# Patient Record
Sex: FEMALE | Race: ASIAN | NOT HISPANIC OR LATINO
[De-identification: names, ages, dates, MRNs, and addresses within clinical notes are randomized per-mention and may not be internally consistent; named-entity substitution may affect disease eponyms.]

---

## 2017-01-05 ENCOUNTER — APPOINTMENT (OUTPATIENT)
Dept: OBGYN | Facility: HOSPITAL | Age: 37
End: 2017-01-05

## 2017-01-05 ENCOUNTER — OUTPATIENT (OUTPATIENT)
Dept: OUTPATIENT SERVICES | Facility: HOSPITAL | Age: 37
LOS: 1 days | End: 2017-01-05

## 2017-01-05 VITALS
SYSTOLIC BLOOD PRESSURE: 125 MMHG | HEART RATE: 79 BPM | WEIGHT: 139 LBS | BODY MASS INDEX: 24.62 KG/M2 | DIASTOLIC BLOOD PRESSURE: 87 MMHG

## 2017-01-05 DIAGNOSIS — Z30.42 ENCOUNTER FOR SURVEILLANCE OF INJECTABLE CONTRACEPTIVE: ICD-10-CM

## 2017-01-05 RX ORDER — MEDROXYPROGESTERONE ACETATE 150 MG/ML
150 INJECTION, SUSPENSION INTRAMUSCULAR
Qty: 0 | Refills: 0 | Status: COMPLETED | OUTPATIENT
Start: 2017-01-05

## 2017-01-05 RX ADMIN — MEDROXYPROGESTERONE ACETATE 0 MG/ML: 150 INJECTION, SUSPENSION INTRAMUSCULAR at 00:00

## 2017-03-23 ENCOUNTER — APPOINTMENT (OUTPATIENT)
Dept: OBGYN | Facility: HOSPITAL | Age: 37
End: 2017-03-23

## 2017-03-23 ENCOUNTER — OUTPATIENT (OUTPATIENT)
Dept: OUTPATIENT SERVICES | Facility: HOSPITAL | Age: 37
LOS: 1 days | End: 2017-03-23

## 2017-03-23 VITALS
WEIGHT: 138 LBS | SYSTOLIC BLOOD PRESSURE: 121 MMHG | HEART RATE: 72 BPM | BODY MASS INDEX: 24.45 KG/M2 | DIASTOLIC BLOOD PRESSURE: 73 MMHG

## 2017-03-23 DIAGNOSIS — Z30.42 ENCOUNTER FOR SURVEILLANCE OF INJECTABLE CONTRACEPTIVE: ICD-10-CM

## 2017-03-23 RX ORDER — MEDROXYPROGESTERONE ACETATE 150 MG/ML
150 INJECTION, SUSPENSION INTRAMUSCULAR
Qty: 0 | Refills: 0 | Status: COMPLETED | OUTPATIENT
Start: 2017-03-23

## 2017-03-23 RX ADMIN — MEDROXYPROGESTERONE ACETATE 0 MG/ML: 150 INJECTION, SUSPENSION INTRAMUSCULAR at 00:00

## 2017-04-03 ENCOUNTER — APPOINTMENT (OUTPATIENT)
Dept: OPHTHALMOLOGY | Facility: CLINIC | Age: 37
End: 2017-04-03

## 2017-05-30 ENCOUNTER — RESULT REVIEW (OUTPATIENT)
Age: 37
End: 2017-05-30

## 2017-05-30 ENCOUNTER — LABORATORY RESULT (OUTPATIENT)
Age: 37
End: 2017-05-30

## 2017-05-30 ENCOUNTER — APPOINTMENT (OUTPATIENT)
Dept: OBGYN | Facility: HOSPITAL | Age: 37
End: 2017-05-30

## 2017-05-30 ENCOUNTER — OUTPATIENT (OUTPATIENT)
Dept: OUTPATIENT SERVICES | Facility: HOSPITAL | Age: 37
LOS: 1 days | End: 2017-05-30

## 2017-05-30 VITALS
BODY MASS INDEX: 24.45 KG/M2 | DIASTOLIC BLOOD PRESSURE: 68 MMHG | HEART RATE: 74 BPM | WEIGHT: 138 LBS | SYSTOLIC BLOOD PRESSURE: 113 MMHG

## 2017-05-30 DIAGNOSIS — Z78.9 OTHER SPECIFIED HEALTH STATUS: ICD-10-CM

## 2017-05-30 LAB
ALBUMIN SERPL ELPH-MCNC: 4.6 G/DL — SIGNIFICANT CHANGE UP (ref 3.3–5)
ALP SERPL-CCNC: 113 U/L — SIGNIFICANT CHANGE UP (ref 40–120)
ALT FLD-CCNC: 35 U/L — HIGH (ref 4–33)
AST SERPL-CCNC: 25 U/L — SIGNIFICANT CHANGE UP (ref 4–32)
BASOPHILS # BLD AUTO: 0.05 K/UL — SIGNIFICANT CHANGE UP (ref 0–0.2)
BASOPHILS NFR BLD AUTO: 1.4 % — SIGNIFICANT CHANGE UP (ref 0–2)
BILIRUB SERPL-MCNC: 0.5 MG/DL — SIGNIFICANT CHANGE UP (ref 0.2–1.2)
BUN SERPL-MCNC: 17 MG/DL — SIGNIFICANT CHANGE UP (ref 7–23)
CALCIUM SERPL-MCNC: 9.5 MG/DL — SIGNIFICANT CHANGE UP (ref 8.4–10.5)
CHLORIDE SERPL-SCNC: 103 MMOL/L — SIGNIFICANT CHANGE UP (ref 98–107)
CO2 SERPL-SCNC: 21 MMOL/L — LOW (ref 22–31)
CREAT SERPL-MCNC: 0.7 MG/DL — SIGNIFICANT CHANGE UP (ref 0.5–1.3)
EOSINOPHIL # BLD AUTO: 0.09 K/UL — SIGNIFICANT CHANGE UP (ref 0–0.5)
EOSINOPHIL NFR BLD AUTO: 2.6 % — SIGNIFICANT CHANGE UP (ref 0–6)
GLUCOSE SERPL-MCNC: 83 MG/DL — SIGNIFICANT CHANGE UP (ref 70–99)
HBA1C BLD-MCNC: 5.5 % — SIGNIFICANT CHANGE UP (ref 4–5.6)
HBV SURFACE AG SER-ACNC: NEGATIVE — SIGNIFICANT CHANGE UP
HCT VFR BLD CALC: 39.2 % — SIGNIFICANT CHANGE UP (ref 34.5–45)
HGB BLD-MCNC: 12.3 G/DL — SIGNIFICANT CHANGE UP (ref 11.5–15.5)
HIV1 AG SER QL: SIGNIFICANT CHANGE UP
HIV1+2 AB SPEC QL: SIGNIFICANT CHANGE UP
IMM GRANULOCYTES NFR BLD AUTO: 0.3 % — SIGNIFICANT CHANGE UP (ref 0–1.5)
LYMPHOCYTES # BLD AUTO: 1.24 K/UL — SIGNIFICANT CHANGE UP (ref 1–3.3)
LYMPHOCYTES # BLD AUTO: 35.3 % — SIGNIFICANT CHANGE UP (ref 13–44)
MCHC RBC-ENTMCNC: 25 PG — LOW (ref 27–34)
MCHC RBC-ENTMCNC: 31.4 % — LOW (ref 32–36)
MCV RBC AUTO: 79.7 FL — LOW (ref 80–100)
MONOCYTES # BLD AUTO: 0.49 K/UL — SIGNIFICANT CHANGE UP (ref 0–0.9)
MONOCYTES NFR BLD AUTO: 14 % — SIGNIFICANT CHANGE UP (ref 2–14)
NEUTROPHILS # BLD AUTO: 1.63 K/UL — LOW (ref 1.8–7.4)
NEUTROPHILS NFR BLD AUTO: 46.4 % — SIGNIFICANT CHANGE UP (ref 43–77)
PLATELET # BLD AUTO: 237 K/UL — SIGNIFICANT CHANGE UP (ref 150–400)
PMV BLD: 10.1 FL — SIGNIFICANT CHANGE UP (ref 7–13)
POTASSIUM SERPL-MCNC: 3.7 MMOL/L — SIGNIFICANT CHANGE UP (ref 3.5–5.3)
POTASSIUM SERPL-SCNC: 3.7 MMOL/L — SIGNIFICANT CHANGE UP (ref 3.5–5.3)
PROT SERPL-MCNC: 7.6 G/DL — SIGNIFICANT CHANGE UP (ref 6–8.3)
RBC # BLD: 4.92 M/UL — SIGNIFICANT CHANGE UP (ref 3.8–5.2)
RBC # FLD: 13.8 % — SIGNIFICANT CHANGE UP (ref 10.3–14.5)
SODIUM SERPL-SCNC: 140 MMOL/L — SIGNIFICANT CHANGE UP (ref 135–145)
T4 FREE SERPL-MCNC: 1.36 NG/DL — SIGNIFICANT CHANGE UP (ref 0.9–1.8)
TSH SERPL-MCNC: 1.43 UIU/ML — SIGNIFICANT CHANGE UP (ref 0.27–4.2)
WBC # BLD: 3.51 K/UL — LOW (ref 3.8–10.5)
WBC # FLD AUTO: 3.51 K/UL — LOW (ref 3.8–10.5)

## 2017-05-30 RX ORDER — MEDROXYPROGESTERONE ACETATE 150 MG/ML
150 INJECTION, SUSPENSION INTRAMUSCULAR
Qty: 0 | Refills: 0 | Status: COMPLETED | OUTPATIENT
Start: 2017-05-30

## 2017-05-30 RX ADMIN — MEDROXYPROGESTERONE ACETATE 0 MG/ML: 150 INJECTION, SUSPENSION INTRAMUSCULAR at 00:00

## 2017-05-31 DIAGNOSIS — Z01.419 ENCOUNTER FOR GYNECOLOGICAL EXAMINATION (GENERAL) (ROUTINE) WITHOUT ABNORMAL FINDINGS: ICD-10-CM

## 2017-05-31 DIAGNOSIS — Z30.42 ENCOUNTER FOR SURVEILLANCE OF INJECTABLE CONTRACEPTIVE: ICD-10-CM

## 2017-05-31 DIAGNOSIS — Z78.9 OTHER SPECIFIED HEALTH STATUS: ICD-10-CM

## 2017-05-31 LAB
C TRACH RRNA SPEC QL NAA+PROBE: SIGNIFICANT CHANGE UP
HCV AB S/CO SERPL IA: 0.13 S/CO — SIGNIFICANT CHANGE UP
HCV AB SERPL-IMP: SIGNIFICANT CHANGE UP
HPV HIGH+LOW RISK DNA PNL CVX: SIGNIFICANT CHANGE UP
N GONORRHOEA RRNA SPEC QL NAA+PROBE: SIGNIFICANT CHANGE UP
SPECIMEN SOURCE: SIGNIFICANT CHANGE UP
T PALLIDUM AB TITR SER: NEGATIVE — SIGNIFICANT CHANGE UP

## 2017-06-01 LAB — CYTOLOGY SPEC DOC CYTO: SIGNIFICANT CHANGE UP

## 2017-08-16 ENCOUNTER — APPOINTMENT (OUTPATIENT)
Dept: OBGYN | Facility: HOSPITAL | Age: 37
End: 2017-08-16

## 2017-08-16 ENCOUNTER — OUTPATIENT (OUTPATIENT)
Dept: OUTPATIENT SERVICES | Facility: HOSPITAL | Age: 37
LOS: 1 days | End: 2017-08-16

## 2017-08-16 VITALS
DIASTOLIC BLOOD PRESSURE: 80 MMHG | BODY MASS INDEX: 24.62 KG/M2 | WEIGHT: 139 LBS | HEART RATE: 90 BPM | SYSTOLIC BLOOD PRESSURE: 135 MMHG

## 2017-08-16 DIAGNOSIS — Z30.42 ENCOUNTER FOR SURVEILLANCE OF INJECTABLE CONTRACEPTIVE: ICD-10-CM

## 2017-08-16 RX ORDER — MEDROXYPROGESTERONE ACETATE 150 MG/ML
150 INJECTION, SUSPENSION INTRAMUSCULAR
Qty: 0 | Refills: 0 | Status: COMPLETED | OUTPATIENT
Start: 2017-08-16

## 2017-08-16 RX ADMIN — MEDROXYPROGESTERONE ACETATE 0 MG/ML: 150 INJECTION, SUSPENSION INTRAMUSCULAR at 00:00

## 2017-10-02 ENCOUNTER — RX RENEWAL (OUTPATIENT)
Age: 37
End: 2017-10-02

## 2017-10-09 ENCOUNTER — APPOINTMENT (OUTPATIENT)
Dept: OPHTHALMOLOGY | Facility: CLINIC | Age: 37
End: 2017-10-09
Payer: COMMERCIAL

## 2017-10-09 DIAGNOSIS — H53.001 UNSPECIFIED AMBLYOPIA, RIGHT EYE: ICD-10-CM

## 2017-10-09 PROCEDURE — 92012 INTRM OPH EXAM EST PATIENT: CPT

## 2017-10-09 PROCEDURE — 92133 CPTRZD OPH DX IMG PST SGM ON: CPT

## 2017-10-09 PROCEDURE — 92226: CPT | Mod: RT

## 2017-11-01 ENCOUNTER — APPOINTMENT (OUTPATIENT)
Dept: OBGYN | Facility: HOSPITAL | Age: 37
End: 2017-11-01

## 2017-11-01 ENCOUNTER — OUTPATIENT (OUTPATIENT)
Dept: OUTPATIENT SERVICES | Facility: HOSPITAL | Age: 37
LOS: 1 days | End: 2017-11-01

## 2017-11-01 VITALS
WEIGHT: 139 LBS | SYSTOLIC BLOOD PRESSURE: 132 MMHG | DIASTOLIC BLOOD PRESSURE: 72 MMHG | BODY MASS INDEX: 24.63 KG/M2 | HEART RATE: 74 BPM | HEIGHT: 63 IN

## 2017-11-01 DIAGNOSIS — Z30.42 ENCOUNTER FOR SURVEILLANCE OF INJECTABLE CONTRACEPTIVE: ICD-10-CM

## 2017-11-01 RX ORDER — MEDROXYPROGESTERONE ACETATE 150 MG/ML
150 INJECTION, SUSPENSION INTRAMUSCULAR
Qty: 0 | Refills: 0 | Status: COMPLETED | OUTPATIENT
Start: 2017-11-01

## 2017-11-01 RX ADMIN — MEDROXYPROGESTERONE ACETATE 0 MG/ML: 150 INJECTION, SUSPENSION INTRAMUSCULAR at 00:00

## 2018-01-11 ENCOUNTER — OUTPATIENT (OUTPATIENT)
Dept: OUTPATIENT SERVICES | Facility: HOSPITAL | Age: 38
LOS: 1 days | End: 2018-01-11

## 2018-01-11 ENCOUNTER — APPOINTMENT (OUTPATIENT)
Dept: OBGYN | Facility: HOSPITAL | Age: 38
End: 2018-01-11

## 2018-01-11 VITALS
WEIGHT: 141.19 LBS | DIASTOLIC BLOOD PRESSURE: 74 MMHG | BODY MASS INDEX: 25.01 KG/M2 | HEART RATE: 78 BPM | SYSTOLIC BLOOD PRESSURE: 116 MMHG

## 2018-01-11 DIAGNOSIS — Z30.42 ENCOUNTER FOR SURVEILLANCE OF INJECTABLE CONTRACEPTIVE: ICD-10-CM

## 2018-01-11 RX ORDER — MEDROXYPROGESTERONE ACETATE 150 MG/ML
150 INJECTION, SUSPENSION INTRAMUSCULAR
Qty: 0 | Refills: 0 | Status: COMPLETED | OUTPATIENT
Start: 2018-01-11

## 2018-01-11 RX ADMIN — MEDROXYPROGESTERONE ACETATE 0 MG/ML: 150 INJECTION, SUSPENSION INTRAMUSCULAR at 00:00

## 2018-02-26 ENCOUNTER — LABORATORY RESULT (OUTPATIENT)
Age: 38
End: 2018-02-26

## 2018-02-26 ENCOUNTER — APPOINTMENT (OUTPATIENT)
Dept: INTERNAL MEDICINE | Facility: HOSPITAL | Age: 38
End: 2018-02-26
Payer: COMMERCIAL

## 2018-02-26 ENCOUNTER — OTHER (OUTPATIENT)
Age: 38
End: 2018-02-26

## 2018-02-26 ENCOUNTER — OUTPATIENT (OUTPATIENT)
Dept: OUTPATIENT SERVICES | Facility: HOSPITAL | Age: 38
LOS: 1 days | End: 2018-02-26

## 2018-02-26 VITALS — HEART RATE: 64 BPM | DIASTOLIC BLOOD PRESSURE: 70 MMHG | SYSTOLIC BLOOD PRESSURE: 120 MMHG

## 2018-02-26 VITALS — HEIGHT: 63 IN | BODY MASS INDEX: 24.98 KG/M2 | WEIGHT: 141 LBS

## 2018-02-26 DIAGNOSIS — Z30.41 ENCOUNTER FOR SURVEILLANCE OF CONTRACEPTIVE PILLS: ICD-10-CM

## 2018-02-26 DIAGNOSIS — T78.1XXD OTHER ADVERSE FOOD REACTIONS, NOT ELSEWHERE CLASSIFIED, SUBSEQUENT ENCOUNTER: ICD-10-CM

## 2018-02-26 DIAGNOSIS — T88.7XXD UNSPECIFIED ADVERSE EFFECT OF DRUG OR MEDICAMENT, SUBSEQUENT ENCOUNTER: ICD-10-CM

## 2018-02-26 DIAGNOSIS — Z01.419 ENCOUNTER FOR GYNECOLOGICAL EXAMINATION (GENERAL) (ROUTINE) W/OUT ABNORMAL FINDINGS: ICD-10-CM

## 2018-02-26 DIAGNOSIS — R71.8 OTHER ABNORMALITY OF RED BLOOD CELLS: ICD-10-CM

## 2018-02-26 DIAGNOSIS — H40.003 PREGLAUCOMA, UNSPECIFIED, BILATERAL: ICD-10-CM

## 2018-02-26 DIAGNOSIS — N64.4 MASTODYNIA: ICD-10-CM

## 2018-02-26 DIAGNOSIS — H04.123 DRY EYE SYNDROME OF BILATERAL LACRIMAL GLANDS: ICD-10-CM

## 2018-02-26 DIAGNOSIS — Z88.0 ALLERGY STATUS TO PENICILLIN: ICD-10-CM

## 2018-02-26 LAB
BASOPHILS # BLD AUTO: 0.05 K/UL — SIGNIFICANT CHANGE UP (ref 0–0.2)
BASOPHILS NFR BLD AUTO: 0.8 % — SIGNIFICANT CHANGE UP (ref 0–2)
EOSINOPHIL # BLD AUTO: 0.09 K/UL — SIGNIFICANT CHANGE UP (ref 0–0.5)
EOSINOPHIL NFR BLD AUTO: 1.4 % — SIGNIFICANT CHANGE UP (ref 0–6)
FERRITIN SERPL-MCNC: 68.66 NG/ML — SIGNIFICANT CHANGE UP (ref 15–150)
HCT VFR BLD CALC: 40.3 % — SIGNIFICANT CHANGE UP (ref 34.5–45)
HGB BLD-MCNC: 12.5 G/DL — SIGNIFICANT CHANGE UP (ref 11.5–15.5)
IMM GRANULOCYTES # BLD AUTO: 0.02 # — SIGNIFICANT CHANGE UP
IMM GRANULOCYTES NFR BLD AUTO: 0.3 % — SIGNIFICANT CHANGE UP (ref 0–1.5)
IRON SATN MFR SERPL: 66 UG/DL — SIGNIFICANT CHANGE UP (ref 30–160)
LYMPHOCYTES # BLD AUTO: 1.24 K/UL — SIGNIFICANT CHANGE UP (ref 1–3.3)
LYMPHOCYTES # BLD AUTO: 19.8 % — SIGNIFICANT CHANGE UP (ref 13–44)
MCHC RBC-ENTMCNC: 25.3 PG — LOW (ref 27–34)
MCHC RBC-ENTMCNC: 31 % — LOW (ref 32–36)
MCV RBC AUTO: 81.6 FL — SIGNIFICANT CHANGE UP (ref 80–100)
MONOCYTES # BLD AUTO: 0.39 K/UL — SIGNIFICANT CHANGE UP (ref 0–0.9)
MONOCYTES NFR BLD AUTO: 6.2 % — SIGNIFICANT CHANGE UP (ref 2–14)
NEUTROPHILS # BLD AUTO: 4.48 K/UL — SIGNIFICANT CHANGE UP (ref 1.8–7.4)
NEUTROPHILS NFR BLD AUTO: 71.5 % — SIGNIFICANT CHANGE UP (ref 43–77)
NRBC # FLD: 0 — SIGNIFICANT CHANGE UP
PLATELET # BLD AUTO: 245 K/UL — SIGNIFICANT CHANGE UP (ref 150–400)
PMV BLD: 10.3 FL — SIGNIFICANT CHANGE UP (ref 7–13)
RBC # BLD: 4.94 M/UL — SIGNIFICANT CHANGE UP (ref 3.8–5.2)
RBC # FLD: 13.6 % — SIGNIFICANT CHANGE UP (ref 10.3–14.5)
WBC # BLD: 6.27 K/UL — SIGNIFICANT CHANGE UP (ref 3.8–10.5)
WBC # FLD AUTO: 6.27 K/UL — SIGNIFICANT CHANGE UP (ref 3.8–10.5)

## 2018-02-26 PROCEDURE — 99385 PREV VISIT NEW AGE 18-39: CPT | Mod: GC

## 2018-02-27 PROBLEM — R71.8 LOW MEAN CORPUSCULAR VOLUME (MCV): Status: ACTIVE | Noted: 2018-02-27

## 2018-02-27 LAB
MEV IGG SER-ACNC: >300 AU/ML — SIGNIFICANT CHANGE UP
MEV IGG+IGM SER-IMP: POSITIVE — SIGNIFICANT CHANGE UP
MUV AB SER-ACNC: NEGATIVE — SIGNIFICANT CHANGE UP
MUV IGG FLD-ACNC: <5 AU/ML — SIGNIFICANT CHANGE UP
RUBV IGG SER-ACNC: 1.5 INDEX — SIGNIFICANT CHANGE UP
RUBV IGG SER-IMP: POSITIVE — SIGNIFICANT CHANGE UP
VZV IGG FLD QL IA: 34.1 INDEX — SIGNIFICANT CHANGE UP
VZV IGG FLD QL IA: NEGATIVE — SIGNIFICANT CHANGE UP

## 2018-02-28 DIAGNOSIS — J30.9 ALLERGIC RHINITIS, UNSPECIFIED: ICD-10-CM

## 2018-02-28 DIAGNOSIS — Z88.0 ALLERGY STATUS TO PENICILLIN: ICD-10-CM

## 2018-02-28 DIAGNOSIS — R71.8 OTHER ABNORMALITY OF RED BLOOD CELLS: ICD-10-CM

## 2018-02-28 DIAGNOSIS — J45.20 MILD INTERMITTENT ASTHMA, UNCOMPLICATED: ICD-10-CM

## 2018-02-28 DIAGNOSIS — Z00.00 ENCOUNTER FOR GENERAL ADULT MEDICAL EXAMINATION WITHOUT ABNORMAL FINDINGS: ICD-10-CM

## 2018-02-28 DIAGNOSIS — Z88.9 ALLERGY STATUS TO UNSPECIFIED DRUGS, MEDICAMENTS AND BIOLOGICAL SUBSTANCES: ICD-10-CM

## 2018-02-28 LAB
MEV IGM SER-ACNC: <20 AU/ML — SIGNIFICANT CHANGE UP (ref 0–19.9)
MEV IGM SER-ACNC: NEGATIVE — SIGNIFICANT CHANGE UP

## 2018-03-02 ENCOUNTER — OUTPATIENT (OUTPATIENT)
Dept: OUTPATIENT SERVICES | Facility: HOSPITAL | Age: 38
LOS: 1 days | End: 2018-03-02

## 2018-03-02 ENCOUNTER — APPOINTMENT (OUTPATIENT)
Dept: INTERNAL MEDICINE | Facility: HOSPITAL | Age: 38
End: 2018-03-02

## 2018-03-02 LAB — MISCELLANEOUS - CHEM: SIGNIFICANT CHANGE UP

## 2018-03-05 DIAGNOSIS — Z23 ENCOUNTER FOR IMMUNIZATION: ICD-10-CM

## 2018-04-04 ENCOUNTER — APPOINTMENT (OUTPATIENT)
Dept: OBGYN | Facility: HOSPITAL | Age: 38
End: 2018-04-04

## 2018-04-04 ENCOUNTER — OUTPATIENT (OUTPATIENT)
Dept: OUTPATIENT SERVICES | Facility: HOSPITAL | Age: 38
LOS: 1 days | End: 2018-04-04

## 2018-04-04 ENCOUNTER — MED ADMIN CHARGE (OUTPATIENT)
Age: 38
End: 2018-04-04

## 2018-04-04 VITALS
DIASTOLIC BLOOD PRESSURE: 78 MMHG | SYSTOLIC BLOOD PRESSURE: 114 MMHG | HEART RATE: 96 BPM | BODY MASS INDEX: 24.86 KG/M2 | WEIGHT: 140.31 LBS

## 2018-04-04 DIAGNOSIS — Z30.42 ENCOUNTER FOR SURVEILLANCE OF INJECTABLE CONTRACEPTIVE: ICD-10-CM

## 2018-04-04 RX ORDER — MEDROXYPROGESTERONE ACETATE 150 MG/ML
150 INJECTION, SUSPENSION INTRAMUSCULAR
Qty: 0 | Refills: 0 | Status: COMPLETED | OUTPATIENT
Start: 2018-04-04

## 2018-04-04 RX ADMIN — MEDROXYPROGESTERONE ACETATE 0 MG/ML: 150 INJECTION, SUSPENSION INTRAMUSCULAR at 00:00

## 2018-04-09 ENCOUNTER — APPOINTMENT (OUTPATIENT)
Dept: OPHTHALMOLOGY | Facility: CLINIC | Age: 38
End: 2018-04-09

## 2018-06-27 ENCOUNTER — LABORATORY RESULT (OUTPATIENT)
Age: 38
End: 2018-06-27

## 2018-06-27 ENCOUNTER — MED ADMIN CHARGE (OUTPATIENT)
Age: 38
End: 2018-06-27

## 2018-06-27 ENCOUNTER — APPOINTMENT (OUTPATIENT)
Dept: OBGYN | Facility: HOSPITAL | Age: 38
End: 2018-06-27

## 2018-06-27 ENCOUNTER — OUTPATIENT (OUTPATIENT)
Dept: OUTPATIENT SERVICES | Facility: HOSPITAL | Age: 38
LOS: 1 days | End: 2018-06-27

## 2018-06-27 VITALS
HEART RATE: 67 BPM | WEIGHT: 144 LBS | BODY MASS INDEX: 25.51 KG/M2 | DIASTOLIC BLOOD PRESSURE: 75 MMHG | SYSTOLIC BLOOD PRESSURE: 125 MMHG

## 2018-06-27 DIAGNOSIS — Z87.42 PERSONAL HISTORY OF OTHER DISEASES OF THE FEMALE GENITAL TRACT: ICD-10-CM

## 2018-06-27 DIAGNOSIS — Z01.419 ENCOUNTER FOR GYNECOLOGICAL EXAMINATION (GENERAL) (ROUTINE) WITHOUT ABNORMAL FINDINGS: ICD-10-CM

## 2018-06-27 DIAGNOSIS — Z30.42 ENCOUNTER FOR SURVEILLANCE OF INJECTABLE CONTRACEPTIVE: ICD-10-CM

## 2018-06-27 DIAGNOSIS — M25.569 PAIN IN UNSPECIFIED KNEE: ICD-10-CM

## 2018-06-27 LAB
24R-OH-CALCIDIOL SERPL-MCNC: 12.3 NG/ML — LOW (ref 30–80)
ALBUMIN SERPL ELPH-MCNC: 4.5 G/DL — SIGNIFICANT CHANGE UP (ref 3.3–5)
ALP SERPL-CCNC: 87 U/L — SIGNIFICANT CHANGE UP (ref 40–120)
ALT FLD-CCNC: 13 U/L — SIGNIFICANT CHANGE UP (ref 4–33)
AST SERPL-CCNC: 15 U/L — SIGNIFICANT CHANGE UP (ref 4–32)
BILIRUB SERPL-MCNC: 0.6 MG/DL — SIGNIFICANT CHANGE UP (ref 0.2–1.2)
BUN SERPL-MCNC: 12 MG/DL — SIGNIFICANT CHANGE UP (ref 7–23)
CALCIUM SERPL-MCNC: 9.1 MG/DL — SIGNIFICANT CHANGE UP (ref 8.4–10.5)
CHLORIDE SERPL-SCNC: 105 MMOL/L — SIGNIFICANT CHANGE UP (ref 98–107)
CO2 SERPL-SCNC: 23 MMOL/L — SIGNIFICANT CHANGE UP (ref 22–31)
CREAT SERPL-MCNC: 0.67 MG/DL — SIGNIFICANT CHANGE UP (ref 0.5–1.3)
GLUCOSE SERPL-MCNC: 80 MG/DL — SIGNIFICANT CHANGE UP (ref 70–99)
HBV SURFACE AG SER-ACNC: NONREACTIVE — SIGNIFICANT CHANGE UP
HCV AB S/CO SERPL IA: 0.1 S/CO — SIGNIFICANT CHANGE UP
HCV AB SERPL-IMP: SIGNIFICANT CHANGE UP
HIV 1+2 AB+HIV1 P24 AG SERPL QL IA: SIGNIFICANT CHANGE UP
POTASSIUM SERPL-MCNC: 3.5 MMOL/L — SIGNIFICANT CHANGE UP (ref 3.5–5.3)
POTASSIUM SERPL-SCNC: 3.5 MMOL/L — SIGNIFICANT CHANGE UP (ref 3.5–5.3)
PROT SERPL-MCNC: 7.6 G/DL — SIGNIFICANT CHANGE UP (ref 6–8.3)
SODIUM SERPL-SCNC: 141 MMOL/L — SIGNIFICANT CHANGE UP (ref 135–145)
T4 FREE SERPL-MCNC: 1.54 NG/DL — SIGNIFICANT CHANGE UP (ref 0.9–1.8)
TSH SERPL-MCNC: 1.11 UIU/ML — SIGNIFICANT CHANGE UP (ref 0.27–4.2)

## 2018-06-27 RX ORDER — MEDROXYPROGESTERONE ACETATE 150 MG/ML
150 INJECTION, SUSPENSION INTRAMUSCULAR
Qty: 0 | Refills: 0 | Status: COMPLETED | OUTPATIENT
Start: 2018-06-27

## 2018-06-27 RX ADMIN — MEDROXYPROGESTERONE ACETATE 0 MG/ML: 150 INJECTION, SUSPENSION INTRAMUSCULAR at 00:00

## 2018-06-28 DIAGNOSIS — E55.9 VITAMIN D DEFICIENCY, UNSPECIFIED: ICD-10-CM

## 2018-06-28 LAB
C TRACH RRNA SPEC QL NAA+PROBE: SIGNIFICANT CHANGE UP
N GONORRHOEA RRNA SPEC QL NAA+PROBE: SIGNIFICANT CHANGE UP
SPECIMEN SOURCE: SIGNIFICANT CHANGE UP

## 2018-06-28 RX ORDER — CHROMIUM 200 MCG
1000 TABLET ORAL DAILY
Qty: 30 | Refills: 5 | Status: COMPLETED | COMMUNITY
Start: 2018-06-28 | End: 2018-12-25

## 2018-07-11 ENCOUNTER — APPOINTMENT (OUTPATIENT)
Dept: ORTHOPEDIC SURGERY | Facility: HOSPITAL | Age: 38
End: 2018-07-11

## 2018-08-07 ENCOUNTER — RX RENEWAL (OUTPATIENT)
Age: 38
End: 2018-08-07

## 2018-08-15 ENCOUNTER — APPOINTMENT (OUTPATIENT)
Dept: ORTHOPEDIC SURGERY | Facility: HOSPITAL | Age: 38
End: 2018-08-15

## 2018-09-12 ENCOUNTER — APPOINTMENT (OUTPATIENT)
Dept: OBGYN | Facility: HOSPITAL | Age: 38
End: 2018-09-12

## 2018-09-12 ENCOUNTER — OUTPATIENT (OUTPATIENT)
Dept: OUTPATIENT SERVICES | Facility: HOSPITAL | Age: 38
LOS: 1 days | End: 2018-09-12

## 2018-09-12 ENCOUNTER — MED ADMIN CHARGE (OUTPATIENT)
Age: 38
End: 2018-09-12

## 2018-09-12 DIAGNOSIS — Z30.42 ENCOUNTER FOR SURVEILLANCE OF INJECTABLE CONTRACEPTIVE: ICD-10-CM

## 2018-09-12 RX ORDER — MEDROXYPROGESTERONE ACETATE 150 MG/ML
150 INJECTION, SUSPENSION INTRAMUSCULAR
Qty: 0 | Refills: 0 | Status: COMPLETED | OUTPATIENT
Start: 2018-09-12

## 2018-09-12 RX ADMIN — MEDROXYPROGESTERONE ACETATE 0 MG/ML: 150 INJECTION, SUSPENSION INTRAMUSCULAR at 00:00

## 2018-11-21 ENCOUNTER — OUTPATIENT (OUTPATIENT)
Dept: OUTPATIENT SERVICES | Facility: HOSPITAL | Age: 38
LOS: 1 days | End: 2018-11-21

## 2018-11-21 ENCOUNTER — APPOINTMENT (OUTPATIENT)
Dept: OBGYN | Facility: HOSPITAL | Age: 38
End: 2018-11-21

## 2018-11-21 VITALS
WEIGHT: 144 LBS | SYSTOLIC BLOOD PRESSURE: 117 MMHG | HEIGHT: 63 IN | BODY MASS INDEX: 25.52 KG/M2 | DIASTOLIC BLOOD PRESSURE: 83 MMHG | HEART RATE: 72 BPM

## 2018-11-21 DIAGNOSIS — Z30.42 ENCOUNTER FOR SURVEILLANCE OF INJECTABLE CONTRACEPTIVE: ICD-10-CM

## 2018-11-21 RX ORDER — MEDROXYPROGESTERONE ACETATE 150 MG/ML
150 INJECTION, SUSPENSION INTRAMUSCULAR
Qty: 0 | Refills: 0 | Status: COMPLETED | OUTPATIENT
Start: 2018-11-21

## 2018-11-21 RX ADMIN — MEDROXYPROGESTERONE ACETATE 0 MG/ML: 150 INJECTION, SUSPENSION INTRAMUSCULAR at 00:00

## 2019-02-05 ENCOUNTER — APPOINTMENT (OUTPATIENT)
Dept: OBGYN | Facility: HOSPITAL | Age: 39
End: 2019-02-05

## 2019-02-05 ENCOUNTER — OUTPATIENT (OUTPATIENT)
Dept: OUTPATIENT SERVICES | Facility: HOSPITAL | Age: 39
LOS: 1 days | End: 2019-02-05

## 2019-02-05 VITALS
SYSTOLIC BLOOD PRESSURE: 129 MMHG | HEIGHT: 63 IN | BODY MASS INDEX: 25.77 KG/M2 | HEART RATE: 79 BPM | DIASTOLIC BLOOD PRESSURE: 89 MMHG | WEIGHT: 145.44 LBS

## 2019-02-05 DIAGNOSIS — Z30.42 ENCOUNTER FOR SURVEILLANCE OF INJECTABLE CONTRACEPTIVE: ICD-10-CM

## 2019-02-05 RX ORDER — MEDROXYPROGESTERONE ACETATE 150 MG/ML
150 INJECTION, SUSPENSION INTRAMUSCULAR
Qty: 0 | Refills: 0 | Status: COMPLETED | OUTPATIENT
Start: 2019-02-05

## 2019-02-05 RX ADMIN — MEDROXYPROGESTERONE ACETATE 0 MG/ML: 150 INJECTION, SUSPENSION INTRAMUSCULAR at 00:00

## 2019-02-05 NOTE — DISCUSSION/SUMMARY
[Depo Provera] : depo provera placement [Pregnancy Prevention] : pregnancy prevention [Bleeding Control] : bleeding control

## 2019-04-04 ENCOUNTER — OUTPATIENT (OUTPATIENT)
Dept: OUTPATIENT SERVICES | Facility: HOSPITAL | Age: 39
LOS: 1 days | End: 2019-04-04
Payer: COMMERCIAL

## 2019-04-04 ENCOUNTER — TRANSCRIPTION ENCOUNTER (OUTPATIENT)
Age: 39
End: 2019-04-04

## 2019-04-04 ENCOUNTER — APPOINTMENT (OUTPATIENT)
Dept: ULTRASOUND IMAGING | Facility: IMAGING CENTER | Age: 39
End: 2019-04-04
Payer: COMMERCIAL

## 2019-04-04 DIAGNOSIS — R10.2 PELVIC AND PERINEAL PAIN: ICD-10-CM

## 2019-04-04 PROCEDURE — 76830 TRANSVAGINAL US NON-OB: CPT | Mod: 26

## 2019-04-04 PROCEDURE — 76830 TRANSVAGINAL US NON-OB: CPT

## 2019-04-11 ENCOUNTER — OUTPATIENT (OUTPATIENT)
Dept: OUTPATIENT SERVICES | Facility: HOSPITAL | Age: 39
LOS: 1 days | End: 2019-04-11

## 2019-04-11 ENCOUNTER — APPOINTMENT (OUTPATIENT)
Dept: OBGYN | Facility: HOSPITAL | Age: 39
End: 2019-04-11
Payer: COMMERCIAL

## 2019-04-11 VITALS
BODY MASS INDEX: 26.17 KG/M2 | DIASTOLIC BLOOD PRESSURE: 69 MMHG | SYSTOLIC BLOOD PRESSURE: 121 MMHG | WEIGHT: 147.71 LBS | HEIGHT: 63 IN | HEART RATE: 86 BPM

## 2019-04-11 DIAGNOSIS — N70.11 CHRONIC SALPINGITIS: ICD-10-CM

## 2019-04-11 PROCEDURE — 99213 OFFICE O/P EST LOW 20 MIN: CPT | Mod: GE

## 2019-04-11 RX ORDER — MEDROXYPROGESTERONE ACETATE 150 MG/ML
150 INJECTION, SUSPENSION INTRAMUSCULAR
Qty: 0 | Refills: 0 | Status: COMPLETED | OUTPATIENT
Start: 2019-04-11

## 2019-04-11 RX ADMIN — MEDROXYPROGESTERONE ACETATE 0 MG/ML: 150 INJECTION, SUSPENSION INTRAMUSCULAR at 00:00

## 2019-04-12 DIAGNOSIS — N70.11 CHRONIC SALPINGITIS: ICD-10-CM

## 2019-04-12 DIAGNOSIS — Z30.42 ENCOUNTER FOR SURVEILLANCE OF INJECTABLE CONTRACEPTIVE: ICD-10-CM

## 2019-04-23 ENCOUNTER — APPOINTMENT (OUTPATIENT)
Dept: OBGYN | Facility: HOSPITAL | Age: 39
End: 2019-04-23

## 2019-04-24 ENCOUNTER — OUTPATIENT (OUTPATIENT)
Dept: OUTPATIENT SERVICES | Facility: HOSPITAL | Age: 39
LOS: 1 days | End: 2019-04-24

## 2019-04-24 VITALS
OXYGEN SATURATION: 98 % | WEIGHT: 147.93 LBS | SYSTOLIC BLOOD PRESSURE: 110 MMHG | TEMPERATURE: 99 F | DIASTOLIC BLOOD PRESSURE: 60 MMHG | RESPIRATION RATE: 16 BRPM | HEIGHT: 64 IN | HEART RATE: 88 BPM

## 2019-04-24 DIAGNOSIS — N70.11 CHRONIC SALPINGITIS: ICD-10-CM

## 2019-04-24 LAB
BASOPHILS # BLD AUTO: 0.06 K/UL — SIGNIFICANT CHANGE UP (ref 0–0.2)
BASOPHILS NFR BLD AUTO: 1.2 % — SIGNIFICANT CHANGE UP (ref 0–2)
BLD GP AB SCN SERPL QL: NEGATIVE — SIGNIFICANT CHANGE UP
EOSINOPHIL # BLD AUTO: 0.17 K/UL — SIGNIFICANT CHANGE UP (ref 0–0.5)
EOSINOPHIL NFR BLD AUTO: 3.3 % — SIGNIFICANT CHANGE UP (ref 0–6)
HCG SERPL-ACNC: < 5 MIU/ML — SIGNIFICANT CHANGE UP
HCT VFR BLD CALC: 39.6 % — SIGNIFICANT CHANGE UP (ref 34.5–45)
HGB BLD-MCNC: 12.1 G/DL — SIGNIFICANT CHANGE UP (ref 11.5–15.5)
IMM GRANULOCYTES NFR BLD AUTO: 0.4 % — SIGNIFICANT CHANGE UP (ref 0–1.5)
LYMPHOCYTES # BLD AUTO: 0.94 K/UL — LOW (ref 1–3.3)
LYMPHOCYTES # BLD AUTO: 18 % — SIGNIFICANT CHANGE UP (ref 13–44)
MCHC RBC-ENTMCNC: 25.4 PG — LOW (ref 27–34)
MCHC RBC-ENTMCNC: 30.6 % — LOW (ref 32–36)
MCV RBC AUTO: 83.2 FL — SIGNIFICANT CHANGE UP (ref 80–100)
MONOCYTES # BLD AUTO: 0.52 K/UL — SIGNIFICANT CHANGE UP (ref 0–0.9)
MONOCYTES NFR BLD AUTO: 10 % — SIGNIFICANT CHANGE UP (ref 2–14)
NEUTROPHILS # BLD AUTO: 3.5 K/UL — SIGNIFICANT CHANGE UP (ref 1.8–7.4)
NEUTROPHILS NFR BLD AUTO: 67.1 % — SIGNIFICANT CHANGE UP (ref 43–77)
NRBC # FLD: 0 K/UL — SIGNIFICANT CHANGE UP (ref 0–0)
PLATELET # BLD AUTO: 250 K/UL — SIGNIFICANT CHANGE UP (ref 150–400)
PMV BLD: 10.1 FL — SIGNIFICANT CHANGE UP (ref 7–13)
RBC # BLD: 4.76 M/UL — SIGNIFICANT CHANGE UP (ref 3.8–5.2)
RBC # FLD: 13 % — SIGNIFICANT CHANGE UP (ref 10.3–14.5)
RH IG SCN BLD-IMP: POSITIVE — SIGNIFICANT CHANGE UP
WBC # BLD: 5.21 K/UL — SIGNIFICANT CHANGE UP (ref 3.8–10.5)
WBC # FLD AUTO: 5.21 K/UL — SIGNIFICANT CHANGE UP (ref 3.8–10.5)

## 2019-04-24 RX ORDER — SODIUM CHLORIDE 9 MG/ML
1000 INJECTION, SOLUTION INTRAVENOUS
Qty: 0 | Refills: 0 | Status: DISCONTINUED | OUTPATIENT
Start: 2019-05-02 | End: 2019-05-17

## 2019-04-24 NOTE — H&P PST ADULT - NSICDXPROBLEM_GEN_ALL_CORE_FT
PROBLEM DIAGNOSES  Problem: Chronic salpingitis  Assessment and Plan: Scheduled for laparoscopic bilateral salpingectomy on 04/30/2019.  Pre-Op instructions provided to patient.   Pepcid for GI prophylaxis provided.   Surgical scrub instructions reviewed and provided to patient.

## 2019-04-24 NOTE — H&P PST ADULT - ATTENDING COMMENTS
Patient seen and assessed by me. Agree with assessment and plan. Known chronic hydrosalpinx and recently increased abdominal pain. Patient also desires permanent sterilization, so will proceed with bilateral salpingectomy. Risks, benefits, and alternatives discussed. All questions answered. Consent signed. Of note, sterilization consent signed <30 days prior, however, owing to patient's pain, will proceed with this case due to urgent nature.

## 2019-04-24 NOTE — H&P PST ADULT - GENITOURINARY COMMENTS
s/p Depo, states she feels menstrual cramps r/t pre-op diagnosis s/p Depo, states she feels menstrual cramps r/t pre-op diagnosis chronic salpingitis

## 2019-04-24 NOTE — H&P PST ADULT - HISTORY OF PRESENT ILLNESS
37 yo  presents to PST unit with pre-op diagnosis of salpingitis scheduled for laparoscopic bilateral salpingectomy on 2019.

## 2019-04-24 NOTE — H&P PST ADULT - RS GEN PE MLT RESP DETAILS PC
respirations non-labored/breath sounds equal/good air movement/clear to auscultation bilaterally/airway patent/no wheezes

## 2019-04-24 NOTE — H&P PST ADULT - ASSESSMENT
39 yo  presents to PST unit with pre-op diagnosis of salpingitis scheduled for laparoscopic bilateral salpingectomy on 2019.

## 2019-04-27 NOTE — PHYSICAL EXAM
[Awake] : awake [Soft] : soft [Alert] : alert [Acute Distress] : no acute distress [Tender] : non tender [Distended] : not distended

## 2019-05-01 ENCOUNTER — TRANSCRIPTION ENCOUNTER (OUTPATIENT)
Age: 39
End: 2019-05-01

## 2019-05-02 ENCOUNTER — OUTPATIENT (OUTPATIENT)
Dept: OUTPATIENT SERVICES | Facility: HOSPITAL | Age: 39
LOS: 1 days | Discharge: HOME | End: 2019-05-02
Payer: COMMERCIAL

## 2019-05-02 ENCOUNTER — RESULT REVIEW (OUTPATIENT)
Age: 39
End: 2019-05-02

## 2019-05-02 VITALS
RESPIRATION RATE: 20 BRPM | TEMPERATURE: 98 F | DIASTOLIC BLOOD PRESSURE: 80 MMHG | SYSTOLIC BLOOD PRESSURE: 112 MMHG | OXYGEN SATURATION: 98 % | HEART RATE: 80 BPM

## 2019-05-02 VITALS
DIASTOLIC BLOOD PRESSURE: 62 MMHG | SYSTOLIC BLOOD PRESSURE: 121 MMHG | RESPIRATION RATE: 14 BRPM | TEMPERATURE: 98 F | HEIGHT: 64 IN | WEIGHT: 147.93 LBS | OXYGEN SATURATION: 100 % | HEART RATE: 79 BPM

## 2019-05-02 DIAGNOSIS — N70.11 CHRONIC SALPINGITIS: ICD-10-CM

## 2019-05-02 LAB — HCG UR QL: NEGATIVE — SIGNIFICANT CHANGE UP

## 2019-05-02 PROCEDURE — 88302 TISSUE EXAM BY PATHOLOGIST: CPT | Mod: 26

## 2019-05-02 PROCEDURE — 58661 LAPAROSCOPY REMOVE ADNEXA: CPT | Mod: GC

## 2019-05-02 RX ORDER — MEDROXYPROGESTERONE ACETATE 150 MG/ML
0 INJECTION, SUSPENSION, EXTENDED RELEASE INTRAMUSCULAR
Qty: 0 | Refills: 0 | COMMUNITY

## 2019-05-02 NOTE — BRIEF OPERATIVE NOTE - OPERATION/FINDINGS
Grossly normal internal anatomy including liver, bowel, uterus, tubes, and ovaries. B/L tubes were removed without complications. Left sided adhesions attached to bowel with omentum coming through the adhesion windows that were taken down with cold cutting using ligasure.

## 2019-05-02 NOTE — ASU DISCHARGE PLAN (ADULT/PEDIATRIC) - MEDICATION INSTRUCTIONS
You can take up to 600mg Ibuprofen every 6 hours and/or tylenol 975mg every 6 hours You can take up to tylenol 975mg every 6 hours

## 2019-05-02 NOTE — ASU DISCHARGE PLAN (ADULT/PEDIATRIC) - PROVIDER TOKENS
FREE:[LAST:[Clinic],PHONE:[(   )    -],FAX:[(   )    -],ADDRESS:[Please follow up in 2 weeks after your surgery    Your  visit will be at the Ambulatory Clinic Unit, [facility]: Oncology Building, 3rd floor.    Please schedule an appointment (PHONE # [phone], call after 9 am) or visit during the walk-in hours as follows: MONDAY 3-6 PM, WEDNESDAY 3-6 PM, FRIDAY 9-11 AM; 1-3 PM]]

## 2019-05-02 NOTE — ASU DISCHARGE PLAN (ADULT/PEDIATRIC) - CARE PROVIDER_API CALL
Clinic,   Please follow up in 2 weeks after your surgery    Your  visit will be at the Ambulatory Clinic Unit, [facility]: Oncology Building, 3rd floor.    Please schedule an appointment (PHONE # [phone], call after 9 am) or visit during the walk-in hours as follows: MONDAY 3-6 PM, WEDNESDAY 3-6 PM, FRIDAY 9-11 AM; 1-3 PM  Phone: (   )    -  Fax: (   )    -  Follow Up Time:

## 2019-05-03 PROBLEM — N70.11 CHRONIC SALPINGITIS: Chronic | Status: ACTIVE | Noted: 2019-04-24

## 2019-05-09 ENCOUNTER — EMERGENCY (EMERGENCY)
Facility: HOSPITAL | Age: 39
LOS: 1 days | Discharge: HOME | End: 2019-05-09
Attending: EMERGENCY MEDICINE | Admitting: EMERGENCY MEDICINE
Payer: COMMERCIAL

## 2019-05-09 VITALS
SYSTOLIC BLOOD PRESSURE: 121 MMHG | OXYGEN SATURATION: 99 % | DIASTOLIC BLOOD PRESSURE: 81 MMHG | TEMPERATURE: 98 F | HEART RATE: 93 BPM | RESPIRATION RATE: 20 BRPM

## 2019-05-09 DIAGNOSIS — N70.11 CHRONIC SALPINGITIS: ICD-10-CM

## 2019-05-09 LAB
ALBUMIN SERPL ELPH-MCNC: 4.25 G/DL — SIGNIFICANT CHANGE UP (ref 3.3–5)
ALP SERPL-CCNC: 79 U/L — SIGNIFICANT CHANGE UP (ref 40–120)
ALT FLD-CCNC: 26 U/L — SIGNIFICANT CHANGE UP (ref 4–33)
ANION GAP SERPL CALC-SCNC: 11 MMO/L — SIGNIFICANT CHANGE UP (ref 7–14)
APPEARANCE UR: CLEAR — SIGNIFICANT CHANGE UP
AST SERPL-CCNC: 18 U/L — SIGNIFICANT CHANGE UP (ref 4–32)
BASOPHILS # BLD AUTO: 0.05 K/UL — SIGNIFICANT CHANGE UP (ref 0–0.2)
BASOPHILS NFR BLD AUTO: 0.9 % — SIGNIFICANT CHANGE UP (ref 0–2)
BILIRUB SERPL-MCNC: 0.5 MG/DL — SIGNIFICANT CHANGE UP (ref 0.2–1.2)
BILIRUB UR-MCNC: NEGATIVE — SIGNIFICANT CHANGE UP
BLOOD UR QL VISUAL: HIGH
BUN SERPL-MCNC: 13 MG/DL — SIGNIFICANT CHANGE UP (ref 7–23)
CALCIUM SERPL-MCNC: 9.4 MG/DL — SIGNIFICANT CHANGE UP (ref 8.4–10.5)
CHLORIDE SERPL-SCNC: 108 MMOL/L — HIGH (ref 98–107)
CO2 SERPL-SCNC: 21 MMOL/L — LOW (ref 22–31)
COLOR SPEC: YELLOW — SIGNIFICANT CHANGE UP
CREAT SERPL-MCNC: 0.67 MG/DL — SIGNIFICANT CHANGE UP (ref 0.5–1.3)
EOSINOPHIL # BLD AUTO: 0.18 K/UL — SIGNIFICANT CHANGE UP (ref 0–0.5)
EOSINOPHIL NFR BLD AUTO: 3.1 % — SIGNIFICANT CHANGE UP (ref 0–6)
EPI CELLS # UR: SIGNIFICANT CHANGE UP
GLUCOSE SERPL-MCNC: 88 MG/DL — SIGNIFICANT CHANGE UP (ref 70–99)
GLUCOSE UR-MCNC: NEGATIVE — SIGNIFICANT CHANGE UP
HCG UR-SCNC: NEGATIVE — SIGNIFICANT CHANGE UP
HCT VFR BLD CALC: 39.7 % — SIGNIFICANT CHANGE UP (ref 34.5–45)
HGB BLD-MCNC: 12.5 G/DL — SIGNIFICANT CHANGE UP (ref 11.5–15.5)
IMM GRANULOCYTES NFR BLD AUTO: 0.3 % — SIGNIFICANT CHANGE UP (ref 0–1.5)
KETONES UR-MCNC: NEGATIVE — SIGNIFICANT CHANGE UP
LEUKOCYTE ESTERASE UR-ACNC: NEGATIVE — SIGNIFICANT CHANGE UP
LYMPHOCYTES # BLD AUTO: 0.96 K/UL — LOW (ref 1–3.3)
LYMPHOCYTES # BLD AUTO: 16.6 % — SIGNIFICANT CHANGE UP (ref 13–44)
MCHC RBC-ENTMCNC: 25.9 PG — LOW (ref 27–34)
MCHC RBC-ENTMCNC: 31.5 % — LOW (ref 32–36)
MCV RBC AUTO: 82.2 FL — SIGNIFICANT CHANGE UP (ref 80–100)
MONOCYTES # BLD AUTO: 0.6 K/UL — SIGNIFICANT CHANGE UP (ref 0–0.9)
MONOCYTES NFR BLD AUTO: 10.4 % — SIGNIFICANT CHANGE UP (ref 2–14)
NEUTROPHILS # BLD AUTO: 3.96 K/UL — SIGNIFICANT CHANGE UP (ref 1.8–7.4)
NEUTROPHILS NFR BLD AUTO: 68.7 % — SIGNIFICANT CHANGE UP (ref 43–77)
NITRITE UR-MCNC: NEGATIVE — SIGNIFICANT CHANGE UP
NRBC # FLD: 0 K/UL — SIGNIFICANT CHANGE UP (ref 0–0)
PH UR: 6 — SIGNIFICANT CHANGE UP (ref 5–8)
PLATELET # BLD AUTO: 246 K/UL — SIGNIFICANT CHANGE UP (ref 150–400)
PMV BLD: 10 FL — SIGNIFICANT CHANGE UP (ref 7–13)
POTASSIUM SERPL-MCNC: 4.3 MMOL/L — SIGNIFICANT CHANGE UP (ref 3.5–5.3)
POTASSIUM SERPL-SCNC: 4.3 MMOL/L — SIGNIFICANT CHANGE UP (ref 3.5–5.3)
PROT SERPL-MCNC: 6.8 G/DL — SIGNIFICANT CHANGE UP (ref 6–8.3)
PROT UR-MCNC: 20 — SIGNIFICANT CHANGE UP
RBC # BLD: 4.83 M/UL — SIGNIFICANT CHANGE UP (ref 3.8–5.2)
RBC # FLD: 13.2 % — SIGNIFICANT CHANGE UP (ref 10.3–14.5)
RBC CASTS # UR COMP ASSIST: SIGNIFICANT CHANGE UP (ref 0–?)
SODIUM SERPL-SCNC: 140 MMOL/L — SIGNIFICANT CHANGE UP (ref 135–145)
SP GR SPEC: 1.02 — SIGNIFICANT CHANGE UP (ref 1–1.04)
UROBILINOGEN FLD QL: NORMAL — SIGNIFICANT CHANGE UP
WBC # BLD: 5.77 K/UL — SIGNIFICANT CHANGE UP (ref 3.8–10.5)
WBC # FLD AUTO: 5.77 K/UL — SIGNIFICANT CHANGE UP (ref 3.8–10.5)
WBC UR QL: SIGNIFICANT CHANGE UP (ref 0–?)

## 2019-05-09 PROCEDURE — 99284 EMERGENCY DEPT VISIT MOD MDM: CPT

## 2019-05-09 PROCEDURE — 74177 CT ABD & PELVIS W/CONTRAST: CPT | Mod: 26

## 2019-05-09 NOTE — ED PROVIDER NOTE - NS ED ROS FT
CONSTITUTIONAL: No fevers, no chills, no lightheadedness, no dizziness  Eyes: no visual changes  Ears: no ear drainage, no ear pain  Nose: no nasal congestion  Mouth/Throat: no sore throat  CV: No chest pain, no palpitations  PULM: No SOB, no cough  GI: No n/v/d  : no dysuria, no hematuria  SKIN: no rashes.  NEURO: no headache, no focal weakness or numbness  PSYCHIATRIC: no known mental health issues.

## 2019-05-09 NOTE — ED PROVIDER NOTE - OBJECTIVE STATEMENT
39yo F postop day 7 b/l tubal ligation for adnexal cyst here with llq pain and swelling. Pt states she had pain and swelling x 6 years since birth of last child and surgery was performed with thought that pain was d/t cyst. States pain is the same as before surgery. No fever, chills, n/v/d, constipation, vaginal discharge/bleeding, or dysuria.

## 2019-05-09 NOTE — ED ADULT TRIAGE NOTE - CHIEF COMPLAINT QUOTE
Patient had both of her fallopian tubes removed last week and for the past two days she has been having left lower abdominal pain.

## 2019-05-09 NOTE — ED PROVIDER NOTE - PROGRESS NOTE DETAILS
ct shows large mediastinal lymphadenopathy, states has had mass left groin x 5 years, labs stable, outpt workup, onc/surgery, pmd Fox Hoyt DO PGY1 - Had at length discussion regarding CT results and the possibility of malignancy. Answered all questions and made clear the next steps moving forward including following with primary doctor and possible oncology/surgery

## 2019-05-09 NOTE — ED PROVIDER NOTE - ATTENDING CONTRIBUTION TO CARE
Attending Attestation: Dr. Leblanc  I have personally performed a history and physical examination of the patient and discussed management with the resident as well as the patient.  I reviewed the resident's note and agree with the documented findings and plan of care.  I have authored and modified critical sections of the Provider Note, including but not limited to HPI, Physical Exam and MDM.  res dr morley    37 yo female co llq pain, pt sp tubal ligation bl, 1 wk ago, removed bl cysts as well. chronic llq pain x years,  hx of llq/groin distention, states unchanged, no fever, no nvd  PE vss, abd soft nd bs pos, mild llq tender, l groin mild prominence , no fluctuance, sp laparoscopy cdi  pelvic exam see dr morley note  ct abd /pelvis r/o abscess/hematoma, check labs, gyn

## 2019-05-09 NOTE — ED PROVIDER NOTE - PHYSICAL EXAMINATION
Gen: Well appearing, NAD  Head: NCAT  HEENT: PERRL, MMM, normal conjunctiva, anicteric, neck supple  Lung: CTAB, no adventitious sounds  CV: RRR, no murmurs  Abd: soft, NTND, no rebound or guarding, no CVAT, mild bony protrustion of L pelvic rim  MSK: No edema, no visible deformities  Neuro: Moving all extremities grossly  Skin: Warm and dry, no evidence of rash  Psych: normal mood and affect   Pelvic: No cmt, no adnexal ttp, no blood or discharge, Chaperone: Ivonne Lopez

## 2019-05-09 NOTE — DISCUSSION/SUMMARY
[Patient] : a call from patient [FreeTextEntry1] : Patient presented to clinic immediately following discharge from ED visit today. Patient is a 38F with allergic asthma who is POD7 from tubal ligation. Patient has been experiencing LLQ pain and per patient, she first noted swelling and pain in LLQ/L proximal thigh 6 years ago. Per chart review she at least was noting symptoms 6/2018 at which time a TVUS was ordered, but not completed as symptoms resolved at that time. Symtoms returned 2/2019 and TVUS was then completed 4/4/2019 showing complex adnexal mass, thought to be hydrosalpinx. Patient had fallopian tubes removed 7 days ago, states that since then pain had not resolved, and today was bad enough that it prompted her to present to ED. In ED CBC, CMP, UA all unremarkable. CTAP with IV contrast performed which revealed:\par -------------------------------------------------------------------\par Copied from CT report dated 5/9/2019 13:43\par "BLADDER: Within normal limits.\par REPRODUCTIVE ORGANS: The uterus and bilateral adnexa are unremarkable\par \par BOWEL: No bowel obstruction. Appendix is normal.\par PERITONEUM: Small abdominal and pelvic ascites.\par VESSELS:  Within normal limits.\par RETROPERITONEUM: Confluent retroperitoneal lymphadenopathy is present \par surrounding the aorta and IVC. Bilateral pelvic lymphadenopathy is \par present, more prominent on the left than the right. There is left greater \par than right inguinal adenopathy. The largest lymph node in the left \par inguinal region measures 5.8 x 4.3 cm (2:105). Confluent adenopathy along \par the left pelvic sidewall measures 4.1 x 2.5 cm (2:84)\par \par ABDOMINAL WALL: Within normal limits.\par BONES: Within normal limits.\par \par IMPRESSION: \par Extensive retroperitoneal, pelvic, and inguinal lymphadenopathy, \par concerning for malignancy.\par Small amount of abdominal and pelvic ascites."\par ------------------------------------------------\par \par Patient was d/c from ED and presented to clinic to make apt to f/u. As patient arrived after scheduled office hours, an apt scheduled for 2pm tomorrow. I spoke with patient and explained that plan tomorrow will be to begin w/u for LAD and to schedule necessary f/u to finalize diagnosis. Apt made for 2pm tomorrow, patient agreed with plan and states intent to f/u.\par \par Reviewed outpatient labs, ED labs, and imaging.\par \par [facility]

## 2019-05-10 ENCOUNTER — APPOINTMENT (OUTPATIENT)
Dept: INTERNAL MEDICINE | Facility: CLINIC | Age: 39
End: 2019-05-10
Payer: COMMERCIAL

## 2019-05-10 ENCOUNTER — LABORATORY RESULT (OUTPATIENT)
Age: 39
End: 2019-05-10

## 2019-05-10 ENCOUNTER — OUTPATIENT (OUTPATIENT)
Dept: OUTPATIENT SERVICES | Facility: HOSPITAL | Age: 39
LOS: 1 days | End: 2019-05-10

## 2019-05-10 VITALS — HEART RATE: 94 BPM | OXYGEN SATURATION: 99 % | WEIGHT: 147 LBS | HEIGHT: 63 IN | BODY MASS INDEX: 26.05 KG/M2

## 2019-05-10 VITALS — RESPIRATION RATE: 16 BRPM | HEART RATE: 80 BPM | DIASTOLIC BLOOD PRESSURE: 75 MMHG | SYSTOLIC BLOOD PRESSURE: 100 MMHG

## 2019-05-10 DIAGNOSIS — Z87.42 PERSONAL HISTORY OF OTHER DISEASES OF THE FEMALE GENITAL TRACT: ICD-10-CM

## 2019-05-10 PROCEDURE — 99214 OFFICE O/P EST MOD 30 MIN: CPT | Mod: GC

## 2019-05-11 LAB
HIV 1+2 AB+HIV1 P24 AG SERPL QL IA: SIGNIFICANT CHANGE UP
T PALLIDUM AB TITR SER: NEGATIVE — SIGNIFICANT CHANGE UP

## 2019-05-13 NOTE — HISTORY OF PRESENT ILLNESS
[FreeTextEntry1] : LAD [de-identified] : 38 y.o. female with allergic asthma, recent surgery on 5/2 for b/l salpingectomy, presenting with left inguinal pain and growing mass. The patient has been closely followed by the OB/GYN service. The patient had been having pain at the left inguinal side since February of 2019, but had a palpable mass for the last 6 years. The patient underwent Tubal ligation for permeant sterilization and to help with left sided pain. TVUS had showed a left side hydrosalpinx. Following the surgery the patient continued to have pain. Called her surgeon's office and stated would be able to see her, but recommended to go the ED to be evaluated. The patient underwent CT scan of abd/pelvis with IV contrast which showed retroperitoneal and inguinal lymphadenopathy. The patient was instructed to follow up with her primary care doctor. \par Today the patient still has pain and tenderness at the left inginual side. The mass on the left side is much bigger now after the surgery, but states the mass has been growing over the last few years. The patient denies any fever, chills, night sweats, or weight loss. The patient also denied vaginal pain, vaginal discharge, dysuria or urinary frequency, or painful lesions around the genital area. She is in a monogamous relationship at this time. Previously had two kids as of now. Originally from [place], and immigrated to the [place] at the age of 24. Denies smoking, drinking EtOH or substance abuse. No recent travel or sick contacts. No family hx of medial problems as well.  \par \par The patient was very dissatisfied with her surgeon for the tubal ligation, stating she was not seen by the surgeon following the procedure. Also feels the communication has been lacking as well between her and the OB/GYN team. \par \par \par

## 2019-05-13 NOTE — PHYSICAL EXAM
[No Acute Distress] : no acute distress [Well Developed] : well developed [Well Nourished] : well nourished [Well-Appearing] : well-appearing [Normal Sclera/Conjunctiva] : normal sclera/conjunctiva [Normal Outer Ear/Nose] : the outer ears and nose were normal in appearance [PERRL] : pupils equal round and reactive to light [Normal Oropharynx] : the oropharynx was normal [No JVD] : no jugular venous distention [Supple] : supple [No Respiratory Distress] : no respiratory distress  [No Accessory Muscle Use] : no accessory muscle use [Clear to Auscultation] : lungs were clear to auscultation bilaterally [Regular Rhythm] : with a regular rhythm [Normal Rate] : normal rate  [Normal S1, S2] : normal S1 and S2 [Soft] : abdomen soft [Non Tender] : non-tender [Normal Bowel Sounds] : normal bowel sounds [No HSM] : no HSM [Grossly Normal Strength/Tone] : grossly normal strength/tone [No Rash] : no rash [No Joint Swelling] : no joint swelling [Normal Gait] : normal gait [No Focal Deficits] : no focal deficits [Coordination Grossly Intact] : coordination grossly intact [de-identified] : Innocence murmur  [de-identified] : At left inginual area palpable mass is felt. hard and tender on exam. tenderness on the right inginual side as well but no palpable mass on the right side

## 2019-05-13 NOTE — END OF VISIT
[] : Resident [FreeTextEntry3] : Patient highly concerned regarding CT findings and the fact that she has had noticed swelling in the same region for several years.  Advised patient to keep her appointment with Gyne next week.

## 2019-05-13 NOTE — REVIEW OF SYSTEMS
[Swollen Glands] : swollen glands [Negative] : Psychiatric [de-identified] : +pain at the inginual area

## 2019-05-14 DIAGNOSIS — R59.0 LOCALIZED ENLARGED LYMPH NODES: ICD-10-CM

## 2019-05-15 ENCOUNTER — APPOINTMENT (OUTPATIENT)
Dept: OBGYN | Facility: HOSPITAL | Age: 39
End: 2019-05-15
Payer: COMMERCIAL

## 2019-05-15 ENCOUNTER — OUTPATIENT (OUTPATIENT)
Dept: OUTPATIENT SERVICES | Facility: HOSPITAL | Age: 39
LOS: 1 days | End: 2019-05-15

## 2019-05-15 VITALS
DIASTOLIC BLOOD PRESSURE: 68 MMHG | HEIGHT: 63 IN | SYSTOLIC BLOOD PRESSURE: 128 MMHG | BODY MASS INDEX: 26.05 KG/M2 | WEIGHT: 147 LBS | HEART RATE: 89 BPM

## 2019-05-15 PROCEDURE — ZZZZZ: CPT | Mod: GC

## 2019-05-16 DIAGNOSIS — Z09 ENCOUNTER FOR FOLLOW-UP EXAMINATION AFTER COMPLETED TREATMENT FOR CONDITIONS OTHER THAN MALIGNANT NEOPLASM: ICD-10-CM

## 2019-05-17 LAB
GAMMA INTERFERON BACKGROUND BLD IA-ACNC: 0.02 IU/ML — SIGNIFICANT CHANGE UP
M TB IFN-G BLD-IMP: NEGATIVE — SIGNIFICANT CHANGE UP
M TB IFN-G CD4+ BCKGRND COR BLD-ACNC: 0 IU/ML — SIGNIFICANT CHANGE UP
M TB IFN-G CD4+CD8+ BCKGRND COR BLD-ACNC: 0 IU/ML — SIGNIFICANT CHANGE UP
QUANT TB PLUS MITOGEN MINUS NIL: 2.81 IU/ML — SIGNIFICANT CHANGE UP

## 2019-05-21 ENCOUNTER — APPOINTMENT (OUTPATIENT)
Dept: INTERVENTIONAL RADIOLOGY/VASCULAR | Facility: CLINIC | Age: 39
End: 2019-05-21
Payer: COMMERCIAL

## 2019-05-21 VITALS
DIASTOLIC BLOOD PRESSURE: 76 MMHG | HEART RATE: 77 BPM | HEIGHT: 63 IN | SYSTOLIC BLOOD PRESSURE: 116 MMHG | BODY MASS INDEX: 26.05 KG/M2 | WEIGHT: 147 LBS | RESPIRATION RATE: 16 BRPM | OXYGEN SATURATION: 100 %

## 2019-05-21 DIAGNOSIS — R59.0 LOCALIZED ENLARGED LYMPH NODES: ICD-10-CM

## 2019-05-21 PROCEDURE — 99204 OFFICE O/P NEW MOD 45 MIN: CPT

## 2019-05-28 ENCOUNTER — FORM ENCOUNTER (OUTPATIENT)
Age: 39
End: 2019-05-28

## 2019-05-28 NOTE — REVIEW OF SYSTEMS
[Fever] : no fever [Chills] : no chills [Chest Pain] : no chest pain [Loss Of Hearing] : no hearing loss [Shortness Of Breath] : no shortness of breath [Palpitations] : no palpitations [Easy Bleeding] : no tendency for easy bleeding [Easy Bruising] : no tendency for easy bruising [Diarrhea] : no diarrhea

## 2019-05-28 NOTE — PHYSICAL EXAM
[Alert] : alert [Normal Hearing] : hearing was normal [No Respiratory Distress] : no respiratory distress [Normal Rate] : heart rate was normal  [Soft] : abdomen soft [Normal Gait] : normal gait [No Tremors] : no tremors [Oriented x3] : oriented to person, place, and time [de-identified] : left neck lymphadenopathy noted.  [de-identified] : LLQ tender to palpate with lymphadenopathy

## 2019-05-28 NOTE — ASSESSMENT
[FreeTextEntry1] : CT abd/pelvis reviewed with the patient.  Plan is for ultrasound guided core needle biopsy of the large left inguinal lymph node with local anesthesia.  Risks of percutaneous biopsy, including but not limited to bleeding, infection and injury to surrounding structure, discussed with the patient.  All questions were answered.

## 2019-05-28 NOTE — HISTORY OF PRESENT ILLNESS
[FreeTextEntry1] : 38 years old female with hx of Asthma, left inguinal pain. Patient sates she initially noted the pain in February. She is s/p b/l salpingectomy on 05/02/19 and noticed to have left inguinal mass. She had CT abd/pelv done on 05/09/19 which demonstrated, "Extensive retroperitoneal, pelvic, and inguinal lymphadenopathy, concerning for malignancy. Small amount of abdominal and pelvic ascites".\par \par Patient states she noticed left groin swelling after her last child who was born in Feb. But she didn’t have any pain or any other symptoms at that time. She states that has has more swelling and more pain since her procedure in May 2019. \par Pain is 5-6/10 and it is more like pressure in the pelvic area, groin and radiating to the right hip.  she states she has tried Tylenol OTC with minimal relief but warm shower helps the most. \par \par Patient also states that she noted her neck is swollen on the left side. \par \par Patient is referred by Dr. Mcrae for lymph node biopsy. \par \par Denies any recent SOB, CP, fever, chills, n/v/d.\par \par Patient sates she has been feeling well overall. Appetite has been good no unintentional weight loss. \par

## 2019-05-29 ENCOUNTER — OUTPATIENT (OUTPATIENT)
Dept: OUTPATIENT SERVICES | Facility: HOSPITAL | Age: 39
LOS: 1 days | End: 2019-05-29
Payer: COMMERCIAL

## 2019-05-29 ENCOUNTER — RESULT REVIEW (OUTPATIENT)
Age: 39
End: 2019-05-29

## 2019-05-29 DIAGNOSIS — R59.0 LOCALIZED ENLARGED LYMPH NODES: ICD-10-CM

## 2019-05-29 PROCEDURE — 88173 CYTOPATH EVAL FNA REPORT: CPT | Mod: 26

## 2019-05-29 PROCEDURE — 49180 BIOPSY ABDOMINAL MASS: CPT

## 2019-05-29 PROCEDURE — 88189 FLOWCYTOMETRY/READ 16 & >: CPT

## 2019-05-29 PROCEDURE — 88341 IMHCHEM/IMCYTCHM EA ADD ANTB: CPT | Mod: 26,59

## 2019-05-29 PROCEDURE — 88305 TISSUE EXAM BY PATHOLOGIST: CPT | Mod: 26

## 2019-05-29 PROCEDURE — 76942 ECHO GUIDE FOR BIOPSY: CPT | Mod: 26

## 2019-05-29 PROCEDURE — 88360 TUMOR IMMUNOHISTOCHEM/MANUAL: CPT | Mod: 26

## 2019-05-29 PROCEDURE — 88342 IMHCHEM/IMCYTCHM 1ST ANTB: CPT | Mod: 26,59

## 2019-05-30 DIAGNOSIS — R59.0 LOCALIZED ENLARGED LYMPH NODES: ICD-10-CM

## 2019-05-31 ENCOUNTER — APPOINTMENT (OUTPATIENT)
Dept: INTERNAL MEDICINE | Facility: CLINIC | Age: 39
End: 2019-05-31

## 2019-06-03 LAB — TM INTERPRETATION: SIGNIFICANT CHANGE UP

## 2019-06-04 ENCOUNTER — APPOINTMENT (OUTPATIENT)
Dept: OBGYN | Facility: HOSPITAL | Age: 39
End: 2019-06-04

## 2019-06-04 ENCOUNTER — OUTPATIENT (OUTPATIENT)
Dept: OUTPATIENT SERVICES | Facility: HOSPITAL | Age: 39
LOS: 1 days | End: 2019-06-04

## 2019-06-04 VITALS
SYSTOLIC BLOOD PRESSURE: 143 MMHG | BODY MASS INDEX: 25.69 KG/M2 | HEART RATE: 81 BPM | WEIGHT: 145 LBS | DIASTOLIC BLOOD PRESSURE: 69 MMHG

## 2019-06-04 DIAGNOSIS — Z30.9 ENCOUNTER FOR CONTRACEPTIVE MANAGEMENT, UNSPECIFIED: ICD-10-CM

## 2019-06-04 RX ORDER — CHOLECALCIFEROL (VITAMIN D3) 1250 MCG
1.25 MG CAPSULE ORAL
Qty: 6 | Refills: 0 | Status: COMPLETED | COMMUNITY
Start: 2018-06-28 | End: 2019-06-04

## 2019-06-04 NOTE — REVIEW OF SYSTEMS
[Recent ___ Lb Weight Loss] : recent [unfilled] ~Ulb weight loss [Night Sweats] : night sweats [Nl] : Integumentary

## 2019-06-05 ENCOUNTER — OUTPATIENT (OUTPATIENT)
Dept: OUTPATIENT SERVICES | Facility: HOSPITAL | Age: 39
LOS: 1 days | Discharge: HOME | End: 2019-06-05
Payer: COMMERCIAL

## 2019-06-05 DIAGNOSIS — C85.88 OTHER SPECIFIED TYPES OF NON-HODGKIN LYMPHOMA, LYMPH NODES OF MULTIPLE SITES: ICD-10-CM

## 2019-06-06 ENCOUNTER — OTHER (OUTPATIENT)
Age: 39
End: 2019-06-06

## 2019-06-06 LAB — NON-GYNECOLOGICAL CYTOLOGY STUDY: SIGNIFICANT CHANGE UP

## 2019-06-10 ENCOUNTER — RESULT REVIEW (OUTPATIENT)
Age: 39
End: 2019-06-10

## 2019-06-10 ENCOUNTER — APPOINTMENT (OUTPATIENT)
Dept: HEMATOLOGY ONCOLOGY | Facility: CLINIC | Age: 39
End: 2019-06-10
Payer: COMMERCIAL

## 2019-06-10 ENCOUNTER — OUTPATIENT (OUTPATIENT)
Dept: OUTPATIENT SERVICES | Facility: HOSPITAL | Age: 39
LOS: 1 days | End: 2019-06-10
Payer: COMMERCIAL

## 2019-06-10 VITALS
TEMPERATURE: 98.2 F | BODY MASS INDEX: 23.88 KG/M2 | RESPIRATION RATE: 16 BRPM | OXYGEN SATURATION: 100 % | DIASTOLIC BLOOD PRESSURE: 77 MMHG | HEART RATE: 77 BPM | SYSTOLIC BLOOD PRESSURE: 113 MMHG | HEIGHT: 65.55 IN | WEIGHT: 145.06 LBS

## 2019-06-10 DIAGNOSIS — C85.88 OTHER SPECIFIED TYPES OF NON-HODGKIN LYMPHOMA, LYMPH NODES OF MULTIPLE SITES: ICD-10-CM

## 2019-06-10 LAB
BASOPHILS # BLD AUTO: 0 K/UL — SIGNIFICANT CHANGE UP (ref 0–0.2)
BASOPHILS NFR BLD AUTO: 0.8 % — SIGNIFICANT CHANGE UP (ref 0–2)
EOSINOPHIL # BLD AUTO: 0.2 K/UL — SIGNIFICANT CHANGE UP (ref 0–0.5)
EOSINOPHIL NFR BLD AUTO: 4.1 % — SIGNIFICANT CHANGE UP (ref 0–6)
HCT VFR BLD CALC: 39.5 % — SIGNIFICANT CHANGE UP (ref 34.5–45)
HGB BLD-MCNC: 13.1 G/DL — SIGNIFICANT CHANGE UP (ref 11.5–15.5)
LYMPHOCYTES # BLD AUTO: 1 K/UL — SIGNIFICANT CHANGE UP (ref 1–3.3)
LYMPHOCYTES # BLD AUTO: 20.2 % — SIGNIFICANT CHANGE UP (ref 13–44)
MCHC RBC-ENTMCNC: 26.6 PG — LOW (ref 27–34)
MCHC RBC-ENTMCNC: 33.3 G/DL — SIGNIFICANT CHANGE UP (ref 32–36)
MCV RBC AUTO: 80.1 FL — SIGNIFICANT CHANGE UP (ref 80–100)
MONOCYTES # BLD AUTO: 0.4 K/UL — SIGNIFICANT CHANGE UP (ref 0–0.9)
MONOCYTES NFR BLD AUTO: 8.8 % — SIGNIFICANT CHANGE UP (ref 2–14)
NEUTROPHILS # BLD AUTO: 3.2 K/UL — SIGNIFICANT CHANGE UP (ref 1.8–7.4)
NEUTROPHILS NFR BLD AUTO: 66 % — SIGNIFICANT CHANGE UP (ref 43–77)
PLATELET # BLD AUTO: 226 K/UL — SIGNIFICANT CHANGE UP (ref 150–400)
RBC # BLD: 4.93 M/UL — SIGNIFICANT CHANGE UP (ref 3.8–5.2)
RBC # FLD: 12.3 % — SIGNIFICANT CHANGE UP (ref 10.3–14.5)
WBC # BLD: 4.9 K/UL — SIGNIFICANT CHANGE UP (ref 3.8–10.5)
WBC # FLD AUTO: 4.9 K/UL — SIGNIFICANT CHANGE UP (ref 3.8–10.5)

## 2019-06-10 PROCEDURE — 87205 SMEAR GRAM STAIN: CPT

## 2019-06-10 PROCEDURE — 85097 BONE MARROW INTERPRETATION: CPT

## 2019-06-10 PROCEDURE — 99205 OFFICE O/P NEW HI 60 MIN: CPT | Mod: 25

## 2019-06-10 PROCEDURE — 88313 SPECIAL STAINS GROUP 2: CPT | Mod: 26

## 2019-06-10 PROCEDURE — 88313 SPECIAL STAINS GROUP 2: CPT

## 2019-06-10 PROCEDURE — 88305 TISSUE EXAM BY PATHOLOGIST: CPT | Mod: 26

## 2019-06-10 PROCEDURE — 88305 TISSUE EXAM BY PATHOLOGIST: CPT

## 2019-06-10 PROCEDURE — 38222 DX BONE MARROW BX & ASPIR: CPT | Mod: LT

## 2019-06-10 PROCEDURE — 88185 FLOWCYTOMETRY/TC ADD-ON: CPT

## 2019-06-10 PROCEDURE — 88184 FLOWCYTOMETRY/ TC 1 MARKER: CPT

## 2019-06-10 NOTE — ASSESSMENT
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 CT A/P done on 5/9/19 which demonstrated extensive retroperitoneal, pelvic, and inguinal lymphadenopathy. She also notes having cervical LAD. \par We reviewed pathology results, prognosis. Also discussed workup for staging and briefly about treatment. \par We will do BMbx today, ordered for PET/CT for staging\par All questions answered\par RTC next week

## 2019-06-10 NOTE — HISTORY OF PRESENT ILLNESS
[de-identified] : 39yo F w/ asthma here for evaluation of newly diagnosed follicular lymphoma. \par \par Patient sates she initially noted left inguinal pain in February 2019, had a palpable mass for the last 6 years which she noticed after delivery of her second baby in 2013. She saw GYN, had TVUS: Uterus: 5.6 x 3.6 x 4.3 cm. EMS 3 mm. Right ovary normal. Left ovary normal. Complex elongated left adnexal lesion suggestive of hydrosalpinx, 9.2 x 4.9 x 9 cm. She is s/p b/l salpingectomy on 5/2/19. She remains on depo injection. She states that she has had more swelling and more pain since her procedure. \par \par She had CT A/P done on 5/9/19 which demonstrated extensive retroperitoneal, pelvic, and inguinal lymphadenopathy, concerning for malignancy with small amount of abdominal and pelvic ascites.\par s/p IR biopsy of left inguinal lymph node - follicular lymphoma grade 1-2. \par \par Also having cervical LAD, noticed for the last few months, L>R. Noted R inguinal swelling for the last week with pain. No fevers of chills. Notes having itching and that her breathing is a bit more labored than usual.

## 2019-06-10 NOTE — PHYSICAL EXAM
[Fully active, able to carry on all pre-disease performance without restriction] : Status 0 - Fully active, able to carry on all pre-disease performance without restriction [Normal] : affect appropriate [de-identified] : palpable cervical adenopathy L>R [de-identified] : palpable inguinal node L>R, palpable cervical adenopathy L>R

## 2019-06-10 NOTE — REVIEW OF SYSTEMS
[Shortness Of Breath] : shortness of breath [Swollen Glands] : swollen glands [Negative] : Psychiatric [de-identified] : pruritis

## 2019-06-10 NOTE — PROCEDURE
[Bone Marrow Biopsy] : bone marrow biopsy [Bone Marrow Aspiration] : bone marrow aspiration  [Patient identification verified] : patient identification verified [Patient] : the patient [Procedure verified and consent obtained] : procedure verified and consent obtained [Laterality verified and correct site marked] : laterality verified and correct site marked [Left] : site: left [Superior iliac spine was identified] : the superior iliac spine was identified. [Prone] : prone [Aspirate] : aspirate [Lidocaine was injected and into the periosteum overlying the site.] : Lidocaine was injected and into the periosteum overlying the site. [The left posterior iliac crest was prepped with betadine and draped, using sterile technique.] : The left posterior iliac crest was prepped with betadine and draped, using sterile technique. [Cytogenetics] : cytogenetics [FISH] : FISH [Flow Cytometry] : flow cytometry [Biopsy] : biopsy [] : The patient was instructed to remove the bandage the following AM. The patient may bathe. Acetaminophen may be taken for discomfort, as per package directions.If there are any other problems, the patient was instructed to call the office. The patient verbalized understanding, and is aware of the office contact numbers. [FreeTextEntry1] : follicular lymphoma

## 2019-06-11 ENCOUNTER — APPOINTMENT (OUTPATIENT)
Dept: INTERNAL MEDICINE | Facility: CLINIC | Age: 39
End: 2019-06-11

## 2019-06-11 LAB
ALBUMIN SERPL ELPH-MCNC: 4.7 G/DL
ALP BLD-CCNC: 88 U/L
ALT SERPL-CCNC: 22 U/L
ANION GAP SERPL CALC-SCNC: 13 MMOL/L
AST SERPL-CCNC: 17 U/L
BILIRUB SERPL-MCNC: 0.5 MG/DL
BUN SERPL-MCNC: 15 MG/DL
CALCIUM SERPL-MCNC: 9.3 MG/DL
CHLORIDE SERPL-SCNC: 109 MMOL/L
CO2 SERPL-SCNC: 22 MMOL/L
CREAT SERPL-MCNC: 0.74 MG/DL
GLUCOSE SERPL-MCNC: 107 MG/DL
LDH SERPL-CCNC: 192 U/L
POTASSIUM SERPL-SCNC: 4 MMOL/L
PROT SERPL-MCNC: 6.9 G/DL
SODIUM SERPL-SCNC: 143 MMOL/L

## 2019-06-12 LAB
HEMATOPATHOLOGY REPORT: SIGNIFICANT CHANGE UP
TM INTERPRETATION: SIGNIFICANT CHANGE UP

## 2019-06-13 ENCOUNTER — APPOINTMENT (OUTPATIENT)
Dept: OBGYN | Facility: HOSPITAL | Age: 39
End: 2019-06-13

## 2019-06-13 ENCOUNTER — OUTPATIENT (OUTPATIENT)
Dept: OUTPATIENT SERVICES | Facility: HOSPITAL | Age: 39
LOS: 1 days | End: 2019-06-13

## 2019-06-13 VITALS
WEIGHT: 144 LBS | HEIGHT: 64 IN | HEART RATE: 83 BPM | SYSTOLIC BLOOD PRESSURE: 120 MMHG | DIASTOLIC BLOOD PRESSURE: 73 MMHG | BODY MASS INDEX: 24.59 KG/M2

## 2019-06-13 DIAGNOSIS — Z30.42 ENCOUNTER FOR SURVEILLANCE OF INJECTABLE CONTRACEPTIVE: ICD-10-CM

## 2019-06-13 RX ORDER — MEDROXYPROGESTERONE ACETATE 150 MG/ML
150 INJECTION, SUSPENSION INTRAMUSCULAR
Qty: 0 | Refills: 0 | Status: COMPLETED | OUTPATIENT
Start: 2019-06-13

## 2019-06-13 RX ADMIN — MEDROXYPROGESTERONE ACETATE 0 MG/ML: 150 INJECTION, SUSPENSION, EXTENDED RELEASE INTRAMUSCULAR at 00:00

## 2019-06-13 NOTE — COUNSELING
[Contraception] : contraception [Lab Results] : lab results [Pregnancy Options] : pregnancy options [Vulvar Hygiene] : vulvar hygiene

## 2019-06-13 NOTE — DISCUSSION/SUMMARY
[Depo Provera] : depo provera placement [Pregnancy Prevention] : pregnancy prevention [Bleeding Control] : bleeding control [Improve Dysmenorrhea] : to improve dysmenorrhea

## 2019-06-15 ENCOUNTER — FORM ENCOUNTER (OUTPATIENT)
Age: 39
End: 2019-06-15

## 2019-06-16 ENCOUNTER — OUTPATIENT (OUTPATIENT)
Dept: OUTPATIENT SERVICES | Facility: HOSPITAL | Age: 39
LOS: 1 days | End: 2019-06-16
Payer: COMMERCIAL

## 2019-06-16 ENCOUNTER — APPOINTMENT (OUTPATIENT)
Dept: NUCLEAR MEDICINE | Facility: IMAGING CENTER | Age: 39
End: 2019-06-16
Payer: COMMERCIAL

## 2019-06-16 DIAGNOSIS — C85.10 UNSPECIFIED B-CELL LYMPHOMA, UNSPECIFIED SITE: ICD-10-CM

## 2019-06-16 PROCEDURE — 78815 PET IMAGE W/CT SKULL-THIGH: CPT | Mod: 26,PI

## 2019-06-16 PROCEDURE — 78815 PET IMAGE W/CT SKULL-THIGH: CPT

## 2019-06-16 PROCEDURE — A9552: CPT

## 2019-06-17 NOTE — HISTORY OF PRESENT ILLNESS
[Healed] : healed [Clean/Dry/Intact] : clean, dry and intact [Doing Well] : is doing well [Excellent Pain Control] : has excellent pain control [No Sign of Infection] : is showing no signs of infection [de-identified] : Large, tender L inguinal mass  [de-identified] : 38 P2 s/p blateral salpingectomy 5/2. Pt is healing well from surgery. However, since surgery, pt has seen a growht in her L inguinal mass. Pt states that she had it before surgery, but it has grown and has been hurting more since surgery. Pt was seen in ED, with CT revealing extensive retroperitoneal, pelvic, and inguinal lymphadenopathy, largest being L inguinal measuring >5cm.  [de-identified] : for IR biopsy of lymph node. Otherwise healing well from surgery. Pt to follow up in 2months for next dose of depo (pt desires to continue as it is helping her dysmenorrhea). [de-identified] : 38 P2 s/p bilateral salpingectomy, healing well. Pt seen in ED for inguinal mass, found to be lymph node. Pt to follow up with IR for lymph node biopsy. Pathology reviewed with patient- normal tubes bilaterally, bilateral paratubal cysts. Nutrition Education

## 2019-06-19 ENCOUNTER — RESULT REVIEW (OUTPATIENT)
Age: 39
End: 2019-06-19

## 2019-06-19 ENCOUNTER — APPOINTMENT (OUTPATIENT)
Dept: HEMATOLOGY ONCOLOGY | Facility: CLINIC | Age: 39
End: 2019-06-19
Payer: COMMERCIAL

## 2019-06-19 VITALS
HEART RATE: 88 BPM | TEMPERATURE: 99 F | RESPIRATION RATE: 16 BRPM | SYSTOLIC BLOOD PRESSURE: 120 MMHG | WEIGHT: 143.3 LBS | DIASTOLIC BLOOD PRESSURE: 81 MMHG | BODY MASS INDEX: 24.6 KG/M2 | OXYGEN SATURATION: 100 %

## 2019-06-19 LAB
BASOPHILS # BLD AUTO: 0 K/UL — SIGNIFICANT CHANGE UP (ref 0–0.2)
BASOPHILS NFR BLD AUTO: 0.8 % — SIGNIFICANT CHANGE UP (ref 0–2)
EOSINOPHIL # BLD AUTO: 0.2 K/UL — SIGNIFICANT CHANGE UP (ref 0–0.5)
EOSINOPHIL NFR BLD AUTO: 3.3 % — SIGNIFICANT CHANGE UP (ref 0–6)
HCT VFR BLD CALC: 36.3 % — SIGNIFICANT CHANGE UP (ref 34.5–45)
HGB BLD-MCNC: 12.7 G/DL — SIGNIFICANT CHANGE UP (ref 11.5–15.5)
LYMPHOCYTES # BLD AUTO: 1 K/UL — SIGNIFICANT CHANGE UP (ref 1–3.3)
LYMPHOCYTES # BLD AUTO: 16.7 % — SIGNIFICANT CHANGE UP (ref 13–44)
MCHC RBC-ENTMCNC: 27.4 PG — SIGNIFICANT CHANGE UP (ref 27–34)
MCHC RBC-ENTMCNC: 35.1 G/DL — SIGNIFICANT CHANGE UP (ref 32–36)
MCV RBC AUTO: 78 FL — LOW (ref 80–100)
MONOCYTES # BLD AUTO: 0.5 K/UL — SIGNIFICANT CHANGE UP (ref 0–0.9)
MONOCYTES NFR BLD AUTO: 8.1 % — SIGNIFICANT CHANGE UP (ref 2–14)
NEUTROPHILS # BLD AUTO: 4.2 K/UL — SIGNIFICANT CHANGE UP (ref 1.8–7.4)
NEUTROPHILS NFR BLD AUTO: 71.1 % — SIGNIFICANT CHANGE UP (ref 43–77)
PLATELET # BLD AUTO: 249 K/UL — SIGNIFICANT CHANGE UP (ref 150–400)
RBC # BLD: 4.65 M/UL — SIGNIFICANT CHANGE UP (ref 3.8–5.2)
RBC # FLD: 11.7 % — SIGNIFICANT CHANGE UP (ref 10.3–14.5)
WBC # BLD: 6 K/UL — SIGNIFICANT CHANGE UP (ref 3.8–10.5)
WBC # FLD AUTO: 6 K/UL — SIGNIFICANT CHANGE UP (ref 3.8–10.5)

## 2019-06-19 PROCEDURE — 99215 OFFICE O/P EST HI 40 MIN: CPT

## 2019-06-19 PROCEDURE — 93010 ELECTROCARDIOGRAM REPORT: CPT

## 2019-06-19 NOTE — PROCEDURE
[] : The patient was instructed to remove the bandage the following AM. The patient may bathe. Acetaminophen may be taken for discomfort, as per package directions.If there are any other problems, the patient was instructed to call the office. The patient verbalized understanding, and is aware of the office contact numbers.

## 2019-06-19 NOTE — PHYSICAL EXAM
[Fully active, able to carry on all pre-disease performance without restriction] : Status 0 - Fully active, able to carry on all pre-disease performance without restriction [Normal] : affect appropriate [de-identified] : palpable inguinal node L>R, palpable cervical adenopathy L>R [de-identified] : palpable cervical adenopathy L>R

## 2019-06-19 NOTE — ASSESSMENT
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 CT A/P done on 5/9/19 which demonstrated extensive retroperitoneal, pelvic, and inguinal lymphadenopathy. She also notes having cervical LAD. \par We reviewed her BMbx and PET/CT results. She has stage IV disease. Given her symptoms, we discussed treatment with Bendamustine/Rituxan. Side effects discussed, information on drugs provided for her to review. Informed consent obtained. EKG done. All questions answered. We will start C1 on 6/24.\par RTC 3 weeks.

## 2019-06-19 NOTE — HISTORY OF PRESENT ILLNESS
[Date: ____________] : Patient's last distress assessment performed on [unfilled]. [4 - Distress Level] : Distress Level: 4 [Patient given social work contact information and resource sheet] : Patient was given social work contact information and resource sheet [de-identified] : 39yo F w/ asthma here for evaluation of newly diagnosed follicular lymphoma. \par \par Patient sates she initially noted left inguinal pain in February 2019, had a palpable mass for the last 6 years which she noticed after delivery of her second baby in 2013. She saw GYN, had TVUS: Uterus: 5.6 x 3.6 x 4.3 cm. EMS 3 mm. Right ovary normal. Left ovary normal. Complex elongated left adnexal lesion suggestive of hydrosalpinx, 9.2 x 4.9 x 9 cm. She is s/p b/l salpingectomy on 5/2/19. She remains on depo injection. She states that she has had more swelling and more pain since her procedure. \par \par She had CT A/P done on 5/9/19 which demonstrated extensive retroperitoneal, pelvic, and inguinal lymphadenopathy, concerning for malignancy with small amount of abdominal and pelvic ascites.\par s/p IR biopsy of left inguinal lymph node - follicular lymphoma grade 1-2. \par \par Also having cervical LAD, noticed for the last few months, L>R. Noted R inguinal swelling for the last week with pain. No fevers of chills. Notes having itching and that her breathing is a bit more labored than usual.  [de-identified] : BMbx (6/10/19): - Focal lymphoid infiltrate consistent with involvement by follicular lymphoma (history of follicular lymphoma)\par - Small paracortical sample with trilineage hematopoiesis with maturation and iron stores present\par \par PET/CT (6/16/19): 1. Hypermetabolic lymphadenopathy in neck, thorax, abdomen, pelvis, and bilateral inguinal/femoral regions corresponds to biopsy-proven follicular lymphoma. Hypermetabolic subcutaneous nodule, right posterolateral chest wall, is compatible with an additional site of lymphoma.\par 2. Nonspecific left greater than right tonsillar hypermetabolism may represent additional sites of lymphoma.\par 3. Findings compatible with bone marrow involvement by lymphoma in bilateral humeri and axial skeleton, as described above.\par \par She reports having increasing fatigue. Still having slight shortness of breath.

## 2019-06-19 NOTE — REVIEW OF SYSTEMS
[Swollen Glands] : swollen glands [Shortness Of Breath] : shortness of breath [Negative] : Psychiatric [de-identified] : pruritis

## 2019-06-22 LAB
ALBUMIN SERPL ELPH-MCNC: 4.9 G/DL
ALP BLD-CCNC: 83 U/L
ALT SERPL-CCNC: 33 U/L
ANION GAP SERPL CALC-SCNC: 15 MMOL/L
AST SERPL-CCNC: 20 U/L
BILIRUB SERPL-MCNC: 0.5 MG/DL
BUN SERPL-MCNC: 18 MG/DL
CALCIUM SERPL-MCNC: 10 MG/DL
CHLORIDE SERPL-SCNC: 108 MMOL/L
CO2 SERPL-SCNC: 19 MMOL/L
CREAT SERPL-MCNC: 0.84 MG/DL
GLUCOSE SERPL-MCNC: 101 MG/DL
POTASSIUM SERPL-SCNC: 4.5 MMOL/L
PROT SERPL-MCNC: 7.1 G/DL
SODIUM SERPL-SCNC: 142 MMOL/L

## 2019-06-24 ENCOUNTER — RESULT REVIEW (OUTPATIENT)
Age: 39
End: 2019-06-24

## 2019-06-24 ENCOUNTER — LABORATORY RESULT (OUTPATIENT)
Age: 39
End: 2019-06-24

## 2019-06-24 ENCOUNTER — APPOINTMENT (OUTPATIENT)
Dept: INFUSION THERAPY | Facility: HOSPITAL | Age: 39
End: 2019-06-24

## 2019-06-24 LAB
BASOPHILS # BLD AUTO: 0 K/UL — SIGNIFICANT CHANGE UP (ref 0–0.2)
BASOPHILS NFR BLD AUTO: 1 % — SIGNIFICANT CHANGE UP (ref 0–2)
EOSINOPHIL # BLD AUTO: 0 K/UL — SIGNIFICANT CHANGE UP (ref 0–0.5)
EOSINOPHIL NFR BLD AUTO: 2 % — SIGNIFICANT CHANGE UP (ref 0–6)
HCT VFR BLD CALC: 36.6 % — SIGNIFICANT CHANGE UP (ref 34.5–45)
HGB BLD-MCNC: 12.8 G/DL — SIGNIFICANT CHANGE UP (ref 11.5–15.5)
LYMPHOCYTES # BLD AUTO: 0.9 K/UL — LOW (ref 1–3.3)
LYMPHOCYTES # BLD AUTO: 22 % — SIGNIFICANT CHANGE UP (ref 13–44)
MCHC RBC-ENTMCNC: 27.9 PG — SIGNIFICANT CHANGE UP (ref 27–34)
MCHC RBC-ENTMCNC: 34.9 G/DL — SIGNIFICANT CHANGE UP (ref 32–36)
MCV RBC AUTO: 79.8 FL — LOW (ref 80–100)
MONOCYTES # BLD AUTO: 0.6 K/UL — SIGNIFICANT CHANGE UP (ref 0–0.9)
MONOCYTES NFR BLD AUTO: 8 % — SIGNIFICANT CHANGE UP (ref 2–14)
NEUTROPHILS # BLD AUTO: 2.9 K/UL — SIGNIFICANT CHANGE UP (ref 1.8–7.4)
NEUTROPHILS NFR BLD AUTO: 67 % — SIGNIFICANT CHANGE UP (ref 43–77)
PLAT MORPH BLD: NORMAL — SIGNIFICANT CHANGE UP
PLATELET # BLD AUTO: 203 K/UL — SIGNIFICANT CHANGE UP (ref 150–400)
RBC # BLD: 4.58 M/UL — SIGNIFICANT CHANGE UP (ref 3.8–5.2)
RBC # FLD: 12.2 % — SIGNIFICANT CHANGE UP (ref 10.3–14.5)
RBC BLD AUTO: NORMAL — SIGNIFICANT CHANGE UP
WBC # BLD: 4.4 K/UL — SIGNIFICANT CHANGE UP (ref 3.8–10.5)
WBC # FLD AUTO: 4.4 K/UL — SIGNIFICANT CHANGE UP (ref 3.8–10.5)

## 2019-06-25 ENCOUNTER — APPOINTMENT (OUTPATIENT)
Dept: INFUSION THERAPY | Facility: HOSPITAL | Age: 39
End: 2019-06-25

## 2019-06-25 DIAGNOSIS — Z51.11 ENCOUNTER FOR ANTINEOPLASTIC CHEMOTHERAPY: ICD-10-CM

## 2019-06-25 DIAGNOSIS — C82.90 FOLLICULAR LYMPHOMA, UNSPECIFIED, UNSPECIFIED SITE: ICD-10-CM

## 2019-06-25 DIAGNOSIS — R11.2 NAUSEA WITH VOMITING, UNSPECIFIED: ICD-10-CM

## 2019-06-26 ENCOUNTER — TRANSCRIPTION ENCOUNTER (OUTPATIENT)
Age: 39
End: 2019-06-26

## 2019-07-05 ENCOUNTER — RX CHANGE (OUTPATIENT)
Age: 39
End: 2019-07-05

## 2019-07-08 ENCOUNTER — OUTPATIENT (OUTPATIENT)
Dept: OUTPATIENT SERVICES | Facility: HOSPITAL | Age: 39
LOS: 1 days | Discharge: HOME | End: 2019-07-08
Payer: MEDICAID

## 2019-07-08 DIAGNOSIS — C85.88 OTHER SPECIFIED TYPES OF NON-HODGKIN LYMPHOMA, LYMPH NODES OF MULTIPLE SITES: ICD-10-CM

## 2019-07-09 ENCOUNTER — APPOINTMENT (OUTPATIENT)
Dept: OBGYN | Facility: HOSPITAL | Age: 39
End: 2019-07-09

## 2019-07-09 ENCOUNTER — LABORATORY RESULT (OUTPATIENT)
Age: 39
End: 2019-07-09

## 2019-07-09 ENCOUNTER — OUTPATIENT (OUTPATIENT)
Dept: OUTPATIENT SERVICES | Facility: HOSPITAL | Age: 39
LOS: 1 days | End: 2019-07-09

## 2019-07-09 NOTE — DISCUSSION/SUMMARY
[Pregnancy Prevention] : pregnancy prevention [Depo Provera] : depo provera placement [Bleeding Control] : bleeding control

## 2019-07-09 NOTE — PHYSICAL EXAM
[Normal] : uterus [No Bleeding] : there was no active vaginal bleeding [Tenderness] : tender [Uterine Adnexae] : were not tender and not enlarged [FreeTextEntry5] : 5 mm polyp noted at os

## 2019-07-10 DIAGNOSIS — R10.2 PELVIC AND PERINEAL PAIN: ICD-10-CM

## 2019-07-10 DIAGNOSIS — N89.8 OTHER SPECIFIED NONINFLAMMATORY DISORDERS OF VAGINA: ICD-10-CM

## 2019-07-11 ENCOUNTER — RESULT REVIEW (OUTPATIENT)
Age: 39
End: 2019-07-11

## 2019-07-11 ENCOUNTER — APPOINTMENT (OUTPATIENT)
Dept: HEMATOLOGY ONCOLOGY | Facility: CLINIC | Age: 39
End: 2019-07-11
Payer: MEDICAID

## 2019-07-11 VITALS
WEIGHT: 140.63 LBS | HEART RATE: 70 BPM | BODY MASS INDEX: 24.14 KG/M2 | DIASTOLIC BLOOD PRESSURE: 77 MMHG | SYSTOLIC BLOOD PRESSURE: 117 MMHG | RESPIRATION RATE: 18 BRPM | OXYGEN SATURATION: 100 % | TEMPERATURE: 98.4 F

## 2019-07-11 LAB
ALBUMIN SERPL ELPH-MCNC: 4.4 G/DL
ALP BLD-CCNC: 66 U/L
ALT SERPL-CCNC: 13 U/L
ANION GAP SERPL CALC-SCNC: 11 MMOL/L
AST SERPL-CCNC: 12 U/L
BASOPHILS # BLD AUTO: 0.1 K/UL — SIGNIFICANT CHANGE UP (ref 0–0.2)
BASOPHILS NFR BLD AUTO: 1 % — SIGNIFICANT CHANGE UP (ref 0–2)
BILIRUB SERPL-MCNC: 0.4 MG/DL
BUN SERPL-MCNC: 12 MG/DL
CALCIUM SERPL-MCNC: 9.3 MG/DL
CANDIDA AB TITR SER: NOT DETECTED — SIGNIFICANT CHANGE UP
CHLORIDE SERPL-SCNC: 110 MMOL/L
CO2 SERPL-SCNC: 19 MMOL/L
CREAT SERPL-MCNC: 0.68 MG/DL
EOSINOPHIL # BLD AUTO: 0.2 K/UL — SIGNIFICANT CHANGE UP (ref 0–0.5)
EOSINOPHIL NFR BLD AUTO: 1 % — SIGNIFICANT CHANGE UP (ref 0–6)
G VAGINALIS DNA SPEC QL NAA+PROBE: DETECTED — SIGNIFICANT CHANGE UP
GLUCOSE SERPL-MCNC: 103 MG/DL
HCT VFR BLD CALC: 38.7 % — SIGNIFICANT CHANGE UP (ref 34.5–45)
HGB BLD-MCNC: 12.5 G/DL — SIGNIFICANT CHANGE UP (ref 11.5–15.5)
LDH SERPL-CCNC: 151 U/L
LYMPHOCYTES # BLD AUTO: 0.5 K/UL — LOW (ref 1–3.3)
LYMPHOCYTES # BLD AUTO: 19 % — SIGNIFICANT CHANGE UP (ref 13–44)
MCHC RBC-ENTMCNC: 26.3 PG — LOW (ref 27–34)
MCHC RBC-ENTMCNC: 32.4 G/DL — SIGNIFICANT CHANGE UP (ref 32–36)
MCV RBC AUTO: 81.1 FL — SIGNIFICANT CHANGE UP (ref 80–100)
MONOCYTES # BLD AUTO: 0.7 K/UL — SIGNIFICANT CHANGE UP (ref 0–0.9)
MONOCYTES NFR BLD AUTO: 17 % — HIGH (ref 2–14)
NEUTROPHILS # BLD AUTO: 2.4 K/UL — SIGNIFICANT CHANGE UP (ref 1.8–7.4)
NEUTROPHILS NFR BLD AUTO: 62 % — SIGNIFICANT CHANGE UP (ref 43–77)
PLAT MORPH BLD: NORMAL — SIGNIFICANT CHANGE UP
PLATELET # BLD AUTO: 233 K/UL — SIGNIFICANT CHANGE UP (ref 150–400)
POTASSIUM SERPL-SCNC: 4.1 MMOL/L
PROT SERPL-MCNC: 6.4 G/DL
RBC # BLD: 4.77 M/UL — SIGNIFICANT CHANGE UP (ref 3.8–5.2)
RBC # FLD: 13 % — SIGNIFICANT CHANGE UP (ref 10.3–14.5)
RBC BLD AUTO: NORMAL — SIGNIFICANT CHANGE UP
SODIUM SERPL-SCNC: 140 MMOL/L
T VAGINALIS SPEC QL WET PREP: NOT DETECTED — SIGNIFICANT CHANGE UP
WBC # BLD: 3.9 K/UL — SIGNIFICANT CHANGE UP (ref 3.8–10.5)
WBC # FLD AUTO: 3.9 K/UL — SIGNIFICANT CHANGE UP (ref 3.8–10.5)

## 2019-07-11 PROCEDURE — 99214 OFFICE O/P EST MOD 30 MIN: CPT

## 2019-07-11 NOTE — ASSESSMENT
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 \par We started treatment with Bendamustine/Rituxan on 6/24. She had a reaction to the Rituxan. Otherwise tolerated treatment well and her LAD has improved. \par f/u w/ GYN as scheduled. Agree with TVUS if pelvic pain does not improve. If needs to remove small polyp, no contraindications to do so as long as counts are wnl.  \par RTC 4 weeks.

## 2019-07-11 NOTE — PHYSICAL EXAM
[Fully active, able to carry on all pre-disease performance without restriction] : Status 0 - Fully active, able to carry on all pre-disease performance without restriction [Normal] : affect appropriate [de-identified] : cervical adenopathy resolved [de-identified] : palpable inguinal node L, R inguinal node resolved

## 2019-07-11 NOTE — HISTORY OF PRESENT ILLNESS
[de-identified] : 39yo F w/ asthma here for evaluation of newly diagnosed follicular lymphoma. \par \par Patient sates she initially noted left inguinal pain in February 2019, had a palpable mass for the last 6 years which she noticed after delivery of her second baby in 2013. She saw GYN, had TVUS: Uterus: 5.6 x 3.6 x 4.3 cm. EMS 3 mm. Right ovary normal. Left ovary normal. Complex elongated left adnexal lesion suggestive of hydrosalpinx, 9.2 x 4.9 x 9 cm. She is s/p b/l salpingectomy on 5/2/19. She remains on depo injection. She states that she has had more swelling and more pain since her procedure. \par \par She had CT A/P done on 5/9/19 which demonstrated extensive retroperitoneal, pelvic, and inguinal lymphadenopathy, concerning for malignancy with small amount of abdominal and pelvic ascites.\par s/p IR biopsy of left inguinal lymph node - follicular lymphoma grade 1-2. \par \par Also having cervical LAD, noticed for the last few months, L>R. Noted R inguinal swelling for the last week with pain. No fevers of chills. Notes having itching and that her breathing is a bit more labored than usual.  [de-identified] : BMbx (6/10/19): - Focal lymphoid infiltrate consistent with involvement by follicular lymphoma (history of follicular lymphoma)\par - Small paracortical sample with trilineage hematopoiesis with maturation and iron stores present\par \par PET/CT (6/16/19): 1. Hypermetabolic lymphadenopathy in neck, thorax, abdomen, pelvis, and bilateral inguinal/femoral regions corresponds to biopsy-proven follicular lymphoma. Hypermetabolic subcutaneous nodule, right posterolateral chest wall, is compatible with an additional site of lymphoma.\par 2. Nonspecific left greater than right tonsillar hypermetabolism may represent additional sites of lymphoma.\par 3. Findings compatible with bone marrow involvement by lymphoma in bilateral humeri and axial skeleton, as described above.\par \par She reports having increasing fatigue. Still having slight shortness of breath. \par We started BR on 6/24. She had a reaction to Rituxan. \par Eating fine. Neck LAD, shortness of breath and abdominal bloating improved/resolved. \par She had a yeast infection, saw GYN. Also reports having pelvic pain and was found to have a small polyp.

## 2019-07-16 ENCOUNTER — NON-APPOINTMENT (OUTPATIENT)
Age: 39
End: 2019-07-16

## 2019-07-16 ENCOUNTER — APPOINTMENT (OUTPATIENT)
Dept: OPHTHALMOLOGY | Facility: CLINIC | Age: 39
End: 2019-07-16
Payer: MEDICAID

## 2019-07-16 PROCEDURE — 92083 EXTENDED VISUAL FIELD XM: CPT

## 2019-07-16 PROCEDURE — 92014 COMPRE OPH EXAM EST PT 1/>: CPT

## 2019-07-17 ENCOUNTER — OUTPATIENT (OUTPATIENT)
Dept: OUTPATIENT SERVICES | Facility: HOSPITAL | Age: 39
LOS: 1 days | End: 2019-07-17

## 2019-07-17 ENCOUNTER — APPOINTMENT (OUTPATIENT)
Dept: INTERNAL MEDICINE | Facility: CLINIC | Age: 39
End: 2019-07-17
Payer: MEDICAID

## 2019-07-17 VITALS
BODY MASS INDEX: 24.07 KG/M2 | SYSTOLIC BLOOD PRESSURE: 100 MMHG | HEIGHT: 64 IN | WEIGHT: 141 LBS | DIASTOLIC BLOOD PRESSURE: 62 MMHG | HEART RATE: 72 BPM

## 2019-07-17 DIAGNOSIS — R59.0 LOCALIZED ENLARGED LYMPH NODES: ICD-10-CM

## 2019-07-17 DIAGNOSIS — N89.8 OTHER SPECIFIED NONINFLAMMATORY DISORDERS OF VAGINA: ICD-10-CM

## 2019-07-17 DIAGNOSIS — Z88.9 ALLERGY STATUS TO UNSPECIFIED DRUGS, MEDICAMENTS AND BIOLOGICAL SUBSTANCES: ICD-10-CM

## 2019-07-17 DIAGNOSIS — H54.7 UNSPECIFIED VISUAL LOSS: ICD-10-CM

## 2019-07-17 PROCEDURE — 99214 OFFICE O/P EST MOD 30 MIN: CPT | Mod: GC

## 2019-07-17 RX ORDER — METOCLOPRAMIDE HYDROCHLORIDE 10 MG/1
10 TABLET, ORALLY DISINTEGRATING ORAL EVERY 8 HOURS
Qty: 30 | Refills: 0 | Status: DISCONTINUED | COMMUNITY
Start: 2019-06-28 | End: 2019-07-17

## 2019-07-19 PROBLEM — R59.0 LYMPHADENOPATHY, INGUINAL: Status: ACTIVE | Noted: 2019-05-10

## 2019-07-22 ENCOUNTER — APPOINTMENT (OUTPATIENT)
Dept: INFUSION THERAPY | Facility: HOSPITAL | Age: 39
End: 2019-07-22

## 2019-07-22 ENCOUNTER — LABORATORY RESULT (OUTPATIENT)
Age: 39
End: 2019-07-22

## 2019-07-22 ENCOUNTER — RESULT REVIEW (OUTPATIENT)
Age: 39
End: 2019-07-22

## 2019-07-22 DIAGNOSIS — J30.9 ALLERGIC RHINITIS, UNSPECIFIED: ICD-10-CM

## 2019-07-22 DIAGNOSIS — J45.20 MILD INTERMITTENT ASTHMA, UNCOMPLICATED: ICD-10-CM

## 2019-07-22 DIAGNOSIS — R10.2 PELVIC AND PERINEAL PAIN: ICD-10-CM

## 2019-07-22 DIAGNOSIS — Z88.9 ALLERGY STATUS TO UNSPECIFIED DRUGS, MEDICAMENTS AND BIOLOGICAL SUBSTANCES: ICD-10-CM

## 2019-07-22 DIAGNOSIS — C85.10 UNSPECIFIED B-CELL LYMPHOMA, UNSPECIFIED SITE: ICD-10-CM

## 2019-07-22 LAB
BASOPHILS # BLD AUTO: 0.1 K/UL — SIGNIFICANT CHANGE UP (ref 0–0.2)
EOSINOPHIL # BLD AUTO: 0.4 K/UL — SIGNIFICANT CHANGE UP (ref 0–0.5)
EOSINOPHIL NFR BLD AUTO: 3 % — SIGNIFICANT CHANGE UP (ref 0–6)
HCT VFR BLD CALC: 36.7 % — SIGNIFICANT CHANGE UP (ref 34.5–45)
HGB BLD-MCNC: 12.7 G/DL — SIGNIFICANT CHANGE UP (ref 11.5–15.5)
LYMPHOCYTES # BLD AUTO: 0.6 K/UL — LOW (ref 1–3.3)
LYMPHOCYTES # BLD AUTO: 18 % — SIGNIFICANT CHANGE UP (ref 13–44)
MCHC RBC-ENTMCNC: 27.3 PG — SIGNIFICANT CHANGE UP (ref 27–34)
MCHC RBC-ENTMCNC: 34.6 G/DL — SIGNIFICANT CHANGE UP (ref 32–36)
MCV RBC AUTO: 78.9 FL — LOW (ref 80–100)
MONOCYTES # BLD AUTO: 0.7 K/UL — SIGNIFICANT CHANGE UP (ref 0–0.9)
MONOCYTES NFR BLD AUTO: 21 % — HIGH (ref 2–14)
NEUTROPHILS # BLD AUTO: 2.1 K/UL — SIGNIFICANT CHANGE UP (ref 1.8–7.4)
NEUTROPHILS NFR BLD AUTO: 58 % — SIGNIFICANT CHANGE UP (ref 43–77)
PLAT MORPH BLD: NORMAL — SIGNIFICANT CHANGE UP
PLATELET # BLD AUTO: 160 K/UL — SIGNIFICANT CHANGE UP (ref 150–400)
RBC # BLD: 4.65 M/UL — SIGNIFICANT CHANGE UP (ref 3.8–5.2)
RBC # FLD: 11.9 % — SIGNIFICANT CHANGE UP (ref 10.3–14.5)
RBC BLD AUTO: NORMAL — SIGNIFICANT CHANGE UP
WBC # BLD: 3.8 K/UL — SIGNIFICANT CHANGE UP (ref 3.8–10.5)
WBC # FLD AUTO: 3.8 K/UL — SIGNIFICANT CHANGE UP (ref 3.8–10.5)

## 2019-07-22 PROCEDURE — 99212 OFFICE O/P EST SF 10 MIN: CPT

## 2019-07-22 NOTE — END OF VISIT
[FreeTextEntry3] : 38 year old woman with h/o asthma and recently diagnosed follicular lymphoma who presents for follow-up, reports that she was initially having pain and dry eyes after starting chemotherapy (bendamustine/rituxan) and had a reaction to rituxan, but overall handling it well and feeling better; saw ophtho rec wetting drops. Denies dyspnea/wheezing and is avoiding asthma triggers, still with fatigue. VSS, examination benign, well appearing and in no distress. Soft, nontender abdomen. Discussed 1) support for diagnosis-reports she is following with oncology and they are helping with this, 2) pneumococcal immunizations needed given recent lymphoma diagnosis and she would like to discuss with oncology. Will order new albuterol and epipen given hers are .  [] : Resident

## 2019-07-22 NOTE — HISTORY OF PRESENT ILLNESS
[Other: _____] : [unfilled] [de-identified] : 39 yo F hx of allergic rhinitis and well controlled asthma who last presented IN February 2019 with a L inguinal hearna mass, for which she underwent biopsy of inguinal mass, CTAP, PET scan and was diagnosed with new B cell lymphoma Stage IV and has since been started on chemotherapy with biweekly rituximab for six months. She had a allergic reaction with administration of chemotherapy which resolved with benadryl and chemotherapy was restarted. She states that she initially has inguinal, armpit, and cervical pain with therapy which has resolved. She felt angry about the diagnosis but if focusing on moving forward and taking care of her health for her daughters. 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 \par  [FreeTextEntry1] : Annual Wellness Visit

## 2019-07-23 ENCOUNTER — APPOINTMENT (OUTPATIENT)
Dept: INFUSION THERAPY | Facility: HOSPITAL | Age: 39
End: 2019-07-23

## 2019-07-23 DIAGNOSIS — Z51.11 ENCOUNTER FOR ANTINEOPLASTIC CHEMOTHERAPY: ICD-10-CM

## 2019-07-23 DIAGNOSIS — R11.2 NAUSEA WITH VOMITING, UNSPECIFIED: ICD-10-CM

## 2019-08-02 ENCOUNTER — OUTPATIENT (OUTPATIENT)
Dept: OUTPATIENT SERVICES | Facility: HOSPITAL | Age: 39
LOS: 1 days | Discharge: HOME | End: 2019-08-02

## 2019-08-02 DIAGNOSIS — C85.88 OTHER SPECIFIED TYPES OF NON-HODGKIN LYMPHOMA, LYMPH NODES OF MULTIPLE SITES: ICD-10-CM

## 2019-08-05 ENCOUNTER — RESULT REVIEW (OUTPATIENT)
Age: 39
End: 2019-08-05

## 2019-08-05 ENCOUNTER — APPOINTMENT (OUTPATIENT)
Dept: ULTRASOUND IMAGING | Facility: IMAGING CENTER | Age: 39
End: 2019-08-05
Payer: MEDICAID

## 2019-08-05 ENCOUNTER — APPOINTMENT (OUTPATIENT)
Dept: HEMATOLOGY ONCOLOGY | Facility: CLINIC | Age: 39
End: 2019-08-05

## 2019-08-05 ENCOUNTER — OUTPATIENT (OUTPATIENT)
Dept: OUTPATIENT SERVICES | Facility: HOSPITAL | Age: 39
LOS: 1 days | End: 2019-08-05
Payer: MEDICAID

## 2019-08-05 DIAGNOSIS — R10.2 PELVIC AND PERINEAL PAIN: ICD-10-CM

## 2019-08-05 LAB
BASOPHILS # BLD AUTO: 0 K/UL — SIGNIFICANT CHANGE UP (ref 0–0.2)
EOSINOPHIL # BLD AUTO: 0.1 K/UL — SIGNIFICANT CHANGE UP (ref 0–0.5)
EOSINOPHIL NFR BLD AUTO: 1 % — SIGNIFICANT CHANGE UP (ref 0–6)
HCT VFR BLD CALC: 35.4 % — SIGNIFICANT CHANGE UP (ref 34.5–45)
HGB BLD-MCNC: 12 G/DL — SIGNIFICANT CHANGE UP (ref 11.5–15.5)
LYMPHOCYTES # BLD AUTO: 0.6 K/UL — LOW (ref 1–3.3)
LYMPHOCYTES # BLD AUTO: 27 % — SIGNIFICANT CHANGE UP (ref 13–44)
MCHC RBC-ENTMCNC: 27 PG — SIGNIFICANT CHANGE UP (ref 27–34)
MCHC RBC-ENTMCNC: 34 G/DL — SIGNIFICANT CHANGE UP (ref 32–36)
MCV RBC AUTO: 79.4 FL — LOW (ref 80–100)
MONOCYTES # BLD AUTO: 0.4 K/UL — SIGNIFICANT CHANGE UP (ref 0–0.9)
MONOCYTES NFR BLD AUTO: 19 % — HIGH (ref 2–14)
NEUTROPHILS # BLD AUTO: 1 K/UL — LOW (ref 1.8–7.4)
NEUTROPHILS NFR BLD AUTO: 51 % — SIGNIFICANT CHANGE UP (ref 43–77)
PLAT MORPH BLD: NORMAL — SIGNIFICANT CHANGE UP
PLATELET # BLD AUTO: 163 K/UL — SIGNIFICANT CHANGE UP (ref 150–400)
RBC # BLD: 4.45 M/UL — SIGNIFICANT CHANGE UP (ref 3.8–5.2)
RBC # FLD: 13.3 % — SIGNIFICANT CHANGE UP (ref 10.3–14.5)
RBC BLD AUTO: SIGNIFICANT CHANGE UP
VARIANT LYMPHS # BLD: 2 % — SIGNIFICANT CHANGE UP (ref 0–6)
WBC # BLD: 2 K/UL — LOW (ref 3.8–10.5)
WBC # FLD AUTO: 2 K/UL — LOW (ref 3.8–10.5)

## 2019-08-05 PROCEDURE — 76830 TRANSVAGINAL US NON-OB: CPT

## 2019-08-05 PROCEDURE — 76830 TRANSVAGINAL US NON-OB: CPT | Mod: 26

## 2019-08-06 LAB
APTT BLD: 26.2 SEC
INR PPP: 1.2 RATIO
PT BLD: 13.7 SEC

## 2019-08-11 ENCOUNTER — FORM ENCOUNTER (OUTPATIENT)
Age: 39
End: 2019-08-11

## 2019-08-12 ENCOUNTER — APPOINTMENT (OUTPATIENT)
Dept: HEMATOLOGY ONCOLOGY | Facility: CLINIC | Age: 39
End: 2019-08-12
Payer: MEDICAID

## 2019-08-12 ENCOUNTER — OUTPATIENT (OUTPATIENT)
Dept: OUTPATIENT SERVICES | Facility: HOSPITAL | Age: 39
LOS: 1 days | End: 2019-08-12
Payer: MEDICAID

## 2019-08-12 VITALS
BODY MASS INDEX: 23.73 KG/M2 | TEMPERATURE: 98.8 F | RESPIRATION RATE: 16 BRPM | WEIGHT: 138.23 LBS | OXYGEN SATURATION: 100 % | HEART RATE: 94 BPM | DIASTOLIC BLOOD PRESSURE: 70 MMHG | SYSTOLIC BLOOD PRESSURE: 107 MMHG

## 2019-08-12 DIAGNOSIS — C85.10 UNSPECIFIED B-CELL LYMPHOMA, UNSPECIFIED SITE: ICD-10-CM

## 2019-08-12 PROCEDURE — 36561 INSERT TUNNELED CV CATH: CPT

## 2019-08-12 PROCEDURE — C1769: CPT

## 2019-08-12 PROCEDURE — C1894: CPT

## 2019-08-12 PROCEDURE — 77001 FLUOROGUIDE FOR VEIN DEVICE: CPT | Mod: 26

## 2019-08-12 PROCEDURE — 76937 US GUIDE VASCULAR ACCESS: CPT

## 2019-08-12 PROCEDURE — C1788: CPT

## 2019-08-12 PROCEDURE — 76937 US GUIDE VASCULAR ACCESS: CPT | Mod: 26

## 2019-08-12 PROCEDURE — 99214 OFFICE O/P EST MOD 30 MIN: CPT

## 2019-08-12 PROCEDURE — 77001 FLUOROGUIDE FOR VEIN DEVICE: CPT

## 2019-08-12 NOTE — HISTORY OF PRESENT ILLNESS
[de-identified] : 37yo F w/ asthma here for evaluation of newly diagnosed follicular lymphoma. \par \par Patient sates she initially noted left inguinal pain in February 2019, had a palpable mass for the last 6 years which she noticed after delivery of her second baby in 2013. She saw GYN, had TVUS: Uterus: 5.6 x 3.6 x 4.3 cm. EMS 3 mm. Right ovary normal. Left ovary normal. Complex elongated left adnexal lesion suggestive of hydrosalpinx, 9.2 x 4.9 x 9 cm. She is s/p b/l salpingectomy on 5/2/19. She remains on depo injection. She states that she has had more swelling and more pain since her procedure. \par \par She had CT A/P done on 5/9/19 which demonstrated extensive retroperitoneal, pelvic, and inguinal lymphadenopathy, concerning for malignancy with small amount of abdominal and pelvic ascites.\par s/p IR biopsy of left inguinal lymph node - follicular lymphoma grade 1-2. \par \par Also having cervical LAD, noticed for the last few months, L>R. Noted R inguinal swelling for the last week with pain. No fevers of chills. Notes having itching and that her breathing is a bit more labored than usual.  [de-identified] : BMbx (6/10/19): - Focal lymphoid infiltrate consistent with involvement by follicular lymphoma (history of follicular lymphoma)\par - Small paracortical sample with trilineage hematopoiesis with maturation and iron stores present\par \par PET/CT (6/16/19): 1. Hypermetabolic lymphadenopathy in neck, thorax, abdomen, pelvis, and bilateral inguinal/femoral regions corresponds to biopsy-proven follicular lymphoma. Hypermetabolic subcutaneous nodule, right posterolateral chest wall, is compatible with an additional site of lymphoma.\par 2. Nonspecific left greater than right tonsillar hypermetabolism may represent additional sites of lymphoma.\par 3. Findings compatible with bone marrow involvement by lymphoma in bilateral humeri and axial skeleton, as described above.\par \par She reports having increasing fatigue. Still having slight shortness of breath. \par \par We started BR on 6/24. She had a reaction to Rituxan. \par Eating fine. Neck LAD, shortness of breath and abdominal bloating improved/resolved. \par She had a yeast infection, saw GYN. Also reports having pelvic pain and was found to have a small polyp. \par \par C2 on 7/22. She notes having burning as she was getting the chemo (please see chart note for details). Notes that her arms were still hurting for a week after. \par She had a portacath placed today\par Felt more fatigued and weak. \par Had transvaginal US, will see GYN on 9/5/19. Pelvic pain has resolved.

## 2019-08-12 NOTE — PHYSICAL EXAM
[Fully active, able to carry on all pre-disease performance without restriction] : Status 0 - Fully active, able to carry on all pre-disease performance without restriction [Normal] : affect appropriate [de-identified] : cervical adenopathy resolved

## 2019-08-12 NOTE — ASSESSMENT
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 \par We started treatment with Bendamustine/Rituxan on 6/24. LAD has improved. \par \par portacath placed today. C3 due 8/19\par f/u w/ GYN as scheduled. TVUS done\par PET/CT due after next cycle\par RTC 4 weeks.

## 2019-08-16 DIAGNOSIS — Z45.2 ENCOUNTER FOR ADJUSTMENT AND MANAGEMENT OF VASCULAR ACCESS DEVICE: ICD-10-CM

## 2019-08-16 DIAGNOSIS — C85.90 NON-HODGKIN LYMPHOMA, UNSPECIFIED, UNSPECIFIED SITE: ICD-10-CM

## 2019-08-19 ENCOUNTER — RESULT REVIEW (OUTPATIENT)
Age: 39
End: 2019-08-19

## 2019-08-19 ENCOUNTER — APPOINTMENT (OUTPATIENT)
Dept: INFUSION THERAPY | Facility: HOSPITAL | Age: 39
End: 2019-08-19

## 2019-08-19 ENCOUNTER — LABORATORY RESULT (OUTPATIENT)
Age: 39
End: 2019-08-19

## 2019-08-19 LAB
EOSINOPHIL NFR BLD AUTO: 3 % — SIGNIFICANT CHANGE UP (ref 0–6)
HCT VFR BLD CALC: 32.6 % — LOW (ref 34.5–45)
HGB BLD-MCNC: 11.4 G/DL — LOW (ref 11.5–15.5)
LYMPHOCYTES # BLD AUTO: 1.5 K/UL — SIGNIFICANT CHANGE UP (ref 1–3.3)
LYMPHOCYTES # BLD AUTO: 60 % — HIGH (ref 13–44)
MCHC RBC-ENTMCNC: 27.8 PG — SIGNIFICANT CHANGE UP (ref 27–34)
MCHC RBC-ENTMCNC: 35 G/DL — SIGNIFICANT CHANGE UP (ref 32–36)
MCV RBC AUTO: 79.5 FL — LOW (ref 80–100)
MONOCYTES # BLD AUTO: 0.6 K/UL — SIGNIFICANT CHANGE UP (ref 0–0.9)
MONOCYTES NFR BLD AUTO: 21 % — HIGH (ref 2–14)
NEUTROPHILS # BLD AUTO: 0.4 K/UL — LOW (ref 1.8–7.4)
NEUTROPHILS NFR BLD AUTO: 16 % — LOW (ref 43–77)
OVALOCYTES BLD QL SMEAR: SLIGHT — SIGNIFICANT CHANGE UP
PLAT MORPH BLD: NORMAL — SIGNIFICANT CHANGE UP
PLATELET # BLD AUTO: 178 K/UL — SIGNIFICANT CHANGE UP (ref 150–400)
RBC # BLD: 4.1 M/UL — SIGNIFICANT CHANGE UP (ref 3.8–5.2)
RBC # FLD: 13.6 % — SIGNIFICANT CHANGE UP (ref 10.3–14.5)
RBC BLD AUTO: SIGNIFICANT CHANGE UP
TARGETS BLD QL SMEAR: SLIGHT — SIGNIFICANT CHANGE UP
WBC # BLD: 2.6 K/UL — LOW (ref 3.8–10.5)
WBC # FLD AUTO: 2.6 K/UL — LOW (ref 3.8–10.5)

## 2019-08-20 ENCOUNTER — APPOINTMENT (OUTPATIENT)
Dept: INFUSION THERAPY | Facility: HOSPITAL | Age: 39
End: 2019-08-20

## 2019-08-20 DIAGNOSIS — Z51.11 ENCOUNTER FOR ANTINEOPLASTIC CHEMOTHERAPY: ICD-10-CM

## 2019-08-20 DIAGNOSIS — R11.2 NAUSEA WITH VOMITING, UNSPECIFIED: ICD-10-CM

## 2019-09-03 ENCOUNTER — OUTPATIENT (OUTPATIENT)
Dept: OUTPATIENT SERVICES | Facility: HOSPITAL | Age: 39
LOS: 1 days | Discharge: HOME | End: 2019-09-03

## 2019-09-03 DIAGNOSIS — C85.88 OTHER SPECIFIED TYPES OF NON-HODGKIN LYMPHOMA, LYMPH NODES OF MULTIPLE SITES: ICD-10-CM

## 2019-09-05 ENCOUNTER — RESULT REVIEW (OUTPATIENT)
Age: 39
End: 2019-09-05

## 2019-09-05 ENCOUNTER — APPOINTMENT (OUTPATIENT)
Dept: HEMATOLOGY ONCOLOGY | Facility: CLINIC | Age: 39
End: 2019-09-05
Payer: MEDICAID

## 2019-09-05 ENCOUNTER — APPOINTMENT (OUTPATIENT)
Dept: OBGYN | Facility: HOSPITAL | Age: 39
End: 2019-09-05

## 2019-09-05 ENCOUNTER — OUTPATIENT (OUTPATIENT)
Dept: OUTPATIENT SERVICES | Facility: HOSPITAL | Age: 39
LOS: 1 days | End: 2019-09-05

## 2019-09-05 VITALS
DIASTOLIC BLOOD PRESSURE: 78 MMHG | BODY MASS INDEX: 23.22 KG/M2 | HEIGHT: 64 IN | HEART RATE: 78 BPM | SYSTOLIC BLOOD PRESSURE: 112 MMHG | WEIGHT: 136 LBS

## 2019-09-05 VITALS
WEIGHT: 137.79 LBS | TEMPERATURE: 98.4 F | BODY MASS INDEX: 23.65 KG/M2 | SYSTOLIC BLOOD PRESSURE: 111 MMHG | RESPIRATION RATE: 16 BRPM | HEART RATE: 79 BPM | OXYGEN SATURATION: 100 % | DIASTOLIC BLOOD PRESSURE: 75 MMHG

## 2019-09-05 LAB
ALBUMIN SERPL ELPH-MCNC: 4.7 G/DL
ALP BLD-CCNC: 90 U/L
ALT SERPL-CCNC: 30 U/L
ANION GAP SERPL CALC-SCNC: 12 MMOL/L
AST SERPL-CCNC: 23 U/L
BASOPHILS # BLD AUTO: 0 K/UL — SIGNIFICANT CHANGE UP (ref 0–0.2)
BASOPHILS NFR BLD AUTO: 1 % — SIGNIFICANT CHANGE UP (ref 0–2)
BILIRUB SERPL-MCNC: 0.3 MG/DL
BUN SERPL-MCNC: 9 MG/DL
CALCIUM SERPL-MCNC: 9.5 MG/DL
CHLORIDE SERPL-SCNC: 107 MMOL/L
CO2 SERPL-SCNC: 20 MMOL/L
CREAT SERPL-MCNC: 0.61 MG/DL
EOSINOPHIL # BLD AUTO: 0.1 K/UL — SIGNIFICANT CHANGE UP (ref 0–0.5)
EOSINOPHIL NFR BLD AUTO: 6 % — SIGNIFICANT CHANGE UP (ref 0–6)
GLUCOSE SERPL-MCNC: 99 MG/DL
HCT VFR BLD CALC: 36 % — SIGNIFICANT CHANGE UP (ref 34.5–45)
HGB BLD-MCNC: 12.3 G/DL — SIGNIFICANT CHANGE UP (ref 11.5–15.5)
LDH SERPL-CCNC: 178 U/L
LYMPHOCYTES # BLD AUTO: 0.3 K/UL — LOW (ref 1–3.3)
LYMPHOCYTES # BLD AUTO: 18 % — SIGNIFICANT CHANGE UP (ref 13–44)
MCHC RBC-ENTMCNC: 27.5 PG — SIGNIFICANT CHANGE UP (ref 27–34)
MCHC RBC-ENTMCNC: 34.1 G/DL — SIGNIFICANT CHANGE UP (ref 32–36)
MCV RBC AUTO: 80.8 FL — SIGNIFICANT CHANGE UP (ref 80–100)
MONOCYTES # BLD AUTO: 0.7 K/UL — SIGNIFICANT CHANGE UP (ref 0–0.9)
MONOCYTES NFR BLD AUTO: 44 % — HIGH (ref 2–14)
NEUTROPHILS # BLD AUTO: 0.5 K/UL — LOW (ref 1.8–7.4)
NEUTROPHILS NFR BLD AUTO: 31 % — LOW (ref 43–77)
PLAT MORPH BLD: NORMAL — SIGNIFICANT CHANGE UP
PLATELET # BLD AUTO: 201 K/UL — SIGNIFICANT CHANGE UP (ref 150–400)
POTASSIUM SERPL-SCNC: 3.9 MMOL/L
PROT SERPL-MCNC: 6.8 G/DL
RBC # BLD: 4.46 M/UL — SIGNIFICANT CHANGE UP (ref 3.8–5.2)
RBC # FLD: 14 % — SIGNIFICANT CHANGE UP (ref 10.3–14.5)
RBC BLD AUTO: SIGNIFICANT CHANGE UP
SODIUM SERPL-SCNC: 139 MMOL/L
WBC # BLD: 1.5 K/UL — LOW (ref 3.8–10.5)
WBC # FLD AUTO: 1.5 K/UL — LOW (ref 3.8–10.5)

## 2019-09-05 PROCEDURE — 99214 OFFICE O/P EST MOD 30 MIN: CPT

## 2019-09-05 RX ORDER — MEDROXYPROGESTERONE ACETATE 150 MG/ML
150 INJECTION, SUSPENSION INTRAMUSCULAR
Qty: 0 | Refills: 0 | Status: COMPLETED | OUTPATIENT
Start: 2019-09-05

## 2019-09-05 RX ADMIN — MEDROXYPROGESTERONE ACETATE 0 MG/ML: 150 INJECTION, SUSPENSION, EXTENDED RELEASE INTRAMUSCULAR at 00:00

## 2019-09-05 NOTE — DISCUSSION/SUMMARY
[Condoms] : condom use [Depo Provera] : depo provera placement [Pregnancy Prevention] : pregnancy prevention [Bleeding Control] : bleeding control [Improve Dysmenorrhea] : to improve dysmenorrhea

## 2019-09-05 NOTE — ASSESSMENT
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 \par We started treatment with Bendamustine/Rituxan on 6/24. LAD has improved. \par \par C4 due 9/16, tolerating treatment well\par PET/CT denied by insurance. Will get CT N/C/A/P to assess response - scheduled for 9/10\par RTC 4 weeks

## 2019-09-05 NOTE — HISTORY OF PRESENT ILLNESS
[de-identified] : 37yo F w/ asthma here for evaluation of newly diagnosed follicular lymphoma. \par \par Patient sates she initially noted left inguinal pain in February 2019, had a palpable mass for the last 6 years which she noticed after delivery of her second baby in 2013. She saw GYN, had TVUS: Uterus: 5.6 x 3.6 x 4.3 cm. EMS 3 mm. Right ovary normal. Left ovary normal. Complex elongated left adnexal lesion suggestive of hydrosalpinx, 9.2 x 4.9 x 9 cm. She is s/p b/l salpingectomy on 5/2/19. She remains on depo injection. She states that she has had more swelling and more pain since her procedure. \par \par She had CT A/P done on 5/9/19 which demonstrated extensive retroperitoneal, pelvic, and inguinal lymphadenopathy, concerning for malignancy with small amount of abdominal and pelvic ascites.\par s/p IR biopsy of left inguinal lymph node - follicular lymphoma grade 1-2. \par \par Also having cervical LAD, noticed for the last few months, L>R. Noted R inguinal swelling for the last week with pain. No fevers of chills. Notes having itching and that her breathing is a bit more labored than usual.  [de-identified] : BMbx (6/10/19): - Focal lymphoid infiltrate consistent with involvement by follicular lymphoma (history of follicular lymphoma)\par - Small paracortical sample with trilineage hematopoiesis with maturation and iron stores present\par \par PET/CT (6/16/19): 1. Hypermetabolic lymphadenopathy in neck, thorax, abdomen, pelvis, and bilateral inguinal/femoral regions corresponds to biopsy-proven follicular lymphoma. Hypermetabolic subcutaneous nodule, right posterolateral chest wall, is compatible with an additional site of lymphoma.\par 2. Nonspecific left greater than right tonsillar hypermetabolism may represent additional sites of lymphoma.\par 3. Findings compatible with bone marrow involvement by lymphoma in bilateral humeri and axial skeleton, as described above.\par \par She reports having increasing fatigue. Still having slight shortness of breath. \par \par We started BR on 6/24. She had a reaction to Rituxan. \par Eating fine. Neck LAD, shortness of breath and abdominal bloating improved/resolved. \par She had a yeast infection, saw GYN. Also reports having pelvic pain and was found to have a small polyp. \par \par C2 on 7/22. She notes having burning as she was getting the chemo (please see chart note for details). Notes that her arms were still hurting for a week after. \par She had a portacath placed 8/12\par Felt more fatigued and weak. \par Had transvaginal US, saw GYN this morning (9/5/19). Pelvic pain has resolved. \par \par C3 on 8/19. Tolerated it well.

## 2019-09-05 NOTE — PHYSICAL EXAM
[Fully active, able to carry on all pre-disease performance without restriction] : Status 0 - Fully active, able to carry on all pre-disease performance without restriction [Normal] : affect appropriate [de-identified] : cervical adenopathy resolved

## 2019-09-06 DIAGNOSIS — Z30.42 ENCOUNTER FOR SURVEILLANCE OF INJECTABLE CONTRACEPTIVE: ICD-10-CM

## 2019-09-09 ENCOUNTER — FORM ENCOUNTER (OUTPATIENT)
Age: 39
End: 2019-09-09

## 2019-09-10 ENCOUNTER — OUTPATIENT (OUTPATIENT)
Dept: OUTPATIENT SERVICES | Facility: HOSPITAL | Age: 39
LOS: 1 days | End: 2019-09-10
Payer: MEDICAID

## 2019-09-10 ENCOUNTER — APPOINTMENT (OUTPATIENT)
Dept: CT IMAGING | Facility: IMAGING CENTER | Age: 39
End: 2019-09-10
Payer: MEDICAID

## 2019-09-10 DIAGNOSIS — C85.10 UNSPECIFIED B-CELL LYMPHOMA, UNSPECIFIED SITE: ICD-10-CM

## 2019-09-10 PROCEDURE — 71260 CT THORAX DX C+: CPT | Mod: 26

## 2019-09-10 PROCEDURE — 71260 CT THORAX DX C+: CPT

## 2019-09-10 PROCEDURE — 74177 CT ABD & PELVIS W/CONTRAST: CPT | Mod: 26

## 2019-09-10 PROCEDURE — 74177 CT ABD & PELVIS W/CONTRAST: CPT

## 2019-09-10 PROCEDURE — 70491 CT SOFT TISSUE NECK W/DYE: CPT | Mod: 26

## 2019-09-10 PROCEDURE — 70491 CT SOFT TISSUE NECK W/DYE: CPT

## 2019-09-16 ENCOUNTER — RESULT REVIEW (OUTPATIENT)
Age: 39
End: 2019-09-16

## 2019-09-16 ENCOUNTER — LABORATORY RESULT (OUTPATIENT)
Age: 39
End: 2019-09-16

## 2019-09-16 ENCOUNTER — APPOINTMENT (OUTPATIENT)
Dept: INFUSION THERAPY | Facility: HOSPITAL | Age: 39
End: 2019-09-16

## 2019-09-16 LAB
EOSINOPHIL # BLD AUTO: 0 K/UL — SIGNIFICANT CHANGE UP (ref 0–0.5)
EOSINOPHIL NFR BLD AUTO: 3 % — SIGNIFICANT CHANGE UP (ref 0–6)
HCT VFR BLD CALC: 35.8 % — SIGNIFICANT CHANGE UP (ref 34.5–45)
HGB BLD-MCNC: 12.6 G/DL — SIGNIFICANT CHANGE UP (ref 11.5–15.5)
LYMPHOCYTES # BLD AUTO: 0.5 K/UL — LOW (ref 1–3.3)
LYMPHOCYTES # BLD AUTO: 14 % — SIGNIFICANT CHANGE UP (ref 13–44)
MCHC RBC-ENTMCNC: 28.1 PG — SIGNIFICANT CHANGE UP (ref 27–34)
MCHC RBC-ENTMCNC: 35.1 G/DL — SIGNIFICANT CHANGE UP (ref 32–36)
MCV RBC AUTO: 80.1 FL — SIGNIFICANT CHANGE UP (ref 80–100)
MONOCYTES # BLD AUTO: 0.6 K/UL — SIGNIFICANT CHANGE UP (ref 0–0.9)
MONOCYTES NFR BLD AUTO: 14 % — SIGNIFICANT CHANGE UP (ref 2–14)
NEUTROPHILS # BLD AUTO: 2.6 K/UL — SIGNIFICANT CHANGE UP (ref 1.8–7.4)
NEUTROPHILS NFR BLD AUTO: 69 % — SIGNIFICANT CHANGE UP (ref 43–77)
PLAT MORPH BLD: NORMAL — SIGNIFICANT CHANGE UP
PLATELET # BLD AUTO: 130 K/UL — LOW (ref 150–400)
RBC # BLD: 4.47 M/UL — SIGNIFICANT CHANGE UP (ref 3.8–5.2)
RBC # FLD: 13.8 % — SIGNIFICANT CHANGE UP (ref 10.3–14.5)
RBC BLD AUTO: SIGNIFICANT CHANGE UP
WBC # BLD: 3.7 K/UL — LOW (ref 3.8–10.5)
WBC # FLD AUTO: 3.7 K/UL — LOW (ref 3.8–10.5)

## 2019-09-17 ENCOUNTER — APPOINTMENT (OUTPATIENT)
Dept: INFUSION THERAPY | Facility: HOSPITAL | Age: 39
End: 2019-09-17

## 2019-09-17 DIAGNOSIS — Z51.11 ENCOUNTER FOR ANTINEOPLASTIC CHEMOTHERAPY: ICD-10-CM

## 2019-09-17 DIAGNOSIS — R11.2 NAUSEA WITH VOMITING, UNSPECIFIED: ICD-10-CM

## 2019-09-30 ENCOUNTER — RESULT REVIEW (OUTPATIENT)
Age: 39
End: 2019-09-30

## 2019-09-30 ENCOUNTER — APPOINTMENT (OUTPATIENT)
Dept: HEMATOLOGY ONCOLOGY | Facility: CLINIC | Age: 39
End: 2019-09-30
Payer: MEDICAID

## 2019-09-30 VITALS
DIASTOLIC BLOOD PRESSURE: 76 MMHG | HEART RATE: 79 BPM | TEMPERATURE: 97.8 F | RESPIRATION RATE: 16 BRPM | SYSTOLIC BLOOD PRESSURE: 109 MMHG | BODY MASS INDEX: 23.46 KG/M2 | WEIGHT: 136.68 LBS | OXYGEN SATURATION: 100 %

## 2019-09-30 LAB
BASOPHILS # BLD AUTO: 0 K/UL — SIGNIFICANT CHANGE UP (ref 0–0.2)
EOSINOPHIL # BLD AUTO: 0.2 K/UL — SIGNIFICANT CHANGE UP (ref 0–0.5)
EOSINOPHIL NFR BLD AUTO: 7 % — HIGH (ref 0–6)
HCT VFR BLD CALC: 34.3 % — LOW (ref 34.5–45)
HGB BLD-MCNC: 11.7 G/DL — SIGNIFICANT CHANGE UP (ref 11.5–15.5)
LYMPHOCYTES # BLD AUTO: 0.3 K/UL — LOW (ref 1–3.3)
LYMPHOCYTES # BLD AUTO: 11 % — LOW (ref 13–44)
MCHC RBC-ENTMCNC: 27.6 PG — SIGNIFICANT CHANGE UP (ref 27–34)
MCHC RBC-ENTMCNC: 34 G/DL — SIGNIFICANT CHANGE UP (ref 32–36)
MCV RBC AUTO: 81.3 FL — SIGNIFICANT CHANGE UP (ref 80–100)
MONOCYTES # BLD AUTO: 0.4 K/UL — SIGNIFICANT CHANGE UP (ref 0–0.9)
MONOCYTES NFR BLD AUTO: 17 % — HIGH (ref 2–14)
NEUTROPHILS # BLD AUTO: 1.3 K/UL — LOW (ref 1.8–7.4)
NEUTROPHILS NFR BLD AUTO: 65 % — SIGNIFICANT CHANGE UP (ref 43–77)
PLAT MORPH BLD: NORMAL — SIGNIFICANT CHANGE UP
PLATELET # BLD AUTO: 159 K/UL — SIGNIFICANT CHANGE UP (ref 150–400)
RBC # BLD: 4.23 M/UL — SIGNIFICANT CHANGE UP (ref 3.8–5.2)
RBC # FLD: 13.6 % — SIGNIFICANT CHANGE UP (ref 10.3–14.5)
RBC BLD AUTO: SIGNIFICANT CHANGE UP
WBC # BLD: 2.2 K/UL — LOW (ref 3.8–10.5)
WBC # FLD AUTO: 2.2 K/UL — LOW (ref 3.8–10.5)

## 2019-09-30 PROCEDURE — 99214 OFFICE O/P EST MOD 30 MIN: CPT

## 2019-09-30 NOTE — PHYSICAL EXAM
[Fully active, able to carry on all pre-disease performance without restriction] : Status 0 - Fully active, able to carry on all pre-disease performance without restriction [Normal] : affect appropriate [de-identified] : cervical adenopathy resolved

## 2019-09-30 NOTE — HISTORY OF PRESENT ILLNESS
[de-identified] : 37yo F w/ asthma here for evaluation of newly diagnosed follicular lymphoma. \par \par Patient sates she initially noted left inguinal pain in February 2019, had a palpable mass for the last 6 years which she noticed after delivery of her second baby in 2013. She saw GYN, had TVUS: Uterus: 5.6 x 3.6 x 4.3 cm. EMS 3 mm. Right ovary normal. Left ovary normal. Complex elongated left adnexal lesion suggestive of hydrosalpinx, 9.2 x 4.9 x 9 cm. She is s/p b/l salpingectomy on 5/2/19. She remains on depo injection. She states that she has had more swelling and more pain since her procedure. \par \par She had CT A/P done on 5/9/19 which demonstrated extensive retroperitoneal, pelvic, and inguinal lymphadenopathy, concerning for malignancy with small amount of abdominal and pelvic ascites.\par s/p IR biopsy of left inguinal lymph node - follicular lymphoma grade 1-2. \par \par Also having cervical LAD, noticed for the last few months, L>R. Noted R inguinal swelling for the last week with pain. No fevers of chills. Notes having itching and that her breathing is a bit more labored than usual.  [de-identified] : BMbx (6/10/19): - Focal lymphoid infiltrate consistent with involvement by follicular lymphoma (history of follicular lymphoma)\par - Small paracortical sample with trilineage hematopoiesis with maturation and iron stores present\par \par PET/CT (6/16/19): 1. Hypermetabolic lymphadenopathy in neck, thorax, abdomen, pelvis, and bilateral inguinal/femoral regions corresponds to biopsy-proven follicular lymphoma. Hypermetabolic subcutaneous nodule, right posterolateral chest wall, is compatible with an additional site of lymphoma.\par 2. Nonspecific left greater than right tonsillar hypermetabolism may represent additional sites of lymphoma.\par 3. Findings compatible with bone marrow involvement by lymphoma in bilateral humeri and axial skeleton, as described above.\par \par She reports having increasing fatigue. Still having slight shortness of breath. \par \par We started BR on 6/24. She had a reaction to Rituxan. \par Eating fine. Neck LAD, shortness of breath and abdominal bloating improved/resolved. \par She had a yeast infection, saw GYN. Also reports having pelvic pain and was found to have a small polyp. \par \par C2 on 7/22. She notes having burning as she was getting the chemo (please see chart note for details). Notes that her arms were still hurting for a week after. \par She had a portacath placed 8/12\par Felt more fatigued and weak. \par Had transvaginal US, saw GYN this morning (9/5/19). Pelvic pain has resolved. \par \par C3 on 8/19. Tolerated it well. \par \par CT N/C/A/P (9/10/19): Marked decrease in size of cervical lymph nodes when compared with the prior exam compatible with a favorable response to therapy. Significant interval decrease in size of left supraclavicular lymphadenopathy.\par Focal enhancement involving the right anterior tonsillar without associated tonsillar expansion. Correlation with direct visualization and continued follow-up is advised.\par Significant interval decrease in size of previously noted hypermetabolic nodule in the soft tissues of the right posterior lateral chest.\par Significant interval decrease in size of retroperitoneal, pelvic, inguinal, and mesenteric lymphadenopathy as described above.\par \par C4 on 9/16. Tolerated well, had some tremors afterwards.

## 2019-09-30 NOTE — ASSESSMENT
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 \par We started treatment with Bendamustine/Rituxan on 6/24. LAD has improved. Interim CT showing interval decrease in size of LN.\par Noted to have focal enhancement involving the right anterior tonsillar without associated tonsillar expansion. Will have her f/u w/ ENT if still present at the end of therapy.\par C5 due 10/14, tolerating treatment well\par RTC 4 weeks

## 2019-10-01 LAB
ALBUMIN SERPL ELPH-MCNC: 4.5 G/DL
ALP BLD-CCNC: 92 U/L
ALT SERPL-CCNC: 23 U/L
ANION GAP SERPL CALC-SCNC: 12 MMOL/L
AST SERPL-CCNC: 21 U/L
BILIRUB SERPL-MCNC: 0.3 MG/DL
BUN SERPL-MCNC: 9 MG/DL
CALCIUM SERPL-MCNC: 9.3 MG/DL
CHLORIDE SERPL-SCNC: 108 MMOL/L
CO2 SERPL-SCNC: 20 MMOL/L
CREAT SERPL-MCNC: 0.57 MG/DL
GLUCOSE SERPL-MCNC: 94 MG/DL
LDH SERPL-CCNC: 202 U/L
POTASSIUM SERPL-SCNC: 4.5 MMOL/L
PROT SERPL-MCNC: 6.5 G/DL
SODIUM SERPL-SCNC: 140 MMOL/L

## 2019-10-10 ENCOUNTER — OUTPATIENT (OUTPATIENT)
Dept: OUTPATIENT SERVICES | Facility: HOSPITAL | Age: 39
LOS: 1 days | Discharge: HOME | End: 2019-10-10

## 2019-10-10 DIAGNOSIS — C85.88 OTHER SPECIFIED TYPES OF NON-HODGKIN LYMPHOMA, LYMPH NODES OF MULTIPLE SITES: ICD-10-CM

## 2019-10-14 ENCOUNTER — LABORATORY RESULT (OUTPATIENT)
Age: 39
End: 2019-10-14

## 2019-10-14 ENCOUNTER — RESULT REVIEW (OUTPATIENT)
Age: 39
End: 2019-10-14

## 2019-10-14 ENCOUNTER — APPOINTMENT (OUTPATIENT)
Dept: INFUSION THERAPY | Facility: HOSPITAL | Age: 39
End: 2019-10-14

## 2019-10-14 ENCOUNTER — RX RENEWAL (OUTPATIENT)
Age: 39
End: 2019-10-14

## 2019-10-14 LAB
BASOPHILS # BLD AUTO: 0 K/UL — SIGNIFICANT CHANGE UP (ref 0–0.2)
BASOPHILS NFR BLD AUTO: 2 % — SIGNIFICANT CHANGE UP (ref 0–2)
EOSINOPHIL # BLD AUTO: 0.2 K/UL — SIGNIFICANT CHANGE UP (ref 0–0.5)
EOSINOPHIL NFR BLD AUTO: 6 % — SIGNIFICANT CHANGE UP (ref 0–6)
HCT VFR BLD CALC: 32.4 % — LOW (ref 34.5–45)
HGB BLD-MCNC: 11.1 G/DL — LOW (ref 11.5–15.5)
LYMPHOCYTES # BLD AUTO: 0.4 K/UL — LOW (ref 1–3.3)
LYMPHOCYTES # BLD AUTO: 34 % — SIGNIFICANT CHANGE UP (ref 13–44)
MCHC RBC-ENTMCNC: 27.8 PG — SIGNIFICANT CHANGE UP (ref 27–34)
MCHC RBC-ENTMCNC: 34.2 G/DL — SIGNIFICANT CHANGE UP (ref 32–36)
MCV RBC AUTO: 81.3 FL — SIGNIFICANT CHANGE UP (ref 80–100)
MONOCYTES # BLD AUTO: 0.5 K/UL — SIGNIFICANT CHANGE UP (ref 0–0.9)
MONOCYTES NFR BLD AUTO: 25 % — HIGH (ref 2–14)
NEUTROPHILS # BLD AUTO: 0.6 K/UL — LOW (ref 1.8–7.4)
NEUTROPHILS NFR BLD AUTO: 33 % — LOW (ref 43–77)
PLAT MORPH BLD: NORMAL — SIGNIFICANT CHANGE UP
PLATELET # BLD AUTO: 154 K/UL — SIGNIFICANT CHANGE UP (ref 150–400)
RBC # BLD: 3.99 M/UL — SIGNIFICANT CHANGE UP (ref 3.8–5.2)
RBC # FLD: 13.9 % — SIGNIFICANT CHANGE UP (ref 10.3–14.5)
RBC BLD AUTO: SIGNIFICANT CHANGE UP
WBC # BLD: 1.6 K/UL — LOW (ref 3.8–10.5)
WBC # FLD AUTO: 1.6 K/UL — LOW (ref 3.8–10.5)

## 2019-10-21 ENCOUNTER — APPOINTMENT (OUTPATIENT)
Dept: INFUSION THERAPY | Facility: HOSPITAL | Age: 39
End: 2019-10-21

## 2019-10-21 ENCOUNTER — LABORATORY RESULT (OUTPATIENT)
Age: 39
End: 2019-10-21

## 2019-10-21 ENCOUNTER — RESULT REVIEW (OUTPATIENT)
Age: 39
End: 2019-10-21

## 2019-10-21 LAB
BASOPHILS # BLD AUTO: 0 K/UL — SIGNIFICANT CHANGE UP (ref 0–0.2)
BASOPHILS NFR BLD AUTO: 1 % — SIGNIFICANT CHANGE UP (ref 0–2)
EOSINOPHIL # BLD AUTO: 0.1 K/UL — SIGNIFICANT CHANGE UP (ref 0–0.5)
EOSINOPHIL NFR BLD AUTO: 12 % — HIGH (ref 0–6)
HCT VFR BLD CALC: 30.9 % — LOW (ref 34.5–45)
HGB BLD-MCNC: 11 G/DL — LOW (ref 11.5–15.5)
LYMPHOCYTES # BLD AUTO: 0.3 K/UL — LOW (ref 1–3.3)
LYMPHOCYTES # BLD AUTO: 32 % — SIGNIFICANT CHANGE UP (ref 13–44)
MCHC RBC-ENTMCNC: 28.6 PG — SIGNIFICANT CHANGE UP (ref 27–34)
MCHC RBC-ENTMCNC: 35.6 G/DL — SIGNIFICANT CHANGE UP (ref 32–36)
MCV RBC AUTO: 80.4 FL — SIGNIFICANT CHANGE UP (ref 80–100)
MONOCYTES # BLD AUTO: 0.4 K/UL — SIGNIFICANT CHANGE UP (ref 0–0.9)
MONOCYTES NFR BLD AUTO: 38 % — HIGH (ref 2–14)
NEUTROPHILS # BLD AUTO: 0.2 K/UL — LOW (ref 1.8–7.4)
NEUTROPHILS NFR BLD AUTO: 17 % — LOW (ref 43–77)
PLAT MORPH BLD: NORMAL — SIGNIFICANT CHANGE UP
PLATELET # BLD AUTO: 181 K/UL — SIGNIFICANT CHANGE UP (ref 150–400)
RBC # BLD: 3.84 M/UL — SIGNIFICANT CHANGE UP (ref 3.8–5.2)
RBC # FLD: 13 % — SIGNIFICANT CHANGE UP (ref 10.3–14.5)
RBC BLD AUTO: SIGNIFICANT CHANGE UP
WBC # BLD: 1 K/UL — CRITICAL LOW (ref 3.8–10.5)
WBC # FLD AUTO: 1 K/UL — CRITICAL LOW (ref 3.8–10.5)

## 2019-10-24 ENCOUNTER — NON-APPOINTMENT (OUTPATIENT)
Age: 39
End: 2019-10-24

## 2019-10-24 ENCOUNTER — APPOINTMENT (OUTPATIENT)
Dept: OPHTHALMOLOGY | Facility: CLINIC | Age: 39
End: 2019-10-24
Payer: MEDICAID

## 2019-10-24 PROCEDURE — 92012 INTRM OPH EXAM EST PATIENT: CPT

## 2019-10-29 ENCOUNTER — APPOINTMENT (OUTPATIENT)
Dept: OPHTHALMOLOGY | Facility: CLINIC | Age: 39
End: 2019-10-29
Payer: MEDICAID

## 2019-10-29 ENCOUNTER — NON-APPOINTMENT (OUTPATIENT)
Age: 39
End: 2019-10-29

## 2019-10-29 PROCEDURE — 92083 EXTENDED VISUAL FIELD XM: CPT

## 2019-10-29 PROCEDURE — 92133 CPTRZD OPH DX IMG PST SGM ON: CPT

## 2019-10-29 PROCEDURE — 92012 INTRM OPH EXAM EST PATIENT: CPT

## 2019-10-30 ENCOUNTER — APPOINTMENT (OUTPATIENT)
Dept: HEMATOLOGY ONCOLOGY | Facility: CLINIC | Age: 39
End: 2019-10-30

## 2019-10-30 ENCOUNTER — APPOINTMENT (OUTPATIENT)
Dept: INFUSION THERAPY | Facility: HOSPITAL | Age: 39
End: 2019-10-30

## 2019-10-30 ENCOUNTER — RESULT REVIEW (OUTPATIENT)
Age: 39
End: 2019-10-30

## 2019-10-30 ENCOUNTER — LABORATORY RESULT (OUTPATIENT)
Age: 39
End: 2019-10-30

## 2019-10-30 LAB
EOSINOPHIL # BLD AUTO: 0 K/UL — SIGNIFICANT CHANGE UP (ref 0–0.5)
HCT VFR BLD CALC: 33.3 % — LOW (ref 34.5–45)
HGB BLD-MCNC: 12.3 G/DL — SIGNIFICANT CHANGE UP (ref 11.5–15.5)
LYMPHOCYTES # BLD AUTO: 0.5 K/UL — LOW (ref 1–3.3)
LYMPHOCYTES # BLD AUTO: 25 % — SIGNIFICANT CHANGE UP (ref 13–44)
MCHC RBC-ENTMCNC: 29.7 PG — SIGNIFICANT CHANGE UP (ref 27–34)
MCHC RBC-ENTMCNC: 37.1 G/DL — HIGH (ref 32–36)
MCV RBC AUTO: 80.1 FL — SIGNIFICANT CHANGE UP (ref 80–100)
MONOCYTES # BLD AUTO: 0.4 K/UL — SIGNIFICANT CHANGE UP (ref 0–0.9)
MONOCYTES NFR BLD AUTO: 19 % — HIGH (ref 2–14)
NEUTROPHILS # BLD AUTO: 1.3 K/UL — LOW (ref 1.8–7.4)
NEUTROPHILS NFR BLD AUTO: 56 % — SIGNIFICANT CHANGE UP (ref 43–77)
PLAT MORPH BLD: NORMAL — SIGNIFICANT CHANGE UP
PLATELET # BLD AUTO: 214 K/UL — SIGNIFICANT CHANGE UP (ref 150–400)
RBC # BLD: 4.15 M/UL — SIGNIFICANT CHANGE UP (ref 3.8–5.2)
RBC # FLD: 13.7 % — SIGNIFICANT CHANGE UP (ref 10.3–14.5)
RBC BLD AUTO: SIGNIFICANT CHANGE UP
WBC # BLD: 2.2 K/UL — LOW (ref 3.8–10.5)
WBC # FLD AUTO: 2.2 K/UL — LOW (ref 3.8–10.5)

## 2019-10-31 ENCOUNTER — APPOINTMENT (OUTPATIENT)
Dept: INFUSION THERAPY | Facility: HOSPITAL | Age: 39
End: 2019-10-31

## 2019-10-31 ENCOUNTER — APPOINTMENT (OUTPATIENT)
Dept: HEMATOLOGY ONCOLOGY | Facility: CLINIC | Age: 39
End: 2019-10-31

## 2019-10-31 DIAGNOSIS — Z51.11 ENCOUNTER FOR ANTINEOPLASTIC CHEMOTHERAPY: ICD-10-CM

## 2019-10-31 DIAGNOSIS — R11.2 NAUSEA WITH VOMITING, UNSPECIFIED: ICD-10-CM

## 2019-11-04 ENCOUNTER — OUTPATIENT (OUTPATIENT)
Dept: OUTPATIENT SERVICES | Facility: HOSPITAL | Age: 39
LOS: 1 days | Discharge: HOME | End: 2019-11-04

## 2019-11-04 DIAGNOSIS — C85.88 OTHER SPECIFIED TYPES OF NON-HODGKIN LYMPHOMA, LYMPH NODES OF MULTIPLE SITES: ICD-10-CM

## 2019-11-08 ENCOUNTER — OTHER (OUTPATIENT)
Age: 39
End: 2019-11-08

## 2019-11-14 ENCOUNTER — RESULT REVIEW (OUTPATIENT)
Age: 39
End: 2019-11-14

## 2019-11-14 ENCOUNTER — APPOINTMENT (OUTPATIENT)
Dept: HEMATOLOGY ONCOLOGY | Facility: CLINIC | Age: 39
End: 2019-11-14
Payer: MEDICAID

## 2019-11-14 VITALS
TEMPERATURE: 98.6 F | SYSTOLIC BLOOD PRESSURE: 116 MMHG | RESPIRATION RATE: 17 BRPM | DIASTOLIC BLOOD PRESSURE: 83 MMHG | WEIGHT: 139.55 LBS | HEART RATE: 91 BPM | OXYGEN SATURATION: 100 % | BODY MASS INDEX: 23.95 KG/M2

## 2019-11-14 LAB
BASOPHILS # BLD AUTO: 0 K/UL — SIGNIFICANT CHANGE UP (ref 0–0.2)
BASOPHILS NFR BLD AUTO: 2 % — SIGNIFICANT CHANGE UP (ref 0–2)
EOSINOPHIL # BLD AUTO: 0.1 K/UL — SIGNIFICANT CHANGE UP (ref 0–0.5)
EOSINOPHIL NFR BLD AUTO: 3 % — SIGNIFICANT CHANGE UP (ref 0–6)
HCT VFR BLD CALC: 33.9 % — LOW (ref 34.5–45)
HGB BLD-MCNC: 12 G/DL — SIGNIFICANT CHANGE UP (ref 11.5–15.5)
LYMPHOCYTES # BLD AUTO: 0.2 K/UL — LOW (ref 1–3.3)
LYMPHOCYTES # BLD AUTO: 9 % — LOW (ref 13–44)
MCHC RBC-ENTMCNC: 29.2 PG — SIGNIFICANT CHANGE UP (ref 27–34)
MCHC RBC-ENTMCNC: 35.3 G/DL — SIGNIFICANT CHANGE UP (ref 32–36)
MCV RBC AUTO: 82.9 FL — SIGNIFICANT CHANGE UP (ref 80–100)
MONOCYTES # BLD AUTO: 0.5 K/UL — SIGNIFICANT CHANGE UP (ref 0–0.9)
MONOCYTES NFR BLD AUTO: 16 % — HIGH (ref 2–14)
NEUTROPHILS # BLD AUTO: 1.3 K/UL — LOW (ref 1.8–7.4)
NEUTROPHILS NFR BLD AUTO: 70 % — SIGNIFICANT CHANGE UP (ref 43–77)
PLAT MORPH BLD: NORMAL — SIGNIFICANT CHANGE UP
PLATELET # BLD AUTO: 167 K/UL — SIGNIFICANT CHANGE UP (ref 150–400)
RBC # BLD: 4.09 M/UL — SIGNIFICANT CHANGE UP (ref 3.8–5.2)
RBC # FLD: 13.1 % — SIGNIFICANT CHANGE UP (ref 10.3–14.5)
RBC BLD AUTO: SIGNIFICANT CHANGE UP
WBC # BLD: 2.2 K/UL — LOW (ref 3.8–10.5)
WBC # FLD AUTO: 2.2 K/UL — LOW (ref 3.8–10.5)

## 2019-11-14 PROCEDURE — 99214 OFFICE O/P EST MOD 30 MIN: CPT

## 2019-11-14 NOTE — ASSESSMENT
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 \par We started treatment with Bendamustine/Rituxan on 6/24. LAD has improved. Interim CT showing interval decrease in size of LN.\par Noted to have focal enhancement involving the right anterior tonsillar without associated tonsillar expansion. Will have her f/u w/ ENT if still present at the end of therapy.\par C5 delayed due to neutropenia, received on 10/30/19. C6 due on 11/26, tolerating treatment well\par RTC 4 weeks

## 2019-11-14 NOTE — PHYSICAL EXAM
[Fully active, able to carry on all pre-disease performance without restriction] : Status 0 - Fully active, able to carry on all pre-disease performance without restriction [Normal] : affect appropriate [de-identified] : cervical adenopathy resolved

## 2019-11-14 NOTE — HISTORY OF PRESENT ILLNESS
[de-identified] : 39yo F w/ asthma here for evaluation of newly diagnosed follicular lymphoma. \par \par Patient sates she initially noted left inguinal pain in February 2019, had a palpable mass for the last 6 years which she noticed after delivery of her second baby in 2013. She saw GYN, had TVUS: Uterus: 5.6 x 3.6 x 4.3 cm. EMS 3 mm. Right ovary normal. Left ovary normal. Complex elongated left adnexal lesion suggestive of hydrosalpinx, 9.2 x 4.9 x 9 cm. She is s/p b/l salpingectomy on 5/2/19. She remains on depo injection. She states that she has had more swelling and more pain since her procedure. \par \par She had CT A/P done on 5/9/19 which demonstrated extensive retroperitoneal, pelvic, and inguinal lymphadenopathy, concerning for malignancy with small amount of abdominal and pelvic ascites.\par s/p IR biopsy of left inguinal lymph node - follicular lymphoma grade 1-2. \par \par Also having cervical LAD, noticed for the last few months, L>R. Noted R inguinal swelling for the last week with pain. No fevers of chills. Notes having itching and that her breathing is a bit more labored than usual.  [de-identified] : BMbx (6/10/19): - Focal lymphoid infiltrate consistent with involvement by follicular lymphoma (history of follicular lymphoma)\par - Small paracortical sample with trilineage hematopoiesis with maturation and iron stores present\par \par PET/CT (6/16/19): 1. Hypermetabolic lymphadenopathy in neck, thorax, abdomen, pelvis, and bilateral inguinal/femoral regions corresponds to biopsy-proven follicular lymphoma. Hypermetabolic subcutaneous nodule, right posterolateral chest wall, is compatible with an additional site of lymphoma.\par 2. Nonspecific left greater than right tonsillar hypermetabolism may represent additional sites of lymphoma.\par 3. Findings compatible with bone marrow involvement by lymphoma in bilateral humeri and axial skeleton, as described above.\par \par She reports having increasing fatigue. Still having slight shortness of breath. \par \par We started BR on 6/24. She had a reaction to Rituxan. \par Eating fine. Neck LAD, shortness of breath and abdominal bloating improved/resolved. \par She had a yeast infection, saw GYN. Also reports having pelvic pain and was found to have a small polyp. \par \par C2 on 7/22. She notes having burning as she was getting the chemo (please see chart note for details). Notes that her arms were still hurting for a week after. \par She had a portacath placed 8/12\par Felt more fatigued and weak. \par Had transvaginal US, saw GYN this morning (9/5/19). Pelvic pain has resolved. \par \par C3 on 8/19. Tolerated it well. \par \par CT N/C/A/P (9/10/19): Marked decrease in size of cervical lymph nodes when compared with the prior exam compatible with a favorable response to therapy. Significant interval decrease in size of left supraclavicular lymphadenopathy.\par Focal enhancement involving the right anterior tonsillar without associated tonsillar expansion. Correlation with direct visualization and continued follow-up is advised.\par Significant interval decrease in size of previously noted hypermetabolic nodule in the soft tissues of the right posterior lateral chest.\par Significant interval decrease in size of retroperitoneal, pelvic, inguinal, and mesenteric lymphadenopathy as described above.\par \par C4 on 9/16. Tolerated well, had some tremors afterwards. \par \par C5 was delayed secondary to neutropenia. Given on 10/30/19. Had an episode of chest tightness w/ Pillo on day 1. Premedicated on day 2 with no events. Tremors better this cycle but notes 3 episodes of restless legs.

## 2019-11-15 LAB
ALBUMIN SERPL ELPH-MCNC: 4.6 G/DL
ALP BLD-CCNC: 99 U/L
ALT SERPL-CCNC: 32 U/L
ANION GAP SERPL CALC-SCNC: 16 MMOL/L
AST SERPL-CCNC: 23 U/L
BILIRUB SERPL-MCNC: 0.3 MG/DL
BUN SERPL-MCNC: 12 MG/DL
CALCIUM SERPL-MCNC: 9.4 MG/DL
CHLORIDE SERPL-SCNC: 107 MMOL/L
CO2 SERPL-SCNC: 19 MMOL/L
CREAT SERPL-MCNC: 0.8 MG/DL
GLUCOSE SERPL-MCNC: 127 MG/DL
LDH SERPL-CCNC: 194 U/L
POTASSIUM SERPL-SCNC: 4.1 MMOL/L
PROT SERPL-MCNC: 7 G/DL
SODIUM SERPL-SCNC: 142 MMOL/L

## 2019-11-26 ENCOUNTER — RESULT REVIEW (OUTPATIENT)
Age: 39
End: 2019-11-26

## 2019-11-26 ENCOUNTER — LABORATORY RESULT (OUTPATIENT)
Age: 39
End: 2019-11-26

## 2019-11-26 ENCOUNTER — APPOINTMENT (OUTPATIENT)
Dept: INFUSION THERAPY | Facility: HOSPITAL | Age: 39
End: 2019-11-26

## 2019-11-26 LAB
BASOPHILS # BLD AUTO: 0 K/UL — SIGNIFICANT CHANGE UP (ref 0–0.2)
EOSINOPHIL # BLD AUTO: 0.2 K/UL — SIGNIFICANT CHANGE UP (ref 0–0.5)
EOSINOPHIL NFR BLD AUTO: 3 % — SIGNIFICANT CHANGE UP (ref 0–6)
HCT VFR BLD CALC: 33.6 % — LOW (ref 34.5–45)
HGB BLD-MCNC: 11.8 G/DL — SIGNIFICANT CHANGE UP (ref 11.5–15.5)
LYMPHOCYTES # BLD AUTO: 0.4 K/UL — LOW (ref 1–3.3)
LYMPHOCYTES # BLD AUTO: 21 % — SIGNIFICANT CHANGE UP (ref 13–44)
MCHC RBC-ENTMCNC: 28.2 PG — SIGNIFICANT CHANGE UP (ref 27–34)
MCHC RBC-ENTMCNC: 35.1 G/DL — SIGNIFICANT CHANGE UP (ref 32–36)
MCV RBC AUTO: 80.4 FL — SIGNIFICANT CHANGE UP (ref 80–100)
MONOCYTES # BLD AUTO: 0.5 K/UL — SIGNIFICANT CHANGE UP (ref 0–0.9)
MONOCYTES NFR BLD AUTO: 16 % — HIGH (ref 2–14)
NEUTROPHILS # BLD AUTO: 1.3 K/UL — LOW (ref 1.8–7.4)
NEUTROPHILS NFR BLD AUTO: 60 % — SIGNIFICANT CHANGE UP (ref 43–77)
PLAT MORPH BLD: NORMAL — SIGNIFICANT CHANGE UP
PLATELET # BLD AUTO: 115 K/UL — LOW (ref 150–400)
RBC # BLD: 4.18 M/UL — SIGNIFICANT CHANGE UP (ref 3.8–5.2)
RBC # FLD: 12.6 % — SIGNIFICANT CHANGE UP (ref 10.3–14.5)
RBC BLD AUTO: SIGNIFICANT CHANGE UP
WBC # BLD: 2.3 K/UL — LOW (ref 3.8–10.5)
WBC # FLD AUTO: 2.3 K/UL — LOW (ref 3.8–10.5)

## 2019-11-27 ENCOUNTER — APPOINTMENT (OUTPATIENT)
Dept: INFUSION THERAPY | Facility: HOSPITAL | Age: 39
End: 2019-11-27

## 2019-11-27 DIAGNOSIS — R11.2 NAUSEA WITH VOMITING, UNSPECIFIED: ICD-10-CM

## 2019-11-27 DIAGNOSIS — Z51.11 ENCOUNTER FOR ANTINEOPLASTIC CHEMOTHERAPY: ICD-10-CM

## 2019-12-05 ENCOUNTER — OUTPATIENT (OUTPATIENT)
Dept: OUTPATIENT SERVICES | Facility: HOSPITAL | Age: 39
LOS: 1 days | End: 2019-12-05

## 2019-12-05 ENCOUNTER — APPOINTMENT (OUTPATIENT)
Dept: OBGYN | Facility: HOSPITAL | Age: 39
End: 2019-12-05

## 2019-12-05 VITALS
HEART RATE: 91 BPM | SYSTOLIC BLOOD PRESSURE: 112 MMHG | WEIGHT: 136 LBS | DIASTOLIC BLOOD PRESSURE: 76 MMHG | BODY MASS INDEX: 23.34 KG/M2

## 2019-12-05 RX ORDER — MEDROXYPROGESTERONE ACETATE 150 MG/ML
150 INJECTION, SUSPENSION INTRAMUSCULAR
Qty: 0 | Refills: 0 | Status: COMPLETED | OUTPATIENT
Start: 2019-12-05

## 2019-12-05 RX ADMIN — MEDROXYPROGESTERONE ACETATE 0 MG/ML: 150 INJECTION, SUSPENSION INTRAMUSCULAR at 00:00

## 2019-12-06 DIAGNOSIS — Z30.42 ENCOUNTER FOR SURVEILLANCE OF INJECTABLE CONTRACEPTIVE: ICD-10-CM

## 2019-12-10 ENCOUNTER — OTHER (OUTPATIENT)
Age: 39
End: 2019-12-10

## 2019-12-13 ENCOUNTER — OUTPATIENT (OUTPATIENT)
Dept: OUTPATIENT SERVICES | Facility: HOSPITAL | Age: 39
LOS: 1 days | Discharge: HOME | End: 2019-12-13

## 2019-12-13 DIAGNOSIS — C85.88 OTHER SPECIFIED TYPES OF NON-HODGKIN LYMPHOMA, LYMPH NODES OF MULTIPLE SITES: ICD-10-CM

## 2019-12-19 ENCOUNTER — RESULT REVIEW (OUTPATIENT)
Age: 39
End: 2019-12-19

## 2019-12-19 ENCOUNTER — APPOINTMENT (OUTPATIENT)
Dept: HEMATOLOGY ONCOLOGY | Facility: CLINIC | Age: 39
End: 2019-12-19
Payer: MEDICAID

## 2019-12-19 VITALS
BODY MASS INDEX: 24.03 KG/M2 | RESPIRATION RATE: 17 BRPM | TEMPERATURE: 98.3 F | WEIGHT: 139.99 LBS | DIASTOLIC BLOOD PRESSURE: 80 MMHG | OXYGEN SATURATION: 100 % | SYSTOLIC BLOOD PRESSURE: 116 MMHG | HEART RATE: 83 BPM

## 2019-12-19 LAB
BASOPHILS # BLD AUTO: 0 K/UL — SIGNIFICANT CHANGE UP (ref 0–0.2)
BASOPHILS NFR BLD AUTO: 1 % — SIGNIFICANT CHANGE UP (ref 0–2)
EOSINOPHIL # BLD AUTO: 0 K/UL — SIGNIFICANT CHANGE UP (ref 0–0.5)
EOSINOPHIL NFR BLD AUTO: 2 % — SIGNIFICANT CHANGE UP (ref 0–6)
HCT VFR BLD CALC: 30.4 % — LOW (ref 34.5–45)
HGB BLD-MCNC: 10.5 G/DL — LOW (ref 11.5–15.5)
LYMPHOCYTES # BLD AUTO: 0.3 K/UL — LOW (ref 1–3.3)
LYMPHOCYTES # BLD AUTO: 26 % — SIGNIFICANT CHANGE UP (ref 13–44)
MCHC RBC-ENTMCNC: 28.6 PG — SIGNIFICANT CHANGE UP (ref 27–34)
MCHC RBC-ENTMCNC: 34.6 G/DL — SIGNIFICANT CHANGE UP (ref 32–36)
MCV RBC AUTO: 82.7 FL — SIGNIFICANT CHANGE UP (ref 80–100)
MONOCYTES # BLD AUTO: 0.4 K/UL — SIGNIFICANT CHANGE UP (ref 0–0.9)
MONOCYTES NFR BLD AUTO: 26 % — HIGH (ref 2–14)
NEUTROPHILS # BLD AUTO: 0.8 K/UL — LOW (ref 1.8–7.4)
NEUTROPHILS NFR BLD AUTO: 46 % — SIGNIFICANT CHANGE UP (ref 43–77)
PLAT MORPH BLD: NORMAL — SIGNIFICANT CHANGE UP
PLATELET # BLD AUTO: 160 K/UL — SIGNIFICANT CHANGE UP (ref 150–400)
RBC # BLD: 3.67 M/UL — LOW (ref 3.8–5.2)
RBC # FLD: 13 % — SIGNIFICANT CHANGE UP (ref 10.3–14.5)
RBC BLD AUTO: SIGNIFICANT CHANGE UP
WBC # BLD: 1.5 K/UL — LOW (ref 3.8–10.5)
WBC # FLD AUTO: 1.5 K/UL — LOW (ref 3.8–10.5)

## 2019-12-19 PROCEDURE — 99214 OFFICE O/P EST MOD 30 MIN: CPT

## 2019-12-23 LAB
ALBUMIN SERPL ELPH-MCNC: 4.6 G/DL
ALP BLD-CCNC: 97 U/L
ALT SERPL-CCNC: 33 U/L
ANION GAP SERPL CALC-SCNC: 12 MMOL/L
AST SERPL-CCNC: 23 U/L
BILIRUB SERPL-MCNC: 0.4 MG/DL
BUN SERPL-MCNC: 10 MG/DL
CALCIUM SERPL-MCNC: 9.8 MG/DL
CHLORIDE SERPL-SCNC: 107 MMOL/L
CO2 SERPL-SCNC: 23 MMOL/L
CREAT SERPL-MCNC: 0.66 MG/DL
GLUCOSE SERPL-MCNC: 95 MG/DL
LDH SERPL-CCNC: 225 U/L
POTASSIUM SERPL-SCNC: 3.8 MMOL/L
PROT SERPL-MCNC: 6.9 G/DL
SODIUM SERPL-SCNC: 142 MMOL/L

## 2019-12-24 NOTE — PHYSICAL EXAM
[Fully active, able to carry on all pre-disease performance without restriction] : Status 0 - Fully active, able to carry on all pre-disease performance without restriction [Normal] : affect appropriate [de-identified] : cervical adenopathy resolved

## 2019-12-24 NOTE — ASSESSMENT
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 \par We started treatment with Bendamustine/Rituxan on 6/24. LAD has improved. Interim CT showing interval decrease in size of LN.\par Noted to have focal enhancement involving the right anterior tonsillar without associated tonsillar expansion. Will have her f/u w/ ENT if still present at the end of therapy.\par C5 delayed due to neutropenia, received on 10/30/19. C6 on 11/26, tolerated treatment well\par Will order PET/CT to assess response\par RTC after PET/CT

## 2019-12-24 NOTE — HISTORY OF PRESENT ILLNESS
[de-identified] : 37yo F w/ asthma here for evaluation of newly diagnosed follicular lymphoma. \par \par Patient sates she initially noted left inguinal pain in February 2019, had a palpable mass for the last 6 years which she noticed after delivery of her second baby in 2013. She saw GYN, had TVUS: Uterus: 5.6 x 3.6 x 4.3 cm. EMS 3 mm. Right ovary normal. Left ovary normal. Complex elongated left adnexal lesion suggestive of hydrosalpinx, 9.2 x 4.9 x 9 cm. She is s/p b/l salpingectomy on 5/2/19. She remains on depo injection. She states that she has had more swelling and more pain since her procedure. \par \par She had CT A/P done on 5/9/19 which demonstrated extensive retroperitoneal, pelvic, and inguinal lymphadenopathy, concerning for malignancy with small amount of abdominal and pelvic ascites.\par s/p IR biopsy of left inguinal lymph node - follicular lymphoma grade 1-2. \par \par Also having cervical LAD, noticed for the last few months, L>R. Noted R inguinal swelling for the last week with pain. No fevers of chills. Notes having itching and that her breathing is a bit more labored than usual.  [de-identified] : BMbx (6/10/19): - Focal lymphoid infiltrate consistent with involvement by follicular lymphoma (history of follicular lymphoma)\par - Small paracortical sample with trilineage hematopoiesis with maturation and iron stores present\par \par PET/CT (6/16/19): 1. Hypermetabolic lymphadenopathy in neck, thorax, abdomen, pelvis, and bilateral inguinal/femoral regions corresponds to biopsy-proven follicular lymphoma. Hypermetabolic subcutaneous nodule, right posterolateral chest wall, is compatible with an additional site of lymphoma.\par 2. Nonspecific left greater than right tonsillar hypermetabolism may represent additional sites of lymphoma.\par 3. Findings compatible with bone marrow involvement by lymphoma in bilateral humeri and axial skeleton, as described above.\par \par She reports having increasing fatigue. Still having slight shortness of breath. \par \par We started BR on 6/24. She had a reaction to Rituxan. \par Eating fine. Neck LAD, shortness of breath and abdominal bloating improved/resolved. \par She had a yeast infection, saw GYN. Also reports having pelvic pain and was found to have a small polyp. \par \par C2 on 7/22. She notes having burning as she was getting the chemo (please see chart note for details). Notes that her arms were still hurting for a week after. \par She had a portacath placed 8/12\par Felt more fatigued and weak. \par Had transvaginal US, saw GYN this morning (9/5/19). Pelvic pain has resolved. \par \par C3 on 8/19. Tolerated it well. \par \par CT N/C/A/P (9/10/19): Marked decrease in size of cervical lymph nodes when compared with the prior exam compatible with a favorable response to therapy. Significant interval decrease in size of left supraclavicular lymphadenopathy.\par Focal enhancement involving the right anterior tonsillar without associated tonsillar expansion. Correlation with direct visualization and continued follow-up is advised.\par Significant interval decrease in size of previously noted hypermetabolic nodule in the soft tissues of the right posterior lateral chest.\par Significant interval decrease in size of retroperitoneal, pelvic, inguinal, and mesenteric lymphadenopathy as described above.\par \par C4 on 9/16. Tolerated well, had some tremors afterwards. \par \par C5 was delayed secondary to neutropenia. Given on 10/30/19. Had an episode of chest tightness w/ Pillo on day 1. Premedicated on day 2 with no events. Tremors better this cycle but notes 3 episodes of restless legs. \par \par C6 on 11/26/19. She reports having muscle aches.

## 2020-01-17 ENCOUNTER — OUTPATIENT (OUTPATIENT)
Dept: OUTPATIENT SERVICES | Facility: HOSPITAL | Age: 40
LOS: 1 days | Discharge: HOME | End: 2020-01-17

## 2020-01-17 ENCOUNTER — FORM ENCOUNTER (OUTPATIENT)
Age: 40
End: 2020-01-17

## 2020-01-17 DIAGNOSIS — C85.88 OTHER SPECIFIED TYPES OF NON-HODGKIN LYMPHOMA, LYMPH NODES OF MULTIPLE SITES: ICD-10-CM

## 2020-01-18 ENCOUNTER — OUTPATIENT (OUTPATIENT)
Dept: OUTPATIENT SERVICES | Facility: HOSPITAL | Age: 40
LOS: 1 days | End: 2020-01-18
Payer: MEDICAID

## 2020-01-18 ENCOUNTER — APPOINTMENT (OUTPATIENT)
Dept: NUCLEAR MEDICINE | Facility: IMAGING CENTER | Age: 40
End: 2020-01-18
Payer: MEDICAID

## 2020-01-18 DIAGNOSIS — C85.10 UNSPECIFIED B-CELL LYMPHOMA, UNSPECIFIED SITE: ICD-10-CM

## 2020-01-18 PROCEDURE — 78815 PET IMAGE W/CT SKULL-THIGH: CPT

## 2020-01-18 PROCEDURE — A9552: CPT

## 2020-01-18 PROCEDURE — 78815 PET IMAGE W/CT SKULL-THIGH: CPT | Mod: 26,PS

## 2020-01-23 ENCOUNTER — LABORATORY RESULT (OUTPATIENT)
Age: 40
End: 2020-01-23

## 2020-01-23 ENCOUNTER — APPOINTMENT (OUTPATIENT)
Dept: HEMATOLOGY ONCOLOGY | Facility: CLINIC | Age: 40
End: 2020-01-23
Payer: MEDICAID

## 2020-01-23 ENCOUNTER — RESULT REVIEW (OUTPATIENT)
Age: 40
End: 2020-01-23

## 2020-01-23 ENCOUNTER — APPOINTMENT (OUTPATIENT)
Dept: INFUSION THERAPY | Facility: HOSPITAL | Age: 40
End: 2020-01-23

## 2020-01-23 VITALS
SYSTOLIC BLOOD PRESSURE: 116 MMHG | WEIGHT: 136.24 LBS | OXYGEN SATURATION: 100 % | TEMPERATURE: 98.4 F | HEART RATE: 98 BPM | DIASTOLIC BLOOD PRESSURE: 78 MMHG | BODY MASS INDEX: 23.39 KG/M2 | RESPIRATION RATE: 18 BRPM

## 2020-01-23 LAB
BASOPHILS # BLD AUTO: 0 K/UL — SIGNIFICANT CHANGE UP (ref 0–0.2)
EOSINOPHIL # BLD AUTO: 0.1 K/UL — SIGNIFICANT CHANGE UP (ref 0–0.5)
EOSINOPHIL NFR BLD AUTO: 6 % — SIGNIFICANT CHANGE UP (ref 0–6)
HCT VFR BLD CALC: 30.5 % — LOW (ref 34.5–45)
HGB BLD-MCNC: 10.4 G/DL — LOW (ref 11.5–15.5)
LYMPHOCYTES # BLD AUTO: 0.3 K/UL — LOW (ref 1–3.3)
LYMPHOCYTES # BLD AUTO: 26 % — SIGNIFICANT CHANGE UP (ref 13–44)
MCHC RBC-ENTMCNC: 28.2 PG — SIGNIFICANT CHANGE UP (ref 27–34)
MCHC RBC-ENTMCNC: 34.1 G/DL — SIGNIFICANT CHANGE UP (ref 32–36)
MCV RBC AUTO: 82.7 FL — SIGNIFICANT CHANGE UP (ref 80–100)
MONOCYTES # BLD AUTO: 0.2 K/UL — SIGNIFICANT CHANGE UP (ref 0–0.9)
MONOCYTES NFR BLD AUTO: 16 % — HIGH (ref 2–14)
NEUTROPHILS # BLD AUTO: 0.9 K/UL — LOW (ref 1.8–7.4)
NEUTROPHILS NFR BLD AUTO: 44 % — SIGNIFICANT CHANGE UP (ref 43–77)
NEUTS BAND # BLD: 8 % — SIGNIFICANT CHANGE UP (ref 0–8)
PLAT MORPH BLD: NORMAL — SIGNIFICANT CHANGE UP
PLATELET # BLD AUTO: 164 K/UL — SIGNIFICANT CHANGE UP (ref 150–400)
RBC # BLD: 3.68 M/UL — LOW (ref 3.8–5.2)
RBC # FLD: 12.1 % — SIGNIFICANT CHANGE UP (ref 10.3–14.5)
RBC BLD AUTO: SIGNIFICANT CHANGE UP
WBC # BLD: 1.5 K/UL — LOW (ref 3.8–10.5)
WBC # FLD AUTO: 1.5 K/UL — LOW (ref 3.8–10.5)

## 2020-01-23 PROCEDURE — 99214 OFFICE O/P EST MOD 30 MIN: CPT

## 2020-01-23 NOTE — PHYSICAL EXAM
[Fully active, able to carry on all pre-disease performance without restriction] : Status 0 - Fully active, able to carry on all pre-disease performance without restriction [Normal] : affect appropriate [de-identified] : cervical adenopathy resolved

## 2020-01-23 NOTE — HISTORY OF PRESENT ILLNESS
[de-identified] : 37yo F w/ asthma here for evaluation of newly diagnosed follicular lymphoma. \par \par Patient sates she initially noted left inguinal pain in February 2019, had a palpable mass for the last 6 years which she noticed after delivery of her second baby in 2013. She saw GYN, had TVUS: Uterus: 5.6 x 3.6 x 4.3 cm. EMS 3 mm. Right ovary normal. Left ovary normal. Complex elongated left adnexal lesion suggestive of hydrosalpinx, 9.2 x 4.9 x 9 cm. She is s/p b/l salpingectomy on 5/2/19. She remains on depo injection. She states that she has had more swelling and more pain since her procedure. \par \par She had CT A/P done on 5/9/19 which demonstrated extensive retroperitoneal, pelvic, and inguinal lymphadenopathy, concerning for malignancy with small amount of abdominal and pelvic ascites.\par s/p IR biopsy of left inguinal lymph node - follicular lymphoma grade 1-2. \par \par Also having cervical LAD, noticed for the last few months, L>R. Noted R inguinal swelling for the last week with pain. No fevers of chills. Notes having itching and that her breathing is a bit more labored than usual.  [de-identified] : BMbx (6/10/19): - Focal lymphoid infiltrate consistent with involvement by follicular lymphoma (history of follicular lymphoma)\par - Small paracortical sample with trilineage hematopoiesis with maturation and iron stores present\par \par PET/CT (6/16/19): 1. Hypermetabolic lymphadenopathy in neck, thorax, abdomen, pelvis, and bilateral inguinal/femoral regions corresponds to biopsy-proven follicular lymphoma. Hypermetabolic subcutaneous nodule, right posterolateral chest wall, is compatible with an additional site of lymphoma.\par 2. Nonspecific left greater than right tonsillar hypermetabolism may represent additional sites of lymphoma.\par 3. Findings compatible with bone marrow involvement by lymphoma in bilateral humeri and axial skeleton, as described above.\par \par She reports having increasing fatigue. Still having slight shortness of breath. \par \par We started BR on 6/24. She had a reaction to Rituxan. \par Eating fine. Neck LAD, shortness of breath and abdominal bloating improved/resolved. \par She had a yeast infection, saw GYN. Also reports having pelvic pain and was found to have a small polyp. \par \par C2 on 7/22. She notes having burning as she was getting the chemo (please see chart note for details). Notes that her arms were still hurting for a week after. \par She had a portacath placed 8/12\par Felt more fatigued and weak. \par Had transvaginal US, saw GYN this morning (9/5/19). Pelvic pain has resolved. \par \par C3 on 8/19. Tolerated it well. \par \par CT N/C/A/P (9/10/19): Marked decrease in size of cervical lymph nodes when compared with the prior exam compatible with a favorable response to therapy. Significant interval decrease in size of left supraclavicular lymphadenopathy.\par Focal enhancement involving the right anterior tonsillar without associated tonsillar expansion. Correlation with direct visualization and continued follow-up is advised.\par Significant interval decrease in size of previously noted hypermetabolic nodule in the soft tissues of the right posterior lateral chest.\par Significant interval decrease in size of retroperitoneal, pelvic, inguinal, and mesenteric lymphadenopathy as described above.\par \par C4 on 9/16. Tolerated well, had some tremors afterwards. \par \par C5 was delayed secondary to neutropenia. Given on 10/30/19. Had an episode of chest tightness w/ Pillo on day 1. Premedicated on day 2 with no events. Tremors better this cycle but notes 3 episodes of restless legs. \par \par C6 on 11/26/19. She reports having muscle aches. \par \par PET/CT 1/18/20: 1. Interval resolution of FDG activity associated with lymphadenopathy in the neck, chest, upper abdomen and pelvis with either resolution or significant decrease in size of the corresponding lymph nodes and not well delineated on CT as compared to PET/CT from 6/16/2019. Near total resolution of FDG avid left inguinal lymphadenopathy which is significantly decreased in size now demonstrating background activity.\par 2. Interval significant decrease in size and metabolism of hypermetabolic retroperitoneal and mesenteric lymphadenopathy.\par 3. Interval resolution of asymmetric tonsillar hypermetabolism.\par 4. Interval resolution of FDG avidity in bone marrow of bilateral humeri, T10, and sacrum.

## 2020-01-23 NOTE — ASSESSMENT
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 \par We started treatment with Bendamustine/Rituxan on 6/24/19. LAD has improved. C5 delayed due to neutropenia, received on 10/30/19. C6 on 11/26, tolerated treatment well. PET/CT done at end of treatment showed interval significant decrease in size and metabolism of LAD. We reviewed the scan results\par We discussed Rituximab maintenance, pt will consider. \par f/u w/ PMD and gyn\par All questions answered\par port flush every 6 wks\par RTC 3 mo

## 2020-02-20 ENCOUNTER — APPOINTMENT (OUTPATIENT)
Dept: OBGYN | Facility: HOSPITAL | Age: 40
End: 2020-02-20

## 2020-02-20 ENCOUNTER — OUTPATIENT (OUTPATIENT)
Dept: OUTPATIENT SERVICES | Facility: HOSPITAL | Age: 40
LOS: 1 days | End: 2020-02-20

## 2020-02-20 VITALS
BODY MASS INDEX: 23.39 KG/M2 | SYSTOLIC BLOOD PRESSURE: 106 MMHG | HEIGHT: 64 IN | HEART RATE: 73 BPM | WEIGHT: 137 LBS | DIASTOLIC BLOOD PRESSURE: 70 MMHG

## 2020-02-20 RX ORDER — MEDROXYPROGESTERONE ACETATE 150 MG/ML
150 INJECTION, SUSPENSION INTRAMUSCULAR
Qty: 0 | Refills: 0 | Status: COMPLETED | OUTPATIENT
Start: 2020-02-20

## 2020-02-20 RX ADMIN — MEDROXYPROGESTERONE ACETATE 0 MG/ML: 150 INJECTION, SUSPENSION INTRAMUSCULAR at 00:00

## 2020-02-24 ENCOUNTER — OUTPATIENT (OUTPATIENT)
Dept: OUTPATIENT SERVICES | Facility: HOSPITAL | Age: 40
LOS: 1 days | Discharge: HOME | End: 2020-02-24

## 2020-02-24 DIAGNOSIS — Z30.42 ENCOUNTER FOR SURVEILLANCE OF INJECTABLE CONTRACEPTIVE: ICD-10-CM

## 2020-02-24 DIAGNOSIS — C85.88 OTHER SPECIFIED TYPES OF NON-HODGKIN LYMPHOMA, LYMPH NODES OF MULTIPLE SITES: ICD-10-CM

## 2020-03-02 ENCOUNTER — APPOINTMENT (OUTPATIENT)
Dept: INFUSION THERAPY | Facility: HOSPITAL | Age: 40
End: 2020-03-02

## 2020-04-10 ENCOUNTER — APPOINTMENT (OUTPATIENT)
Dept: INTERNAL MEDICINE | Facility: CLINIC | Age: 40
End: 2020-04-10
Payer: MEDICAID

## 2020-04-10 ENCOUNTER — OUTPATIENT (OUTPATIENT)
Dept: OUTPATIENT SERVICES | Facility: HOSPITAL | Age: 40
LOS: 1 days | End: 2020-04-10

## 2020-04-10 DIAGNOSIS — B96.89 PERSONAL HISTORY OF OTHER DISEASES OF THE RESPIRATORY SYSTEM: ICD-10-CM

## 2020-04-10 DIAGNOSIS — Z87.09 PERSONAL HISTORY OF OTHER DISEASES OF THE RESPIRATORY SYSTEM: ICD-10-CM

## 2020-04-10 PROCEDURE — ZZZZZ: CPT

## 2020-04-13 ENCOUNTER — OUTPATIENT (OUTPATIENT)
Dept: OUTPATIENT SERVICES | Facility: HOSPITAL | Age: 40
LOS: 1 days | End: 2020-04-13

## 2020-04-13 ENCOUNTER — APPOINTMENT (OUTPATIENT)
Dept: INTERNAL MEDICINE | Facility: CLINIC | Age: 40
End: 2020-04-13
Payer: MEDICAID

## 2020-04-13 PROCEDURE — 99442: CPT

## 2020-04-15 ENCOUNTER — APPOINTMENT (OUTPATIENT)
Dept: HEMATOLOGY ONCOLOGY | Facility: CLINIC | Age: 40
End: 2020-04-15

## 2020-04-16 DIAGNOSIS — J01.90 ACUTE SINUSITIS, UNSPECIFIED: ICD-10-CM

## 2020-04-20 DIAGNOSIS — J01.90 ACUTE SINUSITIS, UNSPECIFIED: ICD-10-CM

## 2020-05-12 ENCOUNTER — OUTPATIENT (OUTPATIENT)
Dept: OUTPATIENT SERVICES | Facility: HOSPITAL | Age: 40
LOS: 1 days | End: 2020-05-12

## 2020-05-12 ENCOUNTER — APPOINTMENT (OUTPATIENT)
Dept: OBGYN | Facility: HOSPITAL | Age: 40
End: 2020-05-12

## 2020-05-12 VITALS
SYSTOLIC BLOOD PRESSURE: 104 MMHG | DIASTOLIC BLOOD PRESSURE: 69 MMHG | BODY MASS INDEX: 24.24 KG/M2 | HEART RATE: 66 BPM | TEMPERATURE: 97.9 F | HEIGHT: 64 IN | WEIGHT: 142 LBS

## 2020-05-12 RX ORDER — MEDROXYPROGESTERONE ACETATE 150 MG/ML
150 INJECTION, SUSPENSION INTRAMUSCULAR
Qty: 0 | Refills: 0 | Status: COMPLETED | OUTPATIENT
Start: 2020-05-12

## 2020-05-12 RX ADMIN — MEDROXYPROGESTERONE ACETATE 0 MG/ML: 150 INJECTION, SUSPENSION, EXTENDED RELEASE INTRAMUSCULAR at 00:00

## 2020-05-13 DIAGNOSIS — Z30.42 ENCOUNTER FOR SURVEILLANCE OF INJECTABLE CONTRACEPTIVE: ICD-10-CM

## 2020-06-01 ENCOUNTER — OUTPATIENT (OUTPATIENT)
Dept: OUTPATIENT SERVICES | Facility: HOSPITAL | Age: 40
LOS: 1 days | Discharge: HOME | End: 2020-06-01
Payer: MEDICAID

## 2020-06-01 DIAGNOSIS — C85.88 OTHER SPECIFIED TYPES OF NON-HODGKIN LYMPHOMA, LYMPH NODES OF MULTIPLE SITES: ICD-10-CM

## 2020-06-04 ENCOUNTER — RESULT REVIEW (OUTPATIENT)
Age: 40
End: 2020-06-04

## 2020-06-04 ENCOUNTER — APPOINTMENT (OUTPATIENT)
Dept: HEMATOLOGY ONCOLOGY | Facility: CLINIC | Age: 40
End: 2020-06-04
Payer: MEDICAID

## 2020-06-04 ENCOUNTER — LABORATORY RESULT (OUTPATIENT)
Age: 40
End: 2020-06-04

## 2020-06-04 ENCOUNTER — APPOINTMENT (OUTPATIENT)
Dept: INFUSION THERAPY | Facility: HOSPITAL | Age: 40
End: 2020-06-04

## 2020-06-04 VITALS
OXYGEN SATURATION: 100 % | TEMPERATURE: 98.6 F | HEART RATE: 82 BPM | WEIGHT: 145.51 LBS | BODY MASS INDEX: 24.98 KG/M2 | RESPIRATION RATE: 18 BRPM | SYSTOLIC BLOOD PRESSURE: 114 MMHG | DIASTOLIC BLOOD PRESSURE: 79 MMHG

## 2020-06-04 LAB
BASOPHILS # BLD AUTO: 0.04 K/UL — SIGNIFICANT CHANGE UP (ref 0–0.2)
BASOPHILS NFR BLD AUTO: 1 % — SIGNIFICANT CHANGE UP (ref 0–2)
EOSINOPHIL # BLD AUTO: 0.15 K/UL — SIGNIFICANT CHANGE UP (ref 0–0.5)
EOSINOPHIL NFR BLD AUTO: 4 % — SIGNIFICANT CHANGE UP (ref 0–6)
HCT VFR BLD CALC: 32.9 % — LOW (ref 34.5–45)
HGB BLD-MCNC: 10.6 G/DL — LOW (ref 11.5–15.5)
LYMPHOCYTES # BLD AUTO: 0.61 K/UL — LOW (ref 1–3.3)
LYMPHOCYTES # BLD AUTO: 16 % — SIGNIFICANT CHANGE UP (ref 13–44)
MCHC RBC-ENTMCNC: 27.3 PG — SIGNIFICANT CHANGE UP (ref 27–34)
MCHC RBC-ENTMCNC: 32.2 GM/DL — SIGNIFICANT CHANGE UP (ref 32–36)
MCV RBC AUTO: 84.8 FL — SIGNIFICANT CHANGE UP (ref 80–100)
MONOCYTES # BLD AUTO: 0.23 K/UL — SIGNIFICANT CHANGE UP (ref 0–0.9)
MONOCYTES NFR BLD AUTO: 6 % — SIGNIFICANT CHANGE UP (ref 2–14)
NEUTROPHILS # BLD AUTO: 2.78 K/UL — SIGNIFICANT CHANGE UP (ref 1.8–7.4)
NEUTROPHILS NFR BLD AUTO: 69 % — SIGNIFICANT CHANGE UP (ref 43–77)
NEUTS BAND # BLD: 4 % — SIGNIFICANT CHANGE UP (ref 0–8)
NRBC # BLD: 0 /100 — SIGNIFICANT CHANGE UP (ref 0–0)
NRBC # BLD: SIGNIFICANT CHANGE UP /100 WBCS (ref 0–0)
PLAT MORPH BLD: NORMAL — SIGNIFICANT CHANGE UP
PLATELET # BLD AUTO: 203 K/UL — SIGNIFICANT CHANGE UP (ref 150–400)
RBC # BLD: 3.88 M/UL — SIGNIFICANT CHANGE UP (ref 3.8–5.2)
RBC # FLD: 14.2 % — SIGNIFICANT CHANGE UP (ref 10.3–14.5)
RBC BLD AUTO: SIGNIFICANT CHANGE UP
WBC # BLD: 3.81 K/UL — SIGNIFICANT CHANGE UP (ref 3.8–10.5)
WBC # FLD AUTO: 3.81 K/UL — SIGNIFICANT CHANGE UP (ref 3.8–10.5)

## 2020-06-04 PROCEDURE — 99214 OFFICE O/P EST MOD 30 MIN: CPT

## 2020-06-04 NOTE — ASSESSMENT
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 \par We started treatment with Bendamustine/Rituxan on 6/24/19. LAD has improved. C5 delayed due to neutropenia, received on 10/30/19. C6 on 11/26, tolerated treatment well. PET/CT done at end of treatment showed interval significant decrease in size and metabolism of LAD. \par We discussed Rituximab maintenance, but pt was not interested at the time. \par She now presents with new night sweats,  L groin and LLQ pain and tenderness. Will plan for PET/CT to r/o relapse\par f/u w/ PMD and gyn\par All questions answered\par port flush every 6 wks\par Will call pt after scan results

## 2020-06-04 NOTE — HISTORY OF PRESENT ILLNESS
[de-identified] : 37yo F w/ asthma here for evaluation of newly diagnosed follicular lymphoma. \par \par Patient sates she initially noted left inguinal pain in February 2019, had a palpable mass for the last 6 years which she noticed after delivery of her second baby in 2013. She saw GYN, had TVUS: Uterus: 5.6 x 3.6 x 4.3 cm. EMS 3 mm. Right ovary normal. Left ovary normal. Complex elongated left adnexal lesion suggestive of hydrosalpinx, 9.2 x 4.9 x 9 cm. She is s/p b/l salpingectomy on 5/2/19. She remains on depo injection. She states that she has had more swelling and more pain since her procedure. \par \par She had CT A/P done on 5/9/19 which demonstrated extensive retroperitoneal, pelvic, and inguinal lymphadenopathy, concerning for malignancy with small amount of abdominal and pelvic ascites.\par s/p IR biopsy of left inguinal lymph node - follicular lymphoma grade 1-2. \par \par Also having cervical LAD, noticed for the last few months, L>R. Noted R inguinal swelling for the last week with pain. No fevers of chills. Notes having itching and that her breathing is a bit more labored than usual.  [de-identified] : BMbx (6/10/19): - Focal lymphoid infiltrate consistent with involvement by follicular lymphoma (history of follicular lymphoma)\par - Small paracortical sample with trilineage hematopoiesis with maturation and iron stores present\par \par PET/CT (6/16/19): 1. Hypermetabolic lymphadenopathy in neck, thorax, abdomen, pelvis, and bilateral inguinal/femoral regions corresponds to biopsy-proven follicular lymphoma. Hypermetabolic subcutaneous nodule, right posterolateral chest wall, is compatible with an additional site of lymphoma.\par 2. Nonspecific left greater than right tonsillar hypermetabolism may represent additional sites of lymphoma.\par 3. Findings compatible with bone marrow involvement by lymphoma in bilateral humeri and axial skeleton, as described above.\par \par She reports having increasing fatigue. Still having slight shortness of breath. \par We started BR on 6/24. She had a reaction to Rituxan. \par Eating fine. Neck LAD, shortness of breath and abdominal bloating improved/resolved. \par She had a yeast infection, saw GYN. Also reports having pelvic pain and was found to have a small polyp. \par \par C2 on 7/22. She notes having burning as she was getting the chemo (please see chart note for details). Notes that her arms were still hurting for a week after. \par She had a portacath placed 8/12\par Felt more fatigued and weak. \par Had transvaginal US, saw GYN this morning (9/5/19). Pelvic pain has resolved. \par \par C3 on 8/19. Tolerated it well. \par \par CT N/C/A/P (9/10/19): Marked decrease in size of cervical lymph nodes when compared with the prior exam compatible with a favorable response to therapy. Significant interval decrease in size of left supraclavicular lymphadenopathy.\par Focal enhancement involving the right anterior tonsillar without associated tonsillar expansion. Correlation with direct visualization and continued follow-up is advised.\par Significant interval decrease in size of previously noted hypermetabolic nodule in the soft tissues of the right posterior lateral chest.\par Significant interval decrease in size of retroperitoneal, pelvic, inguinal, and mesenteric lymphadenopathy as described above.\par \par C4 on 9/16. Tolerated well, had some tremors afterwards. \par \par C5 was delayed secondary to neutropenia. Given on 10/30/19. Had an episode of chest tightness w/ Pillo on day 1. Premedicated on day 2 with no events. Tremors better this cycle but notes 3 episodes of restless legs. \par \par C6 on 11/26/19. She reports having muscle aches. \par \par PET/CT 1/18/20: 1. Interval resolution of FDG activity associated with lymphadenopathy in the neck, chest, upper abdomen and pelvis with either resolution or significant decrease in size of the corresponding lymph nodes and not well delineated on CT as compared to PET/CT from 6/16/2019. Near total resolution of FDG avid left inguinal lymphadenopathy which is significantly decreased in size now demonstrating background activity.\par 2. Interval significant decrease in size and metabolism of hypermetabolic retroperitoneal and mesenteric lymphadenopathy.\par 3. Interval resolution of asymmetric tonsillar hypermetabolism.\par 4. Interval resolution of FDG avidity in bone marrow of bilateral humeri, T10, and sacrum.\par \par Having pain on L side of lower abdomen and groin around 3-4 weeks ago. Having night sweats again too. Had GYN f/u 2 weeks ago.

## 2020-06-04 NOTE — PHYSICAL EXAM
[Fully active, able to carry on all pre-disease performance without restriction] : Status 0 - Fully active, able to carry on all pre-disease performance without restriction [Normal] : grossly intact [de-identified] : cervical adenopathy resolved [de-identified] : L inguinal tenderness [de-identified] : LLQ tenderness

## 2020-06-13 ENCOUNTER — APPOINTMENT (OUTPATIENT)
Dept: NUCLEAR MEDICINE | Facility: IMAGING CENTER | Age: 40
End: 2020-06-13
Payer: MEDICAID

## 2020-06-13 ENCOUNTER — OUTPATIENT (OUTPATIENT)
Dept: OUTPATIENT SERVICES | Facility: HOSPITAL | Age: 40
LOS: 1 days | End: 2020-06-13
Payer: MEDICAID

## 2020-06-13 DIAGNOSIS — C85.10 UNSPECIFIED B-CELL LYMPHOMA, UNSPECIFIED SITE: ICD-10-CM

## 2020-06-13 PROCEDURE — 78815 PET IMAGE W/CT SKULL-THIGH: CPT | Mod: 26,PS

## 2020-06-13 PROCEDURE — A9552: CPT

## 2020-06-13 PROCEDURE — 78815 PET IMAGE W/CT SKULL-THIGH: CPT

## 2020-06-25 ENCOUNTER — RESULT REVIEW (OUTPATIENT)
Age: 40
End: 2020-06-25

## 2020-06-25 ENCOUNTER — LABORATORY RESULT (OUTPATIENT)
Age: 40
End: 2020-06-25

## 2020-06-25 ENCOUNTER — APPOINTMENT (OUTPATIENT)
Dept: INFUSION THERAPY | Facility: HOSPITAL | Age: 40
End: 2020-06-25
Payer: MEDICAID

## 2020-06-25 LAB
BASOPHILS # BLD AUTO: 0.02 K/UL — SIGNIFICANT CHANGE UP (ref 0–0.2)
BASOPHILS NFR BLD AUTO: 1 % — SIGNIFICANT CHANGE UP (ref 0–2)
EOSINOPHIL # BLD AUTO: 0.09 K/UL — SIGNIFICANT CHANGE UP (ref 0–0.5)
EOSINOPHIL NFR BLD AUTO: 4 % — SIGNIFICANT CHANGE UP (ref 0–6)
HCT VFR BLD CALC: 32.7 % — LOW (ref 34.5–45)
HGB BLD-MCNC: 10.8 G/DL — LOW (ref 11.5–15.5)
LYMPHOCYTES # BLD AUTO: 0.54 K/UL — LOW (ref 1–3.3)
LYMPHOCYTES # BLD AUTO: 23 % — SIGNIFICANT CHANGE UP (ref 13–44)
MCHC RBC-ENTMCNC: 27.3 PG — SIGNIFICANT CHANGE UP (ref 27–34)
MCHC RBC-ENTMCNC: 33 GM/DL — SIGNIFICANT CHANGE UP (ref 32–36)
MCV RBC AUTO: 82.6 FL — SIGNIFICANT CHANGE UP (ref 80–100)
MONOCYTES # BLD AUTO: 0.42 K/UL — SIGNIFICANT CHANGE UP (ref 0–0.9)
MONOCYTES NFR BLD AUTO: 18 % — HIGH (ref 2–14)
NEUTROPHILS # BLD AUTO: 1.27 K/UL — LOW (ref 1.8–7.4)
NEUTROPHILS NFR BLD AUTO: 52 % — SIGNIFICANT CHANGE UP (ref 43–77)
NEUTS BAND # BLD: 2 % — SIGNIFICANT CHANGE UP (ref 0–8)
NRBC # BLD: 0 /100 — SIGNIFICANT CHANGE UP (ref 0–0)
NRBC # BLD: SIGNIFICANT CHANGE UP /100 WBCS (ref 0–0)
PLAT MORPH BLD: NORMAL — SIGNIFICANT CHANGE UP
PLATELET # BLD AUTO: 216 K/UL — SIGNIFICANT CHANGE UP (ref 150–400)
RBC # BLD: 3.96 M/UL — SIGNIFICANT CHANGE UP (ref 3.8–5.2)
RBC # FLD: 13.5 % — SIGNIFICANT CHANGE UP (ref 10.3–14.5)
RBC BLD AUTO: SIGNIFICANT CHANGE UP
WBC # BLD: 2.35 K/UL — LOW (ref 3.8–10.5)
WBC # FLD AUTO: 2.35 K/UL — LOW (ref 3.8–10.5)

## 2020-06-25 PROCEDURE — 99213 OFFICE O/P EST LOW 20 MIN: CPT

## 2020-06-25 PROCEDURE — 93010 ELECTROCARDIOGRAM REPORT: CPT

## 2020-06-26 DIAGNOSIS — Z51.11 ENCOUNTER FOR ANTINEOPLASTIC CHEMOTHERAPY: ICD-10-CM

## 2020-06-26 DIAGNOSIS — R11.2 NAUSEA WITH VOMITING, UNSPECIFIED: ICD-10-CM

## 2020-07-01 ENCOUNTER — APPOINTMENT (OUTPATIENT)
Dept: HEMATOLOGY ONCOLOGY | Facility: CLINIC | Age: 40
End: 2020-07-01
Payer: MEDICAID

## 2020-07-01 ENCOUNTER — NON-APPOINTMENT (OUTPATIENT)
Age: 40
End: 2020-07-01

## 2020-07-01 ENCOUNTER — RESULT REVIEW (OUTPATIENT)
Age: 40
End: 2020-07-01

## 2020-07-01 ENCOUNTER — APPOINTMENT (OUTPATIENT)
Dept: OPHTHALMOLOGY | Facility: CLINIC | Age: 40
End: 2020-07-01
Payer: MEDICAID

## 2020-07-01 VITALS
OXYGEN SATURATION: 100 % | SYSTOLIC BLOOD PRESSURE: 115 MMHG | HEART RATE: 82 BPM | DIASTOLIC BLOOD PRESSURE: 78 MMHG | RESPIRATION RATE: 17 BRPM | BODY MASS INDEX: 24.79 KG/M2 | WEIGHT: 144.4 LBS | TEMPERATURE: 98.4 F

## 2020-07-01 LAB
BASOPHILS # BLD AUTO: 0.04 K/UL — SIGNIFICANT CHANGE UP (ref 0–0.2)
BASOPHILS NFR BLD AUTO: 1 % — SIGNIFICANT CHANGE UP (ref 0–2)
EOSINOPHIL # BLD AUTO: 0.16 K/UL — SIGNIFICANT CHANGE UP (ref 0–0.5)
EOSINOPHIL NFR BLD AUTO: 4.1 % — SIGNIFICANT CHANGE UP (ref 0–6)
HCT VFR BLD CALC: 33.4 % — LOW (ref 34.5–45)
HGB BLD-MCNC: 10.9 G/DL — LOW (ref 11.5–15.5)
IMM GRANULOCYTES NFR BLD AUTO: 1.5 % — SIGNIFICANT CHANGE UP (ref 0–1.5)
LYMPHOCYTES # BLD AUTO: 0.99 K/UL — LOW (ref 1–3.3)
LYMPHOCYTES # BLD AUTO: 25.4 % — SIGNIFICANT CHANGE UP (ref 13–44)
MCHC RBC-ENTMCNC: 26.8 PG — LOW (ref 27–34)
MCHC RBC-ENTMCNC: 32.6 GM/DL — SIGNIFICANT CHANGE UP (ref 32–36)
MCV RBC AUTO: 82.3 FL — SIGNIFICANT CHANGE UP (ref 80–100)
MONOCYTES # BLD AUTO: 0.51 K/UL — SIGNIFICANT CHANGE UP (ref 0–0.9)
MONOCYTES NFR BLD AUTO: 13.1 % — SIGNIFICANT CHANGE UP (ref 2–14)
NEUTROPHILS # BLD AUTO: 2.14 K/UL — SIGNIFICANT CHANGE UP (ref 1.8–7.4)
NEUTROPHILS NFR BLD AUTO: 54.9 % — SIGNIFICANT CHANGE UP (ref 43–77)
NRBC # BLD: 0 /100 WBCS — SIGNIFICANT CHANGE UP (ref 0–0)
PLATELET # BLD AUTO: 258 K/UL — SIGNIFICANT CHANGE UP (ref 150–400)
RBC # BLD: 4.06 M/UL — SIGNIFICANT CHANGE UP (ref 3.8–5.2)
RBC # FLD: 14 % — SIGNIFICANT CHANGE UP (ref 10.3–14.5)
WBC # BLD: 3.9 K/UL — SIGNIFICANT CHANGE UP (ref 3.8–10.5)
WBC # FLD AUTO: 3.9 K/UL — SIGNIFICANT CHANGE UP (ref 3.8–10.5)

## 2020-07-01 PROCEDURE — 99214 OFFICE O/P EST MOD 30 MIN: CPT

## 2020-07-01 PROCEDURE — 92014 COMPRE OPH EXAM EST PT 1/>: CPT

## 2020-07-01 PROCEDURE — 92134 CPTRZ OPH DX IMG PST SGM RTA: CPT

## 2020-07-01 NOTE — ASU PATIENT PROFILE, ADULT - PRO MENTAL HEALTH SX RECENT
Care with opiates given current state, try alternative agents for pain management then narcotics as able   none

## 2020-07-09 ENCOUNTER — LABORATORY RESULT (OUTPATIENT)
Age: 40
End: 2020-07-09

## 2020-07-09 ENCOUNTER — RESULT REVIEW (OUTPATIENT)
Age: 40
End: 2020-07-09

## 2020-07-09 ENCOUNTER — OUTPATIENT (OUTPATIENT)
Dept: OUTPATIENT SERVICES | Facility: HOSPITAL | Age: 40
LOS: 1 days | End: 2020-07-09

## 2020-07-09 ENCOUNTER — APPOINTMENT (OUTPATIENT)
Dept: OBGYN | Facility: HOSPITAL | Age: 40
End: 2020-07-09
Payer: MEDICAID

## 2020-07-09 VITALS
HEART RATE: 94 BPM | BODY MASS INDEX: 24.59 KG/M2 | DIASTOLIC BLOOD PRESSURE: 72 MMHG | TEMPERATURE: 98.5 F | WEIGHT: 144 LBS | SYSTOLIC BLOOD PRESSURE: 116 MMHG | HEIGHT: 64 IN

## 2020-07-09 PROCEDURE — 99213 OFFICE O/P EST LOW 20 MIN: CPT | Mod: GE

## 2020-07-10 NOTE — ASSESSMENT
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 \par We started treatment with Bendamustine/Rituxan on 6/24/19. LAD has improved. C5 delayed due to neutropenia, received on 10/30/19. C6 on 11/26, tolerated treatment well. PET/CT done at end of treatment showed interval significant decrease in size and metabolism of LAD. \par \par She continues to have L groin and LLQ pain and tenderness. PET/CT unchanged. Started on Rituximab maintenance on 6/25/20. Cont every 2 mo\par f/u w/ PMD and gyn - gyn appt on 7/9\par All questions answered\par RTC 2 mo

## 2020-07-10 NOTE — HISTORY OF PRESENT ILLNESS
[de-identified] : 39yo F w/ asthma here for evaluation of newly diagnosed follicular lymphoma. \par \par Patient sates she initially noted left inguinal pain in February 2019, had a palpable mass for the last 6 years which she noticed after delivery of her second baby in 2013. She saw GYN, had TVUS: Uterus: 5.6 x 3.6 x 4.3 cm. EMS 3 mm. Right ovary normal. Left ovary normal. Complex elongated left adnexal lesion suggestive of hydrosalpinx, 9.2 x 4.9 x 9 cm. She is s/p b/l salpingectomy on 5/2/19. She remains on depo injection. She states that she has had more swelling and more pain since her procedure. \par \par She had CT A/P done on 5/9/19 which demonstrated extensive retroperitoneal, pelvic, and inguinal lymphadenopathy, concerning for malignancy with small amount of abdominal and pelvic ascites.\par s/p IR biopsy of left inguinal lymph node - follicular lymphoma grade 1-2. \par \par Also having cervical LAD, noticed for the last few months, L>R. Noted R inguinal swelling for the last week with pain. No fevers of chills. Notes having itching and that her breathing is a bit more labored than usual.  [de-identified] : BMbx (6/10/19): - Focal lymphoid infiltrate consistent with involvement by follicular lymphoma (history of follicular lymphoma)\par - Small paracortical sample with trilineage hematopoiesis with maturation and iron stores present\par \par PET/CT (6/16/19): 1. Hypermetabolic lymphadenopathy in neck, thorax, abdomen, pelvis, and bilateral inguinal/femoral regions corresponds to biopsy-proven follicular lymphoma. Hypermetabolic subcutaneous nodule, right posterolateral chest wall, is compatible with an additional site of lymphoma.\par 2. Nonspecific left greater than right tonsillar hypermetabolism may represent additional sites of lymphoma.\par 3. Findings compatible with bone marrow involvement by lymphoma in bilateral humeri and axial skeleton, as described above.\par \par She reports having increasing fatigue. Still having slight shortness of breath. \par We started BR on 6/24. She had a reaction to Rituxan. \par Eating fine. Neck LAD, shortness of breath and abdominal bloating improved/resolved. \par She had a yeast infection, saw GYN. Also reports having pelvic pain and was found to have a small polyp. \par \par C2 on 7/22. She notes having burning as she was getting the chemo (please see chart note for details). Notes that her arms were still hurting for a week after. \par She had a portacath placed 8/12\par Felt more fatigued and weak. \par Had transvaginal US, saw GYN this morning (9/5/19). Pelvic pain has resolved. \par \par C3 on 8/19. Tolerated it well. \par \par CT N/C/A/P (9/10/19): Marked decrease in size of cervical lymph nodes when compared with the prior exam compatible with a favorable response to therapy. Significant interval decrease in size of left supraclavicular lymphadenopathy.\par Focal enhancement involving the right anterior tonsillar without associated tonsillar expansion. Correlation with direct visualization and continued follow-up is advised.\par Significant interval decrease in size of previously noted hypermetabolic nodule in the soft tissues of the right posterior lateral chest.\par Significant interval decrease in size of retroperitoneal, pelvic, inguinal, and mesenteric lymphadenopathy as described above.\par \par C4 on 9/16. Tolerated well, had some tremors afterwards. \par \par C5 was delayed secondary to neutropenia. Given on 10/30/19. Had an episode of chest tightness w/ Pillo on day 1. Premedicated on day 2 with no events. Tremors better this cycle but notes 3 episodes of restless legs. \par \par C6 on 11/26/19. She reports having muscle aches. \par \par PET/CT 1/18/20: 1. Interval resolution of FDG activity associated with lymphadenopathy in the neck, chest, upper abdomen and pelvis with either resolution or significant decrease in size of the corresponding lymph nodes and not well delineated on CT as compared to PET/CT from 6/16/2019. Near total resolution of FDG avid left inguinal lymphadenopathy which is significantly decreased in size now demonstrating background activity.\par 2. Interval significant decrease in size and metabolism of hypermetabolic retroperitoneal and mesenteric lymphadenopathy.\par 3. Interval resolution of asymmetric tonsillar hypermetabolism.\par 4. Interval resolution of FDG avidity in bone marrow of bilateral humeri, T10, and sacrum.\par \par Having pain on L side of lower abdomen and groin around 3-4 weeks ago. Having night sweats again too. Had GYN f/u 2 weeks ago.\par We did a PET/CT on 6/13 which showed: 1. Small focus of mild FDG activity, decreased in size and metabolism, is associated with mesenteric haziness which is unchanged on CT as compared to prior study dated 1/18/2020 ([place] 4). Resolution of minimally FDG-avid amorphous density, left periaortic region.\par 2. Minimally FDG-avid density, left inguinal region, unchanged, may represent postsurgical change versus lymph node. Please correlate clinically.\par \par Started Rituxan maintenance on 6/25/20. She had a reaction to Rituxan -please see chart note for details.

## 2020-07-10 NOTE — PHYSICAL EXAM
[Fully active, able to carry on all pre-disease performance without restriction] : Status 0 - Fully active, able to carry on all pre-disease performance without restriction [Normal] : normal appearance, no rash, nodules, vesicles, ulcers, erythema [de-identified] : cervical adenopathy resolved [de-identified] : L inguinal tenderness [de-identified] : LLQ tenderness

## 2020-07-11 LAB
C TRACH RRNA SPEC QL NAA+PROBE: SIGNIFICANT CHANGE UP
HPV HIGH+LOW RISK DNA PNL CVX: SIGNIFICANT CHANGE UP
N GONORRHOEA RRNA SPEC QL NAA+PROBE: SIGNIFICANT CHANGE UP
SPECIMEN SOURCE: SIGNIFICANT CHANGE UP

## 2020-07-12 ENCOUNTER — EMERGENCY (EMERGENCY)
Facility: HOSPITAL | Age: 40
LOS: 1 days | Discharge: HOME | End: 2020-07-12
Attending: EMERGENCY MEDICINE | Admitting: EMERGENCY MEDICINE
Payer: MEDICAID

## 2020-07-12 VITALS
SYSTOLIC BLOOD PRESSURE: 122 MMHG | HEART RATE: 68 BPM | OXYGEN SATURATION: 100 % | TEMPERATURE: 98 F | DIASTOLIC BLOOD PRESSURE: 63 MMHG | RESPIRATION RATE: 16 BRPM

## 2020-07-12 VITALS
DIASTOLIC BLOOD PRESSURE: 73 MMHG | SYSTOLIC BLOOD PRESSURE: 124 MMHG | TEMPERATURE: 98 F | RESPIRATION RATE: 16 BRPM | OXYGEN SATURATION: 99 % | HEART RATE: 75 BPM

## 2020-07-12 LAB
ALBUMIN SERPL ELPH-MCNC: 4.6 G/DL — SIGNIFICANT CHANGE UP (ref 3.3–5)
ALP SERPL-CCNC: 80 U/L — SIGNIFICANT CHANGE UP (ref 40–120)
ALT FLD-CCNC: 30 U/L — SIGNIFICANT CHANGE UP (ref 4–33)
ANION GAP SERPL CALC-SCNC: 11 MMO/L — SIGNIFICANT CHANGE UP (ref 7–14)
APTT BLD: 25.5 SEC — LOW (ref 27–36.3)
AST SERPL-CCNC: 21 U/L — SIGNIFICANT CHANGE UP (ref 4–32)
BASOPHILS # BLD AUTO: 0.03 K/UL — SIGNIFICANT CHANGE UP (ref 0–0.2)
BASOPHILS NFR BLD AUTO: 0.9 % — SIGNIFICANT CHANGE UP (ref 0–2)
BILIRUB SERPL-MCNC: 0.4 MG/DL — SIGNIFICANT CHANGE UP (ref 0.2–1.2)
BUN SERPL-MCNC: 9 MG/DL — SIGNIFICANT CHANGE UP (ref 7–23)
CALCIUM SERPL-MCNC: 9.7 MG/DL — SIGNIFICANT CHANGE UP (ref 8.4–10.5)
CHLORIDE SERPL-SCNC: 106 MMOL/L — SIGNIFICANT CHANGE UP (ref 98–107)
CO2 SERPL-SCNC: 22 MMOL/L — SIGNIFICANT CHANGE UP (ref 22–31)
CREAT SERPL-MCNC: 0.68 MG/DL — SIGNIFICANT CHANGE UP (ref 0.5–1.3)
EOSINOPHIL # BLD AUTO: 0.15 K/UL — SIGNIFICANT CHANGE UP (ref 0–0.5)
EOSINOPHIL NFR BLD AUTO: 4.4 % — SIGNIFICANT CHANGE UP (ref 0–6)
GLUCOSE SERPL-MCNC: 99 MG/DL — SIGNIFICANT CHANGE UP (ref 70–99)
HCT VFR BLD CALC: 34 % — LOW (ref 34.5–45)
HGB BLD-MCNC: 10.7 G/DL — LOW (ref 11.5–15.5)
IMM GRANULOCYTES NFR BLD AUTO: 0.3 % — SIGNIFICANT CHANGE UP (ref 0–1.5)
INR BLD: 1.16 — SIGNIFICANT CHANGE UP (ref 0.88–1.17)
LYMPHOCYTES # BLD AUTO: 0.75 K/UL — LOW (ref 1–3.3)
LYMPHOCYTES # BLD AUTO: 21.9 % — SIGNIFICANT CHANGE UP (ref 13–44)
MCHC RBC-ENTMCNC: 26 PG — LOW (ref 27–34)
MCHC RBC-ENTMCNC: 31.5 % — LOW (ref 32–36)
MCV RBC AUTO: 82.5 FL — SIGNIFICANT CHANGE UP (ref 80–100)
MONOCYTES # BLD AUTO: 0.44 K/UL — SIGNIFICANT CHANGE UP (ref 0–0.9)
MONOCYTES NFR BLD AUTO: 12.8 % — SIGNIFICANT CHANGE UP (ref 2–14)
NEUTROPHILS # BLD AUTO: 2.05 K/UL — SIGNIFICANT CHANGE UP (ref 1.8–7.4)
NEUTROPHILS NFR BLD AUTO: 59.7 % — SIGNIFICANT CHANGE UP (ref 43–77)
NRBC # FLD: 0 K/UL — SIGNIFICANT CHANGE UP (ref 0–0)
PLATELET # BLD AUTO: 214 K/UL — SIGNIFICANT CHANGE UP (ref 150–400)
PMV BLD: 10.8 FL — SIGNIFICANT CHANGE UP (ref 7–13)
POTASSIUM SERPL-MCNC: 3.7 MMOL/L — SIGNIFICANT CHANGE UP (ref 3.5–5.3)
POTASSIUM SERPL-SCNC: 3.7 MMOL/L — SIGNIFICANT CHANGE UP (ref 3.5–5.3)
PROT SERPL-MCNC: 6.9 G/DL — SIGNIFICANT CHANGE UP (ref 6–8.3)
PROTHROM AB SERPL-ACNC: 13.4 SEC — HIGH (ref 9.8–13.1)
RBC # BLD: 4.12 M/UL — SIGNIFICANT CHANGE UP (ref 3.8–5.2)
RBC # FLD: 14.6 % — HIGH (ref 10.3–14.5)
SODIUM SERPL-SCNC: 139 MMOL/L — SIGNIFICANT CHANGE UP (ref 135–145)
WBC # BLD: 3.43 K/UL — LOW (ref 3.8–10.5)
WBC # FLD AUTO: 3.43 K/UL — LOW (ref 3.8–10.5)

## 2020-07-12 PROCEDURE — 93971 EXTREMITY STUDY: CPT | Mod: 26,LT

## 2020-07-12 PROCEDURE — 99284 EMERGENCY DEPT VISIT MOD MDM: CPT

## 2020-07-12 NOTE — ED PROVIDER NOTE - NSFOLLOWUPINSTRUCTIONS_ED_ALL_ED_FT
- Lab and imaging results, if performed, were discussed with you along with your discharge diagnosis    - Follow up with your doctor in 1 week - bring copies of your results if you were given. If you do not have a primary doctor, please call [phone] to find one convenient for you    - Return to the ED for any new, worsening, or concerning symptoms to you    - Continue all prescribed medications    - Take ibuprofen/tylenol as directed as needed for pain    - Rest and keep yourself hydrated with fluids     - If you continue to have leg pain please have a repeat ultrasound performed in 1 week

## 2020-07-12 NOTE — ED PROVIDER NOTE - NS ED ROS FT
CONSTITUTIONAL: No fevers, no chills, no lightheadedness, no dizziness  EYES: no visual changes, no eye pain  EARS: no ear drainage, no ear pain, no change in hearing  NOSE: no nasal congestion  MOUTH/THROAT: no sore throat  CV: No chest pain, no palpitations  RESP: No SOB, no cough  GI: No n/v/d, no abd pain  : no dysuria, no hematuria, no flank pain  MSK: no back pain  SKIN: no rashes  NEURO: no headache, no focal weakness, no decreased sensation/parasthesias   PSYCHIATRIC: no known mental health issues.

## 2020-07-12 NOTE — ED ADULT NURSE NOTE - OBJECTIVE STATEMENT
patient alert ox4 came in c/o right lower calf pain started since few days ago and became worst since this morning. denies CP/SOB. NAD. no redness or swelling noted to the extremities. h/o non Hodgkin's lymphoma . labs done as ordered. awaiting on US and results  .

## 2020-07-12 NOTE — ED PROVIDER NOTE - PATIENT PORTAL LINK FT
You can access the FollowMyHealth Patient Portal offered by [facility] by registering at the following website: [email or web address]. By joining [facility]’s FollowMyHealth portal, you will also be able to view your health information using other applications (apps) compatible with our system.

## 2020-07-12 NOTE — ED PROVIDER NOTE - OBJECTIVE STATEMENT
38yo F hx lymphoma on immunotherapy p/w 1 day of R calf pain worse with walking. Spoke to her oncologist and told her to come to be eval'ed for poss dvt. Denies cp, sob, fever, chills, cough. No trauma. No hx dvt.

## 2020-07-12 NOTE — ED ADULT NURSE REASSESSMENT NOTE - NS ED NURSE REASSESS COMMENT FT1
pt c/o mild calf pain but states pain has improved. Denies any chest pain, dizziness, nausea, vomiting, shortness of breath, palpitations, diarrhea, fever, constipation, or chills. Will continue to monitor.

## 2020-07-12 NOTE — ED ADULT TRIAGE NOTE - CHIEF COMPLAINT QUOTE
pt c/o pain to right calf that woke her up from sleep this AM. Pt called oncologist and was told to come to ED to rule out a blood clot. 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 Pt denies SOB, CP, N/V/D. Pt appears uncomfortable.

## 2020-07-12 NOTE — ED PROVIDER NOTE - ATTENDING CONTRIBUTION TO CARE
yanelis: hx luymphoma, calf pain, concern for dvt.  appears well, denies sob. leg not red/ warm us not showing dvt ok for dc    I performed a history and physical exam of the patient and discussed their management with the resident and /or advanced care provider. I reviewed the resident and /or ACP's note and agree with the documented findings and plan of care. My medical decison making and observations are found above.

## 2020-07-12 NOTE — ED PROVIDER NOTE - CLINICAL SUMMARY MEDICAL DECISION MAKING FREE TEXT BOX
Hx of lymphoma on immunotherapy w/ atraumatic leg pain concerning for dvt. Well appearing, no chest sx. Labs, US, and reassess. Hx of lymphoma on immunotherapy w/ atraumatic leg pain concerning for dvt. Well appearing, no chest sx. Labs, US, and reassess.    yanelis: hx luymphoma, calf pain, concern for dvt.  appears well, denies sob. leg not red/ warm us not showing dvt ok for dc

## 2020-07-12 NOTE — ED PROVIDER NOTE - PHYSICAL EXAMINATION
Gen: Well appearing, NAD  Head: NCAT  HEENT: PERRL, MMM, normal conjunctiva, anicteric, neck supple  Lung: CTAB, no adventitious sounds  CV: RRR, no murmurs  Abd: soft, NTND, no rebound or guarding, no CVAT  MSK: No edema, R calf mildly ttp, no visible deformities  Neuro: Moving all extremity grossly  Skin: Warm and dry, no evidence of rash  Psych: normal mood and affect

## 2020-07-13 LAB — CYTOLOGY SPEC DOC CYTO: SIGNIFICANT CHANGE UP

## 2020-07-14 NOTE — DISCUSSION/SUMMARY
[FreeTextEntry1] : 40yo F hx lymphoma on immunotherapy p/w 1 day of R calf pain worse with walking. Spoke to her oncologist and told her to come to be eval'ed for poss dvt. Denies cp, sob, fever, chills, cough. No trauma. No hx dvt.\par \par - Clinical Summary (MDM): Summarize the clinical encounter Hx of lymphoma on immunotherapy w/ atraumatic leg pain concerning for dvt. Well appearing, no chest sx. Labs, US, and reassess.yanelis: hx luymphoma, calf pain, concern for dvt. appears well, denies sob. leg not red/ warm us not showing dvt ok for dc. \par \par Called pt, no DVT present. Pain has improved. Just endorses PN of LEs, which has improved since chemo ended last year. No indication for in person visit at this time.

## 2020-07-15 DIAGNOSIS — R10.2 PELVIC AND PERINEAL PAIN: ICD-10-CM

## 2020-07-16 DIAGNOSIS — R10.2 PELVIC AND PERINEAL PAIN: ICD-10-CM

## 2020-08-06 ENCOUNTER — OUTPATIENT (OUTPATIENT)
Dept: OUTPATIENT SERVICES | Facility: HOSPITAL | Age: 40
LOS: 1 days | End: 2020-08-06

## 2020-08-06 ENCOUNTER — APPOINTMENT (OUTPATIENT)
Dept: OBGYN | Facility: HOSPITAL | Age: 40
End: 2020-08-06
Payer: MEDICAID

## 2020-08-06 VITALS
WEIGHT: 145 LBS | TEMPERATURE: 98.1 F | BODY MASS INDEX: 24.75 KG/M2 | HEIGHT: 64 IN | HEART RATE: 82 BPM | DIASTOLIC BLOOD PRESSURE: 67 MMHG | SYSTOLIC BLOOD PRESSURE: 116 MMHG

## 2020-08-06 DIAGNOSIS — Z00.00 ENCOUNTER FOR GENERAL ADULT MEDICAL EXAMINATION WITHOUT ABNORMAL FINDINGS: ICD-10-CM

## 2020-08-06 DIAGNOSIS — Z01.419 ENCOUNTER FOR GYNECOLOGICAL EXAMINATION (GENERAL) (ROUTINE) W/OUT ABNORMAL FINDINGS: ICD-10-CM

## 2020-08-06 DIAGNOSIS — Z01.419 ENCOUNTER FOR GYNECOLOGICAL EXAMINATION (GENERAL) (ROUTINE) WITHOUT ABNORMAL FINDINGS: ICD-10-CM

## 2020-08-06 PROCEDURE — 99213 OFFICE O/P EST LOW 20 MIN: CPT | Mod: GE

## 2020-08-07 ENCOUNTER — APPOINTMENT (OUTPATIENT)
Dept: OBGYN | Facility: HOSPITAL | Age: 40
End: 2020-08-07

## 2020-08-10 NOTE — COUNSELING
[Nutrition] : nutrition [Breast Self Exam] : breast self exam [Sunscreen] : sunscreen [Exercise] : exercise

## 2020-08-24 ENCOUNTER — OUTPATIENT (OUTPATIENT)
Dept: OUTPATIENT SERVICES | Facility: HOSPITAL | Age: 40
LOS: 1 days | Discharge: HOME | End: 2020-08-24

## 2020-08-24 DIAGNOSIS — C85.88 OTHER SPECIFIED TYPES OF NON-HODGKIN LYMPHOMA, LYMPH NODES OF MULTIPLE SITES: ICD-10-CM

## 2020-08-27 ENCOUNTER — APPOINTMENT (OUTPATIENT)
Dept: INFUSION THERAPY | Facility: HOSPITAL | Age: 40
End: 2020-08-27

## 2020-08-28 LAB — SARS-COV-2 N GENE NPH QL NAA+PROBE: NOT DETECTED

## 2020-08-31 ENCOUNTER — RESULT REVIEW (OUTPATIENT)
Age: 40
End: 2020-08-31

## 2020-08-31 ENCOUNTER — APPOINTMENT (OUTPATIENT)
Dept: INFUSION THERAPY | Facility: HOSPITAL | Age: 40
End: 2020-08-31

## 2020-08-31 ENCOUNTER — LABORATORY RESULT (OUTPATIENT)
Age: 40
End: 2020-08-31

## 2020-08-31 DIAGNOSIS — Z51.11 ENCOUNTER FOR ANTINEOPLASTIC CHEMOTHERAPY: ICD-10-CM

## 2020-08-31 DIAGNOSIS — R11.2 NAUSEA WITH VOMITING, UNSPECIFIED: ICD-10-CM

## 2020-08-31 LAB
BASOPHILS # BLD AUTO: 0.05 K/UL — SIGNIFICANT CHANGE UP (ref 0–0.2)
BASOPHILS NFR BLD AUTO: 1.3 % — SIGNIFICANT CHANGE UP (ref 0–2)
EOSINOPHIL # BLD AUTO: 0.22 K/UL — SIGNIFICANT CHANGE UP (ref 0–0.5)
EOSINOPHIL NFR BLD AUTO: 5.9 % — SIGNIFICANT CHANGE UP (ref 0–6)
HCT VFR BLD CALC: 33.8 % — LOW (ref 34.5–45)
HGB BLD-MCNC: 11 G/DL — LOW (ref 11.5–15.5)
IMM GRANULOCYTES NFR BLD AUTO: 1.1 % — SIGNIFICANT CHANGE UP (ref 0–1.5)
LYMPHOCYTES # BLD AUTO: 0.74 K/UL — LOW (ref 1–3.3)
LYMPHOCYTES # BLD AUTO: 19.8 % — SIGNIFICANT CHANGE UP (ref 13–44)
MCHC RBC-ENTMCNC: 27.5 PG — SIGNIFICANT CHANGE UP (ref 27–34)
MCHC RBC-ENTMCNC: 32.5 GM/DL — SIGNIFICANT CHANGE UP (ref 32–36)
MCV RBC AUTO: 84.5 FL — SIGNIFICANT CHANGE UP (ref 80–100)
MONOCYTES # BLD AUTO: 0.41 K/UL — SIGNIFICANT CHANGE UP (ref 0–0.9)
MONOCYTES NFR BLD AUTO: 11 % — SIGNIFICANT CHANGE UP (ref 2–14)
NEUTROPHILS # BLD AUTO: 2.28 K/UL — SIGNIFICANT CHANGE UP (ref 1.8–7.4)
NEUTROPHILS NFR BLD AUTO: 60.9 % — SIGNIFICANT CHANGE UP (ref 43–77)
NRBC # BLD: 0 /100 WBCS — SIGNIFICANT CHANGE UP (ref 0–0)
PLATELET # BLD AUTO: 209 K/UL — SIGNIFICANT CHANGE UP (ref 150–400)
RBC # BLD: 4 M/UL — SIGNIFICANT CHANGE UP (ref 3.8–5.2)
RBC # FLD: 14.2 % — SIGNIFICANT CHANGE UP (ref 10.3–14.5)
WBC # BLD: 3.74 K/UL — LOW (ref 3.8–10.5)
WBC # FLD AUTO: 3.74 K/UL — LOW (ref 3.8–10.5)

## 2020-09-02 NOTE — PHYSICAL EXAM
[Awake] : awake [Alert] : alert [Oriented x3] : oriented to person, place, and time [RRR, No Murmurs] : RRR, no murmurs [CTAB] : CTAB [LAD] : no lymphadenopathy [Acute Distress] : no acute distress [Mass] : no breast mass [Thyroid Nodule] : no thyroid nodule [Axillary LAD] : no axillary lymphadenopathy [Soft] : not soft [Nipple Discharge] : no nipple discharge [Distended] : not distended [Tender] : non tender [H/Smegaly] : no hepatosplenomegaly [Depressed Mood] : not depressed

## 2020-09-14 ENCOUNTER — RESULT REVIEW (OUTPATIENT)
Age: 40
End: 2020-09-14

## 2020-09-14 ENCOUNTER — APPOINTMENT (OUTPATIENT)
Dept: HEMATOLOGY ONCOLOGY | Facility: CLINIC | Age: 40
End: 2020-09-14
Payer: MEDICAID

## 2020-09-14 VITALS
BODY MASS INDEX: 24.09 KG/M2 | HEART RATE: 74 BPM | HEIGHT: 64.57 IN | TEMPERATURE: 98.7 F | DIASTOLIC BLOOD PRESSURE: 79 MMHG | SYSTOLIC BLOOD PRESSURE: 121 MMHG | OXYGEN SATURATION: 100 % | WEIGHT: 142.86 LBS | RESPIRATION RATE: 16 BRPM

## 2020-09-14 LAB
BASOPHILS # BLD AUTO: 0.04 K/UL — SIGNIFICANT CHANGE UP (ref 0–0.2)
BASOPHILS NFR BLD AUTO: 1 % — SIGNIFICANT CHANGE UP (ref 0–2)
EOSINOPHIL # BLD AUTO: 0.12 K/UL — SIGNIFICANT CHANGE UP (ref 0–0.5)
EOSINOPHIL NFR BLD AUTO: 3 % — SIGNIFICANT CHANGE UP (ref 0–6)
HCT VFR BLD CALC: 36.1 % — SIGNIFICANT CHANGE UP (ref 34.5–45)
HGB BLD-MCNC: 11.7 G/DL — SIGNIFICANT CHANGE UP (ref 11.5–15.5)
IMM GRANULOCYTES NFR BLD AUTO: 1 % — SIGNIFICANT CHANGE UP (ref 0–1.5)
LYMPHOCYTES # BLD AUTO: 0.92 K/UL — LOW (ref 1–3.3)
LYMPHOCYTES # BLD AUTO: 22.8 % — SIGNIFICANT CHANGE UP (ref 13–44)
MCHC RBC-ENTMCNC: 27.7 PG — SIGNIFICANT CHANGE UP (ref 27–34)
MCHC RBC-ENTMCNC: 32.4 G/DL — SIGNIFICANT CHANGE UP (ref 32–36)
MCV RBC AUTO: 85.5 FL — SIGNIFICANT CHANGE UP (ref 80–100)
MONOCYTES # BLD AUTO: 0.52 K/UL — SIGNIFICANT CHANGE UP (ref 0–0.9)
MONOCYTES NFR BLD AUTO: 12.9 % — SIGNIFICANT CHANGE UP (ref 2–14)
NEUTROPHILS # BLD AUTO: 2.39 K/UL — SIGNIFICANT CHANGE UP (ref 1.8–7.4)
NEUTROPHILS NFR BLD AUTO: 59.3 % — SIGNIFICANT CHANGE UP (ref 43–77)
NRBC # BLD: 0 /100 WBCS — SIGNIFICANT CHANGE UP (ref 0–0)
PLATELET # BLD AUTO: 227 K/UL — SIGNIFICANT CHANGE UP (ref 150–400)
RBC # BLD: 4.22 M/UL — SIGNIFICANT CHANGE UP (ref 3.8–5.2)
RBC # FLD: 13.3 % — SIGNIFICANT CHANGE UP (ref 10.3–14.5)
WBC # BLD: 4.03 K/UL — SIGNIFICANT CHANGE UP (ref 3.8–10.5)
WBC # FLD AUTO: 4.03 K/UL — SIGNIFICANT CHANGE UP (ref 3.8–10.5)

## 2020-09-14 PROCEDURE — 99214 OFFICE O/P EST MOD 30 MIN: CPT

## 2020-09-15 LAB
ALBUMIN SERPL ELPH-MCNC: 5 G/DL
ALP BLD-CCNC: 83 U/L
ALT SERPL-CCNC: 28 U/L
ANION GAP SERPL CALC-SCNC: 12 MMOL/L
AST SERPL-CCNC: 19 U/L
BILIRUB SERPL-MCNC: 0.3 MG/DL
BUN SERPL-MCNC: 12 MG/DL
CALCIUM SERPL-MCNC: 9.8 MG/DL
CHLORIDE SERPL-SCNC: 107 MMOL/L
CO2 SERPL-SCNC: 22 MMOL/L
CREAT SERPL-MCNC: 0.75 MG/DL
GLUCOSE SERPL-MCNC: 91 MG/DL
LDH SERPL-CCNC: 196 U/L
POTASSIUM SERPL-SCNC: 4.6 MMOL/L
PROT SERPL-MCNC: 7 G/DL
SODIUM SERPL-SCNC: 141 MMOL/L
URATE SERPL-MCNC: 5.9 MG/DL

## 2020-09-15 NOTE — HISTORY OF PRESENT ILLNESS
[de-identified] : 37yo F w/ asthma here for evaluation of newly diagnosed follicular lymphoma. \par \par Patient sates she initially noted left inguinal pain in February 2019, had a palpable mass for the last 6 years which she noticed after delivery of her second baby in 2013. She saw GYN, had TVUS: Uterus: 5.6 x 3.6 x 4.3 cm. EMS 3 mm. Right ovary normal. Left ovary normal. Complex elongated left adnexal lesion suggestive of hydrosalpinx, 9.2 x 4.9 x 9 cm. She is s/p b/l salpingectomy on 5/2/19. She remains on depo injection. She states that she has had more swelling and more pain since her procedure. \par \par She had CT A/P done on 5/9/19 which demonstrated extensive retroperitoneal, pelvic, and inguinal lymphadenopathy, concerning for malignancy with small amount of abdominal and pelvic ascites.\par s/p IR biopsy of left inguinal lymph node - follicular lymphoma grade 1-2. \par \par Also having cervical LAD, noticed for the last few months, L>R. Noted R inguinal swelling for the last week with pain. No fevers of chills. Notes having itching and that her breathing is a bit more labored than usual.  [de-identified] : BMbx (6/10/19): - Focal lymphoid infiltrate consistent with involvement by follicular lymphoma (history of follicular lymphoma)\par - Small paracortical sample with trilineage hematopoiesis with maturation and iron stores present\par \par PET/CT (6/16/19): 1. Hypermetabolic lymphadenopathy in neck, thorax, abdomen, pelvis, and bilateral inguinal/femoral regions corresponds to biopsy-proven follicular lymphoma. Hypermetabolic subcutaneous nodule, right posterolateral chest wall, is compatible with an additional site of lymphoma.\par 2. Nonspecific left greater than right tonsillar hypermetabolism may represent additional sites of lymphoma.\par 3. Findings compatible with bone marrow involvement by lymphoma in bilateral humeri and axial skeleton, as described above.\par \par She reports having increasing fatigue. Still having slight shortness of breath. \par We started BR on 6/24. She had a reaction to Rituxan. \par Eating fine. Neck LAD, shortness of breath and abdominal bloating improved/resolved. \par She had a yeast infection, saw GYN. Also reports having pelvic pain and was found to have a small polyp. \par \par C2 on 7/22. She notes having burning as she was getting the chemo (please see chart note for details). Notes that her arms were still hurting for a week after. \par She had a portacath placed 8/12\par Felt more fatigued and weak. \par Had transvaginal US, saw GYN this morning (9/5/19). Pelvic pain has resolved. \par \par C3 on 8/19. Tolerated it well. \par \par CT N/C/A/P (9/10/19): Marked decrease in size of cervical lymph nodes when compared with the prior exam compatible with a favorable response to therapy. Significant interval decrease in size of left supraclavicular lymphadenopathy.\par Focal enhancement involving the right anterior tonsillar without associated tonsillar expansion. Correlation with direct visualization and continued follow-up is advised.\par Significant interval decrease in size of previously noted hypermetabolic nodule in the soft tissues of the right posterior lateral chest.\par Significant interval decrease in size of retroperitoneal, pelvic, inguinal, and mesenteric lymphadenopathy as described above.\par \par C4 on 9/16. Tolerated well, had some tremors afterwards. \par \par C5 was delayed secondary to neutropenia. Given on 10/30/19. Had an episode of chest tightness w/ Pillo on day 1. Premedicated on day 2 with no events. Tremors better this cycle but notes 3 episodes of restless legs. \par \par C6 on 11/26/19. She reports having muscle aches. \par \par PET/CT 1/18/20: 1. Interval resolution of FDG activity associated with lymphadenopathy in the neck, chest, upper abdomen and pelvis with either resolution or significant decrease in size of the corresponding lymph nodes and not well delineated on CT as compared to PET/CT from 6/16/2019. Near total resolution of FDG avid left inguinal lymphadenopathy which is significantly decreased in size now demonstrating background activity.\par 2. Interval significant decrease in size and metabolism of hypermetabolic retroperitoneal and mesenteric lymphadenopathy.\par 3. Interval resolution of asymmetric tonsillar hypermetabolism.\par 4. Interval resolution of FDG avidity in bone marrow of bilateral humeri, T10, and sacrum.\par \par Having pain on L side of lower abdomen and groin around 3-4 weeks ago. Having night sweats again too. Had GYN f/u 2 weeks ago.\par We did a PET/CT on 6/13 which showed: 1. Small focus of mild FDG activity, decreased in size and metabolism, is associated with mesenteric haziness which is unchanged on CT as compared to prior study dated 1/18/2020 ([facility] 4). Resolution of minimally FDG-avid amorphous density, left periaortic region.\par 2. Minimally FDG-avid density, left inguinal region, unchanged, may represent postsurgical change versus lymph node. Please correlate clinically.\par \par Started Rituxan maintenance on 6/25/20. She had a reaction to Rituxan -please see chart note for details. \par Second dose on 8/31/20, tolerated better. Reports having back pain after premeds wore off.

## 2020-09-15 NOTE — ASSESSMENT
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 \par We started treatment with Bendamustine/Rituxan on 6/24/19. LAD has improved. C5 delayed due to neutropenia, received on 10/30/19. C6 on 11/26/19, tolerated treatment well. PET/CT done at end of treatment showed interval significant decrease in size and metabolism of LAD. \par \par She continues to have L groin and LLQ pain and tenderness. PET/CT unchanged. Started on Rituximab maintenance on 6/25/20. Cont every 2 mo. She continues to have side effects from treatment - we discussed that we may need to stop maintenance if unable to tolerate. Will reassess after next dose\par f/u w/ PMD and gyn\par All questions answered\par RTC 2 mo

## 2020-09-15 NOTE — PHYSICAL EXAM
[Fully active, able to carry on all pre-disease performance without restriction] : Status 0 - Fully active, able to carry on all pre-disease performance without restriction [Normal] : no peripheral adenopathy appreciated [de-identified] : cervical adenopathy resolved

## 2020-10-05 ENCOUNTER — RESULT REVIEW (OUTPATIENT)
Age: 40
End: 2020-10-05

## 2020-10-05 ENCOUNTER — APPOINTMENT (OUTPATIENT)
Dept: MAMMOGRAPHY | Facility: IMAGING CENTER | Age: 40
End: 2020-10-05
Payer: MEDICAID

## 2020-10-05 ENCOUNTER — APPOINTMENT (OUTPATIENT)
Dept: ULTRASOUND IMAGING | Facility: IMAGING CENTER | Age: 40
End: 2020-10-05
Payer: MEDICAID

## 2020-10-05 ENCOUNTER — OUTPATIENT (OUTPATIENT)
Dept: OUTPATIENT SERVICES | Facility: HOSPITAL | Age: 40
LOS: 1 days | End: 2020-10-05
Payer: MEDICAID

## 2020-10-05 DIAGNOSIS — Z00.00 ENCOUNTER FOR GENERAL ADULT MEDICAL EXAMINATION WITHOUT ABNORMAL FINDINGS: ICD-10-CM

## 2020-10-05 PROCEDURE — 77067 SCR MAMMO BI INCL CAD: CPT

## 2020-10-05 PROCEDURE — 77063 BREAST TOMOSYNTHESIS BI: CPT | Mod: 26

## 2020-10-05 PROCEDURE — 77063 BREAST TOMOSYNTHESIS BI: CPT

## 2020-10-05 PROCEDURE — 77067 SCR MAMMO BI INCL CAD: CPT | Mod: 26

## 2020-10-05 PROCEDURE — 76641 ULTRASOUND BREAST COMPLETE: CPT | Mod: 26,50

## 2020-10-05 PROCEDURE — 76641 ULTRASOUND BREAST COMPLETE: CPT

## 2020-10-19 ENCOUNTER — OUTPATIENT (OUTPATIENT)
Dept: OUTPATIENT SERVICES | Facility: HOSPITAL | Age: 40
LOS: 1 days | Discharge: HOME | End: 2020-10-19

## 2020-10-19 DIAGNOSIS — C85.88 OTHER SPECIFIED TYPES OF NON-HODGKIN LYMPHOMA, LYMPH NODES OF MULTIPLE SITES: ICD-10-CM

## 2020-10-22 ENCOUNTER — APPOINTMENT (OUTPATIENT)
Dept: ULTRASOUND IMAGING | Facility: IMAGING CENTER | Age: 40
End: 2020-10-22
Payer: MEDICAID

## 2020-10-22 ENCOUNTER — OUTPATIENT (OUTPATIENT)
Dept: OUTPATIENT SERVICES | Facility: HOSPITAL | Age: 40
LOS: 1 days | End: 2020-10-22
Payer: MEDICAID

## 2020-10-22 ENCOUNTER — EMERGENCY (EMERGENCY)
Facility: HOSPITAL | Age: 40
LOS: 1 days | Discharge: HOME | End: 2020-10-22
Attending: EMERGENCY MEDICINE | Admitting: EMERGENCY MEDICINE
Payer: MEDICAID

## 2020-10-22 VITALS
RESPIRATION RATE: 20 BRPM | HEART RATE: 68 BPM | HEIGHT: 64 IN | SYSTOLIC BLOOD PRESSURE: 113 MMHG | DIASTOLIC BLOOD PRESSURE: 72 MMHG | TEMPERATURE: 97 F | OXYGEN SATURATION: 100 %

## 2020-10-22 VITALS
RESPIRATION RATE: 20 BRPM | HEART RATE: 72 BPM | OXYGEN SATURATION: 100 % | SYSTOLIC BLOOD PRESSURE: 119 MMHG | DIASTOLIC BLOOD PRESSURE: 60 MMHG

## 2020-10-22 DIAGNOSIS — R10.2 PELVIC AND PERINEAL PAIN: ICD-10-CM

## 2020-10-22 LAB
ALBUMIN SERPL ELPH-MCNC: 4.6 G/DL — SIGNIFICANT CHANGE UP (ref 3.3–5)
ALP SERPL-CCNC: 77 U/L — SIGNIFICANT CHANGE UP (ref 40–120)
ALT FLD-CCNC: 27 U/L — SIGNIFICANT CHANGE UP (ref 4–33)
ANION GAP SERPL CALC-SCNC: 13 MMO/L — SIGNIFICANT CHANGE UP (ref 7–14)
APPEARANCE UR: CLEAR — SIGNIFICANT CHANGE UP
AST SERPL-CCNC: 19 U/L — SIGNIFICANT CHANGE UP (ref 4–32)
B PERT DNA SPEC QL NAA+PROBE: SIGNIFICANT CHANGE UP
BACTERIA # UR AUTO: NEGATIVE — SIGNIFICANT CHANGE UP
BASE EXCESS BLDV CALC-SCNC: 0.6 MMOL/L — SIGNIFICANT CHANGE UP
BASOPHILS # BLD AUTO: 0.02 K/UL — SIGNIFICANT CHANGE UP (ref 0–0.2)
BASOPHILS NFR BLD AUTO: 1.4 % — SIGNIFICANT CHANGE UP (ref 0–2)
BASOPHILS NFR SPEC: 1.8 % — SIGNIFICANT CHANGE UP (ref 0–2)
BILIRUB SERPL-MCNC: 0.3 MG/DL — SIGNIFICANT CHANGE UP (ref 0.2–1.2)
BILIRUB UR-MCNC: NEGATIVE — SIGNIFICANT CHANGE UP
BLOOD GAS VENOUS - CREATININE: 0.9 MG/DL — SIGNIFICANT CHANGE UP (ref 0.5–1.3)
BLOOD UR QL VISUAL: HIGH
BUN SERPL-MCNC: 13 MG/DL — SIGNIFICANT CHANGE UP (ref 7–23)
C PNEUM DNA SPEC QL NAA+PROBE: SIGNIFICANT CHANGE UP
CALCIUM SERPL-MCNC: 9.5 MG/DL — SIGNIFICANT CHANGE UP (ref 8.4–10.5)
CHLORIDE BLDV-SCNC: 108 MMOL/L — SIGNIFICANT CHANGE UP (ref 96–108)
CHLORIDE SERPL-SCNC: 108 MMOL/L — HIGH (ref 98–107)
CO2 SERPL-SCNC: 23 MMOL/L — SIGNIFICANT CHANGE UP (ref 22–31)
COLOR SPEC: SIGNIFICANT CHANGE UP
CREAT SERPL-MCNC: 0.84 MG/DL — SIGNIFICANT CHANGE UP (ref 0.5–1.3)
EOSINOPHIL # BLD AUTO: 0.03 K/UL — SIGNIFICANT CHANGE UP (ref 0–0.5)
EOSINOPHIL NFR BLD AUTO: 2.1 % — SIGNIFICANT CHANGE UP (ref 0–6)
EOSINOPHIL NFR FLD: 4.4 % — SIGNIFICANT CHANGE UP (ref 0–6)
FLUAV H1 2009 PAND RNA SPEC QL NAA+PROBE: SIGNIFICANT CHANGE UP
FLUAV H1 RNA SPEC QL NAA+PROBE: SIGNIFICANT CHANGE UP
FLUAV H3 RNA SPEC QL NAA+PROBE: SIGNIFICANT CHANGE UP
FLUAV SUBTYP SPEC NAA+PROBE: SIGNIFICANT CHANGE UP
FLUBV RNA SPEC QL NAA+PROBE: SIGNIFICANT CHANGE UP
GAS PNL BLDV: 143 MMOL/L — SIGNIFICANT CHANGE UP (ref 136–146)
GIANT PLATELETS BLD QL SMEAR: PRESENT — SIGNIFICANT CHANGE UP
GLUCOSE BLDV-MCNC: 144 MG/DL — HIGH (ref 70–99)
GLUCOSE SERPL-MCNC: 149 MG/DL — HIGH (ref 70–99)
GLUCOSE UR-MCNC: NEGATIVE — SIGNIFICANT CHANGE UP
HADV DNA SPEC QL NAA+PROBE: SIGNIFICANT CHANGE UP
HCG SERPL-ACNC: < 5 MIU/ML — SIGNIFICANT CHANGE UP
HCO3 BLDV-SCNC: 24 MMOL/L — SIGNIFICANT CHANGE UP (ref 20–27)
HCOV PNL SPEC NAA+PROBE: SIGNIFICANT CHANGE UP
HCT VFR BLD CALC: 33 % — LOW (ref 34.5–45)
HCT VFR BLDV CALC: 34.4 % — LOW (ref 34.5–45)
HGB BLD-MCNC: 10.4 G/DL — LOW (ref 11.5–15.5)
HGB BLDV-MCNC: 11.2 G/DL — LOW (ref 11.5–15.5)
HMPV RNA SPEC QL NAA+PROBE: SIGNIFICANT CHANGE UP
HPIV1 RNA SPEC QL NAA+PROBE: SIGNIFICANT CHANGE UP
HPIV2 RNA SPEC QL NAA+PROBE: SIGNIFICANT CHANGE UP
HPIV3 RNA SPEC QL NAA+PROBE: SIGNIFICANT CHANGE UP
HPIV4 RNA SPEC QL NAA+PROBE: SIGNIFICANT CHANGE UP
HYALINE CASTS # UR AUTO: NEGATIVE — SIGNIFICANT CHANGE UP
IMM GRANULOCYTES NFR BLD AUTO: 0.7 % — SIGNIFICANT CHANGE UP (ref 0–1.5)
KETONES UR-MCNC: NEGATIVE — SIGNIFICANT CHANGE UP
LACTATE BLDV-MCNC: 1.5 MMOL/L — SIGNIFICANT CHANGE UP (ref 0.5–2)
LEUKOCYTE ESTERASE UR-ACNC: NEGATIVE — SIGNIFICANT CHANGE UP
LIDOCAIN IGE QN: 21.2 U/L — SIGNIFICANT CHANGE UP (ref 7–60)
LYMPHOCYTES # BLD AUTO: 0.53 K/UL — LOW (ref 1–3.3)
LYMPHOCYTES # BLD AUTO: 36.6 % — SIGNIFICANT CHANGE UP (ref 13–44)
LYMPHOCYTES NFR SPEC AUTO: 29.5 % — SIGNIFICANT CHANGE UP (ref 13–44)
MCHC RBC-ENTMCNC: 26.7 PG — LOW (ref 27–34)
MCHC RBC-ENTMCNC: 31.5 % — LOW (ref 32–36)
MCV RBC AUTO: 84.8 FL — SIGNIFICANT CHANGE UP (ref 80–100)
MONOCYTES # BLD AUTO: 0.48 K/UL — SIGNIFICANT CHANGE UP (ref 0–0.9)
MONOCYTES NFR BLD AUTO: 33.1 % — HIGH (ref 2–14)
MONOCYTES NFR BLD: 27.7 % — HIGH (ref 2–9)
NEUTROPHIL AB SER-ACNC: 33.9 % — LOW (ref 43–77)
NEUTROPHILS # BLD AUTO: 0.38 K/UL — LOW (ref 1.8–7.4)
NEUTROPHILS NFR BLD AUTO: 26.1 % — LOW (ref 43–77)
NITRITE UR-MCNC: NEGATIVE — SIGNIFICANT CHANGE UP
NRBC # FLD: 0 K/UL — SIGNIFICANT CHANGE UP (ref 0–0)
PCO2 BLDV: 44 MMHG — SIGNIFICANT CHANGE UP (ref 41–51)
PH BLDV: 7.38 PH — SIGNIFICANT CHANGE UP (ref 7.32–7.43)
PH UR: 7.5 — SIGNIFICANT CHANGE UP (ref 5–8)
PLATELET # BLD AUTO: 197 K/UL — SIGNIFICANT CHANGE UP (ref 150–400)
PLATELET COUNT - ESTIMATE: NORMAL — SIGNIFICANT CHANGE UP
PMV BLD: 9.9 FL — SIGNIFICANT CHANGE UP (ref 7–13)
PO2 BLDV: 29 MMHG — LOW (ref 35–40)
POIKILOCYTOSIS BLD QL AUTO: SLIGHT — SIGNIFICANT CHANGE UP
POTASSIUM BLDV-SCNC: 3.2 MMOL/L — LOW (ref 3.4–4.5)
POTASSIUM SERPL-MCNC: 3.4 MMOL/L — LOW (ref 3.5–5.3)
POTASSIUM SERPL-SCNC: 3.4 MMOL/L — LOW (ref 3.5–5.3)
PROT SERPL-MCNC: 6.8 G/DL — SIGNIFICANT CHANGE UP (ref 6–8.3)
PROT UR-MCNC: 10 — SIGNIFICANT CHANGE UP
RAPID RVP RESULT: SIGNIFICANT CHANGE UP
RBC # BLD: 3.89 M/UL — SIGNIFICANT CHANGE UP (ref 3.8–5.2)
RBC # FLD: 12.7 % — SIGNIFICANT CHANGE UP (ref 10.3–14.5)
RBC CASTS # UR COMP ASSIST: HIGH (ref 0–?)
RSV RNA SPEC QL NAA+PROBE: SIGNIFICANT CHANGE UP
RV+EV RNA SPEC QL NAA+PROBE: SIGNIFICANT CHANGE UP
SAO2 % BLDV: 43.3 % — LOW (ref 60–85)
SARS-COV-2 RNA SPEC QL NAA+PROBE: SIGNIFICANT CHANGE UP
SMUDGE CELLS # BLD: PRESENT — SIGNIFICANT CHANGE UP
SODIUM SERPL-SCNC: 144 MMOL/L — SIGNIFICANT CHANGE UP (ref 135–145)
SP GR SPEC: 1.01 — SIGNIFICANT CHANGE UP (ref 1–1.04)
SQUAMOUS # UR AUTO: SIGNIFICANT CHANGE UP
UROBILINOGEN FLD QL: NORMAL — SIGNIFICANT CHANGE UP
VARIANT LYMPHS # BLD: 2.7 % — SIGNIFICANT CHANGE UP
WBC # BLD: 1.45 K/UL — LOW (ref 3.8–10.5)
WBC # FLD AUTO: 1.45 K/UL — LOW (ref 3.8–10.5)
WBC UR QL: SIGNIFICANT CHANGE UP (ref 0–?)

## 2020-10-22 PROCEDURE — 76830 TRANSVAGINAL US NON-OB: CPT

## 2020-10-22 PROCEDURE — 74177 CT ABD & PELVIS W/CONTRAST: CPT | Mod: 26

## 2020-10-22 PROCEDURE — 71045 X-RAY EXAM CHEST 1 VIEW: CPT | Mod: 26

## 2020-10-22 PROCEDURE — 99285 EMERGENCY DEPT VISIT HI MDM: CPT | Mod: 25

## 2020-10-22 PROCEDURE — 93010 ELECTROCARDIOGRAM REPORT: CPT

## 2020-10-22 PROCEDURE — 76830 TRANSVAGINAL US NON-OB: CPT | Mod: 26

## 2020-10-22 RX ORDER — ONDANSETRON 8 MG/1
1 TABLET, FILM COATED ORAL
Qty: 10 | Refills: 0
Start: 2020-10-22 | End: 2020-10-26

## 2020-10-22 RX ORDER — SODIUM CHLORIDE 9 MG/ML
1000 INJECTION INTRAMUSCULAR; INTRAVENOUS; SUBCUTANEOUS ONCE
Refills: 0 | Status: COMPLETED | OUTPATIENT
Start: 2020-10-22 | End: 2020-10-22

## 2020-10-22 RX ORDER — KETOROLAC TROMETHAMINE 30 MG/ML
15 SYRINGE (ML) INJECTION ONCE
Refills: 0 | Status: DISCONTINUED | OUTPATIENT
Start: 2020-10-22 | End: 2020-10-22

## 2020-10-22 RX ORDER — ONDANSETRON 8 MG/1
4 TABLET, FILM COATED ORAL ONCE
Refills: 0 | Status: COMPLETED | OUTPATIENT
Start: 2020-10-22 | End: 2020-10-22

## 2020-10-22 RX ORDER — MORPHINE SULFATE 50 MG/1
4 CAPSULE, EXTENDED RELEASE ORAL ONCE
Refills: 0 | Status: DISCONTINUED | OUTPATIENT
Start: 2020-10-22 | End: 2020-10-22

## 2020-10-22 RX ADMIN — Medication 15 MILLIGRAM(S): at 05:36

## 2020-10-22 RX ADMIN — ONDANSETRON 4 MILLIGRAM(S): 8 TABLET, FILM COATED ORAL at 03:13

## 2020-10-22 RX ADMIN — SODIUM CHLORIDE 1000 MILLILITER(S): 9 INJECTION INTRAMUSCULAR; INTRAVENOUS; SUBCUTANEOUS at 03:13

## 2020-10-22 RX ADMIN — MORPHINE SULFATE 4 MILLIGRAM(S): 50 CAPSULE, EXTENDED RELEASE ORAL at 03:13

## 2020-10-22 NOTE — ED PROVIDER NOTE - NSFOLLOWUPCLINICS_GEN_ALL_ED_FT
[facility]  Urology  [address]  [place] [address]  Phone: [phone]  Fax:   Follow Up Time:     [facility]  Urology  [address] [place] [address] [place] [address]  Phone: [phone]  Fax:   Follow Up Time:     [facility]  Urology  [address] [place] [address]  Phone: [phone]  Fax: [phone]  Follow Up Time:

## 2020-10-22 NOTE — ED PROVIDER NOTE - OBJECTIVE STATEMENT
40F pmhx of lymphoma last chemo 40F pmhx of lymphoma last iv chemo was in August, now on oral chemo presenting with acute onset abdominal pain around 10pm. Sharp, constant. Mainly r sided, associated with r flank pain, nausea, vomiting. No f/c/diarrhea. No sick contacts. No inciting event/injury. History of b/l salpingectomy, no history of any other abdominal surgeries. Was told she had a kidney stone (coincidently found on CT done for lymphoma f/u) but unsure if she ever passed it. No dysuria/hematuria. 40F pmhx of lymphoma last iv chemo was in August, now on immunotherapy presenting with acute onset abdominal pain around 10pm. Sharp, constant. Mainly r sided, associated with r flank pain, nausea, vomiting. No f/c/diarrhea. No sick contacts. No inciting event/injury. History of b/l salpingectomy, no history of any other abdominal surgeries. Was told she had a kidney stone (coincidently found on CT done for lymphoma f/u) but unsure if she ever passed it. No dysuria/hematuria.

## 2020-10-22 NOTE — ED ADULT NURSE NOTE - OBJECTIVE STATEMENT
Pt received to room 2 complaining of R flank pain since ~10pm. AxOx4 and ambulatory at baseline. Pt endorses pain to R flank and RLQ abdomen. Pt endorses nausea/vomiting, unsure of how many times she vomited, denies blood in vomit, pt not actively vomiting at this time. Pt appears uncomfortable, in NAD, respirations even/unlabored, VSS. Pt denies headache, dizziness, chest pain, SOB, diarrhea, fever, chills and cough at this time. PMH of lymphoma, R chest wall port noted, last chemo November. 20G IV placed to R AC, labs drawn and sent. Pt medicated as per MD orders. Will continue to monitor.

## 2020-10-22 NOTE — ED ADULT NURSE NOTE - EXTENSIONS OF SELF_ADULT
Action 4: Continue Detail Level: Zone Start Regimen: Clobetasol oint bid 10/5, calcipotrene bid, aveeno skin relief moisturizer, vanicream cleansing bat, gentle skin care refimen. Other Instructions: Patient did not want a FSE today. Patient will have FSE at the next visit as well as following up on the psoriasis. Patient will not be back in [place] until so follow up was deferred until then instead of the usual four weeks. None

## 2020-10-22 NOTE — ED PROVIDER NOTE - CLINICAL SUMMARY MEDICAL DECISION MAKING FREE TEXT BOX
40F presenting with abd pain, hx of lymphoma with pelvic involvement PE: VSS, uncomfortable with diffuse abd ttp Ddx: Given complaint was RLQ, cannot rule out appendicitis, possibly renal colic. Pain could be 2/2 to ileus/obstruction caused by underlying lymphoma. Plan: labs, CT, antiemetics

## 2020-10-22 NOTE — ED ADULT TRIAGE NOTE - CHIEF COMPLAINT QUOTE
Pt reports RLQ pain starting 10 pm with nausea and vomiting. PMH of lymphoma. Pt is vomiting in triage appears to be uncomfortable. Pt receiving immunotherapy. Pt reports RLQ pain starting 10 pm with nausea and vomiting. PMH of lymphoma. Pt is vomiting in triage appears to be uncomfortable. Pt receiving immunotherapy. R chest power port.

## 2020-10-22 NOTE — ED PROVIDER NOTE - NS ED ROS FT
CONST: no fevers, no chills, no trauma  EYES: no pain, no blurry vision   ENT: no sore throat, no epistaxis, no rhinorrhea  CV: no chest pain, no palpitations, no orthopnea, no extremity pain or swelling  RESP: no shortness of breath, no cough, no sputum, no pleurisy, no wheezing  ABD: + abdominal pain, + nausea, + vomiting, no diarrhea, no black or bloody stool  : no dysuria, no hematuria, no frequency, no urgency  MSK: no back pain, no neck pain, no extremity pain  NEURO: no headache, no sensory disturbances, no focal weakness, no dizziness  HEME: no easy bleeding or bruising  SKIN: no diaphoresis, no rash

## 2020-10-22 NOTE — ED PROVIDER NOTE - ATTENDING CONTRIBUTION TO CARE
39 y/o F with h/o lymphoma currently on parenteral immunotherapy here with R flank and abdominal pain.  Pt reports pain began suddenly this evening around 10pm, constant without any alleviating or exacerbating factors.  (+)nausea and nbnb vomiting.  No fever, cp, sob, diarrhea, constipation, urinary sxs.  Pt took kelly-seltzer pta without relief.  Pt is lying in stretcher, awake and alert, nontoxic but appears uncomfortable in pain.  AF/VSS.  Lungs cta bl.  Cards nl S1/S2, RRR, no MRG.  Abd soft with R flank and RLQ tenderness, no rebound or guarding, (+)cva tenderness on right.  No pedal edema or calf tenderness.  Plan for labs, ua, ct abd/pel, pain meds, antiemetics, reassess - consider renal colic vs uti/pyelo, appendicitis, will reassess.

## 2020-10-22 NOTE — ED ADULT NURSE REASSESSMENT NOTE - NS ED NURSE REASSESS COMMENT FT1
Pt medicated as per MD orders, Pt reports partial relief in pain, states she feels better. Pt appears comfortable, in NAD, respirations even/unlabored. Pt discharged at this time. IV removed.

## 2020-10-22 NOTE — ED ADULT NURSE NOTE - CHIEF COMPLAINT QUOTE
Pt reports RLQ pain starting 10 pm with nausea and vomiting. PMH of lymphoma. Pt is vomiting in triage appears to be uncomfortable. Pt receiving immunotherapy. R chest power port.

## 2020-10-22 NOTE — ED PROVIDER NOTE - PATIENT PORTAL LINK FT
You can access the FollowMyHealth Patient Portal offered by [facility] by registering at the following website: [email or web address]. By joining [employer]’s FollowMyHealth portal, you will also be able to view your health information using other applications (apps) compatible with our system.

## 2020-10-22 NOTE — ED PROVIDER NOTE - PHYSICAL EXAMINATION
Const: Well-nourished, Well-developed, appearing stated age. Pt vomiting, uncomfortable appearing.   Eyes: no conjunctival injection, and symmetrical lids.  HEENT: Head NCAT, no lesions. Atraumatic external nose and ears. +Dry MM.  Neck: Symmetric, trachea midline.   CVS: +S1/S2, Peripheral pulses 2+ and equal in all extremities.  RESP: Unlabored respiratory effort. Clear to auscultation bilaterally.  GI: Abd diffusely tender with voluntary guarding. Nondistended, +R CVA tenderness   MSK: Normocephalic/Atraumatic, Lower Extremities w/o calf tenderness or edema b/l.   Skin: Warm, dry and intact.   Neuro: CNs II-XII grossly intact. Motor & Sensation grossly intact.  Psych: Awake, Alert, & Oriented (AAO) x3. Appropriate mood and affect.

## 2020-10-22 NOTE — ED PROVIDER NOTE - NSFOLLOWUPINSTRUCTIONS_ED_ALL_ED_FT
Follow up with urology in 7-10 days - number for our clinic provided.     Return to the ER for new or worsening abdominal pain, vomiting, fevers, or any other concerning symptoms.     Stay hydrated and take over the counter Ibuprofen at pharmacy recommended doses for your pain.     See attached information on kidney stones.

## 2020-10-23 ENCOUNTER — APPOINTMENT (OUTPATIENT)
Dept: INFUSION THERAPY | Facility: HOSPITAL | Age: 40
End: 2020-10-23

## 2020-10-23 ENCOUNTER — INPATIENT (INPATIENT)
Facility: HOSPITAL | Age: 40
LOS: 3 days | Discharge: HOME | End: 2020-10-27
Attending: HOSPITALIST | Admitting: HOSPITALIST
Payer: MEDICAID

## 2020-10-23 VITALS
SYSTOLIC BLOOD PRESSURE: 140 MMHG | TEMPERATURE: 99 F | HEIGHT: 64 IN | RESPIRATION RATE: 16 BRPM | HEART RATE: 46 BPM | OXYGEN SATURATION: 99 % | WEIGHT: 141.98 LBS | DIASTOLIC BLOOD PRESSURE: 71 MMHG

## 2020-10-23 DIAGNOSIS — N20.0 CALCULUS OF KIDNEY: ICD-10-CM

## 2020-10-23 DIAGNOSIS — Z29.9 ENCOUNTER FOR PROPHYLACTIC MEASURES, UNSPECIFIED: ICD-10-CM

## 2020-10-23 DIAGNOSIS — R52 PAIN, UNSPECIFIED: ICD-10-CM

## 2020-10-23 LAB
ALBUMIN SERPL ELPH-MCNC: 3.8 G/DL — SIGNIFICANT CHANGE UP (ref 3.3–5)
ALP SERPL-CCNC: 79 U/L — SIGNIFICANT CHANGE UP (ref 40–120)
ALT FLD-CCNC: 29 U/L — SIGNIFICANT CHANGE UP (ref 12–78)
ANION GAP SERPL CALC-SCNC: 8 MMOL/L — SIGNIFICANT CHANGE UP (ref 5–17)
APPEARANCE UR: CLEAR — SIGNIFICANT CHANGE UP
APTT BLD: 25.6 SEC — LOW (ref 27.5–35.5)
AST SERPL-CCNC: 18 U/L — SIGNIFICANT CHANGE UP (ref 15–37)
BACTERIA # UR AUTO: ABNORMAL
BILIRUB SERPL-MCNC: 0.4 MG/DL — SIGNIFICANT CHANGE UP (ref 0.2–1.2)
BILIRUB UR-MCNC: NEGATIVE — SIGNIFICANT CHANGE UP
BUN SERPL-MCNC: 11 MG/DL — SIGNIFICANT CHANGE UP (ref 7–23)
CALCIUM SERPL-MCNC: 8.5 MG/DL — SIGNIFICANT CHANGE UP (ref 8.5–10.1)
CHLORIDE SERPL-SCNC: 107 MMOL/L — SIGNIFICANT CHANGE UP (ref 96–108)
CO2 SERPL-SCNC: 25 MMOL/L — SIGNIFICANT CHANGE UP (ref 22–31)
COLOR SPEC: YELLOW — SIGNIFICANT CHANGE UP
CREAT SERPL-MCNC: 1.02 MG/DL — SIGNIFICANT CHANGE UP (ref 0.5–1.3)
CULTURE RESULTS: SIGNIFICANT CHANGE UP
DIFF PNL FLD: ABNORMAL
EPI CELLS # UR: ABNORMAL
GLUCOSE SERPL-MCNC: 136 MG/DL — HIGH (ref 70–99)
GLUCOSE UR QL: NEGATIVE MG/DL — SIGNIFICANT CHANGE UP
HCG SERPL-ACNC: <1 MIU/ML — SIGNIFICANT CHANGE UP
HCT VFR BLD CALC: 36.2 % — SIGNIFICANT CHANGE UP (ref 34.5–45)
HGB BLD-MCNC: 11.5 G/DL — SIGNIFICANT CHANGE UP (ref 11.5–15.5)
INR BLD: 1.15 RATIO — SIGNIFICANT CHANGE UP (ref 0.88–1.16)
KETONES UR-MCNC: ABNORMAL
LEUKOCYTE ESTERASE UR-ACNC: NEGATIVE — SIGNIFICANT CHANGE UP
LIDOCAIN IGE QN: 78 U/L — SIGNIFICANT CHANGE UP (ref 73–393)
MCHC RBC-ENTMCNC: 27.1 PG — SIGNIFICANT CHANGE UP (ref 27–34)
MCHC RBC-ENTMCNC: 31.8 GM/DL — LOW (ref 32–36)
MCV RBC AUTO: 85.2 FL — SIGNIFICANT CHANGE UP (ref 80–100)
NITRITE UR-MCNC: NEGATIVE — SIGNIFICANT CHANGE UP
NRBC # BLD: 0 /100 WBCS — SIGNIFICANT CHANGE UP (ref 0–0)
PH UR: 6.5 — SIGNIFICANT CHANGE UP (ref 5–8)
PLATELET # BLD AUTO: 237 K/UL — SIGNIFICANT CHANGE UP (ref 150–400)
POTASSIUM SERPL-MCNC: 3.1 MMOL/L — LOW (ref 3.5–5.3)
POTASSIUM SERPL-SCNC: 3.1 MMOL/L — LOW (ref 3.5–5.3)
PROT SERPL-MCNC: 7 GM/DL — SIGNIFICANT CHANGE UP (ref 6–8.3)
PROT UR-MCNC: 15 MG/DL
PROTHROM AB SERPL-ACNC: 13.3 SEC — SIGNIFICANT CHANGE UP (ref 10.6–13.6)
RBC # BLD: 4.25 M/UL — SIGNIFICANT CHANGE UP (ref 3.8–5.2)
RBC # FLD: 12.7 % — SIGNIFICANT CHANGE UP (ref 10.3–14.5)
RBC CASTS # UR COMP ASSIST: ABNORMAL /HPF (ref 0–4)
SODIUM SERPL-SCNC: 140 MMOL/L — SIGNIFICANT CHANGE UP (ref 135–145)
SP GR SPEC: 1.01 — SIGNIFICANT CHANGE UP (ref 1.01–1.02)
SPECIMEN SOURCE: SIGNIFICANT CHANGE UP
UROBILINOGEN FLD QL: NEGATIVE MG/DL — SIGNIFICANT CHANGE UP
WBC # BLD: 2.51 K/UL — LOW (ref 3.8–10.5)
WBC # FLD AUTO: 2.51 K/UL — LOW (ref 3.8–10.5)
WBC UR QL: SIGNIFICANT CHANGE UP

## 2020-10-23 PROCEDURE — 99222 1ST HOSP IP/OBS MODERATE 55: CPT

## 2020-10-23 PROCEDURE — 99285 EMERGENCY DEPT VISIT HI MDM: CPT

## 2020-10-23 PROCEDURE — 93010 ELECTROCARDIOGRAM REPORT: CPT

## 2020-10-23 RX ORDER — TRAMADOL HYDROCHLORIDE 50 MG/1
50 TABLET ORAL ONCE
Refills: 0 | Status: DISCONTINUED | OUTPATIENT
Start: 2020-10-23 | End: 2020-10-23

## 2020-10-23 RX ORDER — ONDANSETRON 8 MG/1
4 TABLET, FILM COATED ORAL ONCE
Refills: 0 | Status: COMPLETED | OUTPATIENT
Start: 2020-10-23 | End: 2020-10-23

## 2020-10-23 RX ORDER — HYDROMORPHONE HYDROCHLORIDE 2 MG/ML
1 INJECTION INTRAMUSCULAR; INTRAVENOUS; SUBCUTANEOUS ONCE
Refills: 0 | Status: DISCONTINUED | OUTPATIENT
Start: 2020-10-23 | End: 2020-10-23

## 2020-10-23 RX ORDER — METOCLOPRAMIDE HCL 10 MG
10 TABLET ORAL ONCE
Refills: 0 | Status: COMPLETED | OUTPATIENT
Start: 2020-10-23 | End: 2020-10-23

## 2020-10-23 RX ORDER — MORPHINE SULFATE 50 MG/1
4 CAPSULE, EXTENDED RELEASE ORAL ONCE
Refills: 0 | Status: DISCONTINUED | OUTPATIENT
Start: 2020-10-23 | End: 2020-10-23

## 2020-10-23 RX ORDER — FENTANYL CITRATE 50 UG/ML
50 INJECTION INTRAVENOUS ONCE
Refills: 0 | Status: DISCONTINUED | OUTPATIENT
Start: 2020-10-23 | End: 2020-10-23

## 2020-10-23 RX ORDER — SODIUM CHLORIDE 9 MG/ML
1000 INJECTION INTRAMUSCULAR; INTRAVENOUS; SUBCUTANEOUS ONCE
Refills: 0 | Status: COMPLETED | OUTPATIENT
Start: 2020-10-23 | End: 2020-10-23

## 2020-10-23 RX ADMIN — HYDROMORPHONE HYDROCHLORIDE 1 MILLIGRAM(S): 2 INJECTION INTRAMUSCULAR; INTRAVENOUS; SUBCUTANEOUS at 22:54

## 2020-10-23 RX ADMIN — FENTANYL CITRATE 50 MICROGRAM(S): 50 INJECTION INTRAVENOUS at 20:17

## 2020-10-23 RX ADMIN — ONDANSETRON 4 MILLIGRAM(S): 8 TABLET, FILM COATED ORAL at 19:33

## 2020-10-23 RX ADMIN — Medication 10 MILLIGRAM(S): at 22:24

## 2020-10-23 RX ADMIN — SODIUM CHLORIDE 1000 MILLILITER(S): 9 INJECTION INTRAMUSCULAR; INTRAVENOUS; SUBCUTANEOUS at 23:28

## 2020-10-23 RX ADMIN — MORPHINE SULFATE 4 MILLIGRAM(S): 50 CAPSULE, EXTENDED RELEASE ORAL at 19:29

## 2020-10-23 RX ADMIN — TRAMADOL HYDROCHLORIDE 50 MILLIGRAM(S): 50 TABLET ORAL at 21:47

## 2020-10-23 RX ADMIN — TRAMADOL HYDROCHLORIDE 50 MILLIGRAM(S): 50 TABLET ORAL at 22:05

## 2020-10-23 RX ADMIN — SODIUM CHLORIDE 1000 MILLILITER(S): 9 INJECTION INTRAMUSCULAR; INTRAVENOUS; SUBCUTANEOUS at 19:14

## 2020-10-23 RX ADMIN — MORPHINE SULFATE 4 MILLIGRAM(S): 50 CAPSULE, EXTENDED RELEASE ORAL at 21:45

## 2020-10-23 RX ADMIN — HYDROMORPHONE HYDROCHLORIDE 1 MILLIGRAM(S): 2 INJECTION INTRAMUSCULAR; INTRAVENOUS; SUBCUTANEOUS at 22:24

## 2020-10-23 RX ADMIN — FENTANYL CITRATE 50 MICROGRAM(S): 50 INJECTION INTRAVENOUS at 20:02

## 2020-10-23 NOTE — ED ADULT NURSE REASSESSMENT NOTE - NS ED NURSE REASSESS COMMENT FT1
received report from CLAUDIA Garcia. pt on the bed alert and oriented. with ongoing fluids. pt identified self introduced, MD at bed side. will continue to monitor.

## 2020-10-23 NOTE — ED PROVIDER NOTE - OBJECTIVE STATEMENT
40F pmhx of lymphoma last iv chemo was in August, now on immunotherapy , Dx with kidney stone last night at [facility] returning for recurrent pain- the same pain as she had alst night. right flank pain with nausea and vomiting. no fevers.

## 2020-10-23 NOTE — H&P ADULT - NSHPPHYSICALEXAM_GEN_ALL_CORE
Vital Signs Last 24 Hrs  T(C): 37.2 (23 Oct 2020 18:47), Max: 37.2 (23 Oct 2020 18:47)  T(F): 99 (23 Oct 2020 18:47), Max: 99 (23 Oct 2020 18:47)  HR: 46 (23 Oct 2020 18:47) (46 - 46)  BP: 140/71 (23 Oct 2020 18:47) (140/71 - 140/71)  BP(mean): --  RR: 16 (23 Oct 2020 18:47) (16 - 16)  SpO2: 99% (23 Oct 2020 18:47) (99% - 99%)    PHYSICAL EXAM:    GENERAL: NAD, well-groomed, well-developed  HEAD:  Atraumatic, Normocephalic  EYES: EOMI, PERRLA, conjunctiva and sclera clear  ENMT: No tonsillar erythema, exudates, or enlargement; Moist mucous membranes, No lesions  NECK: Supple, No JVD, Normal thyroid  NERVOUS SYSTEM:  Alert & Oriented X3, Good concentration; Motor Strength 5/5 B/L upper and lower extremities; DTRs 2+ intact and symmetric  CHEST/LUNG: Clear to percussion bilaterally; No rales, rhonchi, wheezing, or rubs  HEART: Regular rate and rhythm; No rubs, or gallops, +S1,S2  ABDOMEN: Soft, Nontender, Nondistended; Bowel sounds present, +mod r flank tenderness  EXTREMITIES:  2+ Peripheral Pulses, No clubbing, cyanosis, or edema  LYMPH: No cervical adenopathy  RECTAL: deferred  BREAST: deferred  : deferred  SKIN: No rashes or lesions    IMPROVE VTE Individual Risk Assessment        RISK                                                          Points  [  ] Previous VTE                                                3  [  ] Thrombophilia                                             2  [  ] Lower limb paralysis                                    2        (unable to hold up >15 seconds)    [  ] Current Cancer                                             2         (within 6 months)  [  ] Immobilization > 24 hrs                              1  [  ] ICU/CCU stay > 24 hours                            1  [  ] Age > 60                                                    1  IMPROVE VTE Score __0_______

## 2020-10-23 NOTE — ED ADULT TRIAGE NOTE - CHIEF COMPLAINT QUOTE
Pt c/o severe pain rt flank dx kidney stone yesterday. + nausea and vomiting hx lymphoma Pt shaking in triage

## 2020-10-23 NOTE — H&P ADULT - HISTORY OF PRESENT ILLNESS
pt is a 39 y/o female w/pmhx of lymphoma on immunotherapy, last chemo in august, developed flank pain last night seen at [facility] ed and dc'd returns w/untolerable flank pain.  pt denies any fever, chills, sob, cp, palpiations, n/v/d/c no travels.  pt on ct w/4mm obstructing stone w/hydro on r.

## 2020-10-23 NOTE — H&P ADULT - NSHPLABSRESULTS_GEN_ALL_CORE
LABS:                        11.5   2.51  )-----------( 237      ( 23 Oct 2020 19:59 )             36.2     10-23    140  |  107  |  11  ----------------------------<  136<H>  3.1<L>   |  25  |  1.02    Ca    8.5      23 Oct 2020 19:59    TPro  7.0  /  Alb  3.8  /  TBili  0.4  /  DBili  x   /  AST  18  /  ALT  29  /  AlkPhos  79  10-    PT/INR - ( 23 Oct 2020 19:59 )   PT: 13.3 sec;   INR: 1.15 ratio         PTT - ( 23 Oct 2020 19:59 )  PTT:25.6 sec  Urinalysis Basic - ( 23 Oct 2020 21:26 )    Color: Yellow / Appearance: Clear / S.010 / pH: x  Gluc: x / Ketone: Small  / Bili: Negative / Urobili: Negative mg/dL   Blood: x / Protein: 15 mg/dL / Nitrite: Negative   Leuk Esterase: Negative / RBC: 25-50 /HPF / WBC 0-2   Sq Epi: x / Non Sq Epi: Moderate / Bacteria: Few      CAPILLARY BLOOD GLUCOSE            RADIOLOGY & ADDITIONAL TESTS:    Imaging Personally Reviewed:  [ X] YES  [ ] NO

## 2020-10-23 NOTE — ED PROVIDER NOTE - PROGRESS NOTE DETAILS
patient with persistnet pain not repsonding to analgesia in Ed. no sign of of infection. renal function wnl. will admit for pain contrl and urology evaluation for nephrolithiasis not amenable to pain control

## 2020-10-23 NOTE — ED PROVIDER NOTE - CLINICAL SUMMARY MEDICAL DECISION MAKING FREE TEXT BOX
bradycardic with anterior TWI - unlikely ACS. posterior EKG wnl. will obtain 1x troponin. likely bradycardic from vagal reaction from pain. will treat pain and evaluate for worsening renal function and infection from current kidney stone. unliekly appendicitis or other intrabdominal pathology considering CT done 14 hours ago showing renal stone.

## 2020-10-24 LAB — SARS-COV-2 RNA SPEC QL NAA+PROBE: SIGNIFICANT CHANGE UP

## 2020-10-24 PROCEDURE — 99233 SBSQ HOSP IP/OBS HIGH 50: CPT

## 2020-10-24 PROCEDURE — 99221 1ST HOSP IP/OBS SF/LOW 40: CPT

## 2020-10-24 RX ORDER — INFLUENZA VIRUS VACCINE 15; 15; 15; 15 UG/.5ML; UG/.5ML; UG/.5ML; UG/.5ML
0.5 SUSPENSION INTRAMUSCULAR ONCE
Refills: 0 | Status: DISCONTINUED | OUTPATIENT
Start: 2020-10-24 | End: 2020-10-27

## 2020-10-24 RX ORDER — SODIUM CHLORIDE 9 MG/ML
1000 INJECTION INTRAMUSCULAR; INTRAVENOUS; SUBCUTANEOUS
Refills: 0 | Status: DISCONTINUED | OUTPATIENT
Start: 2020-10-24 | End: 2020-10-26

## 2020-10-24 RX ORDER — AZTREONAM 2 G
VIAL (EA) INJECTION
Refills: 0 | Status: DISCONTINUED | OUTPATIENT
Start: 2020-10-24 | End: 2020-10-25

## 2020-10-24 RX ORDER — HYDROMORPHONE HYDROCHLORIDE 2 MG/ML
1 INJECTION INTRAMUSCULAR; INTRAVENOUS; SUBCUTANEOUS ONCE
Refills: 0 | Status: DISCONTINUED | OUTPATIENT
Start: 2020-10-24 | End: 2020-10-24

## 2020-10-24 RX ORDER — TAMSULOSIN HYDROCHLORIDE 0.4 MG/1
0.4 CAPSULE ORAL DAILY
Refills: 0 | Status: DISCONTINUED | OUTPATIENT
Start: 2020-10-24 | End: 2020-10-26

## 2020-10-24 RX ORDER — POTASSIUM CHLORIDE 20 MEQ
40 PACKET (EA) ORAL ONCE
Refills: 0 | Status: COMPLETED | OUTPATIENT
Start: 2020-10-24 | End: 2020-10-24

## 2020-10-24 RX ORDER — POTASSIUM CHLORIDE 20 MEQ
10 PACKET (EA) ORAL
Refills: 0 | Status: COMPLETED | OUTPATIENT
Start: 2020-10-24 | End: 2020-10-24

## 2020-10-24 RX ORDER — ONDANSETRON 8 MG/1
4 TABLET, FILM COATED ORAL EVERY 8 HOURS
Refills: 0 | Status: DISCONTINUED | OUTPATIENT
Start: 2020-10-24 | End: 2020-10-26

## 2020-10-24 RX ORDER — MORPHINE SULFATE 50 MG/1
4 CAPSULE, EXTENDED RELEASE ORAL EVERY 4 HOURS
Refills: 0 | Status: DISCONTINUED | OUTPATIENT
Start: 2020-10-24 | End: 2020-10-25

## 2020-10-24 RX ORDER — SODIUM CHLORIDE 9 MG/ML
1000 INJECTION INTRAMUSCULAR; INTRAVENOUS; SUBCUTANEOUS ONCE
Refills: 0 | Status: COMPLETED | OUTPATIENT
Start: 2020-10-24 | End: 2020-10-24

## 2020-10-24 RX ORDER — AZTREONAM 2 G
1000 VIAL (EA) INJECTION EVERY 8 HOURS
Refills: 0 | Status: DISCONTINUED | OUTPATIENT
Start: 2020-10-24 | End: 2020-10-25

## 2020-10-24 RX ORDER — AZTREONAM 2 G
1000 VIAL (EA) INJECTION ONCE
Refills: 0 | Status: COMPLETED | OUTPATIENT
Start: 2020-10-24 | End: 2020-10-24

## 2020-10-24 RX ORDER — ONDANSETRON 8 MG/1
4 TABLET, FILM COATED ORAL ONCE
Refills: 0 | Status: COMPLETED | OUTPATIENT
Start: 2020-10-24 | End: 2020-10-24

## 2020-10-24 RX ADMIN — MORPHINE SULFATE 4 MILLIGRAM(S): 50 CAPSULE, EXTENDED RELEASE ORAL at 16:11

## 2020-10-24 RX ADMIN — Medication 50 MILLIGRAM(S): at 21:54

## 2020-10-24 RX ADMIN — ONDANSETRON 4 MILLIGRAM(S): 8 TABLET, FILM COATED ORAL at 11:28

## 2020-10-24 RX ADMIN — Medication 100 MILLIEQUIVALENT(S): at 04:27

## 2020-10-24 RX ADMIN — ONDANSETRON 4 MILLIGRAM(S): 8 TABLET, FILM COATED ORAL at 06:38

## 2020-10-24 RX ADMIN — HYDROMORPHONE HYDROCHLORIDE 1 MILLIGRAM(S): 2 INJECTION INTRAMUSCULAR; INTRAVENOUS; SUBCUTANEOUS at 11:15

## 2020-10-24 RX ADMIN — TAMSULOSIN HYDROCHLORIDE 0.4 MILLIGRAM(S): 0.4 CAPSULE ORAL at 11:02

## 2020-10-24 RX ADMIN — HYDROMORPHONE HYDROCHLORIDE 1 MILLIGRAM(S): 2 INJECTION INTRAMUSCULAR; INTRAVENOUS; SUBCUTANEOUS at 10:59

## 2020-10-24 RX ADMIN — MORPHINE SULFATE 4 MILLIGRAM(S): 50 CAPSULE, EXTENDED RELEASE ORAL at 20:53

## 2020-10-24 RX ADMIN — HYDROMORPHONE HYDROCHLORIDE 1 MILLIGRAM(S): 2 INJECTION INTRAMUSCULAR; INTRAVENOUS; SUBCUTANEOUS at 06:45

## 2020-10-24 RX ADMIN — Medication 40 MILLIEQUIVALENT(S): at 11:30

## 2020-10-24 RX ADMIN — SODIUM CHLORIDE 1000 MILLILITER(S): 9 INJECTION INTRAMUSCULAR; INTRAVENOUS; SUBCUTANEOUS at 19:07

## 2020-10-24 RX ADMIN — HYDROMORPHONE HYDROCHLORIDE 1 MILLIGRAM(S): 2 INJECTION INTRAMUSCULAR; INTRAVENOUS; SUBCUTANEOUS at 03:18

## 2020-10-24 RX ADMIN — MORPHINE SULFATE 4 MILLIGRAM(S): 50 CAPSULE, EXTENDED RELEASE ORAL at 21:09

## 2020-10-24 RX ADMIN — Medication 50 MILLIGRAM(S): at 04:28

## 2020-10-24 RX ADMIN — Medication 50 MILLIGRAM(S): at 13:41

## 2020-10-24 RX ADMIN — Medication 100 MILLIEQUIVALENT(S): at 06:06

## 2020-10-24 RX ADMIN — ONDANSETRON 4 MILLIGRAM(S): 8 TABLET, FILM COATED ORAL at 19:55

## 2020-10-24 RX ADMIN — HYDROMORPHONE HYDROCHLORIDE 1 MILLIGRAM(S): 2 INJECTION INTRAMUSCULAR; INTRAVENOUS; SUBCUTANEOUS at 06:36

## 2020-10-24 RX ADMIN — MORPHINE SULFATE 4 MILLIGRAM(S): 50 CAPSULE, EXTENDED RELEASE ORAL at 16:26

## 2020-10-24 NOTE — PROGRESS NOTE ADULT - SUBJECTIVE AND OBJECTIVE BOX
Patient is a 40y old  Female who presents with a chief complaint of flank pain (23 Oct 2020 22:37)      INTERVAL HPI/OVERNIGHT EVENTS: still with flank pain and nausea     MEDICATIONS  (STANDING):  aztreonam  IVPB      aztreonam  IVPB 1000 milliGRAM(s) IV Intermittent every 8 hours  influenza   Vaccine 0.5 milliLiter(s) IntraMuscular once  potassium chloride   Powder 40 milliEquivalent(s) Oral once  tamsulosin 0.4 milliGRAM(s) Oral daily    MEDICATIONS  (PRN):  morphine  - Injectable 4 milliGRAM(s) IV Push every 4 hours PRN Severe Pain (7 - 10)  ondansetron Injectable 4 milliGRAM(s) IV Push every 8 hours PRN Nausea and/or Vomiting      Allergies    aspirin (Anaphylaxis)  latex (Swelling)  NSAIDs (Swelling)  penicillin (Hives)  Pistachios (Pruritus)  shellfish (Anaphylaxis)    Intolerances        REVIEW OF SYSTEMS:  CONSTITUTIONAL: No fever, weight loss, or fatigue  EYES: No eye pain, visual disturbances, or discharge  ENMT:  No difficulty hearing, tinnitus, vertigo; No sinus or throat pain  NECK: No pain or stiffness  RESPIRATORY: No cough, wheezing, chills or hemoptysis; No shortness of breath  CARDIOVASCULAR: No chest pain, palpitations, dizziness, or leg swelling  GASTROINTESTINAL: No hematemesis; No diarrhea or constipation. No melena or hematochezia.  GENITOURINARY: No dysuria, frequency, hematuria, or incontinence  NEUROLOGICAL: No headaches, memory loss, loss of strength, numbness, or tremors  SKIN: No itching, burning, rashes, or lesions     Vital Signs Last 24 Hrs  T(C): 36.5 (24 Oct 2020 10:55), Max: 37.2 (23 Oct 2020 18:47)  T(F): 97.7 (24 Oct 2020 10:55), Max: 99 (23 Oct 2020 18:47)  HR: 65 (24 Oct 2020 10:55) (46 - 69)  BP: 136/73 (24 Oct 2020 10:55) (105/58 - 140/71)  BP(mean): --  RR: 18 (24 Oct 2020 10:55) (16 - 18)  SpO2: 97% (24 Oct 2020 10:55) (94% - 99%)    PHYSICAL EXAM:  GENERAL: NAD, well-groomed, well-developed  HEAD:  Atraumatic, Normocephalic  EYES: EOMI, PERRLA, conjunctiva and sclera clear  ENMT: No tonsillar erythema, exudates, or enlargement; Moist mucous membranes, Good dentition, No lesions  NECK: Supple, No JVD, Normal thyroid  NERVOUS SYSTEM:  Alert & Oriented X3, Good concentration; Motor Strength 5/5 B/L upper and lower extremities; DTRs 2+ intact and symmetric  CHEST/LUNG: Clear to percussion bilaterally; No rales, rhonchi, wheezing, or rubs  HEART: Regular rate and rhythm; No murmurs, rubs, or gallops  ABDOMEN: Soft, Nontender, Nondistended; Bowel sounds present  EXTREMITIES:  2+ Peripheral Pulses, No clubbing, cyanosis, or edema  LYMPH: No lymphadenopathy noted  SKIN: No rashes or lesions    LABS:                        11.5   2.51  )-----------( 237      ( 23 Oct 2020 19:59 )             36.2     10-    140  |  107  |  11  ----------------------------<  136<H>  3.1<L>   |  25  |  1.02    Ca    8.5      23 Oct 2020 19:59    TPro  7.0  /  Alb  3.8  /  TBili  0.4  /  DBili  x   /  AST  18  /  ALT  29  /  AlkPhos  79  10-23    PT/INR - ( 23 Oct 2020 19:59 )   PT: 13.3 sec;   INR: 1.15 ratio         PTT - ( 23 Oct 2020 19:59 )  PTT:25.6 sec  Urinalysis Basic - ( 23 Oct 2020 21:26 )    Color: Yellow / Appearance: Clear / S.010 / pH: x  Gluc: x / Ketone: Small  / Bili: Negative / Urobili: Negative mg/dL   Blood: x / Protein: 15 mg/dL / Nitrite: Negative   Leuk Esterase: Negative / RBC: 25-50 /HPF / WBC 0-2   Sq Epi: x / Non Sq Epi: Moderate / Bacteria: Few      CAPILLARY BLOOD GLUCOSE          RADIOLOGY & ADDITIONAL TESTS:    Imaging Personally Reviewed:  [ ] YES  [ ] NO    Consultant(s) Notes Reviewed:  [ ] YES  [ ] NO    Care Discussed with Consultants/Other Providers [ ] YES  [ ] NO

## 2020-10-24 NOTE — CONSULT NOTE ADULT - SUBJECTIVE AND OBJECTIVE BOX
HPI:  39y/o female w/ PMHx of Cancer and chronic salpingitis, admitted for nephrolithiasis. Pt was seen and examined at the bedside.  Pt is resting comfortably. Pt complains of pain in the right flank area which subsides with current pain medication. Pt denies dysuria, chest pain, SOB. Pt is receiving Immunotherapy for cancer.    PAST MEDICAL HISTORY:  Cancer  Chronic salpingitis    PAST SURGICAL HISTORY:  No significant past surgical history    MEDICATIONS:  aztreonam  IVPB      aztreonam  IVPB 1000 milliGRAM(s) IV Intermittent every 8 hours  influenza   Vaccine 0.5 milliLiter(s) IntraMuscular once  morphine  - Injectable 4 milliGRAM(s) IV Push every 4 hours PRN  ondansetron Injectable 4 milliGRAM(s) IV Push every 8 hours PRN  tamsulosin 0.4 milliGRAM(s) Oral daily    ALLERGIES:  aspirin (Anaphylaxis)  latex (Swelling)  NSAIDs (Swelling)  penicillin (Hives)  Pistachios (Pruritus)  shellfish (Anaphylaxis)    VITALS & I/Os:  Vital Signs Last 24 Hrs  T(C): 36.9 (24 Oct 2020 16:02), Max: 37.2 (23 Oct 2020 18:47)  T(F): 98.4 (24 Oct 2020 16:02), Max: 99 (23 Oct 2020 18:47)  HR: 83 (24 Oct 2020 16:02) (46 - 83)  BP: 123/72 (24 Oct 2020 16:02) (105/58 - 140/71)  BP(mean): --  RR: 18 (24 Oct 2020 16:02) (16 - 18)  SpO2: 100% (24 Oct 2020 16:02) (94% - 100%)    I&O's Summary    23 Oct 2020 07:  -  24 Oct 2020 07:00  --------------------------------------------------------  IN: 500 mL / OUT: 0 mL / NET: 500 mL    24 Oct 2020 07:  -  24 Oct 2020 17:26  --------------------------------------------------------  IN: 460 mL / OUT: 0 mL / NET: 460 mL      PHYSICAL EXAM:  General: NAD  Respiratory: Nonlabored, normal respiratory rate  Cardiovascular: S1 S2  : right side CVA tenderness    LABS:                        11.5   2.51  )-----------( 237      ( 23 Oct 2020 19:59 )             36.2     10-    140  |  107  |  11  ----------------------------<  136<H>  3.1<L>   |  25  |  1.02    Ca    8.5      23 Oct 2020 19:59    TPro  7.0  /  Alb  3.8  /  TBili  0.4  /  DBili  x   /  AST  18  /  ALT  29  /  AlkPhos  79  10-23    Lactate:    PT/INR - ( 23 Oct 2020 19:59 )   PT: 13.3 sec;   INR: 1.15 ratio         PTT - ( 23 Oct 2020 19:59 )  PTT:25.6 sec      Urinalysis Basic - ( 23 Oct 2020 21:26 )    Color: Yellow / Appearance: Clear / S.010 / pH: x  Gluc: x / Ketone: Small  / Bili: Negative / Urobili: Negative mg/dL   Blood: x / Protein: 15 mg/dL / Nitrite: Negative   Leuk Esterase: Negative / RBC: 25-50 /HPF / WBC 0-2   Sq Epi: x / Non Sq Epi: Moderate / Bacteria: Few      IMAGING:   HPI:  39y/o female w/ PMHx of Cancer and chronic salpingitis, admitted for nephrolithiasis. Pt was seen and examined at the bedside.  Pt is resting comfortably. Pt complains of pain in the right flank area which subsides with current pain medication. Pt denies dysuria, chest pain, SOB. Pt is receiving Immunotherapy for cancer.    PAST MEDICAL HISTORY:  Cancer  Chronic salpingitis    PAST SURGICAL HISTORY:  No significant past surgical history    Social Hx : No EtOH or smoking hx    MEDICATIONS:  aztreonam  IVPB      aztreonam  IVPB 1000 milliGRAM(s) IV Intermittent every 8 hours  influenza   Vaccine 0.5 milliLiter(s) IntraMuscular once  morphine  - Injectable 4 milliGRAM(s) IV Push every 4 hours PRN  ondansetron Injectable 4 milliGRAM(s) IV Push every 8 hours PRN  tamsulosin 0.4 milliGRAM(s) Oral daily    ALLERGIES:  aspirin (Anaphylaxis)  latex (Swelling)  NSAIDs (Swelling)  penicillin (Hives)  Pistachios (Pruritus)  shellfish (Anaphylaxis)    VITALS & I/Os:  Vital Signs Last 24 Hrs  T(C): 36.9 (24 Oct 2020 16:02), Max: 37.2 (23 Oct 2020 18:47)  T(F): 98.4 (24 Oct 2020 16:02), Max: 99 (23 Oct 2020 18:47)  HR: 83 (24 Oct 2020 16:02) (46 - 83)  BP: 123/72 (24 Oct 2020 16:02) (105/58 - 140/71)  BP(mean): --  RR: 18 (24 Oct 2020 16:02) (16 - 18)  SpO2: 100% (24 Oct 2020 16:02) (94% - 100%)    I&O's Summary    23 Oct 2020 07:  -  24 Oct 2020 07:00  --------------------------------------------------------  IN: 500 mL / OUT: 0 mL / NET: 500 mL    24 Oct 2020 07:  -  24 Oct 2020 17:26  --------------------------------------------------------  IN: 460 mL / OUT: 0 mL / NET: 460 mL      PHYSICAL EXAM:  General: NAD  Respiratory: Nonlabored, normal respiratory rate  Cardiovascular: S1 S2  : right side CVA tenderness    LABS:                        11.5   2.51  )-----------( 237      ( 23 Oct 2020 19:59 )             36.2     10-    140  |  107  |  11  ----------------------------<  136<H>  3.1<L>   |  25  |  1.02    Ca    8.5      23 Oct 2020 19:59    TPro  7.0  /  Alb  3.8  /  TBili  0.4  /  DBili  x   /  AST  18  /  ALT  29  /  AlkPhos  79  10-23    Lactate:    PT/INR - ( 23 Oct 2020 19:59 )   PT: 13.3 sec;   INR: 1.15 ratio         PTT - ( 23 Oct 2020 19:59 )  PTT:25.6 sec      Urinalysis Basic - ( 23 Oct 2020 21:26 )    Color: Yellow / Appearance: Clear / S.010 / pH: x  Gluc: x / Ketone: Small  / Bili: Negative / Urobili: Negative mg/dL   Blood: x / Protein: 15 mg/dL / Nitrite: Negative   Leuk Esterase: Negative / RBC: 25-50 /HPF / WBC 0-2   Sq Epi: x / Non Sq Epi: Moderate / Bacteria: Few      IMAGING:

## 2020-10-24 NOTE — CONSULT NOTE ADULT - ATTENDING COMMENTS
as per above  conitues with pain-controlled by pain meds  but still   with nausea/vomiting  c/s negative    reviewed with pt  if she continues to remain symptomatic will schedule OR for Mon  right uscope/stone extraction  Rationale , risk alternaitves discussed, questions answered

## 2020-10-24 NOTE — PROGRESS NOTE ADULT - PROBLEM SELECTOR PLAN 1
pain management  iv hydration  add flomax   urology consult pending   Cr stable   urine cx, UA shows minimal wbc. afebrile   on empiric abx d.t being immunocompromised

## 2020-10-24 NOTE — CONSULT NOTE ADULT - SUBJECTIVE AND OBJECTIVE BOX
HPI:  pt is a 39 y/o female w/pmhx of lymphoma on immunotherapy, last chemo in august, developed flank pain last night seen at [facility] ed and dc'd returns w/intolerable flank pain.  pt denies any fever, chills, sob, cp, palpiations, n/v/d/c no travels.  pt on ct w/4mm obstructing stone w/hydro on r. (23 Oct 2020 22:37)      PAST MEDICAL & SURGICAL HISTORY:  Cancer  B-cell    Chronic salpingitis    No significant past surgical history        SOCHX:   tobacco,  -  alcohol    FMHX: FA/MO  - contributory       Recent Travel:    Immunizations:    Allergies    aspirin (Anaphylaxis)  latex (Swelling)  NSAIDs (Swelling)  penicillin (Hives)  Pistachios (Pruritus)  shellfish (Anaphylaxis)    Intolerances        MEDICATIONS  (STANDING):  aztreonam  IVPB      aztreonam  IVPB 1000 milliGRAM(s) IV Intermittent every 8 hours  influenza   Vaccine 0.5 milliLiter(s) IntraMuscular once  sodium chloride 0.9%. 1000 milliLiter(s) (125 mL/Hr) IV Continuous <Continuous>  tamsulosin 0.4 milliGRAM(s) Oral daily    MEDICATIONS  (PRN):  morphine  - Injectable 4 milliGRAM(s) IV Push every 4 hours PRN Severe Pain (7 - 10)  ondansetron Injectable 4 milliGRAM(s) IV Push every 8 hours PRN Nausea and/or Vomiting      REVIEW OF SYSTEMS:  CONSTITUTIONAL: No fever, weight loss, or fatigue  EYES: No eye pain, visual disturbances, or discharge  ENMT:  No difficulty hearing, tinnitus, vertigo; No sinus or throat pain  NECK: No pain or stiffness  BREASTS: No pain, masses, or nipple discharge  RESPIRATORY: No cough, wheezing, chills or hemoptysis; No shortness of breath  CARDIOVASCULAR: No chest pain, palpitations, dizziness, or leg swelling  GASTROINTESTINAL: No abdominal or epigastric pain. No nausea, vomiting, or hematemesis; No diarrhea or constipation. No melena or hematochezia.  GENITOURINARY: No dysuria, frequency, hematuria, or incontinence  NEUROLOGICAL: No headaches, memory loss, loss of strength, numbness, or tremors  SKIN: No itching, burning, rashes, or lesions   LYMPH NODES: No enlarged glands  ENDOCRINE: No heat or cold intolerance; No hair loss  MUSCULOSKELETAL: No joint pain or swelling; No muscle, back, or extremity pain  PSYCHIATRIC: No depression, anxiety, mood swings, or difficulty sleeping  HEME/LYMPH: No easy bruising, or bleeding gums  ALLERGY AND IMMUNOLOGIC: No hives or eczema    VITAL SIGNS:    T(C): 36.9 (10-24-20 @ 16:02), Max: 36.9 (10-24-20 @ 03:53)  T(F): 98.4 (10-24-20 @ 16:02), Max: 98.4 (10-24-20 @ 03:53)  HR: 83 (10-24-20 @ 16:02) (57 - 83)  BP: 123/72 (10-24-20 @ 16:02) (105/58 - 136/75)  RR: 18 (10-24-20 @ 16:02) (16 - 18)  SpO2: 100% (10-24-20 @ 16:02) (94% - 100%)    PHYSICAL EXAM:    GENERAL: NAD, well-groomed, well-developed  HEAD:  Atraumatic, Normocephalic  EYES: EOMI, PERRLA, conjunctiva and sclera clear  ENMT: No tonsillar erythema, exudates, or enlargement; Moist mucous membranes, Good dentition, No lesions  NECK: Supple, No JVD, Normal thyroid  NERVOUS SYSTEM:  Alert & Oriented X3, Good concentration; Motor Strength 5/5 B/L upper and lower extremities; DTRs 2+ intact and symmetric  CHEST/LUNG: Clear to auscultation bilaterally; No rales, rhonchi, wheezing bilaterally  HEART: Regular rate and rhythm; No murmurs, rubs, or gallops  ABDOMEN: Soft, Nontender, Nondistended; Bowel sounds present  EXTREMITIES:  2+ Peripheral Pulses, No clubbing, cyanosis, or edema  LYMPH: No lymphadenopathy noted  SKIN: No rashes or lesions  BACK: no pressor sore     LABS:                         11.5   2.51  )-----------( 237      ( 23 Oct 2020 19:59 )             36.2     10-23    140  |  107  |  11  ----------------------------<  136<H>  3.1<L>   |  25  |  1.02    Ca    8.5      23 Oct 2020 19:59    TPro  7.0  /  Alb  3.8  /  TBili  0.4  /  DBili  x   /  AST  18  /  ALT  29  /  AlkPhos  79  10-23    LIVER FUNCTIONS - ( 23 Oct 2020 19:59 )  Alb: 3.8 g/dL / Pro: 7.0 gm/dL / ALK PHOS: 79 U/L / ALT: 29 U/L / AST: 18 U/L / GGT: x           PT/INR - ( 23 Oct 2020 19:59 )   PT: 13.3 sec;   INR: 1.15 ratio         PTT - ( 23 Oct 2020 19:59 )  PTT:25.6 sec  Urinalysis Basic - ( 23 Oct 2020 21:26 )    Color: Yellow / Appearance: Clear / S.010 / pH: x  Gluc: x / Ketone: Small  / Bili: Negative / Urobili: Negative mg/dL   Blood: x / Protein: 15 mg/dL / Nitrite: Negative   Leuk Esterase: Negative / RBC: 25-50 /HPF / WBC 0-2   Sq Epi: x / Non Sq Epi: Moderate / Bacteria: Few              HCG Quantitative, Serum: <1 mIU/mL (10-23 @ 19:59)              Culture Results:   <10,000 CFU/mL Normal Urogenital Mignon (10-22 @ 03:34)                Radiology:    ct< from: US Transvaginal (10.22.20 @ 19:25) >    FINDINGS:    Uterus: 6.3 x 3.6 x 4.1 cm. Within normal limits.  Endometrium: 2 mm. There is trace fluid in the endometrial lining.    Right ovary: 2.4 x 1.8 x 1.2 cm. Within normal limits.  Left ovary: 2.0 x 1.1 x 2.4 cm. Within normal limits.    Fluid: None.    IMPRESSION:  Trace fluid in the endometrial lining. Otherwise, normal pelvic ultrasound.                 MELO ARAMBULA MD; Attending Radiologist   This document has been electronically signed. Oct 23 2020  9:43AM    < end of copied text >  < from: CT Abdomen and Pelvis w/ IV Cont (10.22.20 @ 04:56) >    IMPRESSION:  Mild to moderate right hydronephrosis and obstructive uropathy caused by a 4 mm stone in the proximal right ureter.  Underlying genitourinary infection and/or pyelonephritis should be excluded based on clinical symptoms and laboratory values.    Additional right upper pole renal stones measure 4 mm and 2 mm.    A hyperemic left inguinal lymph node appears slightly decreased in size from 09/10/2019.              < end of copied text >     HPI:  pt is a 41 y/o female w/pmhx of lymphoma on immunotherapy, last chemo in august, developed flank pain last night seen at [facility] ed and dc'd returns w/intolerable flank pain.  pt denies any fever, chills, sob, cp, palpiations, n/v/d/c no travels.  pt on ct w/4mm obstructing stone w/hydro on r. (23 Oct 2020 22:37)      PAST MEDICAL & SURGICAL HISTORY:  Cancer  B-cell    Chronic salpingitis  both tubes out      past surgical history; bilateral salpingectomy         SOCHX:  no tobacco,  no-  alcohol  2 kids  /partner is a   patient is a housewife  FMHX: FA/MO  -non contributory       Recent Travel:    Immunizations:    Allergies    aspirin (Anaphylaxis)  latex (Swelling)  NSAIDs (Swelling)  penicillin (Hives)  Pistachios (Pruritus)  shellfish (Anaphylaxis)    Intolerances        MEDICATIONS  (STANDING):  aztreonam  IVPB      aztreonam  IVPB 1000 milliGRAM(s) IV Intermittent every 8 hours  influenza   Vaccine 0.5 milliLiter(s) IntraMuscular once  sodium chloride 0.9%. 1000 milliLiter(s) (125 mL/Hr) IV Continuous <Continuous>  tamsulosin 0.4 milliGRAM(s) Oral daily    MEDICATIONS  (PRN):  morphine  - Injectable 4 milliGRAM(s) IV Push every 4 hours PRN Severe Pain (7 - 10)  ondansetron Injectable 4 milliGRAM(s) IV Push every 8 hours PRN Nausea and/or Vomiting      REVIEW OF SYSTEMS:  CONSTITUTIONAL: No fever  has , weight loss,and  fatigue  walking is more and more difficult attributed to rituximab as per patient by her oncologist  EYES: No eye pain, visual disturbances, or discharge  ENMT:  No difficulty hearing, tinnitus, vertigo; No sinus or throat pain  NECK: No pain or stiffness  BREASTS: No pain, masses, or nipple discharge  RESPIRATORY: No cough, wheezing, chills or hemoptysis; No shortness of breath  CARDIOVASCULAR: No chest pain, palpitations, dizziness, or leg swelling  GASTROINTESTINAL: No abdominal or epigastric pain. No nausea, vomiting, or hematemesis; No diarrhea or constipation. No melena or hematochezia.  GENITOURINARY: No dysuria, frequency, hematuria, or incontinence  NEUROLOGICAL: No headaches, memory loss, loss of strength,   bilateral numbness,  no tremors  SKIN: No itching, burning, rashes, or lesions   LYMPH NODES: No enlarged glands  ENDOCRINE: No heat or cold intolerance; No hair loss  MUSCULOSKELETAL: No joint pain or swelling; No muscle, back, or extremity pain  PSYCHIATRIC: No depression, anxiety, mood swings, or difficulty sleeping  HEME/LYMPH: No easy bruising, or bleeding gums  ALLERGY AND IMMUNOLOGIC: No hives or eczema    VITAL SIGNS:    T(C): 36.9 (10-24-20 @ 16:02), Max: 36.9 (10-24-20 @ 03:53)  T(F): 98.4 (10-24-20 @ 16:02), Max: 98.4 (10-24-20 @ 03:53)  HR: 83 (10-24-20 @ 16:02) (57 - 83)  BP: 123/72 (10-24-20 @ 16:02) (105/58 - 136/75)  RR: 18 (10-24-20 @ 16:02) (16 - 18)  SpO2: 100% (10-24-20 @ 16:02) (94% - 100%)    PHYSICAL EXAM:    GENERAL: NAD, well-groomed, well-developed  HEAD:  Atraumatic, Normocephalic  EYES: EOMI, PERRLA, conjunctiva and sclera clear  ENMT: No tonsillar erythema, exudates, or enlargement; Moist mucous membranes,   NECK: Supple, No JVD, Normal thyroid  NERVOUS SYSTEM:  Alert & Oriented X3, Good concentration; Motor Strength 5/5 B/L upper and lower extremities; limited exam  CHEST/LUNG: Clear to auscultation bilaterally; No rales, rhonchi, wheezing bilaterally  HEART: Regular rate and rhythm; No murmurs, rubs, or gallops  ABDOMEN: Soft, Nontender, Nondistended; Bowel sounds present  tender rt flank   EXTREMITIES:  2+ Peripheral Pulses, No clubbing, cyanosis, or edema  LYMPH: No lymphadenopathy noted  SKIN: No rashes or lesions  BACK: no pressor sore     LABS:                         11.5   2.51  )-----------( 237      ( 23 Oct 2020 19:59 )             36.2     10-23    140  |  107  |  11  ----------------------------<  136<H>  3.1<L>   |  25  |  1.02    Ca    8.5      23 Oct 2020 19:59    TPro  7.0  /  Alb  3.8  /  TBili  0.4  /  DBili  x   /  AST  18  /  ALT  29  /  AlkPhos  79  10-23    LIVER FUNCTIONS - ( 23 Oct 2020 19:59 )  Alb: 3.8 g/dL / Pro: 7.0 gm/dL / ALK PHOS: 79 U/L / ALT: 29 U/L / AST: 18 U/L / GGT: x           PT/INR - ( 23 Oct 2020 19:59 )   PT: 13.3 sec;   INR: 1.15 ratio         PTT - ( 23 Oct 2020 19:59 )  PTT:25.6 sec  Urinalysis Basic - ( 23 Oct 2020 21:26 )    Color: Yellow / Appearance: Clear / S.010 / pH: x  Gluc: x / Ketone: Small  / Bili: Negative / Urobili: Negative mg/dL   Blood: x / Protein: 15 mg/dL / Nitrite: Negative   Leuk Esterase: Negative / RBC: 25-50 /HPF / WBC 0-2   Sq Epi: x / Non Sq Epi: Moderate / Bacteria: Few              HCG Quantitative, Serum: <1 mIU/mL (10-23 @ 19:59)              Culture Results:   <10,000 CFU/mL Normal Urogenital Mignon (10-22 @ 03:34)                Radiology:    ct< from: US Transvaginal (10.22.20 @ 19:25) >    FINDINGS:    Uterus: 6.3 x 3.6 x 4.1 cm. Within normal limits.  Endometrium: 2 mm. There is trace fluid in the endometrial lining.    Right ovary: 2.4 x 1.8 x 1.2 cm. Within normal limits.  Left ovary: 2.0 x 1.1 x 2.4 cm. Within normal limits.    Fluid: None.    IMPRESSION:  Trace fluid in the endometrial lining. Otherwise, normal pelvic ultrasound.                 MELO ARAMBULA MD; Attending Radiologist   This document has been electronically signed. Oct 23 2020  9:43AM    < end of copied text >  < from: CT Abdomen and Pelvis w/ IV Cont (10.22.20 @ 04:56) >    IMPRESSION:  Mild to moderate right hydronephrosis and obstructive uropathy caused by a 4 mm stone in the proximal right ureter.  Underlying genitourinary infection and/or pyelonephritis should be excluded based on clinical symptoms and laboratory values.    Additional right upper pole renal stones measure 4 mm and 2 mm.    A hyperemic left inguinal lymph node appears slightly decreased in size from 09/10/2019.              < end of copied text >

## 2020-10-25 ENCOUNTER — TRANSCRIPTION ENCOUNTER (OUTPATIENT)
Age: 40
End: 2020-10-25

## 2020-10-25 DIAGNOSIS — C85.90 NON-HODGKIN LYMPHOMA, UNSPECIFIED, UNSPECIFIED SITE: ICD-10-CM

## 2020-10-25 LAB
ANION GAP SERPL CALC-SCNC: 6 MMOL/L — SIGNIFICANT CHANGE UP (ref 5–17)
BUN SERPL-MCNC: 8 MG/DL — SIGNIFICANT CHANGE UP (ref 7–23)
CALCIUM SERPL-MCNC: 8.3 MG/DL — LOW (ref 8.5–10.1)
CHLORIDE SERPL-SCNC: 108 MMOL/L — SIGNIFICANT CHANGE UP (ref 96–108)
CO2 SERPL-SCNC: 25 MMOL/L — SIGNIFICANT CHANGE UP (ref 22–31)
CREAT SERPL-MCNC: 1.21 MG/DL — SIGNIFICANT CHANGE UP (ref 0.5–1.3)
CULTURE RESULTS: SIGNIFICANT CHANGE UP
GLUCOSE SERPL-MCNC: 109 MG/DL — HIGH (ref 70–99)
HCT VFR BLD CALC: 34.2 % — LOW (ref 34.5–45)
HGB BLD-MCNC: 10.5 G/DL — LOW (ref 11.5–15.5)
MCHC RBC-ENTMCNC: 26.3 PG — LOW (ref 27–34)
MCHC RBC-ENTMCNC: 30.7 GM/DL — LOW (ref 32–36)
MCV RBC AUTO: 85.7 FL — SIGNIFICANT CHANGE UP (ref 80–100)
NRBC # BLD: 0 /100 WBCS — SIGNIFICANT CHANGE UP (ref 0–0)
PLATELET # BLD AUTO: 184 K/UL — SIGNIFICANT CHANGE UP (ref 150–400)
POTASSIUM SERPL-MCNC: 3.1 MMOL/L — LOW (ref 3.5–5.3)
POTASSIUM SERPL-SCNC: 3.1 MMOL/L — LOW (ref 3.5–5.3)
RBC # BLD: 3.99 M/UL — SIGNIFICANT CHANGE UP (ref 3.8–5.2)
RBC # FLD: 12.7 % — SIGNIFICANT CHANGE UP (ref 10.3–14.5)
SARS-COV-2 IGG SERPL QL IA: NEGATIVE — SIGNIFICANT CHANGE UP
SARS-COV-2 IGM SERPL IA-ACNC: <0.1 INDEX — SIGNIFICANT CHANGE UP
SODIUM SERPL-SCNC: 139 MMOL/L — SIGNIFICANT CHANGE UP (ref 135–145)
SPECIMEN SOURCE: SIGNIFICANT CHANGE UP
WBC # BLD: 2.62 K/UL — LOW (ref 3.8–10.5)
WBC # FLD AUTO: 2.62 K/UL — LOW (ref 3.8–10.5)

## 2020-10-25 PROCEDURE — 99233 SBSQ HOSP IP/OBS HIGH 50: CPT

## 2020-10-25 RX ORDER — POTASSIUM CHLORIDE 20 MEQ
10 PACKET (EA) ORAL
Refills: 0 | Status: COMPLETED | OUTPATIENT
Start: 2020-10-25 | End: 2020-10-25

## 2020-10-25 RX ORDER — HYDROMORPHONE HYDROCHLORIDE 2 MG/ML
1 INJECTION INTRAMUSCULAR; INTRAVENOUS; SUBCUTANEOUS ONCE
Refills: 0 | Status: DISCONTINUED | OUTPATIENT
Start: 2020-10-24 | End: 2020-10-25

## 2020-10-25 RX ORDER — HYDROMORPHONE HYDROCHLORIDE 2 MG/ML
1 INJECTION INTRAMUSCULAR; INTRAVENOUS; SUBCUTANEOUS ONCE
Refills: 0 | Status: DISCONTINUED | OUTPATIENT
Start: 2020-10-25 | End: 2020-10-25

## 2020-10-25 RX ORDER — CEFTRIAXONE 500 MG/1
1000 INJECTION, POWDER, FOR SOLUTION INTRAMUSCULAR; INTRAVENOUS EVERY 24 HOURS
Refills: 0 | Status: DISCONTINUED | OUTPATIENT
Start: 2020-10-25 | End: 2020-10-26

## 2020-10-25 RX ORDER — HYDROMORPHONE HYDROCHLORIDE 2 MG/ML
1 INJECTION INTRAMUSCULAR; INTRAVENOUS; SUBCUTANEOUS EVERY 4 HOURS
Refills: 0 | Status: DISCONTINUED | OUTPATIENT
Start: 2020-10-25 | End: 2020-10-26

## 2020-10-25 RX ADMIN — CEFTRIAXONE 100 MILLIGRAM(S): 500 INJECTION, POWDER, FOR SOLUTION INTRAMUSCULAR; INTRAVENOUS at 09:21

## 2020-10-25 RX ADMIN — HYDROMORPHONE HYDROCHLORIDE 1 MILLIGRAM(S): 2 INJECTION INTRAMUSCULAR; INTRAVENOUS; SUBCUTANEOUS at 09:24

## 2020-10-25 RX ADMIN — HYDROMORPHONE HYDROCHLORIDE 1 MILLIGRAM(S): 2 INJECTION INTRAMUSCULAR; INTRAVENOUS; SUBCUTANEOUS at 13:22

## 2020-10-25 RX ADMIN — ONDANSETRON 4 MILLIGRAM(S): 8 TABLET, FILM COATED ORAL at 04:34

## 2020-10-25 RX ADMIN — HYDROMORPHONE HYDROCHLORIDE 1 MILLIGRAM(S): 2 INJECTION INTRAMUSCULAR; INTRAVENOUS; SUBCUTANEOUS at 00:46

## 2020-10-25 RX ADMIN — HYDROMORPHONE HYDROCHLORIDE 1 MILLIGRAM(S): 2 INJECTION INTRAMUSCULAR; INTRAVENOUS; SUBCUTANEOUS at 00:31

## 2020-10-25 RX ADMIN — Medication 100 MILLIEQUIVALENT(S): at 11:38

## 2020-10-25 RX ADMIN — ONDANSETRON 4 MILLIGRAM(S): 8 TABLET, FILM COATED ORAL at 12:56

## 2020-10-25 RX ADMIN — SODIUM CHLORIDE 125 MILLILITER(S): 9 INJECTION INTRAMUSCULAR; INTRAVENOUS; SUBCUTANEOUS at 09:20

## 2020-10-25 RX ADMIN — HYDROMORPHONE HYDROCHLORIDE 1 MILLIGRAM(S): 2 INJECTION INTRAMUSCULAR; INTRAVENOUS; SUBCUTANEOUS at 17:31

## 2020-10-25 RX ADMIN — HYDROMORPHONE HYDROCHLORIDE 1 MILLIGRAM(S): 2 INJECTION INTRAMUSCULAR; INTRAVENOUS; SUBCUTANEOUS at 13:37

## 2020-10-25 RX ADMIN — Medication 100 MILLIEQUIVALENT(S): at 10:12

## 2020-10-25 RX ADMIN — SODIUM CHLORIDE 125 MILLILITER(S): 9 INJECTION INTRAMUSCULAR; INTRAVENOUS; SUBCUTANEOUS at 00:31

## 2020-10-25 RX ADMIN — HYDROMORPHONE HYDROCHLORIDE 1 MILLIGRAM(S): 2 INJECTION INTRAMUSCULAR; INTRAVENOUS; SUBCUTANEOUS at 09:11

## 2020-10-25 RX ADMIN — HYDROMORPHONE HYDROCHLORIDE 1 MILLIGRAM(S): 2 INJECTION INTRAMUSCULAR; INTRAVENOUS; SUBCUTANEOUS at 22:00

## 2020-10-25 RX ADMIN — Medication 100 MILLIEQUIVALENT(S): at 13:24

## 2020-10-25 RX ADMIN — ONDANSETRON 4 MILLIGRAM(S): 8 TABLET, FILM COATED ORAL at 21:45

## 2020-10-25 RX ADMIN — HYDROMORPHONE HYDROCHLORIDE 1 MILLIGRAM(S): 2 INJECTION INTRAMUSCULAR; INTRAVENOUS; SUBCUTANEOUS at 21:45

## 2020-10-25 NOTE — PROGRESS NOTE ADULT - SUBJECTIVE AND OBJECTIVE BOX
Patient seen and examined at bedside in no acute distress.  C/o R sided abdominal pain and flank pain.   Pt reports persistent nausea today and anorexia.   Continues to void without issues, straining urine. Pt reports "I have not passed a stone yet."  Denies dysuria, hematuria, suprapubic discomfort. Afebrile.      Vital Signs Last 24 Hrs  T(F): 98.5 (10-25-20 @ 11:19), Max: 99.6 (10-24-20 @ 23:25)  HR: 80 (10-25-20 @ 11:19)  BP: 116/70 (10-25-20 @ 11:19)  RR: 18 (10-25-20 @ 11:19)  SpO2: 98% (10-25-20 @ 11:19)    GENERAL: Alert, NAD  CHEST/LUNG: Respirations nonlabored  HEART: Regular rate and rhythm  ABDOMEN: +Bowel sounds, soft, Nondistended, Mildly ttp of RLQ, +R CVA tenderness   EXTREMITIES:  no calf tenderness, No edema    I&O's Detail    24 Oct 2020 07:01  -  25 Oct 2020 07:00  --------------------------------------------------------  IN:    IV PiggyBack: 150 mL    Oral Fluid: 360 mL    sodium chloride 0.9%: 1500 mL  Total IN: 2010 mL    OUT:  Total OUT: 0 mL    Total NET: 2010 mL        LABS:                        10.5   2.62  )-----------( 184      ( 25 Oct 2020 07:20 )             34.2     10-25    139  |  108  |  8   ----------------------------<  109<H>  3.1<L>   |  25  |  1.21    Ca    8.3<L>      25 Oct 2020 07:20    TPro  7.0  /  Alb  3.8  /  TBili  0.4  /  DBili  x   /  AST  18  /  ALT  29  /  AlkPhos  79  10-23    PT/INR - ( 23 Oct 2020 19:59 )   PT: 13.3 sec;   INR: 1.15 ratio         PTT - ( 23 Oct 2020 19:59 )  PTT:25.6 sec    Impression: 39 y/o female PMH chronic salpingitis, lymphoma admitted with 4mm proximal right ureteral stone. Afebrile, voiding without issues, straining urine. +nausea. Urine cx shows normal richelle.     Plan  -Per Dr. Guerrero, will likely schedule for cystoscopy, ureteroscopy Monday as patient remains symptomatic.     -NPO, IVF, preop labs, COVID. Consent to be obtained.   -Abx per ID.   -Medical management and supportive care.   -Will discuss with attending.

## 2020-10-25 NOTE — PROGRESS NOTE ADULT - SUBJECTIVE AND OBJECTIVE BOX
Patient is a 40y old  Female who presents with a chief complaint of flank pain (23 Oct 2020 22:37)      INTERVAL HPI/OVERNIGHT EVENTS: still with flank pain     MEDICATIONS  (STANDING):  cefTRIAXone   IVPB 1000 milliGRAM(s) IV Intermittent every 24 hours  influenza   Vaccine 0.5 milliLiter(s) IntraMuscular once  potassium chloride  10 mEq/100 mL IVPB 10 milliEquivalent(s) IV Intermittent every 1 hour  sodium chloride 0.9%. 1000 milliLiter(s) (125 mL/Hr) IV Continuous <Continuous>  tamsulosin 0.4 milliGRAM(s) Oral daily    MEDICATIONS  (PRN):  morphine  - Injectable 4 milliGRAM(s) IV Push every 4 hours PRN Severe Pain (7 - 10)  ondansetron Injectable 4 milliGRAM(s) IV Push every 8 hours PRN Nausea and/or Vomiting        Allergies    aspirin (Anaphylaxis)  latex (Swelling)  NSAIDs (Swelling)  penicillin (Hives)  Pistachios (Pruritus)  shellfish (Anaphylaxis)    Intolerances        REVIEW OF SYSTEMS:  CONSTITUTIONAL: No fever, weight loss, or fatigue  EYES: No eye pain, visual disturbances, or discharge  ENMT:  No difficulty hearing, tinnitus, vertigo; No sinus or throat pain  NECK: No pain or stiffness  RESPIRATORY: No cough, wheezing, chills or hemoptysis; No shortness of breath  CARDIOVASCULAR: No chest pain, palpitations, dizziness, or leg swelling  GASTROINTESTINAL: No hematemesis; No diarrhea or constipation. No melena or hematochezia.  GENITOURINARY: No dysuria, frequency, hematuria, or incontinence  NEUROLOGICAL: No headaches, memory loss, loss of strength, numbness, or tremors  SKIN: No itching, burning, rashes, or lesions     Vital Signs Last 24 Hrs  T(C): 37.3 (25 Oct 2020 05:27), Max: 37.6 (24 Oct 2020 23:25)  T(F): 99.1 (25 Oct 2020 05:27), Max: 99.6 (24 Oct 2020 23:25)  HR: 87 (25 Oct 2020 05:27) (65 - 87)  BP: 131/72 (25 Oct 2020 08:19) (115/63 - 136/73)  BP(mean): --  RR: 18 (25 Oct 2020 05:27) (18 - 18)  SpO2: 97% (25 Oct 2020 05:27) (97% - 100%)    PHYSICAL EXAM:  GENERAL: NAD, well-groomed, well-developed  HEAD:  Atraumatic, Normocephalic  EYES: EOMI, PERRLA, conjunctiva and sclera clear  ENMT: No tonsillar erythema, exudates, or enlargement; Moist mucous membranes, Good dentition, No lesions  NECK: Supple, No JVD, Normal thyroid  NERVOUS SYSTEM:  Alert & Oriented X3, Good concentration; Motor Strength 5/5 B/L upper and lower extremities; DTRs 2+ intact and symmetric  CHEST/LUNG: Clear to percussion bilaterally; No rales, rhonchi, wheezing, or rubs  HEART: Regular rate and rhythm; No murmurs, rubs, or gallops  ABDOMEN: Soft, Nontender, Nondistended; Bowel sounds present  EXTREMITIES:  2+ Peripheral Pulses, No clubbing, cyanosis, or edema  LYMPH: No lymphadenopathy noted  SKIN: No rashes or lesions    LABS:                                         10.5   2.62  )-----------( 184      ( 25 Oct 2020 07:20 )             34.2     10-    139  |  108  |  8   ----------------------------<  109<H>  3.1<L>   |  25  |  1.21    Ca    8.3<L>      25 Oct 2020 07:20    TPro  7.0  /  Alb  3.8  /  TBili  0.4  /  DBili  x   /  AST  18  /  ALT  29  /  AlkPhos  79  10-23    PT/INR - ( 23 Oct 2020 19:59 )   PT: 13.3 sec;   INR: 1.15 ratio         PTT - ( 23 Oct 2020 19:59 )  PTT:25.6 sec  Urinalysis Basic - ( 23 Oct 2020 21:26 )    Color: Yellow / Appearance: Clear / S.010 / pH: x  Gluc: x / Ketone: Small  / Bili: Negative / Urobili: Negative mg/dL   Blood: x / Protein: 15 mg/dL / Nitrite: Negative   Leuk Esterase: Negative / RBC: 25-50 /HPF / WBC 0-2   Sq Epi: x / Non Sq Epi: Moderate / Bacteria: Few        Color: Yellow / Appearance: Clear / S.010 / pH: x  Gluc: x / Ketone: Small  / Bili: Negative / Urobili: Negative mg/dL   Blood: x / Protein: 15 mg/dL / Nitrite: Negative   Leuk Esterase: Negative / RBC: 25-50 /HPF / WBC 0-2   Sq Epi: x / Non Sq Epi: Moderate / Bacteria: Few      CAPILLARY BLOOD GLUCOSE          RADIOLOGY & ADDITIONAL TESTS:    Imaging Personally Reviewed:  [ ] YES  [ ] NO    Consultant(s) Notes Reviewed:  [ ] YES  [ ] NO    Care Discussed with Consultants/Other Providers [ ] YES  [ ] NO

## 2020-10-25 NOTE — PROGRESS NOTE ADULT - PROBLEM SELECTOR PLAN 1
still with flank pain and cr slowly trending up. if pain persists she will likely need cysto tomorrow   pain management  iv hydration  add flomax   urology consult reviewed   Cr stable   urine cx, UA shows minimal wbc. afebrile   on empiric abx d.t being immunocompromised

## 2020-10-26 ENCOUNTER — LABORATORY RESULT (OUTPATIENT)
Age: 40
End: 2020-10-26

## 2020-10-26 ENCOUNTER — RESULT REVIEW (OUTPATIENT)
Age: 40
End: 2020-10-26

## 2020-10-26 DIAGNOSIS — C80.1 MALIGNANT (PRIMARY) NEOPLASM, UNSPECIFIED: ICD-10-CM

## 2020-10-26 LAB
ANION GAP SERPL CALC-SCNC: 6 MMOL/L — SIGNIFICANT CHANGE UP (ref 5–17)
APTT BLD: 23.2 SEC — LOW (ref 27.5–35.5)
BLD GP AB SCN SERPL QL: SIGNIFICANT CHANGE UP
BUN SERPL-MCNC: 9 MG/DL — SIGNIFICANT CHANGE UP (ref 7–23)
CALCIUM SERPL-MCNC: 8.7 MG/DL — SIGNIFICANT CHANGE UP (ref 8.5–10.1)
CHLORIDE SERPL-SCNC: 111 MMOL/L — HIGH (ref 96–108)
CO2 SERPL-SCNC: 23 MMOL/L — SIGNIFICANT CHANGE UP (ref 22–31)
CREAT SERPL-MCNC: 1 MG/DL — SIGNIFICANT CHANGE UP (ref 0.5–1.3)
GLUCOSE SERPL-MCNC: 76 MG/DL — SIGNIFICANT CHANGE UP (ref 70–99)
HCG SERPL-ACNC: <1 MIU/ML — SIGNIFICANT CHANGE UP
HCG UR QL: NEGATIVE — SIGNIFICANT CHANGE UP
HCT VFR BLD CALC: 31 % — LOW (ref 34.5–45)
HGB BLD-MCNC: 9.7 G/DL — LOW (ref 11.5–15.5)
INR BLD: 1.35 RATIO — HIGH (ref 0.88–1.16)
MAGNESIUM SERPL-MCNC: 2.3 MG/DL — SIGNIFICANT CHANGE UP (ref 1.6–2.6)
MCHC RBC-ENTMCNC: 26.2 PG — LOW (ref 27–34)
MCHC RBC-ENTMCNC: 31.3 GM/DL — LOW (ref 32–36)
MCV RBC AUTO: 83.8 FL — SIGNIFICANT CHANGE UP (ref 80–100)
NRBC # BLD: 0 /100 WBCS — SIGNIFICANT CHANGE UP (ref 0–0)
PHOSPHATE SERPL-MCNC: 2.4 MG/DL — LOW (ref 2.5–4.5)
PLATELET # BLD AUTO: 175 K/UL — SIGNIFICANT CHANGE UP (ref 150–400)
POTASSIUM SERPL-MCNC: 3.4 MMOL/L — LOW (ref 3.5–5.3)
POTASSIUM SERPL-SCNC: 3.4 MMOL/L — LOW (ref 3.5–5.3)
PROTHROM AB SERPL-ACNC: 15.4 SEC — HIGH (ref 10.6–13.6)
RBC # BLD: 3.7 M/UL — LOW (ref 3.8–5.2)
RBC # FLD: 12.5 % — SIGNIFICANT CHANGE UP (ref 10.3–14.5)
SODIUM SERPL-SCNC: 140 MMOL/L — SIGNIFICANT CHANGE UP (ref 135–145)
WBC # BLD: 2.67 K/UL — LOW (ref 3.8–10.5)
WBC # FLD AUTO: 2.67 K/UL — LOW (ref 3.8–10.5)

## 2020-10-26 PROCEDURE — 88300 SURGICAL PATH GROSS: CPT | Mod: 26

## 2020-10-26 PROCEDURE — 99233 SBSQ HOSP IP/OBS HIGH 50: CPT

## 2020-10-26 RX ORDER — HYDROMORPHONE HYDROCHLORIDE 2 MG/ML
1 INJECTION INTRAMUSCULAR; INTRAVENOUS; SUBCUTANEOUS
Refills: 0 | Status: DISCONTINUED | OUTPATIENT
Start: 2020-10-26 | End: 2020-10-26

## 2020-10-26 RX ORDER — OXYCODONE AND ACETAMINOPHEN 5; 325 MG/1; MG/1
1 TABLET ORAL EVERY 6 HOURS
Refills: 0 | Status: DISCONTINUED | OUTPATIENT
Start: 2020-10-26 | End: 2020-10-27

## 2020-10-26 RX ORDER — HYDROMORPHONE HYDROCHLORIDE 2 MG/ML
0.5 INJECTION INTRAMUSCULAR; INTRAVENOUS; SUBCUTANEOUS
Refills: 0 | Status: DISCONTINUED | OUTPATIENT
Start: 2020-10-26 | End: 2020-10-26

## 2020-10-26 RX ORDER — SODIUM CHLORIDE 9 MG/ML
1000 INJECTION, SOLUTION INTRAVENOUS
Refills: 0 | Status: DISCONTINUED | OUTPATIENT
Start: 2020-10-26 | End: 2020-10-26

## 2020-10-26 RX ORDER — CEFTRIAXONE 500 MG/1
1000 INJECTION, POWDER, FOR SOLUTION INTRAMUSCULAR; INTRAVENOUS EVERY 24 HOURS
Refills: 0 | Status: DISCONTINUED | OUTPATIENT
Start: 2020-10-26 | End: 2020-10-27

## 2020-10-26 RX ORDER — SODIUM,POTASSIUM PHOSPHATES 278-250MG
1 POWDER IN PACKET (EA) ORAL ONCE
Refills: 0 | Status: COMPLETED | OUTPATIENT
Start: 2020-10-26 | End: 2020-10-26

## 2020-10-26 RX ORDER — ACETAMINOPHEN 500 MG
1000 TABLET ORAL ONCE
Refills: 0 | Status: COMPLETED | OUTPATIENT
Start: 2020-10-26 | End: 2020-10-26

## 2020-10-26 RX ORDER — HYDROMORPHONE HYDROCHLORIDE 2 MG/ML
1 INJECTION INTRAMUSCULAR; INTRAVENOUS; SUBCUTANEOUS EVERY 4 HOURS
Refills: 0 | Status: DISCONTINUED | OUTPATIENT
Start: 2020-10-26 | End: 2020-10-26

## 2020-10-26 RX ORDER — POTASSIUM CHLORIDE 20 MEQ
10 PACKET (EA) ORAL
Refills: 0 | Status: COMPLETED | OUTPATIENT
Start: 2020-10-26 | End: 2020-10-26

## 2020-10-26 RX ADMIN — Medication 1 PACKET(S): at 14:06

## 2020-10-26 RX ADMIN — HYDROMORPHONE HYDROCHLORIDE 1 MILLIGRAM(S): 2 INJECTION INTRAMUSCULAR; INTRAVENOUS; SUBCUTANEOUS at 04:56

## 2020-10-26 RX ADMIN — HYDROMORPHONE HYDROCHLORIDE 1 MILLIGRAM(S): 2 INJECTION INTRAMUSCULAR; INTRAVENOUS; SUBCUTANEOUS at 15:04

## 2020-10-26 RX ADMIN — HYDROMORPHONE HYDROCHLORIDE 1 MILLIGRAM(S): 2 INJECTION INTRAMUSCULAR; INTRAVENOUS; SUBCUTANEOUS at 09:37

## 2020-10-26 RX ADMIN — ONDANSETRON 4 MILLIGRAM(S): 8 TABLET, FILM COATED ORAL at 06:09

## 2020-10-26 RX ADMIN — HYDROMORPHONE HYDROCHLORIDE 1 MILLIGRAM(S): 2 INJECTION INTRAMUSCULAR; INTRAVENOUS; SUBCUTANEOUS at 04:41

## 2020-10-26 RX ADMIN — HYDROMORPHONE HYDROCHLORIDE 1 MILLIGRAM(S): 2 INJECTION INTRAMUSCULAR; INTRAVENOUS; SUBCUTANEOUS at 14:23

## 2020-10-26 RX ADMIN — SODIUM CHLORIDE 125 MILLILITER(S): 9 INJECTION INTRAMUSCULAR; INTRAVENOUS; SUBCUTANEOUS at 14:06

## 2020-10-26 RX ADMIN — HYDROMORPHONE HYDROCHLORIDE 1 MILLIGRAM(S): 2 INJECTION INTRAMUSCULAR; INTRAVENOUS; SUBCUTANEOUS at 10:07

## 2020-10-26 RX ADMIN — SODIUM CHLORIDE 125 MILLILITER(S): 9 INJECTION INTRAMUSCULAR; INTRAVENOUS; SUBCUTANEOUS at 04:41

## 2020-10-26 RX ADMIN — CEFTRIAXONE 100 MILLIGRAM(S): 500 INJECTION, POWDER, FOR SOLUTION INTRAMUSCULAR; INTRAVENOUS at 11:16

## 2020-10-26 RX ADMIN — Medication 400 MILLIGRAM(S): at 22:51

## 2020-10-26 RX ADMIN — Medication 100 MILLIEQUIVALENT(S): at 08:37

## 2020-10-26 RX ADMIN — TAMSULOSIN HYDROCHLORIDE 0.4 MILLIGRAM(S): 0.4 CAPSULE ORAL at 14:06

## 2020-10-26 RX ADMIN — SODIUM CHLORIDE 100 MILLILITER(S): 9 INJECTION, SOLUTION INTRAVENOUS at 17:42

## 2020-10-26 RX ADMIN — Medication 100 MILLIEQUIVALENT(S): at 09:45

## 2020-10-26 RX ADMIN — Medication 1000 MILLIGRAM(S): at 23:10

## 2020-10-26 RX ADMIN — ONDANSETRON 4 MILLIGRAM(S): 8 TABLET, FILM COATED ORAL at 14:23

## 2020-10-26 RX ADMIN — SODIUM CHLORIDE 125 MILLILITER(S): 9 INJECTION INTRAMUSCULAR; INTRAVENOUS; SUBCUTANEOUS at 14:25

## 2020-10-26 NOTE — PROGRESS NOTE ADULT - PROBLEM SELECTOR PLAN 1
still with flank pain. remaisn npo. cr stable   plan for cysto with stent today   Cr stable   pain management  iv hydration  add flomax   urology consult reviewed   Cr stable   urine cx, ngtd   on empiric abx d.t being immunocompromised and for ? pyelo seen on ct

## 2020-10-26 NOTE — BRIEF OPERATIVE NOTE - NSICDXBRIEFPROCEDURE_GEN_ALL_CORE_FT
PROCEDURES:  Retrograde pyelogram 26-Oct-2020 17:40:37  Gustavo Harris  Ureteroscopy, with laser lithotripsy and stent insertion 26-Oct-2020 17:40:28  Gustavo Harris

## 2020-10-26 NOTE — DIETITIAN INITIAL EVALUATION ADULT. - PERTINENT MEDS FT
lumbar brachial MEDICATIONS  (STANDING):  cefTRIAXone   IVPB 1000 milliGRAM(s) IV Intermittent every 24 hours  influenza   Vaccine 0.5 milliLiter(s) IntraMuscular once  sodium chloride 0.9%. 1000 milliLiter(s) (125 mL/Hr) IV Continuous <Continuous>  tamsulosin 0.4 milliGRAM(s) Oral daily    MEDICATIONS  (PRN):  HYDROmorphone  Injectable 1 milliGRAM(s) IV Push every 4 hours PRN Severe Pain (7 - 10)  ondansetron Injectable 4 milliGRAM(s) IV Push every 8 hours PRN Nausea and/or Vomiting

## 2020-10-26 NOTE — PROGRESS NOTE ADULT - SUBJECTIVE AND OBJECTIVE BOX
Patient seen and examined bedside resting comfortably.  Pt c/o nausea and Right flank pain 10/10  Pt is voiding. Denies hematuria,   Voiding spontaneously without difficulty.  Denies hematuria and dysuria. Denies nausea and vomiting. Tolerating diet.  Denies chest pain, dyspnea, cough.    T(F): 99.6 (10-26-20 @ 05:03), Max: 99.7 (10-25-20 @ 18:04)  HR: 67 (10-26-20 @ 05:03) (67 - 80)  BP: 119/74 (10-26-20 @ 05:03) (116/70 - 137/72)  RR: 18 (10-26-20 @ 05:03) (18 - 18)  SpO2: 97% (10-26-20 @ 05:03) (97% - 98%)  Wt(kg): --  CAPILLARY BLOOD GLUCOSE          PHYSICAL EXAM:    General: NAD, alert and awake  HEENT: NCAT, EOMI, conjunctiva clear  Chest: nonlabored respirations, CTA b/l.  Abdomen: soft, NT/ND.   Extremities: Calf soft, nontender b/l.   : No suprapubic tenderness or bladder distention.      LABS:                        9.7    2.67  )-----------( 175      ( 26 Oct 2020 07:21 )             31.0   10-26    140  |  111<H>  |  9   ----------------------------<  76  3.4<L>   |  23  |  1.00    Ca    8.7      26 Oct 2020 07:21  Phos  2.4     10-26  Mg     2.3     10-26    PT/INR - ( 26 Oct 2020 07:21 )   PT: 15.4 sec;   INR: 1.35 ratio         PTT - ( 26 Oct 2020 07:21 )  PTT:23.2 sec  I&O's Detail    25 Oct 2020 07:01  -  26 Oct 2020 07:00  --------------------------------------------------------  IN:    sodium chloride 0.9%: 1500 mL  Total IN: 1500 mL    OUT:    Voided (mL): 1500 mL  Total OUT: 1500 mL    Total NET: 0 mL          Impression: 40y Female admitted with INTRACTABLE PAIN/ NEPHROLITH    CANCER PATIENT, KIDNEY STONE      PMH   Cancer    Chronic salpingitis        Plan:   Patient seen and examined at bedside resting comfortably.  Pt c/o nausea and Right flank pain 10/10  Pt is voiding. Denies hematuria, dysuria, vomiting. Pt tolerating diet.  Denies hematuria and dysuria, nausea and vomiting. Tolerating diet.  Denies chest pain, dyspnea, cough.    T(F): 99.6 (10-26-20 @ 05:03), Max: 99.7 (10-25-20 @ 18:04)  HR: 67 (10-26-20 @ 05:03) (67 - 80)  BP: 119/74 (10-26-20 @ 05:03) (116/70 - 137/72)  RR: 18 (10-26-20 @ 05:03) (18 - 18)  SpO2: 97% (10-26-20 @ 05:03) (97% - 98%)  Wt(kg): --  CAPILLARY BLOOD GLUCOSE    PHYSICAL EXAM:    General: NAD, alert and awake  Chest: nonlabored respirations, normal respiratory effort/ rate  Abdomen: soft, NT/ND.   : No suprapubic tenderness or bladder distention.      LABS:                        9.7    2.67  )-----------( 175      ( 26 Oct 2020 07:21 )             31.0   10-26    140  |  111<H>  |  9   ----------------------------<  76  3.4<L>   |  23  |  1.00    Ca    8.7      26 Oct 2020 07:21  Phos  2.4     10-26  Mg     2.3     10-26    PT/INR - ( 26 Oct 2020 07:21 )   PT: 15.4 sec;   INR: 1.35 ratio         PTT - ( 26 Oct 2020 07:21 )  PTT:23.2 sec  I&O's Detail    25 Oct 2020 07:01  -  26 Oct 2020 07:00  --------------------------------------------------------  IN:    sodium chloride 0.9%: 1500 mL  Total IN: 1500 mL    OUT:    Voided (mL): 1500 mL  Total OUT: 1500 mL    Total NET: 0 mL          Impression: 40y Female admitted with intractable pain due to nephrolithiasis. PMHx Cancer, chronic salpingitis. Ucx shows normal urogenital richelle.    Plan:  -IV Pain control  -Continue NPO  - Continue IV abx, IV fluid, Flomax  -Dr Harris will do surgery  -Monitor labs     Patient seen and examined at bedside.  States she is in a lot of pain, c/o right flank pain that is a 10/10 in severity.  Denies fever, chills, V/D, CP, SOB.     T(F): 99.6 (10-26-20 @ 05:03), Max: 99.7 (10-25-20 @ 18:04)  HR: 67 (10-26-20 @ 05:03) (67 - 80)  BP: 119/74 (10-26-20 @ 05:03) (116/70 - 137/72)  RR: 18 (10-26-20 @ 05:03) (18 - 18)  SpO2: 97% (10-26-20 @ 05:03) (97% - 98%)    PHYSICAL EXAM:  General: alert, in distress  Chest: nonlabored respirations  Abdomen: soft, ttp in RLQ, no rebound, no guarding.   : No suprapubic tenderness or bladder distention. + R CVAT.    LABS:                        9.7    2.67  )-----------( 175      ( 26 Oct 2020 07:21 )             31.0   10-26    140  |  111<H>  |  9   ----------------------------<  76  3.4<L>   |  23  |  1.00    Ca    8.7      26 Oct 2020 07:21  Phos  2.4     10-26  Mg     2.3     10-26    PT/INR - ( 26 Oct 2020 07:21 )   PT: 15.4 sec;   INR: 1.35 ratio    PTT - ( 26 Oct 2020 07:21 )  PTT:23.2 sec    I&O's Detail    25 Oct 2020 07:01  -  26 Oct 2020 07:00  --------------------------------------------------------  IN:    sodium chloride 0.9%: 1500 mL  Total IN: 1500 mL    OUT:    Voided (mL): 1500 mL  Total OUT: 1500 mL    Total NET: 0 mL      Impression:   41yo F with PMH of lymphoma, chronic salpingitis, admitted with intractable pain due to nephrolithiasis.   Ucx shows normal urogenital richelle.    Plan:  - continue pain management  - NPO; continue IVF  - continue Rocephin  - continue Flomax; continue to strain urine   - booked for OR today (10/26/20) for cystoscopy/uteroscopy. Consent in chart.   - plan discussed with Dr. Harris

## 2020-10-26 NOTE — PROGRESS NOTE ADULT - PROBLEM SELECTOR PLAN 1
still with flank pain   plan for cysto with stent today   Cr stable   pain management  iv hydration  add flomax   urology consult reviewed   Cr stable   urine cx, ngtd   on empiric abx d.t being immunocompromised

## 2020-10-26 NOTE — DIETITIAN INITIAL EVALUATION ADULT. - PERTINENT LABORATORY DATA
[Negative] : Genitourinary
10-26 Na140 mmol/L Glu 76 mg/dL K+ 3.4 mmol/L<L> Cr  1.00 mg/dL BUN 9 mg/dL 10-26 Phos 2.4 mg/dL<L> 10-23 Alb 3.8 g/dL

## 2020-10-26 NOTE — PROGRESS NOTE ADULT - SUBJECTIVE AND OBJECTIVE BOX
Patient is a 40y old  Female who presents with a chief complaint of flank pain (23 Oct 2020 22:37)      INTERVAL HPI/OVERNIGHT EVENTS: none     MEDICATIONS  (STANDING):  cefTRIAXone   IVPB 1000 milliGRAM(s) IV Intermittent every 24 hours  influenza   Vaccine 0.5 milliLiter(s) IntraMuscular once  potassium chloride  10 mEq/100 mL IVPB 10 milliEquivalent(s) IV Intermittent every 1 hour  sodium chloride 0.9%. 1000 milliLiter(s) (125 mL/Hr) IV Continuous <Continuous>  tamsulosin 0.4 milliGRAM(s) Oral daily    MEDICATIONS  (PRN):  HYDROmorphone  Injectable 1 milliGRAM(s) IV Push every 4 hours PRN Severe Pain (7 - 10)  ondansetron Injectable 4 milliGRAM(s) IV Push every 8 hours PRN Nausea and/or Vomiting          Allergies    aspirin (Anaphylaxis)  latex (Swelling)  NSAIDs (Swelling)  penicillin (Hives)  Pistachios (Pruritus)  shellfish (Anaphylaxis)    Intolerances        REVIEW OF SYSTEMS:  CONSTITUTIONAL: No fever, weight loss, or fatigue  EYES: No eye pain, visual disturbances, or discharge  ENMT:  No difficulty hearing, tinnitus, vertigo; No sinus or throat pain  NECK: No pain or stiffness  RESPIRATORY: No cough, wheezing, chills or hemoptysis; No shortness of breath  CARDIOVASCULAR: No chest pain, palpitations, dizziness, or leg swelling  GASTROINTESTINAL: No hematemesis; No diarrhea or constipation. No melena or hematochezia.  GENITOURINARY: No dysuria, frequency, hematuria, or incontinence  NEUROLOGICAL: No headaches, memory loss, loss of strength, numbness, or tremors  SKIN: No itching, burning, rashes, or lesions     Vital Signs Last 24 Hrs  T(C): 37.6 (26 Oct 2020 05:03), Max: 37.6 (25 Oct 2020 18:04)  T(F): 99.6 (26 Oct 2020 05:03), Max: 99.7 (25 Oct 2020 18:04)  HR: 67 (26 Oct 2020 05:03) (67 - 80)  BP: 119/74 (26 Oct 2020 05:03) (116/70 - 137/72)  BP(mean): --  RR: 18 (26 Oct 2020 05:03) (18 - 18)  SpO2: 97% (26 Oct 2020 05:03) (97% - 98%)    PHYSICAL EXAM:  GENERAL: NAD, well-groomed, well-developed  HEAD:  Atraumatic, Normocephalic  EYES: EOMI, PERRLA, conjunctiva and sclera clear  ENMT: No tonsillar erythema, exudates, or enlargement; Moist mucous membranes, Good dentition, No lesions  NECK: Supple, No JVD, Normal thyroid  NERVOUS SYSTEM:  Alert & Oriented X3, Good concentration; Motor Strength 5/5 B/L upper and lower extremities; DTRs 2+ intact and symmetric  CHEST/LUNG: Clear to percussion bilaterally; No rales, rhonchi, wheezing, or rubs  HEART: Regular rate and rhythm; No murmurs, rubs, or gallops  ABDOMEN: Soft, Nontender, Nondistended; Bowel sounds present  EXTREMITIES:  2+ Peripheral Pulses, No clubbing, cyanosis, or edema  LYMPH: No lymphadenopathy noted  SKIN: No rashes or lesions    LABS:                                                           9.7    2.67  )-----------( 175      ( 26 Oct 2020 07:21 )             31.0     10-    140  |  111<H>  |  9   ----------------------------<  76  3.4<L>   |  23  |  1.00    Ca    8.7      26 Oct 2020 07:21  Phos  2.4     10-26  Mg     2.3     10-26      PT/INR - ( 26 Oct 2020 07:21 )   PT: 15.4 sec;   INR: 1.35 ratio         PTT - ( 26 Oct 2020 07:21 )  PTT:23.2 sec      Gluc: x / Ketone: Small  / Bili: Negative / Urobili: Negative mg/dL   Blood: x / Protein: 15 mg/dL / Nitrite: Negative   Leuk Esterase: Negative / RBC: 25-50 /HPF / WBC 0-2   Sq Epi: x / Non Sq Epi: Moderate / Bacteria: Few        Color: Yellow / Appearance: Clear / S.010 / pH: x  Gluc: x / Ketone: Small  / Bili: Negative / Urobili: Negative mg/dL   Blood: x / Protein: 15 mg/dL / Nitrite: Negative   Leuk Esterase: Negative / RBC: 25-50 /HPF / WBC 0-2   Sq Epi: x / Non Sq Epi: Moderate / Bacteria: Few      CAPILLARY BLOOD GLUCOSE          RADIOLOGY & ADDITIONAL TESTS:    Imaging Personally Reviewed:  [ ] YES  [ ] NO    Consultant(s) Notes Reviewed:  [ ] YES  [ ] NO    Care Discussed with Consultants/Other Providers [ ] YES  [ ] NO

## 2020-10-26 NOTE — DIETITIAN INITIAL EVALUATION ADULT. - DIET TYPE
When medically feasible reinitiate Regular diet + Ensure Clear x 3/day (provides 720 kcal, 24 g protein)

## 2020-10-26 NOTE — BRIEF OPERATIVE NOTE - NSICDXBRIEFPOSTOP_GEN_ALL_CORE_FT
POST-OP DIAGNOSIS:  Ureteral stricture, right 26-Oct-2020 17:41:26  Gustavo Harris  Hydronephrosis with ureteral calculus 26-Oct-2020 17:41:05  Gustavo Harris

## 2020-10-26 NOTE — DIETITIAN INITIAL EVALUATION ADULT. - EDUCATION DIETARY MODIFICATIONS
(1) partially meets; needs review/practice/Optimizing PO intake. Nutrient dense foods/snaks; small frequent meals./verbalization

## 2020-10-26 NOTE — PROGRESS NOTE ADULT - SUBJECTIVE AND OBJECTIVE BOX
HPI:  pt is a 41 y/o female w/pmhx of lymphoma on immunotherapy, last chemo in august, developed flank pain last night seen at [facility] ed and dc'd returns w/untolerable flank pain.  pt denies any fever, chills, sob, cp, palpiations, n/v/d/c no travels.  pt on ct w/4mm obstructing stone w/hydro on r. (23 Oct 2020 22:37)  patient admitted with obs uropathy from stone   all culture NEGATIVE   tolerated rocephin without issues   remains afebrile     Allergies    aspirin (Anaphylaxis)  latex (Swelling)  NSAIDs (Swelling)  penicillin (Hives)  Pistachios (Pruritus)  shellfish (Anaphylaxis)    Intolerances        MEDICATIONS  (STANDING):  cefTRIAXone   IVPB 1000 milliGRAM(s) IV Intermittent every 24 hours  influenza   Vaccine 0.5 milliLiter(s) IntraMuscular once  potassium chloride  10 mEq/100 mL IVPB 10 milliEquivalent(s) IV Intermittent every 1 hour  sodium chloride 0.9%. 1000 milliLiter(s) (125 mL/Hr) IV Continuous <Continuous>  tamsulosin 0.4 milliGRAM(s) Oral daily    MEDICATIONS  (PRN):  HYDROmorphone  Injectable 1 milliGRAM(s) IV Push every 4 hours PRN Severe Pain (7 - 10)  ondansetron Injectable 4 milliGRAM(s) IV Push every 8 hours PRN Nausea and/or Vomiting      REVIEW OF SYSTEMS:    CONSTITUTIONAL: No fever, chills, weight loss, or fatigue  HEENT: No sore throat, runny nose, ear ache  RESPIRATORY: No cough, wheezing, No shortness of breath  CARDIOVASCULAR: No chest pain, palpitations, dizziness  GASTROINTESTINAL: No abdominal pain. No nausea, vomiting, diarrhea  GENITOURINARY: No dysuria, increase frequency, hematuria, or incontinence  NEUROLOGICAL: No headaches, memory loss, loss of strength, numbness, or tremors, no weakness  EXTREMITY: No pedal edema BLE  SKIN: No itching, burning, rashes, or lesions     VITAL SIGNS:  T(C): 37.6 (10-26-20 @ 05:03), Max: 37.6 (10-25-20 @ 18:04)  T(F): 99.6 (10-26-20 @ 05:03), Max: 99.7 (10-25-20 @ 18:04)  HR: 67 (10-26-20 @ 05:03) (67 - 80)  BP: 119/74 (10-26-20 @ 05:03) (116/70 - 137/72)  RR: 18 (10-26-20 @ 05:03) (18 - 18)  SpO2: 97% (10-26-20 @ 05:03) (97% - 98%)  Wt(kg): --    PHYSICAL EXAM:    GENERAL: not in any distress  HEENT: Neck is supple, normocephalic, atraumatic   CHEST/LUNG: Clear to auscultation bilaterally; No rales, rhonchi, wheezing  HEART: Regular rate and rhythm; No murmurs, rubs, or gallops  ABDOMEN: Soft, Nontender, Nondistended; Bowel sounds present, no rebound   EXTREMITIES:  2+ Peripheral Pulses, No clubbing, cyanosis, or edema  GENITOURINARY:   SKIN: No rashes or lesions  BACK: no pressor sore   NERVOUS SYSTEM:  Alert & Oriented X3, Good concentration  PSYCH: normal affect     LABS:                         9.7    2.67  )-----------( 175      ( 26 Oct 2020 07:21 )             31.0     10-26    140  |  111<H>  |  9   ----------------------------<  76  3.4<L>   |  23  |  1.00    Ca    8.7      26 Oct 2020 07:21  Phos  2.4     10-26  Mg     2.3     10-26        PT/INR - ( 26 Oct 2020 07:21 )   PT: 15.4 sec;   INR: 1.35 ratio         PTT - ( 26 Oct 2020 07:21 )  PTT:23.2 sec            HCG Quantitative, Serum: <1 mIU/mL (10-26 @ 07:21)              Culture Results:   <10,000 CFU/mL Normal Urogenital Mignon (10-24 @ 12:27)  Culture Results:   <10,000 CFU/mL Normal Urogenital Mignon (10-22 @ 03:34)                Radiology:       HPI:  pt is a 41 y/o female w/pmhx of lymphoma on immunotherapy, last chemo in august, developed flank pain last night seen at [facility] ed and dc'd returns w/untolerable flank pain.  pt denies any fever, chills, sob, cp, palpiations, n/v/d/c no travels.  pt on ct w/4mm obstructing stone w/hydro on r. (23 Oct 2020 22:37)  patient admitted with obs uropathy from stone   all culture NEGATIVE   tolerated rocephin without issues   remains afebrile   has not eaten anything since friday am   no diarrhea   zero oral intake as vomits   Allergies    aspirin (Anaphylaxis)  latex (Swelling)  NSAIDs (Swelling)  penicillin (Hives)  Pistachios (Pruritus)  shellfish (Anaphylaxis)    Intolerances        MEDICATIONS  (STANDING):  cefTRIAXone   IVPB 1000 milliGRAM(s) IV Intermittent every 24 hours  influenza   Vaccine 0.5 milliLiter(s) IntraMuscular once  potassium chloride  10 mEq/100 mL IVPB 10 milliEquivalent(s) IV Intermittent every 1 hour  sodium chloride 0.9%. 1000 milliLiter(s) (125 mL/Hr) IV Continuous <Continuous>  tamsulosin 0.4 milliGRAM(s) Oral daily    MEDICATIONS  (PRN):  HYDROmorphone  Injectable 1 milliGRAM(s) IV Push every 4 hours PRN Severe Pain (7 - 10)  ondansetron Injectable 4 milliGRAM(s) IV Push every 8 hours PRN Nausea and/or Vomiting      REVIEW OF SYSTEMS:    CONSTITUTIONAL: No fever, chills,  has  weight loss, fatigue  HEENT: No sore throat, runny nose, ear ache  RESPIRATORY: No cough, wheezing, No shortness of breath  CARDIOVASCULAR: No chest pain, palpitations, dizziness  GASTROINTESTINAL: No abdominal pain.   has  nausea, vomiting,; no intake x 3 days   no  diarrhea; no bms only gas   GENITOURINARY: No dysuria, increase frequency, hematuria, or incontinence  flank pain plus   NEUROLOGICAL: No headaches, memory loss, loss of strength, numbness, or tremors, no weakness  EXTREMITY: No pedal edema BLE  SKIN: No itching, burning, rashes, or lesions     VITAL SIGNS:  T(C): 37.6 (10-26-20 @ 05:03), Max: 37.6 (10-25-20 @ 18:04)  T(F): 99.6 (10-26-20 @ 05:03), Max: 99.7 (10-25-20 @ 18:04)  HR: 67 (10-26-20 @ 05:03) (67 - 80)  BP: 119/74 (10-26-20 @ 05:03) (116/70 - 137/72)  RR: 18 (10-26-20 @ 05:03) (18 - 18)  SpO2: 97% (10-26-20 @ 05:03) (97% - 98%)  Wt(kg): --    PHYSICAL EXAM:    GENERAL: not in any distress  HEENT: Neck is supple, normocephalic, atraumatic   CHEST/LUNG: Clear to auscultation bilaterally; No rales, rhonchi, wheezing  HEART: Regular rate and rhythm; No murmurs, rubs, or gallops  ABDOMEN: Soft, Nontender, Nondistended; Bowel sounds present, no rebound   EXTREMITIES:  2+ Peripheral Pulses, No clubbing, cyanosis, or edema  SKIN: No rashes or lesions  BACK: no pressor sore   NERVOUS SYSTEM:  Alert & Oriented X3, Good concentration  PSYCH: normal affect     LABS:                         9.7    2.67  )-----------( 175      ( 26 Oct 2020 07:21 )             31.0     10-26    140  |  111<H>  |  9   ----------------------------<  76  3.4<L>   |  23  |  1.00    Ca    8.7      26 Oct 2020 07:21  Phos  2.4     10-26  Mg     2.3     10-26        PT/INR - ( 26 Oct 2020 07:21 )   PT: 15.4 sec;   INR: 1.35 ratio         PTT - ( 26 Oct 2020 07:21 )  PTT:23.2 sec            HCG Quantitative, Serum: <1 mIU/mL (10-26 @ 07:21)              Culture Results:   <10,000 CFU/mL Normal Urogenital Mignon (10-24 @ 12:27)  Culture Results:   <10,000 CFU/mL Normal Urogenital Mignon (10-22 @ 03:34)                Radiology:    < from: CT Abdomen and Pelvis w/ IV Cont (10.22.20 @ 04:56) >  IMPRESSION:  Mild to moderate right hydronephrosis and obstructive uropathy caused by a 4 mm stone in the proximal right ureter.  Underlying genitourinary infection and/or pyelonephritis should be excluded based on clinical symptoms and laboratory values.    Additional right upper pole renal stones measure 4 mm and 2 mm.    A hyperemic left inguinal lymph node appears slightly decreased in size from 09/10/2019.              KAVIN ROBERTO M.D.,ATTENDING RADIOLOGIST  This document has been electronically signed. Oct 22 2020  5:33AM    < end of copied text >

## 2020-10-26 NOTE — DIETITIAN INITIAL EVALUATION ADULT. - ORAL INTAKE PTA/DIET HISTORY
Pt with lymphoma on immunotherapy and chemo. Pt states poor PO intake PTA x 2 days PTA with N/V due to pain. States she was eating almost nothing for those 2 days. Pt reports following a diet low in sugar and carbohydrates, eats "healthy carbohydrates", no caffeine, and lots a vegetables PTA. States she took a Multivitamin and elderberry supplement when she was not due for a chemo treatment. Pt with lymphoma on immunotherapy and chemo. Pt states poor PO intake PTA x 2 days PTA with N/V due to pain. States she was eating almost nothing for those 2 days. Prior to that, pt reports following a diet low in sugar and carbohydrates, eats "healthy carbohydrates", no caffeine, and lots a vegetables PTA. States she took a Multivitamin and elderberry supplement when she was not due for a chemo treatment.

## 2020-10-26 NOTE — PROGRESS NOTE ADULT - SUBJECTIVE AND OBJECTIVE BOX
Patient is a 40y old  Female who presents with a chief complaint of flank pain (23 Oct 2020 22:37)      INTERVAL HPI/OVERNIGHT EVENTS: none     MEDICATIONS  (STANDING):  cefTRIAXone   IVPB 1000 milliGRAM(s) IV Intermittent every 24 hours  influenza   Vaccine 0.5 milliLiter(s) IntraMuscular once  potassium phosphate / sodium phosphate Powder (PHOS-NaK) 1 Packet(s) Oral once  sodium chloride 0.9%. 1000 milliLiter(s) (125 mL/Hr) IV Continuous <Continuous>  tamsulosin 0.4 milliGRAM(s) Oral daily    MEDICATIONS  (PRN):  HYDROmorphone  Injectable 1 milliGRAM(s) IV Push every 4 hours PRN Severe Pain (7 - 10)  ondansetron Injectable 4 milliGRAM(s) IV Push every 8 hours PRN Nausea and/or Vomiting            Allergies    aspirin (Anaphylaxis)  latex (Swelling)  NSAIDs (Swelling)  penicillin (Hives)  Pistachios (Pruritus)  shellfish (Anaphylaxis)    Intolerances        REVIEW OF SYSTEMS:  CONSTITUTIONAL: No fever, weight loss, or fatigue  EYES: No eye pain, visual disturbances, or discharge  ENMT:  No difficulty hearing, tinnitus, vertigo; No sinus or throat pain  NECK: No pain or stiffness  RESPIRATORY: No cough, wheezing, chills or hemoptysis; No shortness of breath  CARDIOVASCULAR: No chest pain, palpitations, dizziness, or leg swelling  GASTROINTESTINAL: No hematemesis; No diarrhea or constipation. No melena or hematochezia.  GENITOURINARY: No dysuria, frequency, hematuria, or incontinence  NEUROLOGICAL: No headaches, memory loss, loss of strength, numbness, or tremors  SKIN: No itching, burning, rashes, or lesions     Vital Signs Last 24 Hrs  T(C): 37.6 (26 Oct 2020 05:03), Max: 37.6 (25 Oct 2020 18:04)  T(F): 99.6 (26 Oct 2020 05:03), Max: 99.7 (25 Oct 2020 18:04)  HR: 67 (26 Oct 2020 05:03) (67 - 76)  BP: 119/74 (26 Oct 2020 05:03) (119/74 - 137/72)  BP(mean): --  RR: 18 (26 Oct 2020 05:03) (18 - 18)  SpO2: 97% (26 Oct 2020 05:03) (97% - 97%)    PHYSICAL EXAM:  GENERAL: NAD, well-groomed, well-developed  HEAD:  Atraumatic, Normocephalic  EYES: EOMI, PERRLA, conjunctiva and sclera clear  ENMT: No tonsillar erythema, exudates, or enlargement; Moist mucous membranes, Good dentition, No lesions  NECK: Supple, No JVD, Normal thyroid  NERVOUS SYSTEM:  Alert & Oriented X3, Good concentration; Motor Strength 5/5 B/L upper and lower extremities; DTRs 2+ intact and symmetric  CHEST/LUNG: Clear to percussion bilaterally; No rales, rhonchi, wheezing, or rubs  HEART: Regular rate and rhythm; No murmurs, rubs, or gallops  ABDOMEN: Soft, Nontender, Nondistended; Bowel sounds present  EXTREMITIES:  2+ Peripheral Pulses, No clubbing, cyanosis, or edema  LYMPH: No lymphadenopathy noted  SKIN: No rashes or lesions    LABS:                                                           9.7    2.67  )-----------( 175      ( 26 Oct 2020 07:21 )             31.0     10-26    140  |  111<H>  |  9   ----------------------------<  76  3.4<L>   |  23  |  1.00    Ca    8.7      26 Oct 2020 07:21  Phos  2.4     10-26  Mg     2.3     10-26      PT/INR - ( 26 Oct 2020 07:21 )   PT: 15.4 sec;   INR: 1.35 ratio         PTT - ( 26 Oct 2020 07:21 )  PTT:23.2 sec      Gluc: x / Ketone: Small  / Bili: Negative / Urobili: Negative mg/dL   Blood: x / Protein: 15 mg/dL / Nitrite: Negative   Leuk Esterase: Negative / RBC: 25-50 /HPF / WBC 0-2   Sq Epi: x / Non Sq Epi: Moderate / Bacteria: Few        ts/Other Providers [ ] YES  [ ] NO

## 2020-10-26 NOTE — DIETITIAN INITIAL EVALUATION ADULT. - OTHER INFO
Pt NPO after midnight for possible cystoscopy today (10/26). Pt reports poor appetite and PO intake throughout hospital stay. States any intake causes nausea. States no BM since admission, however passing flatus; usually regular BMs PTA. Pt states weight stable; -142 pounds.   Preferences taken. Spoke at length with pt about optimizing PO intake. Amenable to trying Ensure Clear x 3/day (provides 720 kcal, 24 g protein), however states dislike for "milky" products. Pt NPO after midnight for possible cystoscopy today (10/26). Pt reports poor appetite and PO intake throughout hospital stay. States any intake causes nausea. States no BM since admission (10/23), however passing flatus; usually regular BMs PTA. Pt states weight stable; -142 pounds.   Preferences taken. Spoke at length with pt about optimizing PO intake by eating nutrient dense foods/snacks, leaving nonperishable foods aside for later consumption mimicking small frequent meals, and sipping on nutritional supplement throughout the day. Amenable to trying Ensure Clear x 3/day (provides 720 kcal, 24 g protein), however states dislike for "milky" products. Provided alternative menu options. Pt NPO after midnight for possible cystoscopy today (10/26). Pt reports poor appetite and PO intake throughout hospital stay. States any intake causes nausea. States no BM since admission (10/23), however passing flatus; usually regular BMs PTA. Pt states weight stable; -142 pounds.   Preferences taken. Spoke at length with pt about optimizing PO intake by eating nutrient dense foods/snacks, leaving nonperishable foods aside for later consumption mimicking small frequent meals, and sipping on nutritional supplement throughout the day. Amenable to trying Ensure Clear x 3/day (provides 720 kcal, 24 g protein), however states dislike for "milky" products. Provided alternative menu options.  Pt not Food As Health Eligible; no comorbidities, not food insecure.

## 2020-10-27 ENCOUNTER — TRANSCRIPTION ENCOUNTER (OUTPATIENT)
Age: 40
End: 2020-10-27

## 2020-10-27 VITALS
OXYGEN SATURATION: 99 % | TEMPERATURE: 99 F | DIASTOLIC BLOOD PRESSURE: 73 MMHG | HEART RATE: 61 BPM | RESPIRATION RATE: 18 BRPM | SYSTOLIC BLOOD PRESSURE: 129 MMHG

## 2020-10-27 LAB
ANION GAP SERPL CALC-SCNC: 10 MMOL/L — SIGNIFICANT CHANGE UP (ref 5–17)
BUN SERPL-MCNC: 14 MG/DL — SIGNIFICANT CHANGE UP (ref 7–23)
CALCIUM SERPL-MCNC: 9.6 MG/DL — SIGNIFICANT CHANGE UP (ref 8.5–10.1)
CHLORIDE SERPL-SCNC: 108 MMOL/L — SIGNIFICANT CHANGE UP (ref 96–108)
CO2 SERPL-SCNC: 22 MMOL/L — SIGNIFICANT CHANGE UP (ref 22–31)
CREAT SERPL-MCNC: 0.83 MG/DL — SIGNIFICANT CHANGE UP (ref 0.5–1.3)
GLUCOSE SERPL-MCNC: 98 MG/DL — SIGNIFICANT CHANGE UP (ref 70–99)
HCT VFR BLD CALC: 34.7 % — SIGNIFICANT CHANGE UP (ref 34.5–45)
HGB BLD-MCNC: 11.2 G/DL — LOW (ref 11.5–15.5)
MCHC RBC-ENTMCNC: 26.5 PG — LOW (ref 27–34)
MCHC RBC-ENTMCNC: 32.3 GM/DL — SIGNIFICANT CHANGE UP (ref 32–36)
MCV RBC AUTO: 82.2 FL — SIGNIFICANT CHANGE UP (ref 80–100)
NRBC # BLD: 0 /100 WBCS — SIGNIFICANT CHANGE UP (ref 0–0)
PLATELET # BLD AUTO: 233 K/UL — SIGNIFICANT CHANGE UP (ref 150–400)
POTASSIUM SERPL-MCNC: 3.9 MMOL/L — SIGNIFICANT CHANGE UP (ref 3.5–5.3)
POTASSIUM SERPL-SCNC: 3.9 MMOL/L — SIGNIFICANT CHANGE UP (ref 3.5–5.3)
RBC # BLD: 4.22 M/UL — SIGNIFICANT CHANGE UP (ref 3.8–5.2)
RBC # FLD: 12.2 % — SIGNIFICANT CHANGE UP (ref 10.3–14.5)
SODIUM SERPL-SCNC: 140 MMOL/L — SIGNIFICANT CHANGE UP (ref 135–145)
WBC # BLD: 2.65 K/UL — LOW (ref 3.8–10.5)
WBC # FLD AUTO: 2.65 K/UL — LOW (ref 3.8–10.5)

## 2020-10-27 PROCEDURE — 99232 SBSQ HOSP IP/OBS MODERATE 35: CPT

## 2020-10-27 RX ORDER — ONDANSETRON 8 MG/1
4 TABLET, FILM COATED ORAL EVERY 8 HOURS
Refills: 0 | Status: DISCONTINUED | OUTPATIENT
Start: 2020-10-27 | End: 2020-10-27

## 2020-10-27 RX ORDER — CEFPODOXIME PROXETIL 100 MG
1 TABLET ORAL
Qty: 7 | Refills: 0
Start: 2020-10-27 | End: 2020-11-02

## 2020-10-27 RX ORDER — HYDROMORPHONE HYDROCHLORIDE 2 MG/ML
1 INJECTION INTRAMUSCULAR; INTRAVENOUS; SUBCUTANEOUS ONCE
Refills: 0 | Status: DISCONTINUED | OUTPATIENT
Start: 2020-10-27 | End: 2020-10-27

## 2020-10-27 RX ORDER — CEFPODOXIME PROXETIL 100 MG
1 TABLET ORAL
Qty: 7 | Refills: 0
Start: 2020-10-27 | End: 2020-11-04

## 2020-10-27 RX ADMIN — CEFTRIAXONE 100 MILLIGRAM(S): 500 INJECTION, POWDER, FOR SOLUTION INTRAMUSCULAR; INTRAVENOUS at 05:10

## 2020-10-27 RX ADMIN — HYDROMORPHONE HYDROCHLORIDE 1 MILLIGRAM(S): 2 INJECTION INTRAMUSCULAR; INTRAVENOUS; SUBCUTANEOUS at 02:18

## 2020-10-27 RX ADMIN — ONDANSETRON 4 MILLIGRAM(S): 8 TABLET, FILM COATED ORAL at 14:27

## 2020-10-27 RX ADMIN — HYDROMORPHONE HYDROCHLORIDE 1 MILLIGRAM(S): 2 INJECTION INTRAMUSCULAR; INTRAVENOUS; SUBCUTANEOUS at 02:35

## 2020-10-27 NOTE — PROGRESS NOTE ADULT - SUBJECTIVE AND OBJECTIVE BOX
POD #1  Patient seen and examined at bedside resting comfortably.  Little catheter in place. Patient states the color of her urine is becoming more clear and less bloody.  States her pain is much better.  Tolerating diet. Admits to nausea but states she is usually nauseous for days following anesthesia.   Denies fever, chills, vomiting, diarrhea, CP, SOB    T(F): 97.7 (10-27-20 @ 05:21), Max: 99.1 (10-26-20 @ 14:23)  HR: 70 (10-27-20 @ 05:21) (59 - 80)  BP: 124/88 (10-27-20 @ 05:21) (112/64 - 134/75)  RR: 18 (10-27-20 @ 05:21) (14 - 18)  SpO2: 98% (10-27-20 @ 05:21) (95% - 100%)    PHYSICAL EXAM:  General: NAD  Chest: nonlabored respirations  Abdomen: soft, softly ttp on right side, ND.   Extremities: SCDs in place  : no suprapubic tenderness, no CVAT. Little catheter draining blood tinged urine.    LABS:                        11.2   2.65  )-----------( 233      ( 27 Oct 2020 07:16 )             34.7   10-27    140  |  108  |  14  ----------------------------<  98  3.9   |  22  |  0.83    Ca    9.6      27 Oct 2020 07:16  Phos  2.4     10-26  Mg     2.3     10-26    PT/INR - ( 26 Oct 2020 07:21 )   PT: 15.4 sec;   INR: 1.35 ratio    PTT - ( 26 Oct 2020 07:21 )  PTT:23.2 sec    I&O's Detail    26 Oct 2020 07:01  -  27 Oct 2020 07:00  --------------------------------------------------------  IN:    Lactated Ringers: 100 mL  Total IN: 100 mL    OUT:    Indwelling Catheter - Urethral (mL): 950 mL  Total OUT: 950 mL    Total NET: -850 mL    Impression:   41yo F with PMH of lymphoma, chronic salpingitis, admitted with intractable pain due to nephrolithiasis.   POD #1 s/p cystoscopy/ ureteroscopy, lithotripsy, Right stent placement.   Afebrile.     Plan:  - continue little catheter  - continue pain management  - continue regular diet  - consider Zofran for nausea as per medicine  - continue Rocephin, d/c on PO Vantin as per ID  -  stable for d/c, will f/u OP for stent removal with Dr. Guerrero  - will discuss plan with Dr. Guerrero POD #1  Patient seen and examined at bedside resting comfortably.  Little catheter in place. Patient states the color of her urine is becoming more clear and less bloody.  States her pain is much better.  Tolerating diet. Admits to nausea but states she is usually nauseous for days following anesthesia.   Denies fever, chills, vomiting, diarrhea, CP, SOB    T(F): 97.7 (10-27-20 @ 05:21), Max: 99.1 (10-26-20 @ 14:23)  HR: 70 (10-27-20 @ 05:21) (59 - 80)  BP: 124/88 (10-27-20 @ 05:21) (112/64 - 134/75)  RR: 18 (10-27-20 @ 05:21) (14 - 18)  SpO2: 98% (10-27-20 @ 05:21) (95% - 100%)    PHYSICAL EXAM:  General: NAD  Chest: nonlabored respirations  Abdomen: soft, softly ttp on right side, ND.   Extremities: SCDs in place  : no suprapubic tenderness, no CVAT. Little catheter draining blood tinged urine.    LABS:                        11.2   2.65  )-----------( 233      ( 27 Oct 2020 07:16 )             34.7   10-27    140  |  108  |  14  ----------------------------<  98  3.9   |  22  |  0.83    Ca    9.6      27 Oct 2020 07:16  Phos  2.4     10-26  Mg     2.3     10-26    PT/INR - ( 26 Oct 2020 07:21 )   PT: 15.4 sec;   INR: 1.35 ratio    PTT - ( 26 Oct 2020 07:21 )  PTT:23.2 sec    I&O's Detail    26 Oct 2020 07:01  -  27 Oct 2020 07:00  --------------------------------------------------------  IN:    Lactated Ringers: 100 mL  Total IN: 100 mL    OUT:    Indwelling Catheter - Urethral (mL): 950 mL  Total OUT: 950 mL    Total NET: -850 mL    Impression:   41yo F with PMH of lymphoma, chronic salpingitis, admitted with intractable pain due to nephrolithiasis.   POD #1 s/p cystoscopy/ ureteroscopy, lithotripsy, Right stent placement.   Afebrile.     Plan:  - continue little catheter, will d/c tomorrow AM if patient is able to ambulate to bathroom on her own.   - continue pain management  - continue regular diet  - consider Zofran for nausea as per medicine  - continue Rocephin, d/c on PO Vantin as per ID  -  stable for d/c, will f/u OP for stent removal with Dr. Guerrero in 6 weeks  - plan discussed with Dr. Hooper

## 2020-10-27 NOTE — DISCHARGE NOTE NURSING/CASE MANAGEMENT/SOCIAL WORK - PATIENT PORTAL LINK FT
You can access the FollowMyHealth Patient Portal offered by [facility] by registering at the following website: [email or web address]. By joining [email or web address]’s FollowMyHealth portal, you will also be able to view your health information using other applications (apps) compatible with our system.

## 2020-10-27 NOTE — DISCHARGE NOTE PROVIDER - NSDCCPCAREPLAN_GEN_ALL_CORE_FT
PRINCIPAL DISCHARGE DIAGNOSIS  Diagnosis: Intractable pain  Assessment and Plan of Treatment:       SECONDARY DISCHARGE DIAGNOSES  Diagnosis: Nephrolith  Assessment and Plan of Treatment:

## 2020-10-27 NOTE — DISCHARGE NOTE PROVIDER - CARE PROVIDER_API CALL
Fox Guerrero  UROLOGY  [address]  [place] [id number]  Phone: [phone]  Fax: [phone]  Follow Up Time:

## 2020-10-27 NOTE — PROGRESS NOTE ADULT - ASSESSMENT
acute pyelonephritis   obstructive uropathy  status post stent yesterday    on the case  uc non diagnostic   discharge plan on po vantin once stable from gu standpoint   vantin 200 mg po q 12 end date  11/5/20  
pt w/obstructing urinary stone and hydro
pt w/obstructing urinary stone and hydro s/p ureteral stent and stone removal hematuria improving but still with little as per urology. Appetite improving but still feels woozy from anesthesia. Plan to dc remove little tomorrow and dc on  vantin 200 mg po q 12 end date  11/5/20  
ac pyelo  obstructive uropathy  discharge plan on po vantin once gu stable   unexplained nausea vomiting  no oral intake per patient x 3 days   Gi eval

## 2020-10-27 NOTE — DISCHARGE NOTE PROVIDER - HOSPITAL COURSE
41 y/o female w/ PMHx of lymphoma on immunotherapy, last chemo in august, developed flank pain last night seen at [facility] ed and dc'd and returned w/untolerable flank pain.  pt denies any fever, chills, sob, cp, palpiations, n/v/d/c no travels. Pt found to have 4mm stone in the proximal right ureter. Pt is now s/p cystoscopy/ ureteroscopy, lithotripsy, Right stent placement. Pak discontined today. Pt able to ambulate, tolerating diet.    Pt discussed with Dr Condon and Urology. Medically stable for discharge home.   To follow up with Dr Guerrero in 6 weeks for stent removal

## 2020-10-27 NOTE — PROGRESS NOTE ADULT - SUBJECTIVE AND OBJECTIVE BOX
Patient is a 40y old  Female who presents with a chief complaint of flank pain (23 Oct 2020 22:37)      INTERVAL HPI/OVERNIGHT EVENTS: pain improved. not eating well but improving     MEDICATIONS  (STANDING):  cefTRIAXone   IVPB 1000 milliGRAM(s) IV Intermittent every 24 hours  influenza   Vaccine 0.5 milliLiter(s) IntraMuscular once    MEDICATIONS  (PRN):  ondansetron Injectable 4 milliGRAM(s) IV Push every 8 hours PRN Nausea and/or Vomiting  oxycodone    5 mG/acetaminophen 325 mG 1 Tablet(s) Oral every 6 hours PRN Moderate Pain (4 - 6)            Allergies    aspirin (Anaphylaxis)  latex (Swelling)  NSAIDs (Swelling)  penicillin (Hives)  Pistachios (Pruritus)  shellfish (Anaphylaxis)    Intolerances        REVIEW OF SYSTEMS:  CONSTITUTIONAL: No fever, weight loss, or fatigue  EYES: No eye pain, visual disturbances, or discharge  ENMT:  No difficulty hearing, tinnitus, vertigo; No sinus or throat pain  NECK: No pain or stiffness  RESPIRATORY: No cough, wheezing, chills or hemoptysis; No shortness of breath  CARDIOVASCULAR: No chest pain, palpitations, dizziness, or leg swelling  GASTROINTESTINAL: No hematemesis; No diarrhea or constipation. No melena or hematochezia.  GENITOURINARY: No dysuria, frequency, hematuria, or incontinence  NEUROLOGICAL: No headaches, memory loss, loss of strength, numbness, or tremors  SKIN: No itching, burning, rashes, or lesions     Vital Signs Last 24 Hrs  T(C): 36.6 (27 Oct 2020 11:22), Max: 36.8 (26 Oct 2020 18:51)  T(F): 97.9 (27 Oct 2020 11:22), Max: 98.3 (26 Oct 2020 23:36)  HR: 74 (27 Oct 2020 11:22) (59 - 80)  BP: 109/69 (27 Oct 2020 11:22) (109/69 - 134/75)  BP(mean): --  RR: 18 (27 Oct 2020 11:22) (14 - 18)  SpO2: 100% (27 Oct 2020 11:22) (95% - 100%)    PHYSICAL EXAM:  GENERAL: NAD, well-groomed, well-developed  HEAD:  Atraumatic, Normocephalic  EYES: EOMI, PERRLA, conjunctiva and sclera clear  ENMT: No tonsillar erythema, exudates, or enlargement; Moist mucous membranes, Good dentition, No lesions  NECK: Supple, No JVD, Normal thyroid  NERVOUS SYSTEM:  Alert & Oriented X3, Good concentration; Motor Strength 5/5 B/L upper and lower extremities; DTRs 2+ intact and symmetric  CHEST/LUNG: Clear to percussion bilaterally; No rales, rhonchi, wheezing, or rubs  HEART: Regular rate and rhythm; No murmurs, rubs, or gallops  ABDOMEN: Soft, Nontender, Nondistended; Bowel sounds present  EXTREMITIES:  2+ Peripheral Pulses, No clubbing, cyanosis, or edema  LYMPH: No lymphadenopathy noted  SKIN: No rashes or lesions    LABS:                                                             11.2   2.65  )-----------( 233      ( 27 Oct 2020 07:16 )             34.7     10-27    140  |  108  |  14  ----------------------------<  98  3.9   |  22  |  0.83    Ca    9.6      27 Oct 2020 07:16  Phos  2.4     10-26  Mg     2.3     10-26            PT/INR - ( 26 Oct 2020 07:21 )   PT: 15.4 sec;   INR: 1.35 ratio         PTT - ( 26 Oct 2020 07:21 )  PTT:23.2 sec      Gluc: x / Ketone: Small  / Bili: Negative / Urobili: Negative mg/dL   Blood: x / Protein: 15 mg/dL / Nitrite: Negative   Leuk Esterase: Negative / RBC: 25-50 /HPF / WBC 0-2   Sq Epi: x / Non Sq Epi: Moderate / Bacteria: Few        ts/Other Providers [ ] YES  [ ] NO

## 2020-10-27 NOTE — DISCHARGE NOTE PROVIDER - NSDCFUSCHEDAPPT_GEN_ALL_CORE_FT
SONDRA HELLER ; 11/04/2020 ; [facility]  SONDRA HELLER ; 11/11/2020 ; [facility] [address]  SONDRA HELLER ; 12/03/2020 ; [facility]  SONDRA HELLER ; 01/05/2021 ; [facility] [address]  SONDRA HELLER ; 01/05/2021 ; [facility] [address]

## 2020-10-27 NOTE — PROGRESS NOTE ADULT - REASON FOR ADMISSION
flank pain

## 2020-10-27 NOTE — PROGRESS NOTE ADULT - PROBLEM SELECTOR PROBLEM 3
Preventive measure
Intractable pain

## 2020-10-27 NOTE — PROGRESS NOTE ADULT - PROBLEM SELECTOR PLAN 1
cr stable   plan for cysto with stent today   Cr stable   pain management  iv hydration  add flomax   urology consult reviewed   on empiric abx d.t being immunocompromised and for ? pyelo seen on ct

## 2020-10-27 NOTE — PROGRESS NOTE ADULT - ATTENDING COMMENTS
continues to be symptomatic-nauseated, flank pain  to arrange OR for tomorrow  continue hydrate with IV flulids
c/o nausea and headaches and feeling sleepy from anesthesia; falling asleep while speaking and has not ambulated as she feels light headed.  AVSS  Little in with the slightest pink tinge.  will keep little and remove in am.  If stable, safe from  standpoint w f/u 6 wks for stent removal w Dr. Harris.

## 2020-10-28 ENCOUNTER — NON-APPOINTMENT (OUTPATIENT)
Age: 40
End: 2020-10-28

## 2020-10-29 RX ORDER — LIDOCAINE AND PRILOCAINE CREAM 25; 25 MG/G; MG/G
1 CREAM TOPICAL
Qty: 1 | Refills: 0
Start: 2020-10-29 | End: 2020-10-29

## 2020-10-29 RX ORDER — LIDOCAINE AND PRILOCAINE CREAM 25; 25 MG/G; MG/G
1 CREAM TOPICAL
Qty: 20 | Refills: 0
Start: 2020-10-29

## 2020-10-29 RX ORDER — CEFDINIR 250 MG/5ML
1 POWDER, FOR SUSPENSION ORAL
Qty: 10 | Refills: 0
Start: 2020-10-29 | End: 2020-11-02

## 2020-10-29 RX ORDER — CEFDINIR 250 MG/5ML
1 POWDER, FOR SUSPENSION ORAL
Qty: 14 | Refills: 0
Start: 2020-10-29 | End: 2020-11-04

## 2020-10-29 NOTE — DISCUSSION/SUMMARY
[FreeTextEntry1] : Spoke with pt and stated that she left AMA. Pt refusing to take percoset or oxycodone. Pt stated that she is in severe pain now 10/10 and would like to speak with her PCP. Pt stated that her urine has small amount of blood and no clots. Pt instructed to call urologist. and stress importance. Note sent to Dr. Pierre.

## 2020-10-30 ENCOUNTER — NON-APPOINTMENT (OUTPATIENT)
Age: 40
End: 2020-10-30

## 2020-10-31 LAB — NIDUS STONE QN: SIGNIFICANT CHANGE UP

## 2020-11-01 DIAGNOSIS — Z88.6 ALLERGY STATUS TO ANALGESIC AGENT: ICD-10-CM

## 2020-11-01 DIAGNOSIS — R31.9 HEMATURIA, UNSPECIFIED: ICD-10-CM

## 2020-11-01 DIAGNOSIS — N10 ACUTE PYELONEPHRITIS: ICD-10-CM

## 2020-11-01 DIAGNOSIS — Z91.018 ALLERGY TO OTHER FOODS: ICD-10-CM

## 2020-11-01 DIAGNOSIS — Z91.013 ALLERGY TO SEAFOOD: ICD-10-CM

## 2020-11-01 DIAGNOSIS — N13.6 PYONEPHROSIS: ICD-10-CM

## 2020-11-01 DIAGNOSIS — C85.90 NON-HODGKIN LYMPHOMA, UNSPECIFIED, UNSPECIFIED SITE: ICD-10-CM

## 2020-11-01 DIAGNOSIS — Z91.040 LATEX ALLERGY STATUS: ICD-10-CM

## 2020-11-01 DIAGNOSIS — Z92.21 PERSONAL HISTORY OF ANTINEOPLASTIC CHEMOTHERAPY: ICD-10-CM

## 2020-11-01 DIAGNOSIS — N20.0 CALCULUS OF KIDNEY: ICD-10-CM

## 2020-11-01 DIAGNOSIS — Z88.0 ALLERGY STATUS TO PENICILLIN: ICD-10-CM

## 2020-11-03 LAB — SURGICAL PATHOLOGY STUDY: SIGNIFICANT CHANGE UP

## 2020-11-04 ENCOUNTER — LABORATORY RESULT (OUTPATIENT)
Age: 40
End: 2020-11-04

## 2020-11-04 ENCOUNTER — APPOINTMENT (OUTPATIENT)
Dept: INFUSION THERAPY | Facility: HOSPITAL | Age: 40
End: 2020-11-04

## 2020-11-04 ENCOUNTER — RESULT REVIEW (OUTPATIENT)
Age: 40
End: 2020-11-04

## 2020-11-04 DIAGNOSIS — R11.2 NAUSEA WITH VOMITING, UNSPECIFIED: ICD-10-CM

## 2020-11-04 DIAGNOSIS — Z51.11 ENCOUNTER FOR ANTINEOPLASTIC CHEMOTHERAPY: ICD-10-CM

## 2020-11-04 LAB
BASOPHILS # BLD AUTO: 0.04 K/UL — SIGNIFICANT CHANGE UP (ref 0–0.2)
BASOPHILS NFR BLD AUTO: 0.9 % — SIGNIFICANT CHANGE UP (ref 0–2)
EOSINOPHIL # BLD AUTO: 0.21 K/UL — SIGNIFICANT CHANGE UP (ref 0–0.5)
EOSINOPHIL NFR BLD AUTO: 5 % — SIGNIFICANT CHANGE UP (ref 0–6)
HCT VFR BLD CALC: 36.8 % — SIGNIFICANT CHANGE UP (ref 34.5–45)
HGB BLD-MCNC: 11.8 G/DL — SIGNIFICANT CHANGE UP (ref 11.5–15.5)
IMM GRANULOCYTES NFR BLD AUTO: 0.9 % — SIGNIFICANT CHANGE UP (ref 0–1.5)
LYMPHOCYTES # BLD AUTO: 0.9 K/UL — LOW (ref 1–3.3)
LYMPHOCYTES # BLD AUTO: 21.3 % — SIGNIFICANT CHANGE UP (ref 13–44)
MCHC RBC-ENTMCNC: 26.6 PG — LOW (ref 27–34)
MCHC RBC-ENTMCNC: 32.1 G/DL — SIGNIFICANT CHANGE UP (ref 32–36)
MCV RBC AUTO: 83.1 FL — SIGNIFICANT CHANGE UP (ref 80–100)
MONOCYTES # BLD AUTO: 0.41 K/UL — SIGNIFICANT CHANGE UP (ref 0–0.9)
MONOCYTES NFR BLD AUTO: 9.7 % — SIGNIFICANT CHANGE UP (ref 2–14)
NEUTROPHILS # BLD AUTO: 2.63 K/UL — SIGNIFICANT CHANGE UP (ref 1.8–7.4)
NEUTROPHILS NFR BLD AUTO: 62.2 % — SIGNIFICANT CHANGE UP (ref 43–77)
NRBC # BLD: 0 /100 WBCS — SIGNIFICANT CHANGE UP (ref 0–0)
PLATELET # BLD AUTO: 248 K/UL — SIGNIFICANT CHANGE UP (ref 150–400)
RBC # BLD: 4.43 M/UL — SIGNIFICANT CHANGE UP (ref 3.8–5.2)
RBC # FLD: 12.6 % — SIGNIFICANT CHANGE UP (ref 10.3–14.5)
WBC # BLD: 4.23 K/UL — SIGNIFICANT CHANGE UP (ref 3.8–10.5)
WBC # FLD AUTO: 4.23 K/UL — SIGNIFICANT CHANGE UP (ref 3.8–10.5)

## 2020-11-10 NOTE — ED ADULT TRIAGE NOTE - IDEAL BODY WEIGHT(KG)
55 Mercedes Flap Text: The defect edges were debeveled with a #15 scalpel blade.  Given the location of the defect, shape of the defect and the proximity to free margins a Mercedes flap was deemed most appropriate.  Using a sterile surgical marker, an appropriate advancement flap was drawn incorporating the defect and placing the expected incisions within the relaxed skin tension lines where possible. The area thus outlined was incised deep to adipose tissue with a #15 scalpel blade.  The skin margins were undermined to an appropriate distance in all directions utilizing iris scissors.

## 2020-11-11 ENCOUNTER — NON-APPOINTMENT (OUTPATIENT)
Age: 40
End: 2020-11-11

## 2020-11-11 ENCOUNTER — APPOINTMENT (OUTPATIENT)
Dept: OBGYN | Facility: HOSPITAL | Age: 40
End: 2020-11-11
Payer: MEDICAID

## 2020-11-11 ENCOUNTER — APPOINTMENT (OUTPATIENT)
Dept: OPHTHALMOLOGY | Facility: CLINIC | Age: 40
End: 2020-11-11
Payer: MEDICAID

## 2020-11-11 ENCOUNTER — OUTPATIENT (OUTPATIENT)
Dept: OUTPATIENT SERVICES | Facility: HOSPITAL | Age: 40
LOS: 1 days | End: 2020-11-11

## 2020-11-11 VITALS
DIASTOLIC BLOOD PRESSURE: 75 MMHG | BODY MASS INDEX: 24.24 KG/M2 | HEART RATE: 78 BPM | TEMPERATURE: 98.1 F | WEIGHT: 142 LBS | SYSTOLIC BLOOD PRESSURE: 116 MMHG | HEIGHT: 64 IN

## 2020-11-11 PROCEDURE — 92012 INTRM OPH EXAM EST PATIENT: CPT

## 2020-11-11 PROCEDURE — 99072 ADDL SUPL MATRL&STAF TM PHE: CPT

## 2020-11-11 PROCEDURE — 99395 PREV VISIT EST AGE 18-39: CPT | Mod: GC

## 2020-11-11 NOTE — PHYSICAL EXAM
[Appropriately responsive] : appropriately responsive [Soft] : soft [Non-tender] : non-tender [Non-distended] : non-distended [No Lesions] : no lesions [No Mass] : no mass [Oriented x3] : oriented x3

## 2020-11-12 DIAGNOSIS — Z00.00 ENCOUNTER FOR GENERAL ADULT MEDICAL EXAMINATION WITHOUT ABNORMAL FINDINGS: ICD-10-CM

## 2020-11-15 NOTE — REVIEW OF SYSTEMS
[PMS/PMDD Symptoms] : PMS/PMDD symptoms [Negative] : Heme/Lymph [Fever] : no fever [Dyspnea] : no dyspnea [Chest Pain] : no chest pain [Abdominal Pain] : no abdominal pain [Anxiety] : no anxiety [Depression] : no depression

## 2020-11-16 DIAGNOSIS — Z01.419 ENCOUNTER FOR GYNECOLOGICAL EXAMINATION (GENERAL) (ROUTINE) WITHOUT ABNORMAL FINDINGS: ICD-10-CM

## 2020-11-18 ENCOUNTER — APPOINTMENT (OUTPATIENT)
Dept: INTERNAL MEDICINE | Facility: CLINIC | Age: 40
End: 2020-11-18

## 2020-11-20 ENCOUNTER — APPOINTMENT (OUTPATIENT)
Dept: INTERNAL MEDICINE | Facility: CLINIC | Age: 40
End: 2020-11-20

## 2020-11-25 ENCOUNTER — APPOINTMENT (OUTPATIENT)
Dept: OPHTHALMOLOGY | Facility: CLINIC | Age: 40
End: 2020-11-25

## 2020-11-28 ENCOUNTER — OUTPATIENT (OUTPATIENT)
Dept: OUTPATIENT SERVICES | Facility: HOSPITAL | Age: 40
LOS: 1 days | Discharge: HOME | End: 2020-11-28

## 2020-11-28 DIAGNOSIS — C85.88 OTHER SPECIFIED TYPES OF NON-HODGKIN LYMPHOMA, LYMPH NODES OF MULTIPLE SITES: ICD-10-CM

## 2020-12-03 ENCOUNTER — RESULT REVIEW (OUTPATIENT)
Age: 40
End: 2020-12-03

## 2020-12-03 ENCOUNTER — APPOINTMENT (OUTPATIENT)
Dept: HEMATOLOGY ONCOLOGY | Facility: CLINIC | Age: 40
End: 2020-12-03
Payer: MEDICAID

## 2020-12-03 ENCOUNTER — APPOINTMENT (OUTPATIENT)
Dept: UROLOGY | Facility: CLINIC | Age: 40
End: 2020-12-03
Payer: MEDICAID

## 2020-12-03 VITALS
TEMPERATURE: 97.9 F | RESPIRATION RATE: 16 BRPM | HEIGHT: 63.78 IN | DIASTOLIC BLOOD PRESSURE: 76 MMHG | SYSTOLIC BLOOD PRESSURE: 112 MMHG | WEIGHT: 142.42 LBS | BODY MASS INDEX: 24.62 KG/M2 | OXYGEN SATURATION: 100 % | HEART RATE: 79 BPM

## 2020-12-03 VITALS — TEMPERATURE: 97.2 F | HEART RATE: 93 BPM | SYSTOLIC BLOOD PRESSURE: 119 MMHG | DIASTOLIC BLOOD PRESSURE: 78 MMHG

## 2020-12-03 DIAGNOSIS — N20.9 URINARY CALCULUS, UNSPECIFIED: ICD-10-CM

## 2020-12-03 LAB
BASOPHILS # BLD AUTO: 0 K/UL — SIGNIFICANT CHANGE UP (ref 0–0.2)
BASOPHILS NFR BLD AUTO: 0 % — SIGNIFICANT CHANGE UP (ref 0–2)
EOSINOPHIL # BLD AUTO: 0.13 K/UL — SIGNIFICANT CHANGE UP (ref 0–0.5)
EOSINOPHIL NFR BLD AUTO: 5 % — SIGNIFICANT CHANGE UP (ref 0–6)
HCT VFR BLD CALC: 35.8 % — SIGNIFICANT CHANGE UP (ref 34.5–45)
HGB BLD-MCNC: 11.5 G/DL — SIGNIFICANT CHANGE UP (ref 11.5–15.5)
LYMPHOCYTES # BLD AUTO: 0.53 K/UL — LOW (ref 1–3.3)
LYMPHOCYTES # BLD AUTO: 20 % — SIGNIFICANT CHANGE UP (ref 13–44)
MCHC RBC-ENTMCNC: 27.1 PG — SIGNIFICANT CHANGE UP (ref 27–34)
MCHC RBC-ENTMCNC: 32.1 G/DL — SIGNIFICANT CHANGE UP (ref 32–36)
MCV RBC AUTO: 84.2 FL — SIGNIFICANT CHANGE UP (ref 80–100)
MONOCYTES # BLD AUTO: 0.47 K/UL — SIGNIFICANT CHANGE UP (ref 0–0.9)
MONOCYTES NFR BLD AUTO: 18 % — HIGH (ref 2–14)
NEUTROPHILS # BLD AUTO: 1.5 K/UL — LOW (ref 1.8–7.4)
NEUTROPHILS NFR BLD AUTO: 57 % — SIGNIFICANT CHANGE UP (ref 43–77)
NRBC # BLD: 0 /100 — SIGNIFICANT CHANGE UP (ref 0–0)
NRBC # BLD: SIGNIFICANT CHANGE UP /100 WBCS (ref 0–0)
PLAT MORPH BLD: NORMAL — SIGNIFICANT CHANGE UP
PLATELET # BLD AUTO: 226 K/UL — SIGNIFICANT CHANGE UP (ref 150–400)
RBC # BLD: 4.25 M/UL — SIGNIFICANT CHANGE UP (ref 3.8–5.2)
RBC # FLD: 14.1 % — SIGNIFICANT CHANGE UP (ref 10.3–14.5)
RBC BLD AUTO: SIGNIFICANT CHANGE UP
WBC # BLD: 2.63 K/UL — LOW (ref 3.8–10.5)
WBC # FLD AUTO: 2.63 K/UL — LOW (ref 3.8–10.5)

## 2020-12-03 PROCEDURE — 99072 ADDL SUPL MATRL&STAF TM PHE: CPT

## 2020-12-03 PROCEDURE — 99214 OFFICE O/P EST MOD 30 MIN: CPT

## 2020-12-03 PROCEDURE — 99213 OFFICE O/P EST LOW 20 MIN: CPT | Mod: 25

## 2020-12-03 PROCEDURE — 52315 CYSTOSCOPY AND TREATMENT: CPT

## 2020-12-04 NOTE — PHYSICAL EXAM
[Fully active, able to carry on all pre-disease performance without restriction] : Status 0 - Fully active, able to carry on all pre-disease performance without restriction [Normal] : affect appropriate [de-identified] : cervical adenopathy resolved

## 2020-12-04 NOTE — HISTORY OF PRESENT ILLNESS
[de-identified] : 37yo F w/ asthma here for evaluation of newly diagnosed follicular lymphoma. \par \par Patient sates she initially noted left inguinal pain in February 2019, had a palpable mass for the last 6 years which she noticed after delivery of her second baby in 2013. She saw GYN, had TVUS: Uterus: 5.6 x 3.6 x 4.3 cm. EMS 3 mm. Right ovary normal. Left ovary normal. Complex elongated left adnexal lesion suggestive of hydrosalpinx, 9.2 x 4.9 x 9 cm. She is s/p b/l salpingectomy on 5/2/19. She remains on depo injection. She states that she has had more swelling and more pain since her procedure. \par \par She had CT A/P done on 5/9/19 which demonstrated extensive retroperitoneal, pelvic, and inguinal lymphadenopathy, concerning for malignancy with small amount of abdominal and pelvic ascites.\par s/p IR biopsy of left inguinal lymph node - follicular lymphoma grade 1-2. \par \par Also having cervical LAD, noticed for the last few months, L>R. Noted R inguinal swelling for the last week with pain. No fevers of chills. Notes having itching and that her breathing is a bit more labored than usual.  [de-identified] : BMbx (6/10/19): - Focal lymphoid infiltrate consistent with involvement by follicular lymphoma (history of follicular lymphoma)\par - Small paracortical sample with trilineage hematopoiesis with maturation and iron stores present\par \par PET/CT (6/16/19): 1. Hypermetabolic lymphadenopathy in neck, thorax, abdomen, pelvis, and bilateral inguinal/femoral regions corresponds to biopsy-proven follicular lymphoma. Hypermetabolic subcutaneous nodule, right posterolateral chest wall, is compatible with an additional site of lymphoma.\par 2. Nonspecific left greater than right tonsillar hypermetabolism may represent additional sites of lymphoma.\par 3. Findings compatible with bone marrow involvement by lymphoma in bilateral humeri and axial skeleton, as described above.\par \par She reports having increasing fatigue. Still having slight shortness of breath.\par \par We started BR on 6/24. She had a reaction to Rituxan. \par \par \par Eating fine. Neck LAD, shortness of breath and abdominal bloating improved/resolved. \par She had a yeast infection, saw GYN. Also reports having pelvic pain and was found to have a small polyp. \par \par C2 on 7/22. She notes having burning as she was getting the chemo (please see chart note for details). Notes that her arms were still hurting for a week after. \par She had a portacath placed 8/12\par Felt more fatigued and weak. \par Had transvaginal US, saw GYN this morning (9/5/19). Pelvic pain has resolved. \par \par C3 on 8/19. Tolerated it well. \par \par CT N/C/A/P (9/10/19): Marked decrease in size of cervical lymph nodes when compared with the prior exam compatible with a favorable response to therapy. Significant interval decrease in size of left supraclavicular lymphadenopathy.\par Focal enhancement involving the right anterior tonsillar without associated tonsillar expansion. Correlation with direct visualization and continued follow-up is advised.\par Significant interval decrease in size of previously noted hypermetabolic nodule in the soft tissues of the right posterior lateral chest.\par Significant interval decrease in size of retroperitoneal, pelvic, inguinal, and mesenteric lymphadenopathy as described above.\par \par C4 on 9/16. Tolerated well, had some tremors afterwards. \par \par C5 was delayed secondary to neutropenia. Given on 10/30/19. Had an episode of chest tightness w/ Pillo on day 1. Premedicated on day 2 with no events. Tremors better this cycle but notes 3 episodes of restless legs. \par \par C6 on 11/26/19. She reports having muscle aches. \par \par PET/CT 1/18/20: 1. Interval resolution of FDG activity associated with lymphadenopathy in the neck, chest, upper abdomen and pelvis with either resolution or significant decrease in size of the corresponding lymph nodes and not well delineated on CT as compared to PET/CT from 6/16/2019. Near total resolution of FDG avid left inguinal lymphadenopathy which is significantly decreased in size now demonstrating background activity.\par 2. Interval significant decrease in size and metabolism of hypermetabolic retroperitoneal and mesenteric lymphadenopathy.\par 3. Interval resolution of asymmetric tonsillar hypermetabolism.\par 4. Interval resolution of FDG avidity in bone marrow of bilateral humeri, T10, and sacrum.\par \par Having pain on L side of lower abdomen and groin around 3-4 weeks ago. Having night sweats again too. Had GYN f/u 2 weeks ago.\par We did a PET/CT on 6/13 which showed: 1. Small focus of mild FDG activity, decreased in size and metabolism, is associated with mesenteric haziness which is unchanged on CT as compared to prior study dated 1/18/2020 ([place] [facility]). Resolution of minimally FDG-avid amorphous density, left periaortic region.\par 2. Minimally FDG-avid density, left inguinal region, unchanged, may represent postsurgical change versus lymph node. Please correlate clinically.\par \par Started Rituxan maintenance on 6/25/20. She had a reaction to Rituxan -please see chart note for details. \par Second dose on 8/31/20, tolerated better. Reports having back pain after premeds wore off. \par 3rd dose on 11/4/20, she felt tired and slept through the treatment, denied nausea, but c/o tingling in the finger tips and feet comes and goes. Patient admitted tremor improved.\par birth control pills d/c 3 months ago, she attributed the nausea after Rituxan to the withdrawal of birth control pills. Kidney stone stent removed today.

## 2020-12-04 NOTE — ASSESSMENT
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 \par We started treatment with Bendamustine/Rituxan on 6/24/19. LAD has improved. C5 delayed due to neutropenia, received on 10/30/19. C6 on 11/26/19, tolerated treatment well. PET/CT done at end of treatment showed interval significant decrease in size and metabolism of LAD. \par \par PET/CT unchanged. Started on Rituximab maintenance on 6/25/20. Cont every 2 mo. s/p 3rd dose of Rituxan on 11/4/20. She had less nausea after 3rd dose and is willing to continue Rituximab Q2 months, next dose on 12/30\par f/u w/ PMD and gyn\par All questions answered\par RTC 7-10 days after C4 f/u w/ Dr. Vega\par \par Case and management discussed with Dr. Vega\par

## 2020-12-07 NOTE — HISTORY OF PRESENT ILLNESS
[FreeTextEntry1] : 41 y/o female presenting fir initial visit for right ureteral stent removal. Pt presented to [facility] on 10/23/2020 for flank pain. Pt has medical history of lymphoma and is on immunotherapy. Pt found to have 4 mm stone in the proximal right ureter.Pt is now s/p cystoscopy/ureteroscopy, lithotripsy, Right stent placement on 10/26/2020.\par \par Pt is here today for right ureteral stent removal. Stent was removed today intact.\par Pt is doing well.\par \par Will follow up in 2 weeks to r/o infection.

## 2020-12-07 NOTE — HISTORY OF PRESENT ILLNESS
[FreeTextEntry1] : 41 y/o female presenting fir initial visit for right ureteral stent removal. Pt presented to [facility] on 10/23/2020 for flank pain. Pt has medical history of lymphoma and is on immunotherapy. Pt found to have 4 mm stone in the proximal right ureter.Pt is now s/p cystoscopy/ureteroscopy, lithotripsy, Right stent placement on 10/26/2020.\par \par Pt is here today for right ureteral stent removal. Stent was removed today intact.\par Pt is doing well.\par \par Will follow up in 2 weeks to r/o infection.

## 2020-12-07 NOTE — ADDENDUM
[FreeTextEntry1] : I, Eun Innocent, acted solely as a scribe for Dr. Gustavo Harris on this date, 12/03/2020.\par \par All medical record entries made by the Scribe were at my Dr. Gustavo Harris direction and personally dictated by me on, 12/03/2020. I have reviewed the chart and agree that the record accurately reflects my personal performance of the history, physical exam, assessment and plan. I have also personally directed, reviewed and agreed with the chart.\par

## 2020-12-08 LAB
ALBUMIN SERPL ELPH-MCNC: 4.8 G/DL
ALP BLD-CCNC: 99 U/L
ALT SERPL-CCNC: 37 U/L
ANION GAP SERPL CALC-SCNC: 13 MMOL/L
AST SERPL-CCNC: 26 U/L
BILIRUB SERPL-MCNC: 0.3 MG/DL
BUN SERPL-MCNC: 14 MG/DL
CALCIUM SERPL-MCNC: 9.5 MG/DL
CHLORIDE SERPL-SCNC: 110 MMOL/L
CO2 SERPL-SCNC: 19 MMOL/L
CREAT SERPL-MCNC: 0.66 MG/DL
GLUCOSE SERPL-MCNC: 86 MG/DL
LDH SERPL-CCNC: 228 U/L
POTASSIUM SERPL-SCNC: 3.9 MMOL/L
PROT SERPL-MCNC: 6.9 G/DL
SODIUM SERPL-SCNC: 142 MMOL/L

## 2020-12-21 PROBLEM — Z30.9 ENCOUNTER FOR CONTRACEPTIVE MANAGEMENT: Status: RESOLVED | Noted: 2019-04-11 | Resolved: 2020-12-21

## 2020-12-23 ENCOUNTER — APPOINTMENT (OUTPATIENT)
Dept: INTERNAL MEDICINE | Facility: CLINIC | Age: 40
End: 2020-12-23
Payer: MEDICAID

## 2020-12-23 ENCOUNTER — LABORATORY RESULT (OUTPATIENT)
Age: 40
End: 2020-12-23

## 2020-12-23 ENCOUNTER — OUTPATIENT (OUTPATIENT)
Dept: OUTPATIENT SERVICES | Facility: HOSPITAL | Age: 40
LOS: 1 days | End: 2020-12-23

## 2020-12-23 ENCOUNTER — APPOINTMENT (OUTPATIENT)
Dept: INFUSION THERAPY | Facility: HOSPITAL | Age: 40
End: 2020-12-23

## 2020-12-23 VITALS
DIASTOLIC BLOOD PRESSURE: 78 MMHG | OXYGEN SATURATION: 99 % | HEART RATE: 86 BPM | SYSTOLIC BLOOD PRESSURE: 122 MMHG | HEIGHT: 63.78 IN

## 2020-12-23 VITALS — TEMPERATURE: 97.1 F

## 2020-12-23 PROBLEM — Z87.09 HISTORY OF ACUTE BACTERIAL SINUSITIS: Status: RESOLVED | Noted: 2020-04-10 | Resolved: 2020-12-23

## 2020-12-23 PROCEDURE — 99214 OFFICE O/P EST MOD 30 MIN: CPT | Mod: GC

## 2020-12-23 NOTE — HISTORY OF PRESENT ILLNESS
[FreeTextEntry1] : "Can't complain"\par Follow-up of chronic medical conditions [de-identified] : 40 year old woman, history of allergic rhinitis and well controlled asthma, follicular lymphoma diagnosed in February 2019, now undergoing maintenance therapy with rituximab every 2 months, presenting for follow up visit. \par Follicular lymphoma: Initially presented in February 2019 with a L inguinal mass, biopsy, CTAP, and PET scan confirmed diagnoses with B cell lymphoma Stage IV, started on intensive regimen of rituximab (biweekly). She has had allergic reactions to treatment, and receives pre-treatment with benadryl. Today, patient states she feels, "much better," and has finally "wrapped her head around her diagnosis." She does state she feels fatigued, associated with increased intolerance to prolonged exertion post-rituximab treatment. 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 She also has had intermittent numbness and tingling of her fingers, which have now resolved. Her initial lympadenopathy (inguinal, cervical) has resolved. She denies fevers, chills, cough, SOB, abdominal pain, dysuria, hematuria, back pain, LE edema. 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 She has followed closely with ob/gyn, last month had full workup including breast ultrasound and mammogram, all benign. \par She also was hospitalized in October for nephrolithiasis, and recently had ureteral stent removed by urology. She has since had no associated pain, dysuria, burning or pain with urination, fevers or chills. She is scheduled to follow up with them tomorrow. \par Patient is amenable to receiving flu and pneumonia vaccines, however would like to wait until after her rituximab treatment next week. \par

## 2020-12-23 NOTE — ASSESSMENT
[FreeTextEntry1] : 40 year old woman, history of allergic rhinitis and well controlled asthma, follicular lymphoma diagnosed in February 2019. nephrolithiasis resulting in hospitalization October 2020 s/p ureteral stent and removal, now undergoing maintenance therapy with rituximab every 2 months, presenting for follow up visit. \par \par #Follicular lymphoma\par -following closely with hem/onc, immunosuppressed on rituximab, no acute concern for infection\par -general malaise, weakness likely 2/2 to rituximab treatment \par \par #Nephrolithiasis\par -currently asymptomatic, s/p ureteral stent removal, follow up with urology 12/24/2020\par \par #Breast tenderness\par -associated with discontinuation of Depo injections July 2020\par -patient declining breast exam today, follows closely with OB/GYN, extensive workup including ultrasound and mammogram benign\par \par #HCM\par -Received last TDAP during her last pregnancy 7 years ago, next due in 2023\par -patient to schedule nursing visit during 2nd or 3rd week of January 2021 to receive flu and pneumonia vaccines\par \par Brenna Ramirez\par PGY-1 [employer]\par \par Case discussed with Dr. Zheng\par \par F/u in 2-3 weeks for nursing visit\par F/u in 6 months for follow up\par \par

## 2020-12-23 NOTE — END OF VISIT
[] : Resident [FreeTextEntry3] : 40-year-old woman with a history of asthma, nephrolithiasis, follicular lymphoma on maintenance rituximab who presents for follow-up of chronic medical conditions.  Has had no recent asthma symptoms.  No recent nephrolithiasis.  Has a change in energy around the time that she has rituximab, with increased weakness and nausea.  Also notes increased breast tenderness since being off Depo-Provera earlier this year, mammogram and breast ultrasound were negative, seen by gynecology.  Vital signs are normal and her exam reveals a well-appearing woman in no apparent distress.  She declines vaccinations at this time, preferring to return after she has had her rituximab next week and feels better a couple of weeks later.  Can organize for nursing visit.  Rest of plan as per PGY 1 note.

## 2020-12-23 NOTE — REVIEW OF SYSTEMS
[Fatigue] : fatigue [Dryness] : dryness  [Muscle Weakness] : muscle weakness [Memory Loss] : memory loss [Negative] : Integumentary [Dizziness] : no dizziness [Fainting] : no fainting [Unsteady Walking] : no ataxia [Suicidal] : not suicidal [Depression] : no depression [FreeTextEntry8] : vulval dryness/itchiness ,improved with topical steroids [FreeTextEntry9] : generalized, associated with rituximab treatment

## 2020-12-23 NOTE — PHYSICAL EXAM
[Declined Breast Exam] : declined breast exam  [Normal] : soft, non-tender, non-distended, no masses palpated, no HSM and normal bowel sounds [Normal Posterior Cervical Nodes] : no posterior cervical lymphadenopathy [Normal Anterior Cervical Nodes] : no anterior cervical lymphadenopathy [No CVA Tenderness] : no CVA  tenderness [No Joint Swelling] : no joint swelling [No Rash] : no rash [Coordination Grossly Intact] : coordination grossly intact [Normal Gait] : normal gait [Normal Affect] : the affect was normal [Normal Insight/Judgement] : insight and judgment were intact

## 2020-12-24 ENCOUNTER — APPOINTMENT (OUTPATIENT)
Dept: UROLOGY | Facility: CLINIC | Age: 40
End: 2020-12-24

## 2020-12-24 ENCOUNTER — OUTPATIENT (OUTPATIENT)
Dept: OUTPATIENT SERVICES | Facility: HOSPITAL | Age: 40
LOS: 1 days | Discharge: HOME | End: 2020-12-24

## 2020-12-24 DIAGNOSIS — C85.88 OTHER SPECIFIED TYPES OF NON-HODGKIN LYMPHOMA, LYMPH NODES OF MULTIPLE SITES: ICD-10-CM

## 2020-12-28 DIAGNOSIS — N64.4 MASTODYNIA: ICD-10-CM

## 2020-12-28 DIAGNOSIS — C85.10 UNSPECIFIED B-CELL LYMPHOMA, UNSPECIFIED SITE: ICD-10-CM

## 2020-12-28 DIAGNOSIS — J45.20 MILD INTERMITTENT ASTHMA, UNCOMPLICATED: ICD-10-CM

## 2020-12-30 ENCOUNTER — LABORATORY RESULT (OUTPATIENT)
Age: 40
End: 2020-12-30

## 2020-12-30 ENCOUNTER — RESULT REVIEW (OUTPATIENT)
Age: 40
End: 2020-12-30

## 2020-12-30 ENCOUNTER — APPOINTMENT (OUTPATIENT)
Dept: INFUSION THERAPY | Facility: HOSPITAL | Age: 40
End: 2020-12-30

## 2020-12-30 DIAGNOSIS — R11.2 NAUSEA WITH VOMITING, UNSPECIFIED: ICD-10-CM

## 2020-12-30 DIAGNOSIS — Z51.11 ENCOUNTER FOR ANTINEOPLASTIC CHEMOTHERAPY: ICD-10-CM

## 2020-12-30 LAB
BASOPHILS # BLD AUTO: 0.03 K/UL — SIGNIFICANT CHANGE UP (ref 0–0.2)
BASOPHILS NFR BLD AUTO: 1 % — SIGNIFICANT CHANGE UP (ref 0–2)
EOSINOPHIL # BLD AUTO: 0.13 K/UL — SIGNIFICANT CHANGE UP (ref 0–0.5)
EOSINOPHIL NFR BLD AUTO: 5 % — SIGNIFICANT CHANGE UP (ref 0–6)
HCT VFR BLD CALC: 34.3 % — LOW (ref 34.5–45)
HGB BLD-MCNC: 11.3 G/DL — LOW (ref 11.5–15.5)
LYMPHOCYTES # BLD AUTO: 0.71 K/UL — LOW (ref 1–3.3)
LYMPHOCYTES # BLD AUTO: 28 % — SIGNIFICANT CHANGE UP (ref 13–44)
MCHC RBC-ENTMCNC: 27.3 PG — SIGNIFICANT CHANGE UP (ref 27–34)
MCHC RBC-ENTMCNC: 32.9 G/DL — SIGNIFICANT CHANGE UP (ref 32–36)
MCV RBC AUTO: 82.9 FL — SIGNIFICANT CHANGE UP (ref 80–100)
MONOCYTES # BLD AUTO: 0.53 K/UL — SIGNIFICANT CHANGE UP (ref 0–0.9)
MONOCYTES NFR BLD AUTO: 21 % — HIGH (ref 2–14)
NEUTROPHILS # BLD AUTO: 1.13 K/UL — LOW (ref 1.8–7.4)
NEUTROPHILS NFR BLD AUTO: 45 % — SIGNIFICANT CHANGE UP (ref 43–77)
NRBC # BLD: 0 /100 — SIGNIFICANT CHANGE UP (ref 0–0)
NRBC # BLD: SIGNIFICANT CHANGE UP /100 WBCS (ref 0–0)
PLAT MORPH BLD: NORMAL — SIGNIFICANT CHANGE UP
PLATELET # BLD AUTO: 196 K/UL — SIGNIFICANT CHANGE UP (ref 150–400)
RBC # BLD: 4.14 M/UL — SIGNIFICANT CHANGE UP (ref 3.8–5.2)
RBC # FLD: 14 % — SIGNIFICANT CHANGE UP (ref 10.3–14.5)
RBC BLD AUTO: SIGNIFICANT CHANGE UP
WBC # BLD: 2.52 K/UL — LOW (ref 3.8–10.5)
WBC # FLD AUTO: 2.52 K/UL — LOW (ref 3.8–10.5)

## 2021-01-13 ENCOUNTER — NON-APPOINTMENT (OUTPATIENT)
Age: 41
End: 2021-01-13

## 2021-01-14 ENCOUNTER — RESULT REVIEW (OUTPATIENT)
Age: 41
End: 2021-01-14

## 2021-01-14 ENCOUNTER — APPOINTMENT (OUTPATIENT)
Dept: HEMATOLOGY ONCOLOGY | Facility: CLINIC | Age: 41
End: 2021-01-14
Payer: MEDICAID

## 2021-01-14 ENCOUNTER — OUTPATIENT (OUTPATIENT)
Dept: OUTPATIENT SERVICES | Facility: HOSPITAL | Age: 41
LOS: 1 days | End: 2021-01-14

## 2021-01-14 ENCOUNTER — RESULT CHARGE (OUTPATIENT)
Age: 41
End: 2021-01-14

## 2021-01-14 ENCOUNTER — APPOINTMENT (OUTPATIENT)
Dept: OBGYN | Facility: HOSPITAL | Age: 41
End: 2021-01-14
Payer: MEDICAID

## 2021-01-14 ENCOUNTER — NON-APPOINTMENT (OUTPATIENT)
Age: 41
End: 2021-01-14

## 2021-01-14 VITALS
WEIGHT: 143.4 LBS | HEART RATE: 77 BPM | DIASTOLIC BLOOD PRESSURE: 66 MMHG | TEMPERATURE: 97.7 F | SYSTOLIC BLOOD PRESSURE: 108 MMHG | HEIGHT: 63 IN | BODY MASS INDEX: 25.41 KG/M2

## 2021-01-14 VITALS
WEIGHT: 143.3 LBS | BODY MASS INDEX: 24.77 KG/M2 | HEIGHT: 63.78 IN | DIASTOLIC BLOOD PRESSURE: 75 MMHG | HEART RATE: 74 BPM | TEMPERATURE: 97.9 F | OXYGEN SATURATION: 99 % | RESPIRATION RATE: 16 BRPM | SYSTOLIC BLOOD PRESSURE: 123 MMHG

## 2021-01-14 LAB
BASOPHILS # BLD AUTO: 0.03 K/UL — SIGNIFICANT CHANGE UP (ref 0–0.2)
BASOPHILS NFR BLD AUTO: 1 % — SIGNIFICANT CHANGE UP (ref 0–2)
EOSINOPHIL # BLD AUTO: 0.19 K/UL — SIGNIFICANT CHANGE UP (ref 0–0.5)
EOSINOPHIL NFR BLD AUTO: 7 % — HIGH (ref 0–6)
HCG UR QL: NEGATIVE
HCT VFR BLD CALC: 34.4 % — LOW (ref 34.5–45)
HGB BLD-MCNC: 11.5 G/DL — SIGNIFICANT CHANGE UP (ref 11.5–15.5)
LYMPHOCYTES # BLD AUTO: 0.66 K/UL — LOW (ref 1–3.3)
LYMPHOCYTES # BLD AUTO: 24 % — SIGNIFICANT CHANGE UP (ref 13–44)
MCHC RBC-ENTMCNC: 27.7 PG — SIGNIFICANT CHANGE UP (ref 27–34)
MCHC RBC-ENTMCNC: 33.4 G/DL — SIGNIFICANT CHANGE UP (ref 32–36)
MCV RBC AUTO: 82.9 FL — SIGNIFICANT CHANGE UP (ref 80–100)
MONOCYTES # BLD AUTO: 0.28 K/UL — SIGNIFICANT CHANGE UP (ref 0–0.9)
MONOCYTES NFR BLD AUTO: 10 % — SIGNIFICANT CHANGE UP (ref 2–14)
NEUTROPHILS # BLD AUTO: 1.61 K/UL — LOW (ref 1.8–7.4)
NEUTROPHILS NFR BLD AUTO: 58 % — SIGNIFICANT CHANGE UP (ref 43–77)
NRBC # BLD: 0 /100 — SIGNIFICANT CHANGE UP (ref 0–0)
NRBC # BLD: SIGNIFICANT CHANGE UP /100 WBCS (ref 0–0)
PLAT MORPH BLD: NORMAL — SIGNIFICANT CHANGE UP
PLATELET # BLD AUTO: 207 K/UL — SIGNIFICANT CHANGE UP (ref 150–400)
QUALITY CONTROL: YES
RBC # BLD: 4.15 M/UL — SIGNIFICANT CHANGE UP (ref 3.8–5.2)
RBC # FLD: 13.1 % — SIGNIFICANT CHANGE UP (ref 10.3–14.5)
RBC BLD AUTO: SIGNIFICANT CHANGE UP
WBC # BLD: 2.77 K/UL — LOW (ref 3.8–10.5)
WBC # FLD AUTO: 2.77 K/UL — LOW (ref 3.8–10.5)

## 2021-01-14 PROCEDURE — 99213 OFFICE O/P EST LOW 20 MIN: CPT | Mod: 25,GC

## 2021-01-14 PROCEDURE — 99072 ADDL SUPL MATRL&STAF TM PHE: CPT

## 2021-01-14 PROCEDURE — 99214 OFFICE O/P EST MOD 30 MIN: CPT

## 2021-01-14 RX ORDER — MEDROXYPROGESTERONE ACETATE 150 MG/ML
150 INJECTION, SUSPENSION INTRAMUSCULAR
Qty: 0 | Refills: 0 | Status: COMPLETED | OUTPATIENT
Start: 2021-01-14

## 2021-01-14 RX ADMIN — MEDROXYPROGESTERONE ACETATE 0 MG/ML: 150 INJECTION, SUSPENSION INTRAMUSCULAR at 00:00

## 2021-01-14 NOTE — PHYSICAL EXAM
[Appropriately responsive] : appropriately responsive [Alert] : alert [No Acute Distress] : no acute distress [Soft] : soft [Non-tender] : non-tender [Non-distended] : non-distended [Oriented x3] : oriented x3 [Labia Majora] : normal [Labia Minora] : normal [Normal] : normal [Normal Position] : in a normal position [Tenderness] : tender [Uterine Adnexae] : normal

## 2021-01-14 NOTE — PHYSICAL EXAM
[Fully active, able to carry on all pre-disease performance without restriction] : Status 0 - Fully active, able to carry on all pre-disease performance without restriction [Normal] : affect appropriate [de-identified] : tearful [de-identified] : cervical adenopathy resolved

## 2021-01-14 NOTE — ASSESSMENT
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 \par We started treatment with Bendamustine/Rituxan on 6/24/19. LAD has improved. C5 delayed due to neutropenia, received on 10/30/19. C6 on 11/26/19, tolerated treatment well. PET/CT done at end of treatment showed interval significant decrease in size and metabolism of LAD. \par \par PET/CT unchanged. Started on Rituximab maintenance on 6/25/20. Cont every 2 mo. s/p 4th dose of Rituxan on 12/30/20. She had worsened side effects after 4th dose and does not want to continue\par will hold further Rituxan maintenance given intolerance. Check PET/CT\par f/u w/ PMD and gyn\par All questions answered\par RTC 1 mo\par

## 2021-01-14 NOTE — HISTORY OF PRESENT ILLNESS
[de-identified] : 39yo F w/ asthma here for evaluation of newly diagnosed follicular lymphoma. \par \par Patient sates she initially noted left inguinal pain in February 2019, had a palpable mass for the last 6 years which she noticed after delivery of her second baby in 2013. She saw GYN, had TVUS: Uterus: 5.6 x 3.6 x 4.3 cm. EMS 3 mm. Right ovary normal. Left ovary normal. Complex elongated left adnexal lesion suggestive of hydrosalpinx, 9.2 x 4.9 x 9 cm. She is s/p b/l salpingectomy on 5/2/19. She remains on depo injection. She states that she has had more swelling and more pain since her procedure. \par \par She had CT A/P done on 5/9/19 which demonstrated extensive retroperitoneal, pelvic, and inguinal lymphadenopathy, concerning for malignancy with small amount of abdominal and pelvic ascites.\par s/p IR biopsy of left inguinal lymph node - follicular lymphoma grade 1-2. \par \par Also having cervical LAD, noticed for the last few months, L>R. Noted R inguinal swelling for the last week with pain. No fevers of chills. Notes having itching and that her breathing is a bit more labored than usual.  [de-identified] : BMbx (6/10/19): - Focal lymphoid infiltrate consistent with involvement by follicular lymphoma (history of follicular lymphoma)\par - Small paracortical sample with trilineage hematopoiesis with maturation and iron stores present\par \par PET/CT (6/16/19): 1. Hypermetabolic lymphadenopathy in neck, thorax, abdomen, pelvis, and bilateral inguinal/femoral regions corresponds to biopsy-proven follicular lymphoma. Hypermetabolic subcutaneous nodule, right posterolateral chest wall, is compatible with an additional site of lymphoma.\par 2. Nonspecific left greater than right tonsillar hypermetabolism may represent additional sites of lymphoma.\par 3. Findings compatible with bone marrow involvement by lymphoma in bilateral humeri and axial skeleton, as described above.\par \par She reports having increasing fatigue. Still having slight shortness of breath.\par \par We started BR on 6/24. She had a reaction to Rituxan. \par \par Eating fine. Neck LAD, shortness of breath and abdominal bloating improved/resolved. \par She had a yeast infection, saw GYN. Also reports having pelvic pain and was found to have a small polyp. \par \par C2 on 7/22. She notes having burning as she was getting the chemo (please see chart note for details). Notes that her arms were still hurting for a week after. \par She had a portacath placed 8/12\par Felt more fatigued and weak. \par Had transvaginal US, saw GYN this morning (9/5/19). Pelvic pain has resolved. \par \par C3 on 8/19. Tolerated it well. \par \par CT N/C/A/P (9/10/19): Marked decrease in size of cervical lymph nodes when compared with the prior exam compatible with a favorable response to therapy. Significant interval decrease in size of left supraclavicular lymphadenopathy.\par Focal enhancement involving the right anterior tonsillar without associated tonsillar expansion. Correlation with direct visualization and continued follow-up is advised.\par Significant interval decrease in size of previously noted hypermetabolic nodule in the soft tissues of the right posterior lateral chest.\par Significant interval decrease in size of retroperitoneal, pelvic, inguinal, and mesenteric lymphadenopathy as described above.\par \par C4 on 9/16. Tolerated well, had some tremors afterwards. \par \par C5 was delayed secondary to neutropenia. Given on 10/30/19. Had an episode of chest tightness w/ Pillo on day 1. Premedicated on day 2 with no events. Tremors better this cycle but notes 3 episodes of restless legs. \par \par C6 on 11/26/19. She reports having muscle aches. \par \par PET/CT 1/18/20: 1. Interval resolution of FDG activity associated with lymphadenopathy in the neck, chest, upper abdomen and pelvis with either resolution or significant decrease in size of the corresponding lymph nodes and not well delineated on CT as compared to PET/CT from 6/16/2019. Near total resolution of FDG avid left inguinal lymphadenopathy which is significantly decreased in size now demonstrating background activity.\par 2. Interval significant decrease in size and metabolism of hypermetabolic retroperitoneal and mesenteric lymphadenopathy.\par 3. Interval resolution of asymmetric tonsillar hypermetabolism.\par 4. Interval resolution of FDG avidity in bone marrow of bilateral humeri, T10, and sacrum.\par \par Having pain on L side of lower abdomen and groin around 3-4 weeks ago. Having night sweats again too. Had GYN f/u 2 weeks ago.\par We did a PET/CT on 6/13 which showed: 1. Small focus of mild FDG activity, decreased in size and metabolism, is associated with mesenteric haziness which is unchanged on CT as compared to prior study dated 1/18/2020 ([facility] 4). Resolution of minimally FDG-avid amorphous density, left periaortic region.\par 2. Minimally FDG-avid density, left inguinal region, unchanged, may represent postsurgical change versus lymph node. Please correlate clinically.\par \par Started Rituxan maintenance on 6/25/20. She had a reaction to Rituxan -please see chart note for details. \par Second dose on 8/31/20, tolerated better. Reports having back pain after premeds wore off. \par 3rd dose on 11/4/20, she felt tired and slept through the treatment, denied nausea, but c/o tingling in the finger tips and feet comes and goes. Patient admitted tremor improved.\par birth control pills d/c 3 months ago, she attributed the nausea after Rituxan to the withdrawal of birth control pills. Kidney stone stent removed today. \par Fourth dose on 12/30/20. Pt reports nausea and fatigue. States that does not feel well. Does not want to continue at this time.

## 2021-01-15 LAB
ALBUMIN SERPL ELPH-MCNC: 4.5 G/DL
ALP BLD-CCNC: 98 U/L
ALT SERPL-CCNC: 51 U/L
ANION GAP SERPL CALC-SCNC: 13 MMOL/L
AST SERPL-CCNC: 31 U/L
BILIRUB SERPL-MCNC: 0.2 MG/DL
BUN SERPL-MCNC: 11 MG/DL
CALCIUM SERPL-MCNC: 9.7 MG/DL
CHLORIDE SERPL-SCNC: 111 MMOL/L
CO2 SERPL-SCNC: 19 MMOL/L
CREAT SERPL-MCNC: 0.67 MG/DL
GLUCOSE SERPL-MCNC: 100 MG/DL
LDH SERPL-CCNC: 264 U/L
POTASSIUM SERPL-SCNC: 3.7 MMOL/L
PROT SERPL-MCNC: 6.4 G/DL
SODIUM SERPL-SCNC: 143 MMOL/L

## 2021-01-16 NOTE — HISTORY OF PRESENT ILLNESS
[FreeTextEntry1] : 39y/o  F LMP / with medical history significant for stage IV follicular lymphoma diagnosed 2019 (retroperitoneal, pelvic and inguinal LAD) pesenting to GYN clinic with c/o AUB. \par \par Patient is s/p bilateral salpingectomy and reports she was previously on Depo-Provera for regulation of her menstrual cycle and PMS symptoms. She discontinued the Depo-Provera injections in 2020 2/2 prolonged use (7 year history). She states that she initially tolerated discontinuation of Depo well, however over the past 2-3 months has noticed increasingly irregular bleeding and inter-menstrual spotting. She notes painful periods but states they are generally light, and occur at q2-3 week intervals. She notes breast tenderness accompanying her cycle. \par \par She endorses mood lability and the sensation that she is "loosing control of herself". She states since discontinuation of the depo injections, it has become increasingly difficulty for her to emotionally cope with her cancer treatment. \par \par She states that she has tried OCPs in the past, but frequently missed doses. She declines Nexplanon or IUD.

## 2021-01-19 ENCOUNTER — APPOINTMENT (OUTPATIENT)
Dept: INTERNAL MEDICINE | Facility: CLINIC | Age: 41
End: 2021-01-19

## 2021-01-19 ENCOUNTER — OUTPATIENT (OUTPATIENT)
Dept: OUTPATIENT SERVICES | Facility: HOSPITAL | Age: 41
LOS: 1 days | End: 2021-01-19

## 2021-01-19 ENCOUNTER — MED ADMIN CHARGE (OUTPATIENT)
Age: 41
End: 2021-01-19

## 2021-01-19 VITALS — TEMPERATURE: 97.5 F

## 2021-01-20 DIAGNOSIS — Z23 ENCOUNTER FOR IMMUNIZATION: ICD-10-CM

## 2021-01-25 ENCOUNTER — NON-APPOINTMENT (OUTPATIENT)
Age: 41
End: 2021-01-25

## 2021-01-26 DIAGNOSIS — N93.9 ABNORMAL UTERINE AND VAGINAL BLEEDING, UNSPECIFIED: ICD-10-CM

## 2021-01-26 DIAGNOSIS — Z30.42 ENCOUNTER FOR SURVEILLANCE OF INJECTABLE CONTRACEPTIVE: ICD-10-CM

## 2021-01-26 DIAGNOSIS — Z00.00 ENCOUNTER FOR GENERAL ADULT MEDICAL EXAMINATION WITHOUT ABNORMAL FINDINGS: ICD-10-CM

## 2021-02-04 ENCOUNTER — NON-APPOINTMENT (OUTPATIENT)
Age: 41
End: 2021-02-04

## 2021-02-05 ENCOUNTER — APPOINTMENT (OUTPATIENT)
Dept: OPHTHALMOLOGY | Facility: CLINIC | Age: 41
End: 2021-02-05
Payer: MEDICAID

## 2021-02-05 ENCOUNTER — NON-APPOINTMENT (OUTPATIENT)
Age: 41
End: 2021-02-05

## 2021-02-05 PROCEDURE — 92133 CPTRZD OPH DX IMG PST SGM ON: CPT

## 2021-02-05 PROCEDURE — 92083 EXTENDED VISUAL FIELD XM: CPT

## 2021-02-05 PROCEDURE — 92012 INTRM OPH EXAM EST PATIENT: CPT

## 2021-02-05 PROCEDURE — 99072 ADDL SUPL MATRL&STAF TM PHE: CPT

## 2021-02-06 ENCOUNTER — OUTPATIENT (OUTPATIENT)
Dept: OUTPATIENT SERVICES | Facility: HOSPITAL | Age: 41
LOS: 1 days | End: 2021-02-06
Payer: MEDICAID

## 2021-02-06 ENCOUNTER — APPOINTMENT (OUTPATIENT)
Dept: NUCLEAR MEDICINE | Facility: IMAGING CENTER | Age: 41
End: 2021-02-06
Payer: MEDICAID

## 2021-02-06 DIAGNOSIS — C85.10 UNSPECIFIED B-CELL LYMPHOMA, UNSPECIFIED SITE: ICD-10-CM

## 2021-02-06 PROCEDURE — A9552: CPT

## 2021-02-06 PROCEDURE — 78815 PET IMAGE W/CT SKULL-THIGH: CPT | Mod: 26,PS

## 2021-02-06 PROCEDURE — 78815 PET IMAGE W/CT SKULL-THIGH: CPT

## 2021-02-08 ENCOUNTER — APPOINTMENT (OUTPATIENT)
Dept: ULTRASOUND IMAGING | Facility: IMAGING CENTER | Age: 41
End: 2021-02-08

## 2021-02-15 ENCOUNTER — OUTPATIENT (OUTPATIENT)
Dept: OUTPATIENT SERVICES | Facility: HOSPITAL | Age: 41
LOS: 1 days | Discharge: HOME | End: 2021-02-15

## 2021-02-15 DIAGNOSIS — C85.88 OTHER SPECIFIED TYPES OF NON-HODGKIN LYMPHOMA, LYMPH NODES OF MULTIPLE SITES: ICD-10-CM

## 2021-02-18 ENCOUNTER — APPOINTMENT (OUTPATIENT)
Dept: HEMATOLOGY ONCOLOGY | Facility: CLINIC | Age: 41
End: 2021-02-18
Payer: MEDICAID

## 2021-02-18 PROCEDURE — 99213 OFFICE O/P EST LOW 20 MIN: CPT | Mod: 95

## 2021-02-18 NOTE — PHYSICAL EXAM
[Fully active, able to carry on all pre-disease performance without restriction] : Status 0 - Fully active, able to carry on all pre-disease performance without restriction [Normal] : grossly intact [de-identified] : cervical adenopathy resolved

## 2021-02-18 NOTE — HISTORY OF PRESENT ILLNESS
[Home] : at home, [unfilled] , at the time of the visit. [Other Location: e.g. Home (Enter Location, City,State)___] : at [unfilled] [Verbal consent obtained from patient] : the patient, [unfilled] [de-identified] : 37yo F w/ asthma here for evaluation of newly diagnosed follicular lymphoma. \par \par Patient sates she initially noted left inguinal pain in February 2019, had a palpable mass for the last 6 years which she noticed after delivery of her second baby in 2013. She saw GYN, had TVUS: Uterus: 5.6 x 3.6 x 4.3 cm. EMS 3 mm. Right ovary normal. Left ovary normal. Complex elongated left adnexal lesion suggestive of hydrosalpinx, 9.2 x 4.9 x 9 cm. She is s/p b/l salpingectomy on 5/2/19. She remains on depo injection. She states that she has had more swelling and more pain since her procedure. \par \par She had CT A/P done on 5/9/19 which demonstrated extensive retroperitoneal, pelvic, and inguinal lymphadenopathy, concerning for malignancy with small amount of abdominal and pelvic ascites.\par s/p IR biopsy of left inguinal lymph node - follicular lymphoma grade 1-2. \par \par Also having cervical LAD, noticed for the last few months, L>R. Noted R inguinal swelling for the last week with pain. No fevers of chills. Notes having itching and that her breathing is a bit more labored than usual.  [de-identified] : BMbx (6/10/19): - Focal lymphoid infiltrate consistent with involvement by follicular lymphoma (history of follicular lymphoma)\par - Small paracortical sample with trilineage hematopoiesis with maturation and iron stores present\par \par PET/CT (6/16/19): 1. Hypermetabolic lymphadenopathy in neck, thorax, abdomen, pelvis, and bilateral inguinal/femoral regions corresponds to biopsy-proven follicular lymphoma. Hypermetabolic subcutaneous nodule, right posterolateral chest wall, is compatible with an additional site of lymphoma.\par 2. Nonspecific left greater than right tonsillar hypermetabolism may represent additional sites of lymphoma.\par 3. Findings compatible with bone marrow involvement by lymphoma in bilateral humeri and axial skeleton, as described above.\par \par She reports having increasing fatigue. Still having slight shortness of breath.\par \par We started BR on 6/24. She had a reaction to Rituxan. \par \par Eating fine. Neck LAD, shortness of breath and abdominal bloating improved/resolved. \par She had a yeast infection, saw GYN. Also reports having pelvic pain and was found to have a small polyp. \par \par C2 on 7/22. She notes having burning as she was getting the chemo (please see chart note for details). Notes that her arms were still hurting for a week after. \par She had a portacath placed 8/12\par Felt more fatigued and weak. \par Had transvaginal US, saw GYN this morning (9/5/19). Pelvic pain has resolved. \par \par C3 on 8/19. Tolerated it well. \par \par CT N/C/A/P (9/10/19): Marked decrease in size of cervical lymph nodes when compared with the prior exam compatible with a favorable response to therapy. Significant interval decrease in size of left supraclavicular lymphadenopathy.\par Focal enhancement involving the right anterior tonsillar without associated tonsillar expansion. Correlation with direct visualization and continued follow-up is advised.\par Significant interval decrease in size of previously noted hypermetabolic nodule in the soft tissues of the right posterior lateral chest.\par Significant interval decrease in size of retroperitoneal, pelvic, inguinal, and mesenteric lymphadenopathy as described above.\par \par C4 on 9/16. Tolerated well, had some tremors afterwards. \par \par C5 was delayed secondary to neutropenia. Given on 10/30/19. Had an episode of chest tightness w/ Pillo on day 1. Premedicated on day 2 with no events. Tremors better this cycle but notes 3 episodes of restless legs. \par \par C6 on 11/26/19. She reports having muscle aches. \par \par PET/CT 1/18/20: 1. Interval resolution of FDG activity associated with lymphadenopathy in the neck, chest, upper abdomen and pelvis with either resolution or significant decrease in size of the corresponding lymph nodes and not well delineated on CT as compared to PET/CT from 6/16/2019. Near total resolution of FDG avid left inguinal lymphadenopathy which is significantly decreased in size now demonstrating background activity.\par 2. Interval significant decrease in size and metabolism of hypermetabolic retroperitoneal and mesenteric lymphadenopathy.\par 3. Interval resolution of asymmetric tonsillar hypermetabolism.\par 4. Interval resolution of FDG avidity in bone marrow of bilateral humeri, T10, and sacrum.\par \par Having pain on L side of lower abdomen and groin around 3-4 weeks ago. Having night sweats again too. Had GYN f/u 2 weeks ago.\par We did a PET/CT on 6/13 which showed: 1. Small focus of mild FDG activity, decreased in size and metabolism, is associated with mesenteric haziness which is unchanged on CT as compared to prior study dated 1/18/2020 ([facility] 4). Resolution of minimally FDG-avid amorphous density, left periaortic region.\par 2. Minimally FDG-avid density, left inguinal region, unchanged, may represent postsurgical change versus lymph node. Please correlate clinically.\par \par Started Rituxan maintenance on 6/25/20. She had a reaction to Rituxan -please see chart note for details. \par Second dose on 8/31/20, tolerated better. Reports having back pain after premeds wore off. \par 3rd dose on 11/4/20, she felt tired and slept through the treatment, denied nausea, but c/o tingling in the finger tips and feet comes and goes. Patient admitted tremor improved.\par birth control pills d/c 3 months ago, she attributed the nausea after Rituxan to the withdrawal of birth control pills. Kidney stone stent removed today. \par Fourth dose on 12/30/20. Pt reports nausea and fatigue. States that does not feel well. Does not want to continue at this time. \par \par PET/CT 2/6/21: 1. Resolution of small focus of mild FDG activity associated with mesenteric haziness which is unchanged on CT ([facility] 1).\par 2. Minimally FDG-avid density, left inguinal region, unchanged, may represent postsurgical change versus lymph node. Please correlate clinically.\par 3. Remainder study is unremarkable and not significantly changed, demonstrating no evidence of FDG-avid disease.\par \par She is doing well, no new complaints.

## 2021-02-24 ENCOUNTER — APPOINTMENT (OUTPATIENT)
Dept: INFUSION THERAPY | Facility: HOSPITAL | Age: 41
End: 2021-02-24

## 2021-02-25 ENCOUNTER — RESULT REVIEW (OUTPATIENT)
Age: 41
End: 2021-02-25

## 2021-02-25 ENCOUNTER — LABORATORY RESULT (OUTPATIENT)
Age: 41
End: 2021-02-25

## 2021-02-25 ENCOUNTER — NON-APPOINTMENT (OUTPATIENT)
Age: 41
End: 2021-02-25

## 2021-02-25 ENCOUNTER — APPOINTMENT (OUTPATIENT)
Dept: INFUSION THERAPY | Facility: HOSPITAL | Age: 41
End: 2021-02-25

## 2021-02-25 ENCOUNTER — OUTPATIENT (OUTPATIENT)
Dept: OUTPATIENT SERVICES | Facility: HOSPITAL | Age: 41
LOS: 1 days | End: 2021-02-25
Payer: MEDICAID

## 2021-02-25 ENCOUNTER — APPOINTMENT (OUTPATIENT)
Dept: ULTRASOUND IMAGING | Facility: CLINIC | Age: 41
End: 2021-02-25

## 2021-02-25 DIAGNOSIS — R10.2 PELVIC AND PERINEAL PAIN: ICD-10-CM

## 2021-02-25 LAB
BASOPHILS # BLD AUTO: 0 K/UL — SIGNIFICANT CHANGE UP (ref 0–0.2)
BASOPHILS NFR BLD AUTO: 0 % — SIGNIFICANT CHANGE UP (ref 0–2)
EOSINOPHIL # BLD AUTO: 0.14 K/UL — SIGNIFICANT CHANGE UP (ref 0–0.5)
EOSINOPHIL NFR BLD AUTO: 4.1 % — SIGNIFICANT CHANGE UP (ref 0–6)
HCT VFR BLD CALC: 35.7 % — SIGNIFICANT CHANGE UP (ref 34.5–45)
HGB BLD-MCNC: 11.6 G/DL — SIGNIFICANT CHANGE UP (ref 11.5–15.5)
LYMPHOCYTES # BLD AUTO: 0.69 K/UL — LOW (ref 1–3.3)
LYMPHOCYTES # BLD AUTO: 20.4 % — SIGNIFICANT CHANGE UP (ref 13–44)
MCHC RBC-ENTMCNC: 27.2 PG — SIGNIFICANT CHANGE UP (ref 27–34)
MCHC RBC-ENTMCNC: 32.5 G/DL — SIGNIFICANT CHANGE UP (ref 32–36)
MCV RBC AUTO: 83.6 FL — SIGNIFICANT CHANGE UP (ref 80–100)
MONOCYTES # BLD AUTO: 0.45 K/UL — SIGNIFICANT CHANGE UP (ref 0–0.9)
MONOCYTES NFR BLD AUTO: 13.3 % — SIGNIFICANT CHANGE UP (ref 2–14)
NEUTROPHILS # BLD AUTO: 2.11 K/UL — SIGNIFICANT CHANGE UP (ref 1.8–7.4)
NEUTROPHILS NFR BLD AUTO: 62.2 % — SIGNIFICANT CHANGE UP (ref 43–77)
NRBC # BLD: 0 /100 — SIGNIFICANT CHANGE UP (ref 0–0)
NRBC # BLD: SIGNIFICANT CHANGE UP /100 WBCS (ref 0–0)
PLAT MORPH BLD: NORMAL — SIGNIFICANT CHANGE UP
PLATELET # BLD AUTO: 231 K/UL — SIGNIFICANT CHANGE UP (ref 150–400)
RBC # BLD: 4.27 M/UL — SIGNIFICANT CHANGE UP (ref 3.8–5.2)
RBC # FLD: 13.2 % — SIGNIFICANT CHANGE UP (ref 10.3–14.5)
RBC BLD AUTO: SIGNIFICANT CHANGE UP
WBC # BLD: 3.39 K/UL — LOW (ref 3.8–10.5)
WBC # FLD AUTO: 3.39 K/UL — LOW (ref 3.8–10.5)

## 2021-02-25 PROCEDURE — 76830 TRANSVAGINAL US NON-OB: CPT | Mod: 26

## 2021-02-25 PROCEDURE — 76830 TRANSVAGINAL US NON-OB: CPT

## 2021-03-16 ENCOUNTER — OUTPATIENT (OUTPATIENT)
Dept: OUTPATIENT SERVICES | Facility: HOSPITAL | Age: 41
LOS: 1 days | End: 2021-03-16

## 2021-03-16 ENCOUNTER — APPOINTMENT (OUTPATIENT)
Dept: INTERNAL MEDICINE | Facility: CLINIC | Age: 41
End: 2021-03-16

## 2021-03-16 VITALS — TEMPERATURE: 96.7 F

## 2021-03-17 DIAGNOSIS — Z23 ENCOUNTER FOR IMMUNIZATION: ICD-10-CM

## 2021-04-01 ENCOUNTER — OUTPATIENT (OUTPATIENT)
Dept: OUTPATIENT SERVICES | Facility: HOSPITAL | Age: 41
LOS: 1 days | End: 2021-04-01
Payer: MEDICAID

## 2021-04-01 ENCOUNTER — APPOINTMENT (OUTPATIENT)
Dept: OBGYN | Facility: HOSPITAL | Age: 41
End: 2021-04-01
Payer: MEDICAID

## 2021-04-01 ENCOUNTER — APPOINTMENT (OUTPATIENT)
Dept: ULTRASOUND IMAGING | Facility: IMAGING CENTER | Age: 41
End: 2021-04-01
Payer: MEDICAID

## 2021-04-01 ENCOUNTER — RESULT REVIEW (OUTPATIENT)
Age: 41
End: 2021-04-01

## 2021-04-01 ENCOUNTER — OUTPATIENT (OUTPATIENT)
Dept: OUTPATIENT SERVICES | Facility: HOSPITAL | Age: 41
LOS: 1 days | End: 2021-04-01

## 2021-04-01 VITALS
HEIGHT: 64 IN | BODY MASS INDEX: 24.75 KG/M2 | TEMPERATURE: 98 F | DIASTOLIC BLOOD PRESSURE: 86 MMHG | WEIGHT: 145 LBS | HEART RATE: 85 BPM | SYSTOLIC BLOOD PRESSURE: 122 MMHG

## 2021-04-01 DIAGNOSIS — N83.201 UNSPECIFIED OVARIAN CYST, RIGHT SIDE: ICD-10-CM

## 2021-04-01 PROCEDURE — 76830 TRANSVAGINAL US NON-OB: CPT

## 2021-04-01 PROCEDURE — 99213 OFFICE O/P EST LOW 20 MIN: CPT | Mod: 25,GC

## 2021-04-01 PROCEDURE — 76830 TRANSVAGINAL US NON-OB: CPT | Mod: 26

## 2021-04-01 PROCEDURE — 96372 THER/PROPH/DIAG INJ SC/IM: CPT | Mod: NC

## 2021-04-01 RX ORDER — MEDROXYPROGESTERONE ACETATE 150 MG/ML
150 INJECTION, SUSPENSION INTRAMUSCULAR
Qty: 0 | Refills: 0 | Status: COMPLETED | OUTPATIENT
Start: 2021-04-01

## 2021-04-01 RX ADMIN — MEDROXYPROGESTERONE ACETATE 0 MG/ML: 150 INJECTION, SUSPENSION INTRAMUSCULAR at 00:00

## 2021-04-01 NOTE — DISCUSSION/SUMMARY
[FreeTextEntry1] : Patient is a 39 yo P2 female presenting for Depo Provera. \par \par 1. AUB\par - symptomatically improved with Depo Provera\par - Dose given today \par \par 2. Ovarian cyst\par - f/u TVUS performed on 4/1\par \par \par RTC 3 months\par d/w Dr. Mason\par JCARLOS [facility] [id number]

## 2021-04-01 NOTE — HISTORY OF PRESENT ILLNESS
[FreeTextEntry1] : Patient is a 39 yo  female LMP 3/26/21 presenting for depo provera shot and follow up of ovarian cyst seen on sono. PMH stage IV follicular lymphoma. Today, she has no acute complaints. She reports menses have improved since restarting Depo. Her current cycle has been light and only required a panty liner. She denies pelvic pain/cramps, dysuria, urinary frequency/urgency, changes in BM, vaginal discharge. \par \par She went to her ultrasound appt today to follow up on her ovarian cyst today.

## 2021-04-01 NOTE — PHYSICAL EXAM
[Appropriately responsive] : appropriately responsive [Soft] : soft [Non-distended] : non-distended [Non-tender] : non-tender [FreeTextEntry1] : Deferred as patient is asymptomatic

## 2021-04-05 ENCOUNTER — NON-APPOINTMENT (OUTPATIENT)
Age: 41
End: 2021-04-05

## 2021-04-05 DIAGNOSIS — N83.201 UNSPECIFIED OVARIAN CYST, RIGHT SIDE: ICD-10-CM

## 2021-04-05 DIAGNOSIS — N92.6 IRREGULAR MENSTRUATION, UNSPECIFIED: ICD-10-CM

## 2021-04-05 DIAGNOSIS — Z30.42 ENCOUNTER FOR SURVEILLANCE OF INJECTABLE CONTRACEPTIVE: ICD-10-CM

## 2021-05-14 ENCOUNTER — OUTPATIENT (OUTPATIENT)
Dept: OUTPATIENT SERVICES | Facility: HOSPITAL | Age: 41
LOS: 1 days | Discharge: HOME | End: 2021-05-14

## 2021-05-14 DIAGNOSIS — C85.88 OTHER SPECIFIED TYPES OF NON-HODGKIN LYMPHOMA, LYMPH NODES OF MULTIPLE SITES: ICD-10-CM

## 2021-05-17 ENCOUNTER — APPOINTMENT (OUTPATIENT)
Dept: INFUSION THERAPY | Facility: HOSPITAL | Age: 41
End: 2021-05-17

## 2021-05-17 ENCOUNTER — LABORATORY RESULT (OUTPATIENT)
Age: 41
End: 2021-05-17

## 2021-05-17 ENCOUNTER — RESULT REVIEW (OUTPATIENT)
Age: 41
End: 2021-05-17

## 2021-05-17 ENCOUNTER — APPOINTMENT (OUTPATIENT)
Dept: ULTRASOUND IMAGING | Facility: IMAGING CENTER | Age: 41
End: 2021-05-17

## 2021-05-17 ENCOUNTER — APPOINTMENT (OUTPATIENT)
Dept: HEMATOLOGY ONCOLOGY | Facility: CLINIC | Age: 41
End: 2021-05-17
Payer: MEDICAID

## 2021-05-17 VITALS
TEMPERATURE: 97.5 F | HEART RATE: 82 BPM | BODY MASS INDEX: 25.78 KG/M2 | OXYGEN SATURATION: 99 % | HEIGHT: 64 IN | RESPIRATION RATE: 16 BRPM | WEIGHT: 151.02 LBS | DIASTOLIC BLOOD PRESSURE: 89 MMHG | SYSTOLIC BLOOD PRESSURE: 124 MMHG

## 2021-05-17 LAB
BASOPHILS # BLD AUTO: 0 K/UL — SIGNIFICANT CHANGE UP (ref 0–0.2)
BASOPHILS NFR BLD AUTO: 0 % — SIGNIFICANT CHANGE UP (ref 0–2)
EOSINOPHIL # BLD AUTO: 0.04 K/UL — SIGNIFICANT CHANGE UP (ref 0–0.5)
EOSINOPHIL NFR BLD AUTO: 1 % — SIGNIFICANT CHANGE UP (ref 0–6)
HCT VFR BLD CALC: 35.3 % — SIGNIFICANT CHANGE UP (ref 34.5–45)
HGB BLD-MCNC: 11.4 G/DL — LOW (ref 11.5–15.5)
LYMPHOCYTES # BLD AUTO: 1.16 K/UL — SIGNIFICANT CHANGE UP (ref 1–3.3)
LYMPHOCYTES # BLD AUTO: 33 % — SIGNIFICANT CHANGE UP (ref 13–44)
MCHC RBC-ENTMCNC: 27.5 PG — SIGNIFICANT CHANGE UP (ref 27–34)
MCHC RBC-ENTMCNC: 32.3 G/DL — SIGNIFICANT CHANGE UP (ref 32–36)
MCV RBC AUTO: 85.1 FL — SIGNIFICANT CHANGE UP (ref 80–100)
MONOCYTES # BLD AUTO: 0.49 K/UL — SIGNIFICANT CHANGE UP (ref 0–0.9)
MONOCYTES NFR BLD AUTO: 14 % — SIGNIFICANT CHANGE UP (ref 2–14)
NEUTROPHILS # BLD AUTO: 1.82 K/UL — SIGNIFICANT CHANGE UP (ref 1.8–7.4)
NEUTROPHILS NFR BLD AUTO: 52 % — SIGNIFICANT CHANGE UP (ref 43–77)
NRBC # BLD: 0 /100 — SIGNIFICANT CHANGE UP (ref 0–0)
NRBC # BLD: SIGNIFICANT CHANGE UP /100 WBCS (ref 0–0)
PLAT MORPH BLD: NORMAL — SIGNIFICANT CHANGE UP
PLATELET # BLD AUTO: 231 K/UL — SIGNIFICANT CHANGE UP (ref 150–400)
RBC # BLD: 4.15 M/UL — SIGNIFICANT CHANGE UP (ref 3.8–5.2)
RBC # FLD: 13.5 % — SIGNIFICANT CHANGE UP (ref 10.3–14.5)
RBC BLD AUTO: SIGNIFICANT CHANGE UP
WBC # BLD: 3.5 K/UL — LOW (ref 3.8–10.5)
WBC # FLD AUTO: 3.5 K/UL — LOW (ref 3.8–10.5)

## 2021-05-17 PROCEDURE — 99072 ADDL SUPL MATRL&STAF TM PHE: CPT

## 2021-05-17 PROCEDURE — 99214 OFFICE O/P EST MOD 30 MIN: CPT

## 2021-05-19 NOTE — ED ADULT NURSE NOTE - NSFALLRSKOUTCOME_ED_ALL_ED
Report received from [place] [facility]. Mario to call primary RN for transfer report. 1223: VSS pt is being transported back to room with [facility] tech. Fall Risk

## 2021-05-25 NOTE — ASSESSMENT
[FreeTextEntry1] : 41yo F w/ asthma here for f/u of stage IV follicular lymphoma grade 1-2. \par We started treatment with Bendamustine/Rituxan on 6/24/19. LAD has improved. C5 delayed due to neutropenia, received on 10/30/19. C6 on 11/26/19, tolerated treatment well. PET/CT done at end of treatment showed interval significant decrease in size and metabolism of LAD. \par \par PET/CT unchanged. Started on Rituximab maintenance on 6/25/20. Cont every 2 mo. s/p 4th dose of Rituxan on 12/30/20. She had worsened side effects after 4th dose and does not want to continue. Will hold further Rituxan maintenance given intolerance. PET/CT post Rituxan stable\par f/u w/ PMD and gyn\par Will refer her to psycho-oncology -pt exhibits psychological distress including anxious thought, ruminations, tearfulness, and anhedonia. These symptoms interfere with functioning across domains. Pt is likely to benefit from psychotherapy. Discussed SW referral as well but pt would like one at a time. Recommended that she join a support group through S as well. \par All questions answered\par port flush every 6 wks\par RTC 3 mo\par  Airway patent, Nasal mucosa clear. Mouth with normal mucosa. Throat has no vesicles, no oropharyngeal exudates and uvula is midline.

## 2021-05-25 NOTE — HISTORY OF PRESENT ILLNESS
[de-identified] : 37yo F w/ asthma here for evaluation of newly diagnosed follicular lymphoma. \par \par Patient sates she initially noted left inguinal pain in February 2019, had a palpable mass for the last 6 years which she noticed after delivery of her second baby in 2013. She saw GYN, had TVUS: Uterus: 5.6 x 3.6 x 4.3 cm. EMS 3 mm. Right ovary normal. Left ovary normal. Complex elongated left adnexal lesion suggestive of hydrosalpinx, 9.2 x 4.9 x 9 cm. She is s/p b/l salpingectomy on 5/2/19. She remains on depo injection. She states that she has had more swelling and more pain since her procedure. \par \par She had CT A/P done on 5/9/19 which demonstrated extensive retroperitoneal, pelvic, and inguinal lymphadenopathy, concerning for malignancy with small amount of abdominal and pelvic ascites.\par s/p IR biopsy of left inguinal lymph node - follicular lymphoma grade 1-2. \par \par Also having cervical LAD, noticed for the last few months, L>R. Noted R inguinal swelling for the last week with pain. No fevers of chills. Notes having itching and that her breathing is a bit more labored than usual.  [de-identified] : BMbx (6/10/19): - Focal lymphoid infiltrate consistent with involvement by follicular lymphoma (history of follicular lymphoma)\par - Small paracortical sample with trilineage hematopoiesis with maturation and iron stores present\par \par PET/CT (6/16/19): 1. Hypermetabolic lymphadenopathy in neck, thorax, abdomen, pelvis, and bilateral inguinal/femoral regions corresponds to biopsy-proven follicular lymphoma. Hypermetabolic subcutaneous nodule, right posterolateral chest wall, is compatible with an additional site of lymphoma.\par 2. Nonspecific left greater than right tonsillar hypermetabolism may represent additional sites of lymphoma.\par 3. Findings compatible with bone marrow involvement by lymphoma in bilateral humeri and axial skeleton, as described above.\par \par She reports having increasing fatigue. Still having slight shortness of breath.\par \par We started BR on 6/24. She had a reaction to Rituxan. \par \par Eating fine. Neck LAD, shortness of breath and abdominal bloating improved/resolved. \par She had a yeast infection, saw GYN. Also reports having pelvic pain and was found to have a small polyp. \par \par C2 on 7/22. She notes having burning as she was getting the chemo (please see chart note for details). Notes that her arms were still hurting for a week after. \par She had a portacath placed 8/12\par Felt more fatigued and weak. \par Had transvaginal US, saw GYN this morning (9/5/19). Pelvic pain has resolved. \par \par C3 on 8/19. Tolerated it well. \par \par CT N/C/A/P (9/10/19): Marked decrease in size of cervical lymph nodes when compared with the prior exam compatible with a favorable response to therapy. Significant interval decrease in size of left supraclavicular lymphadenopathy.\par Focal enhancement involving the right anterior tonsillar without associated tonsillar expansion. Correlation with direct visualization and continued follow-up is advised.\par Significant interval decrease in size of previously noted hypermetabolic nodule in the soft tissues of the right posterior lateral chest.\par Significant interval decrease in size of retroperitoneal, pelvic, inguinal, and mesenteric lymphadenopathy as described above.\par \par C4 on 9/16. Tolerated well, had some tremors afterwards. \par \par C5 was delayed secondary to neutropenia. Given on 10/30/19. Had an episode of chest tightness w/ Pillo on day 1. Premedicated on day 2 with no events. Tremors better this cycle but notes 3 episodes of restless legs. \par \par C6 on 11/26/19. She reports having muscle aches. \par \par PET/CT 1/18/20: 1. Interval resolution of FDG activity associated with lymphadenopathy in the neck, chest, upper abdomen and pelvis with either resolution or significant decrease in size of the corresponding lymph nodes and not well delineated on CT as compared to PET/CT from 6/16/2019. Near total resolution of FDG avid left inguinal lymphadenopathy which is significantly decreased in size now demonstrating background activity.\par 2. Interval significant decrease in size and metabolism of hypermetabolic retroperitoneal and mesenteric lymphadenopathy.\par 3. Interval resolution of asymmetric tonsillar hypermetabolism.\par 4. Interval resolution of FDG avidity in bone marrow of bilateral humeri, T10, and sacrum.\par \par Having pain on L side of lower abdomen and groin around 3-4 weeks ago. Having night sweats again too. Had GYN f/u 2 weeks ago.\par We did a PET/CT on 6/13 which showed: 1. Small focus of mild FDG activity, decreased in size and metabolism, is associated with mesenteric haziness which is unchanged on CT as compared to prior study dated 1/18/2020 (Deauville 4). Resolution of minimally FDG-avid amorphous density, left periaortic region.\par 2. Minimally FDG-avid density, left inguinal region, unchanged, may represent postsurgical change versus lymph node. Please correlate clinically.\par \par Started Rituxan maintenance on 6/25/20. She had a reaction to Rituxan -please see chart note for details. \par Second dose on 8/31/20, tolerated better. Reports having back pain after premeds wore off. \par 3rd dose on 11/4/20, she felt tired and slept through the treatment, denied nausea, but c/o tingling in the finger tips and feet comes and goes. Patient admitted tremor improved.\par birth control pills d/c 3 months ago, she attributed the nausea after Rituxan to the withdrawal of birth control pills. Kidney stone stent removed today. \par Fourth dose on 12/30/20. Pt reports nausea and fatigue. States that does not feel well. Does not want to continue at this time. \par \par PET/CT 2/6/21: 1. Resolution of small focus of mild FDG activity associated with mesenteric haziness which is unchanged on CT (Deauville 1).\par 2. Minimally FDG-avid density, left inguinal region, unchanged, may represent postsurgical change versus lymph node. Please correlate clinically.\par 3. Remainder study is unremarkable and not significantly changed, demonstrating no evidence of FDG-avid disease.\par \par She is doing well, no new complaints. \par She has pain in hands and feet. Continues to be fatigued. Reports having a difficult time coping with her diagnosis and her transition off treatment. \par She is scheduled for her first COVID vaccine dose this Fri.

## 2021-05-25 NOTE — PHYSICAL EXAM
[Fully active, able to carry on all pre-disease performance without restriction] : Status 0 - Fully active, able to carry on all pre-disease performance without restriction [Normal] : normal spine exam without palpable tenderness, no kyphosis or scoliosis [de-identified] : cervical adenopathy resolved

## 2021-05-29 NOTE — DISCHARGE NOTE PROVIDER - NSDCMRMEDTOKEN_GEN_ALL_CORE_FT
cefpodoxime 200 mg oral tablet: 1 tab(s) orally once a day   Emla 2.5%-2.5% topical cream: Apply topically to affected area once  for use with PORT  oxycodone-acetaminophen 5 mg-325 mg oral tablet: 1 tab(s) orally every 6 hours, As Needed for severe pain MDD:4 tabs  Reglan 10 mg oral tablet: as needed IF nausea  Rituxan 10 mg/mL intravenous solution: every 8 weeks.  last dose 8/31/2020  Tylenol 325 mg oral capsule: 1 cap(s) orally 3 times a day, As Needed   29-May-2021 21:05:11

## 2021-06-09 NOTE — ED PROVIDER NOTE - CROS ED ENMT ALL NEG
And comes in today because just over the last month she has developed small red raised lumps across the midportion of her chest.  It is not isolated to the right side.  These just came out very quickly.  They are a bit itchy and annoying to her.    Physical exam:  General: Elderly woman who comes in with a walker and clearly is having a bit of a hard time ambulating.  Chest: Across the chest primarily in the middle of the chest but prevalent on both the right and left side are multiple raised red nodules.  They span an area of at least 10 cm.  Axilla: No axillary adenopathy that I can feel.    Procedure: 1 of the smaller areas was prepped with Betadine and then infiltrated with 1% lidocaine with epinephrine.  A 6 mm punch was used to remove the lesion completely.  This was placed in formalin.  I closed the wound with interrupted 3-0 nylon sutures.    Impression: Recurrent breast cancer.  I had a long talk with and and her daughter today and explained the significance of this.  These unfortunately are scattered over to large of an area to even think about any type of surgery.  They could consider some sort of radiation but that would be palliative at best.  I told him that they really should consider hospice.  And overall medical condition is quite poor.  She has limited mobility.  In reviewing this whole case, her cancer did not appear to be all that horribly aggressive but it is behaving much more aggressively than the tumor biology would have indicated, that is being strongly estrogen receptor positive.  I told them that would be impossible to give her any type of prediction as far as survival goes but untreated I would think her survival would be in the six-month to one-year area.  Chest wall recurrences can be very difficult to deal with as far as wound issues are concerned but unfortunately there just is no surgical option here.    Plan: I will call her daughter once I have the final pathology and then will  talk it over with Dr. Guzman.   negative...

## 2021-06-10 ENCOUNTER — OUTPATIENT (OUTPATIENT)
Dept: OUTPATIENT SERVICES | Facility: HOSPITAL | Age: 41
LOS: 1 days | End: 2021-06-10

## 2021-06-10 ENCOUNTER — APPOINTMENT (OUTPATIENT)
Dept: OBGYN | Facility: HOSPITAL | Age: 41
End: 2021-06-10
Payer: MEDICAID

## 2021-06-10 VITALS
TEMPERATURE: 97.6 F | WEIGHT: 154 LBS | SYSTOLIC BLOOD PRESSURE: 126 MMHG | DIASTOLIC BLOOD PRESSURE: 71 MMHG | BODY MASS INDEX: 26.29 KG/M2 | HEART RATE: 89 BPM | HEIGHT: 64 IN

## 2021-06-10 PROCEDURE — 96372 THER/PROPH/DIAG INJ SC/IM: CPT | Mod: NC

## 2021-06-10 PROCEDURE — 99213 OFFICE O/P EST LOW 20 MIN: CPT | Mod: 25,GC

## 2021-06-10 RX ORDER — MEDROXYPROGESTERONE ACETATE 150 MG/ML
150 INJECTION, SUSPENSION INTRAMUSCULAR
Qty: 0 | Refills: 0 | Status: COMPLETED | OUTPATIENT
Start: 2021-06-10

## 2021-06-10 RX ADMIN — MEDROXYPROGESTERONE ACETATE 0 MG/ML: 150 INJECTION, SUSPENSION INTRAMUSCULAR at 00:00

## 2021-06-12 NOTE — PHYSICAL EXAM
[Appropriately responsive] : appropriately responsive [Alert] : alert [No Acute Distress] : no acute distress [Regular Rate Rhythm] : regular rate rhythm [Soft] : soft [Non-tender] : non-tender [Oriented x3] : oriented x3 [FreeTextEntry5] : nl work of breathing

## 2021-06-12 NOTE — PLAN
[FreeTextEntry1] : 41yo P2 LMP 5/23 presents for follow up for depo. Also discussed results of ultrasound showing resolution of ovarian cyst. \par \par #AUB \par - symptoms controlled with depo\par - dose given today \par \par #Ovarian cyst \par - TVUS 4/1 showed resolution of cyst\par \par RTC Aug 26-Sept 9 for next dose \par SReynolds PGY1 \par d/w Dr. Mason

## 2021-06-12 NOTE — HISTORY OF PRESENT ILLNESS
[FreeTextEntry1] : 39yo  LMP  presenting for depo. Patient happy with depo, reports periods continue to be light to moderate flow. Some cramping with menses, resolved with tylenol use. Patient also had ultrasound after last appointment to follow ovarian cyst, resolved on US. She denies pelvic pain, dysuria, urinary frequency/urgency, changes in BM, vaginal discharge.

## 2021-06-15 DIAGNOSIS — Z01.419 ENCOUNTER FOR GYNECOLOGICAL EXAMINATION (GENERAL) (ROUTINE) WITHOUT ABNORMAL FINDINGS: ICD-10-CM

## 2021-06-15 DIAGNOSIS — R10.2 PELVIC AND PERINEAL PAIN: ICD-10-CM

## 2021-06-15 DIAGNOSIS — Z30.42 ENCOUNTER FOR SURVEILLANCE OF INJECTABLE CONTRACEPTIVE: ICD-10-CM

## 2021-06-22 ENCOUNTER — OUTPATIENT (OUTPATIENT)
Dept: OUTPATIENT SERVICES | Facility: HOSPITAL | Age: 41
LOS: 1 days | Discharge: HOME | End: 2021-06-22

## 2021-06-22 DIAGNOSIS — C85.88 OTHER SPECIFIED TYPES OF NON-HODGKIN LYMPHOMA, LYMPH NODES OF MULTIPLE SITES: ICD-10-CM

## 2021-06-28 ENCOUNTER — RESULT REVIEW (OUTPATIENT)
Age: 41
End: 2021-06-28

## 2021-06-28 ENCOUNTER — APPOINTMENT (OUTPATIENT)
Dept: INFUSION THERAPY | Facility: HOSPITAL | Age: 41
End: 2021-06-28

## 2021-06-28 ENCOUNTER — LABORATORY RESULT (OUTPATIENT)
Age: 41
End: 2021-06-28

## 2021-06-28 LAB
BASOPHILS # BLD AUTO: 0.04 K/UL — SIGNIFICANT CHANGE UP (ref 0–0.2)
BASOPHILS NFR BLD AUTO: 1 % — SIGNIFICANT CHANGE UP (ref 0–2)
EOSINOPHIL # BLD AUTO: 0.21 K/UL — SIGNIFICANT CHANGE UP (ref 0–0.5)
EOSINOPHIL NFR BLD AUTO: 5 % — SIGNIFICANT CHANGE UP (ref 0–6)
HCT VFR BLD CALC: 36 % — SIGNIFICANT CHANGE UP (ref 34.5–45)
HGB BLD-MCNC: 11.6 G/DL — SIGNIFICANT CHANGE UP (ref 11.5–15.5)
LYMPHOCYTES # BLD AUTO: 1.21 K/UL — SIGNIFICANT CHANGE UP (ref 1–3.3)
LYMPHOCYTES # BLD AUTO: 29 % — SIGNIFICANT CHANGE UP (ref 13–44)
MCHC RBC-ENTMCNC: 27.2 PG — SIGNIFICANT CHANGE UP (ref 27–34)
MCHC RBC-ENTMCNC: 32.2 G/DL — SIGNIFICANT CHANGE UP (ref 32–36)
MCV RBC AUTO: 84.3 FL — SIGNIFICANT CHANGE UP (ref 80–100)
MONOCYTES # BLD AUTO: 0.54 K/UL — SIGNIFICANT CHANGE UP (ref 0–0.9)
MONOCYTES NFR BLD AUTO: 13 % — SIGNIFICANT CHANGE UP (ref 2–14)
NEUTROPHILS # BLD AUTO: 2.16 K/UL — SIGNIFICANT CHANGE UP (ref 1.8–7.4)
NEUTROPHILS NFR BLD AUTO: 52 % — SIGNIFICANT CHANGE UP (ref 43–77)
NRBC # BLD: 0 /100 — SIGNIFICANT CHANGE UP (ref 0–0)
NRBC # BLD: SIGNIFICANT CHANGE UP /100 WBCS (ref 0–0)
PLAT MORPH BLD: NORMAL — SIGNIFICANT CHANGE UP
PLATELET # BLD AUTO: 260 K/UL — SIGNIFICANT CHANGE UP (ref 150–400)
RBC # BLD: 4.27 M/UL — SIGNIFICANT CHANGE UP (ref 3.8–5.2)
RBC # FLD: 13.3 % — SIGNIFICANT CHANGE UP (ref 10.3–14.5)
RBC BLD AUTO: SIGNIFICANT CHANGE UP
WBC # BLD: 4.16 K/UL — SIGNIFICANT CHANGE UP (ref 3.8–10.5)
WBC # FLD AUTO: 4.16 K/UL — SIGNIFICANT CHANGE UP (ref 3.8–10.5)

## 2021-07-23 ENCOUNTER — APPOINTMENT (OUTPATIENT)
Dept: INTERNAL MEDICINE | Facility: CLINIC | Age: 41
End: 2021-07-23

## 2021-08-12 ENCOUNTER — OUTPATIENT (OUTPATIENT)
Dept: OUTPATIENT SERVICES | Facility: HOSPITAL | Age: 41
LOS: 1 days | Discharge: HOME | End: 2021-08-12

## 2021-08-12 DIAGNOSIS — C85.88 OTHER SPECIFIED TYPES OF NON-HODGKIN LYMPHOMA, LYMPH NODES OF MULTIPLE SITES: ICD-10-CM

## 2021-08-16 ENCOUNTER — LABORATORY RESULT (OUTPATIENT)
Age: 41
End: 2021-08-16

## 2021-08-16 ENCOUNTER — APPOINTMENT (OUTPATIENT)
Dept: HEMATOLOGY ONCOLOGY | Facility: CLINIC | Age: 41
End: 2021-08-16
Payer: MEDICAID

## 2021-08-16 ENCOUNTER — APPOINTMENT (OUTPATIENT)
Dept: INFUSION THERAPY | Facility: HOSPITAL | Age: 41
End: 2021-08-16

## 2021-08-16 ENCOUNTER — RESULT REVIEW (OUTPATIENT)
Age: 41
End: 2021-08-16

## 2021-08-16 VITALS
HEIGHT: 64.72 IN | SYSTOLIC BLOOD PRESSURE: 120 MMHG | DIASTOLIC BLOOD PRESSURE: 85 MMHG | BODY MASS INDEX: 26.51 KG/M2 | WEIGHT: 157.19 LBS | RESPIRATION RATE: 18 BRPM | HEART RATE: 72 BPM | OXYGEN SATURATION: 98 % | TEMPERATURE: 97.7 F

## 2021-08-16 LAB
BASOPHILS # BLD AUTO: 0.03 K/UL — SIGNIFICANT CHANGE UP (ref 0–0.2)
BASOPHILS NFR BLD AUTO: 0.9 % — SIGNIFICANT CHANGE UP (ref 0–2)
EOSINOPHIL # BLD AUTO: 0.2 K/UL — SIGNIFICANT CHANGE UP (ref 0–0.5)
EOSINOPHIL NFR BLD AUTO: 5.7 % — SIGNIFICANT CHANGE UP (ref 0–6)
HCT VFR BLD CALC: 36 % — SIGNIFICANT CHANGE UP (ref 34.5–45)
HGB BLD-MCNC: 11.8 G/DL — SIGNIFICANT CHANGE UP (ref 11.5–15.5)
IMM GRANULOCYTES NFR BLD AUTO: 4 % — HIGH (ref 0–1.5)
LYMPHOCYTES # BLD AUTO: 1.09 K/UL — SIGNIFICANT CHANGE UP (ref 1–3.3)
LYMPHOCYTES # BLD AUTO: 31.2 % — SIGNIFICANT CHANGE UP (ref 13–44)
MCHC RBC-ENTMCNC: 28 PG — SIGNIFICANT CHANGE UP (ref 27–34)
MCHC RBC-ENTMCNC: 32.8 G/DL — SIGNIFICANT CHANGE UP (ref 32–36)
MCV RBC AUTO: 85.5 FL — SIGNIFICANT CHANGE UP (ref 80–100)
MONOCYTES # BLD AUTO: 0.51 K/UL — SIGNIFICANT CHANGE UP (ref 0–0.9)
MONOCYTES NFR BLD AUTO: 14.6 % — HIGH (ref 2–14)
NEUTROPHILS # BLD AUTO: 1.52 K/UL — LOW (ref 1.8–7.4)
NEUTROPHILS NFR BLD AUTO: 43.6 % — SIGNIFICANT CHANGE UP (ref 43–77)
NRBC # BLD: 0 /100 WBCS — SIGNIFICANT CHANGE UP (ref 0–0)
PLATELET # BLD AUTO: 216 K/UL — SIGNIFICANT CHANGE UP (ref 150–400)
RBC # BLD: 4.21 M/UL — SIGNIFICANT CHANGE UP (ref 3.8–5.2)
RBC # FLD: 13.5 % — SIGNIFICANT CHANGE UP (ref 10.3–14.5)
WBC # BLD: 3.49 K/UL — LOW (ref 3.8–10.5)
WBC # FLD AUTO: 3.49 K/UL — LOW (ref 3.8–10.5)

## 2021-08-16 PROCEDURE — 99213 OFFICE O/P EST LOW 20 MIN: CPT

## 2021-08-16 NOTE — ASSESSMENT
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 \par We started treatment with Bendamustine/Rituxan on 6/24/19. LAD has improved. C5 delayed due to neutropenia, received on 10/30/19. C6 on 11/26/19, tolerated treatment well. PET/CT done at end of treatment showed interval significant decrease in size and metabolism of LAD. \par \par PET/CT unchanged. Started on Rituximab maintenance on 6/25/20. Cont every 2 mo. s/p 4th dose of Rituxan on 12/30/20. She had worsened side effects after 4th dose and does not want to continue. Will hold further Rituxan maintenance given intolerance. PET/CT post Rituxan stable\par f/u w/ PMD and gyn\par Pt refered to psycho-oncology -pt exhibits psychological distress including anxious thought, ruminations, tearfulness, and anhedonia. These symptoms interfere with functioning across domains. Pt is likely to benefit from psychotherapy. She has joined a support group through S. \par All questions answered\par port flush every 6 wks\par RTC 3 mo\par

## 2021-08-16 NOTE — PHYSICAL EXAM
[Fully active, able to carry on all pre-disease performance without restriction] : Status 0 - Fully active, able to carry on all pre-disease performance without restriction [Normal] : affect appropriate [de-identified] : cervical adenopathy resolved

## 2021-08-16 NOTE — HISTORY OF PRESENT ILLNESS
[de-identified] : 39yo F w/ asthma here for evaluation of newly diagnosed follicular lymphoma. \par \par Patient sates she initially noted left inguinal pain in February 2019, had a palpable mass for the last 6 years which she noticed after delivery of her second baby in 2013. She saw GYN, had TVUS: Uterus: 5.6 x 3.6 x 4.3 cm. EMS 3 mm. Right ovary normal. Left ovary normal. Complex elongated left adnexal lesion suggestive of hydrosalpinx, 9.2 x 4.9 x 9 cm. She is s/p b/l salpingectomy on 5/2/19. She remains on depo injection. She states that she has had more swelling and more pain since her procedure. \par \par She had CT A/P done on 5/9/19 which demonstrated extensive retroperitoneal, pelvic, and inguinal lymphadenopathy, concerning for malignancy with small amount of abdominal and pelvic ascites.\par s/p IR biopsy of left inguinal lymph node - follicular lymphoma grade 1-2. \par \par Also having cervical LAD, noticed for the last few months, L>R. Noted R inguinal swelling for the last week with pain. No fevers of chills. Notes having itching and that her breathing is a bit more labored than usual.  [de-identified] : BMbx (6/10/19): - Focal lymphoid infiltrate consistent with involvement by follicular lymphoma (history of follicular lymphoma)\par - Small paracortical sample with trilineage hematopoiesis with maturation and iron stores present\par \par PET/CT (6/16/19): 1. Hypermetabolic lymphadenopathy in neck, thorax, abdomen, pelvis, and bilateral inguinal/femoral regions corresponds to biopsy-proven follicular lymphoma. Hypermetabolic subcutaneous nodule, right posterolateral chest wall, is compatible with an additional site of lymphoma.\par 2. Nonspecific left greater than right tonsillar hypermetabolism may represent additional sites of lymphoma.\par 3. Findings compatible with bone marrow involvement by lymphoma in bilateral humeri and axial skeleton, as described above.\par \par She reports having increasing fatigue. Still having slight shortness of breath.\par \par We started BR on 6/24. She had a reaction to Rituxan. \par \par Eating fine. Neck LAD, shortness of breath and abdominal bloating improved/resolved. \par She had a yeast infection, saw GYN. Also reports having pelvic pain and was found to have a small polyp. \par \par C2 on 7/22. She notes having burning as she was getting the chemo (please see chart note for details). Notes that her arms were still hurting for a week after. \par She had a portacath placed 8/12\par Felt more fatigued and weak. \par Had transvaginal US, saw GYN this morning (9/5/19). Pelvic pain has resolved. \par \par C3 on 8/19. Tolerated it well. \par \par CT N/C/A/P (9/10/19): Marked decrease in size of cervical lymph nodes when compared with the prior exam compatible with a favorable response to therapy. Significant interval decrease in size of left supraclavicular lymphadenopathy.\par Focal enhancement involving the right anterior tonsillar without associated tonsillar expansion. Correlation with direct visualization and continued follow-up is advised.\par Significant interval decrease in size of previously noted hypermetabolic nodule in the soft tissues of the right posterior lateral chest.\par Significant interval decrease in size of retroperitoneal, pelvic, inguinal, and mesenteric lymphadenopathy as described above.\par \par C4 on 9/16. Tolerated well, had some tremors afterwards. \par \par C5 was delayed secondary to neutropenia. Given on 10/30/19. Had an episode of chest tightness w/ Pillo on day 1. Premedicated on day 2 with no events. Tremors better this cycle but notes 3 episodes of restless legs. \par \par C6 on 11/26/19. She reports having muscle aches. \par \par PET/CT 1/18/20: 1. Interval resolution of FDG activity associated with lymphadenopathy in the neck, chest, upper abdomen and pelvis with either resolution or significant decrease in size of the corresponding lymph nodes and not well delineated on CT as compared to PET/CT from 6/16/2019. Near total resolution of FDG avid left inguinal lymphadenopathy which is significantly decreased in size now demonstrating background activity.\par 2. Interval significant decrease in size and metabolism of hypermetabolic retroperitoneal and mesenteric lymphadenopathy.\par 3. Interval resolution of asymmetric tonsillar hypermetabolism.\par 4. Interval resolution of FDG avidity in bone marrow of bilateral humeri, T10, and sacrum.\par \par Having pain on L side of lower abdomen and groin around 3-4 weeks ago. Having night sweats again too. Had GYN f/u 2 weeks ago.\par We did a PET/CT on 6/13 which showed: 1. Small focus of mild FDG activity, decreased in size and metabolism, is associated with mesenteric haziness which is unchanged on CT as compared to prior study dated 1/18/2020 (Deauville 4). Resolution of minimally FDG-avid amorphous density, left periaortic region.\par 2. Minimally FDG-avid density, left inguinal region, unchanged, may represent postsurgical change versus lymph node. Please correlate clinically.\par \par Started Rituxan maintenance on 6/25/20. She had a reaction to Rituxan -please see chart note for details. \par Second dose on 8/31/20, tolerated better. Reports having back pain after premeds wore off. \par 3rd dose on 11/4/20, she felt tired and slept through the treatment, denied nausea, but c/o tingling in the finger tips and feet comes and goes. Patient admitted tremor improved.\par birth control pills d/c 3 months ago, she attributed the nausea after Rituxan to the withdrawal of birth control pills. Kidney stone stent removed today. \par Fourth dose on 12/30/20. Pt reports nausea and fatigue. States that does not feel well. Does not want to continue at this time. \par \par PET/CT 2/6/21: 1. Resolution of small focus of mild FDG activity associated with mesenteric haziness which is unchanged on CT (Deauville 1).\par 2. Minimally FDG-avid density, left inguinal region, unchanged, may represent postsurgical change versus lymph node. Please correlate clinically.\par 3. Remainder study is unremarkable and not significantly changed, demonstrating no evidence of FDG-avid disease.\par \par She is doing well, no new complaints. \par Continues to be fatigued. Reports having a difficult time coping with her diagnosis and her transition off treatment. \par She received both her COVID vaccine doses - 5/27 and 6/17

## 2021-08-23 ENCOUNTER — APPOINTMENT (OUTPATIENT)
Dept: INTERNAL MEDICINE | Facility: CLINIC | Age: 41
End: 2021-08-23

## 2021-08-25 ENCOUNTER — APPOINTMENT (OUTPATIENT)
Dept: OBGYN | Facility: HOSPITAL | Age: 41
End: 2021-08-25
Payer: MEDICAID

## 2021-08-25 ENCOUNTER — OUTPATIENT (OUTPATIENT)
Dept: OUTPATIENT SERVICES | Facility: HOSPITAL | Age: 41
LOS: 1 days | End: 2021-08-25

## 2021-08-25 VITALS
DIASTOLIC BLOOD PRESSURE: 78 MMHG | TEMPERATURE: 97.5 F | BODY MASS INDEX: 27.49 KG/M2 | HEART RATE: 77 BPM | SYSTOLIC BLOOD PRESSURE: 136 MMHG | HEIGHT: 64 IN | WEIGHT: 161 LBS

## 2021-08-25 PROCEDURE — 99212 OFFICE O/P EST SF 10 MIN: CPT | Mod: GE

## 2021-08-25 RX ORDER — MEDROXYPROGESTERONE ACETATE 150 MG/ML
150 INJECTION, SUSPENSION INTRAMUSCULAR
Qty: 0 | Refills: 0 | Status: COMPLETED | OUTPATIENT
Start: 2021-08-25

## 2021-08-25 RX ADMIN — MEDROXYPROGESTERONE ACETATE 0 MG/ML: 150 INJECTION, SUSPENSION INTRAMUSCULAR at 00:00

## 2021-08-26 DIAGNOSIS — Z30.42 ENCOUNTER FOR SURVEILLANCE OF INJECTABLE CONTRACEPTIVE: ICD-10-CM

## 2021-08-28 NOTE — HISTORY OF PRESENT ILLNESS
[FreeTextEntry1] : 39yo  LMP  presenting for depo.\par No complaints.\par  Denies pelvic pain, dysuria, urinary frequency/urgency, changes in BM, vaginal discharge.

## 2021-09-15 ENCOUNTER — APPOINTMENT (OUTPATIENT)
Dept: OPHTHALMOLOGY | Facility: CLINIC | Age: 41
End: 2021-09-15
Payer: MEDICAID

## 2021-09-15 ENCOUNTER — NON-APPOINTMENT (OUTPATIENT)
Age: 41
End: 2021-09-15

## 2021-09-15 PROCEDURE — 92012 INTRM OPH EXAM EST PATIENT: CPT

## 2021-09-15 PROCEDURE — 92083 EXTENDED VISUAL FIELD XM: CPT

## 2021-09-22 ENCOUNTER — OUTPATIENT (OUTPATIENT)
Dept: OUTPATIENT SERVICES | Facility: HOSPITAL | Age: 41
LOS: 1 days | Discharge: HOME | End: 2021-09-22

## 2021-09-22 DIAGNOSIS — C85.88 OTHER SPECIFIED TYPES OF NON-HODGKIN LYMPHOMA, LYMPH NODES OF MULTIPLE SITES: ICD-10-CM

## 2021-09-27 ENCOUNTER — RESULT REVIEW (OUTPATIENT)
Age: 41
End: 2021-09-27

## 2021-09-27 ENCOUNTER — LABORATORY RESULT (OUTPATIENT)
Age: 41
End: 2021-09-27

## 2021-09-27 ENCOUNTER — APPOINTMENT (OUTPATIENT)
Dept: INFUSION THERAPY | Facility: HOSPITAL | Age: 41
End: 2021-09-27

## 2021-09-27 LAB
BASOPHILS # BLD AUTO: 0.04 K/UL — SIGNIFICANT CHANGE UP (ref 0–0.2)
BASOPHILS NFR BLD AUTO: 0.9 % — SIGNIFICANT CHANGE UP (ref 0–2)
EOSINOPHIL # BLD AUTO: 0.11 K/UL — SIGNIFICANT CHANGE UP (ref 0–0.5)
EOSINOPHIL NFR BLD AUTO: 2.6 % — SIGNIFICANT CHANGE UP (ref 0–6)
HCT VFR BLD CALC: 35.3 % — SIGNIFICANT CHANGE UP (ref 34.5–45)
HGB BLD-MCNC: 11.6 G/DL — SIGNIFICANT CHANGE UP (ref 11.5–15.5)
IMM GRANULOCYTES NFR BLD AUTO: 0.5 % — SIGNIFICANT CHANGE UP (ref 0–1.5)
LYMPHOCYTES # BLD AUTO: 1.14 K/UL — SIGNIFICANT CHANGE UP (ref 1–3.3)
LYMPHOCYTES # BLD AUTO: 26.5 % — SIGNIFICANT CHANGE UP (ref 13–44)
MCHC RBC-ENTMCNC: 27.8 PG — SIGNIFICANT CHANGE UP (ref 27–34)
MCHC RBC-ENTMCNC: 32.9 G/DL — SIGNIFICANT CHANGE UP (ref 32–36)
MCV RBC AUTO: 84.7 FL — SIGNIFICANT CHANGE UP (ref 80–100)
MONOCYTES # BLD AUTO: 0.41 K/UL — SIGNIFICANT CHANGE UP (ref 0–0.9)
MONOCYTES NFR BLD AUTO: 9.5 % — SIGNIFICANT CHANGE UP (ref 2–14)
NEUTROPHILS # BLD AUTO: 2.58 K/UL — SIGNIFICANT CHANGE UP (ref 1.8–7.4)
NEUTROPHILS NFR BLD AUTO: 60 % — SIGNIFICANT CHANGE UP (ref 43–77)
NRBC # BLD: 0 /100 WBCS — SIGNIFICANT CHANGE UP (ref 0–0)
PLATELET # BLD AUTO: 212 K/UL — SIGNIFICANT CHANGE UP (ref 150–400)
RBC # BLD: 4.17 M/UL — SIGNIFICANT CHANGE UP (ref 3.8–5.2)
RBC # FLD: 13.2 % — SIGNIFICANT CHANGE UP (ref 10.3–14.5)
WBC # BLD: 4.3 K/UL — SIGNIFICANT CHANGE UP (ref 3.8–10.5)
WBC # FLD AUTO: 4.3 K/UL — SIGNIFICANT CHANGE UP (ref 3.8–10.5)

## 2021-11-04 ENCOUNTER — OUTPATIENT (OUTPATIENT)
Dept: OUTPATIENT SERVICES | Facility: HOSPITAL | Age: 41
LOS: 1 days | End: 2021-11-04

## 2021-11-04 ENCOUNTER — RESULT REVIEW (OUTPATIENT)
Age: 41
End: 2021-11-04

## 2021-11-04 ENCOUNTER — APPOINTMENT (OUTPATIENT)
Dept: OBGYN | Facility: HOSPITAL | Age: 41
End: 2021-11-04
Payer: MEDICAID

## 2021-11-04 VITALS
BODY MASS INDEX: 27.9 KG/M2 | TEMPERATURE: 97.8 F | WEIGHT: 163.4 LBS | HEIGHT: 64 IN | SYSTOLIC BLOOD PRESSURE: 129 MMHG | DIASTOLIC BLOOD PRESSURE: 77 MMHG | HEART RATE: 90 BPM

## 2021-11-04 DIAGNOSIS — Z01.419 ENCOUNTER FOR GYNECOLOGICAL EXAMINATION (GENERAL) (ROUTINE) WITHOUT ABNORMAL FINDINGS: ICD-10-CM

## 2021-11-04 DIAGNOSIS — Z85.72 PERSONAL HISTORY OF NON-HODGKIN LYMPHOMAS: ICD-10-CM

## 2021-11-04 DIAGNOSIS — Z00.00 ENCOUNTER FOR GENERAL ADULT MEDICAL EXAMINATION WITHOUT ABNORMAL FINDINGS: ICD-10-CM

## 2021-11-04 LAB — HIV 1+2 AB+HIV1 P24 AG SERPL QL IA: SIGNIFICANT CHANGE UP

## 2021-11-04 PROCEDURE — 99214 OFFICE O/P EST MOD 30 MIN: CPT | Mod: 25,GC

## 2021-11-04 PROCEDURE — 96372 THER/PROPH/DIAG INJ SC/IM: CPT | Mod: NC

## 2021-11-04 RX ORDER — MEDROXYPROGESTERONE ACETATE 150 MG/ML
150 INJECTION, SUSPENSION INTRAMUSCULAR
Qty: 0 | Refills: 0 | Status: COMPLETED | OUTPATIENT
Start: 2021-11-04

## 2021-11-04 RX ADMIN — MEDROXYPROGESTERONE ACETATE 0 MG/ML: 150 INJECTION, SUSPENSION INTRAMUSCULAR at 00:00

## 2021-11-05 ENCOUNTER — RESULT REVIEW (OUTPATIENT)
Age: 41
End: 2021-11-05

## 2021-11-05 ENCOUNTER — OUTPATIENT (OUTPATIENT)
Dept: OUTPATIENT SERVICES | Facility: HOSPITAL | Age: 41
LOS: 1 days | End: 2021-11-05

## 2021-11-05 ENCOUNTER — APPOINTMENT (OUTPATIENT)
Dept: INTERNAL MEDICINE | Facility: CLINIC | Age: 41
End: 2021-11-05
Payer: MEDICAID

## 2021-11-05 ENCOUNTER — APPOINTMENT (OUTPATIENT)
Dept: INTERNAL MEDICINE | Facility: CLINIC | Age: 41
End: 2021-11-05

## 2021-11-05 VITALS
OXYGEN SATURATION: 98 % | BODY MASS INDEX: 27.4 KG/M2 | HEIGHT: 64 IN | DIASTOLIC BLOOD PRESSURE: 70 MMHG | SYSTOLIC BLOOD PRESSURE: 120 MMHG | WEIGHT: 160.5 LBS | HEART RATE: 89 BPM

## 2021-11-05 VITALS — TEMPERATURE: 97.3 F

## 2021-11-05 DIAGNOSIS — R53.83 OTHER FATIGUE: ICD-10-CM

## 2021-11-05 DIAGNOSIS — Z00.00 ENCOUNTER FOR GENERAL ADULT MEDICAL EXAMINATION W/OUT ABNORMAL FINDINGS: ICD-10-CM

## 2021-11-05 DIAGNOSIS — C85.10 UNSPECIFIED B-CELL LYMPHOMA, UNSPECIFIED SITE: ICD-10-CM

## 2021-11-05 DIAGNOSIS — R06.00 DYSPNEA, UNSPECIFIED: ICD-10-CM

## 2021-11-05 DIAGNOSIS — R09.81 NASAL CONGESTION: ICD-10-CM

## 2021-11-05 DIAGNOSIS — Z23 ENCOUNTER FOR IMMUNIZATION: ICD-10-CM

## 2021-11-05 LAB
A1C WITH ESTIMATED AVERAGE GLUCOSE RESULT: 5.5 % — SIGNIFICANT CHANGE UP (ref 4–5.6)
C TRACH RRNA SPEC QL NAA+PROBE: SIGNIFICANT CHANGE UP
CHOLEST SERPL-MCNC: 224 MG/DL — HIGH
ESTIMATED AVERAGE GLUCOSE: 111 — SIGNIFICANT CHANGE UP
HBV SURFACE AG SER-ACNC: SIGNIFICANT CHANGE UP
HCV AB S/CO SERPL IA: 0.07 S/CO — SIGNIFICANT CHANGE UP (ref 0–0.99)
HCV AB SERPL-IMP: SIGNIFICANT CHANGE UP
HDLC SERPL-MCNC: 33 MG/DL — LOW
LIPID PNL WITH DIRECT LDL SERPL: 167 MG/DL — HIGH
N GONORRHOEA RRNA SPEC QL NAA+PROBE: SIGNIFICANT CHANGE UP
NON HDL CHOLESTEROL: 191 MG/DL — HIGH
SPECIMEN SOURCE: SIGNIFICANT CHANGE UP
T PALLIDUM AB TITR SER: NEGATIVE — SIGNIFICANT CHANGE UP
T4 FREE SERPL-MCNC: 1.3 NG/DL — SIGNIFICANT CHANGE UP (ref 0.9–1.8)
TRIGL SERPL-MCNC: 118 MG/DL — SIGNIFICANT CHANGE UP
TSH SERPL-MCNC: 1.24 UIU/ML — SIGNIFICANT CHANGE UP (ref 0.27–4.2)

## 2021-11-05 PROCEDURE — 99214 OFFICE O/P EST MOD 30 MIN: CPT | Mod: GC

## 2021-11-05 RX ORDER — FLUCONAZOLE 150 MG/1
150 TABLET ORAL
Qty: 2 | Refills: 0 | Status: DISCONTINUED | COMMUNITY
Start: 2019-07-09 | End: 2021-11-05

## 2021-11-05 RX ORDER — CEFPODOXIME PROXETIL 200 MG/1
200 TABLET, FILM COATED ORAL
Qty: 14 | Refills: 0 | Status: DISCONTINUED | COMMUNITY
Start: 2020-10-29 | End: 2021-11-05

## 2021-11-05 RX ORDER — FLUCONAZOLE 150 MG/1
150 TABLET ORAL
Qty: 1 | Refills: 0 | Status: DISCONTINUED | COMMUNITY
Start: 2019-07-05 | End: 2021-11-05

## 2021-11-05 RX ORDER — LIDOCAINE AND PRILOCAINE 25; 25 MG/G; MG/G
2.5-2.5 CREAM TOPICAL
Qty: 30 | Refills: 0 | Status: DISCONTINUED | COMMUNITY
Start: 2020-10-29 | End: 2021-11-05

## 2021-11-05 RX ORDER — LIDOCAINE AND PRILOCAINE 25; 25 MG/G; MG/G
2.5-2.5 CREAM TOPICAL
Qty: 1 | Refills: 1 | Status: DISCONTINUED | COMMUNITY
Start: 2019-08-19 | End: 2021-11-05

## 2021-11-05 RX ORDER — METOCLOPRAMIDE 10 MG/1
10 TABLET ORAL
Qty: 30 | Refills: 1 | Status: DISCONTINUED | COMMUNITY
Start: 2019-10-14 | End: 2021-11-05

## 2021-11-05 RX ORDER — TERCONAZOLE 80 MG/1
80 SUPPOSITORY VAGINAL
Qty: 7 | Refills: 0 | Status: DISCONTINUED | COMMUNITY
Start: 2019-07-05 | End: 2021-11-05

## 2021-11-05 RX ORDER — DOXYCYCLINE HYCLATE 100 MG/1
100 CAPSULE ORAL TWICE DAILY
Qty: 14 | Refills: 0 | Status: DISCONTINUED | COMMUNITY
Start: 2020-04-10 | End: 2021-11-05

## 2021-11-05 RX ORDER — CAMPHOR 0.45 %
25 GEL (GRAM) TOPICAL EVERY 4 HOURS
Qty: 30 | Refills: 0 | Status: DISCONTINUED | COMMUNITY
Start: 2019-07-17 | End: 2021-11-05

## 2021-11-05 NOTE — HISTORY OF PRESENT ILLNESS
[FreeTextEntry1] : 40 y/o  with LMP 21 and PMH of follicular lymphoma presenting for annual visit and depo. She is happy with depo for PMS symptoms and period regulation s/p BS. Denies vaginal discharge, pruritis, or other sx. \par \par ObHx: NSVDx2, TABx1\par GynHx: LMP 21, has regular monthly menses without intermenstrual bleeding. Depo for contraception. Sexually active with 1 male partner, reports pain with sexual activity. H/o ovarian cyst s/p spontaneous remission. \par PMH: B-cell lymphoma (stage 4, in remission), asthma\par PSH: Kidney stone removal (10/2020), BS (2019) \par Meds: Tylenol \par All: NKDA\par Social: Lives with her partner and 2 children. She and her  work in [employer].  is usually travelling for work. Feels safe at home. Rare alcohol use, CBD in tea. Denies tobacco use, ilicit drug use.

## 2021-11-05 NOTE — PHYSICAL EXAM
[Appropriately responsive] : appropriately responsive [Alert] : alert [No Acute Distress] : no acute distress [Examination Of The Breasts] : a normal appearance [No Discharge] : no discharge [No Masses] : no breast masses were palpable [Labia Majora] : normal [Labia Minora] : normal [Pink Rugae] : pink rugae [Discharge] : a  ~M vaginal discharge was present [Moderate] : moderate [White] : white [Thin] : thin [Mucoid] : mucoid [Normal] : normal [Anteversion] : anteverted [Uterine Adnexae] : normal [Foul Smelling] : not foul smelling [Tenderness] : nontender [Enlarged ___ wks] : not enlarged [Mass ___ cm] : no uterine mass was palpated

## 2021-11-05 NOTE — DISCUSSION/SUMMARY
[FreeTextEntry1] : 42 y/o  with PMH of B-cell lymphoma s/p chemotherapy presents for annual GYN exam and depo shot. \par - Mammography referral given\par - STI panel done today\par - Depo shot given\par - Encouraged lubricant use for pain with intercourse\par - Next pap \par - RTC in 10-12 weeks for next depo \par \par LUIS ANGEL Galindo, PGY-1 \par Seen and examined with Dr. Mason

## 2021-11-11 ENCOUNTER — APPOINTMENT (OUTPATIENT)
Dept: OBGYN | Facility: HOSPITAL | Age: 41
End: 2021-11-11

## 2021-11-15 ENCOUNTER — OUTPATIENT (OUTPATIENT)
Dept: OUTPATIENT SERVICES | Facility: HOSPITAL | Age: 41
LOS: 1 days | Discharge: HOME | End: 2021-11-15

## 2021-11-15 DIAGNOSIS — C85.88 OTHER SPECIFIED TYPES OF NON-HODGKIN LYMPHOMA, LYMPH NODES OF MULTIPLE SITES: ICD-10-CM

## 2021-11-18 ENCOUNTER — RESULT REVIEW (OUTPATIENT)
Age: 41
End: 2021-11-18

## 2021-11-18 ENCOUNTER — LABORATORY RESULT (OUTPATIENT)
Age: 41
End: 2021-11-18

## 2021-11-18 ENCOUNTER — APPOINTMENT (OUTPATIENT)
Dept: HEMATOLOGY ONCOLOGY | Facility: CLINIC | Age: 41
End: 2021-11-18
Payer: MEDICAID

## 2021-11-18 ENCOUNTER — APPOINTMENT (OUTPATIENT)
Dept: INFUSION THERAPY | Facility: HOSPITAL | Age: 41
End: 2021-11-18

## 2021-11-18 VITALS
WEIGHT: 160.94 LBS | OXYGEN SATURATION: 100 % | HEART RATE: 71 BPM | TEMPERATURE: 97.4 F | BODY MASS INDEX: 27.62 KG/M2 | RESPIRATION RATE: 16 BRPM | SYSTOLIC BLOOD PRESSURE: 117 MMHG | DIASTOLIC BLOOD PRESSURE: 81 MMHG

## 2021-11-18 LAB
BASOPHILS # BLD AUTO: 0.04 K/UL — SIGNIFICANT CHANGE UP (ref 0–0.2)
BASOPHILS NFR BLD AUTO: 0.9 % — SIGNIFICANT CHANGE UP (ref 0–2)
EOSINOPHIL # BLD AUTO: 0.15 K/UL — SIGNIFICANT CHANGE UP (ref 0–0.5)
EOSINOPHIL NFR BLD AUTO: 3.4 % — SIGNIFICANT CHANGE UP (ref 0–6)
HCT VFR BLD CALC: 36.3 % — SIGNIFICANT CHANGE UP (ref 34.5–45)
HGB BLD-MCNC: 11.9 G/DL — SIGNIFICANT CHANGE UP (ref 11.5–15.5)
IMM GRANULOCYTES NFR BLD AUTO: 0.4 % — SIGNIFICANT CHANGE UP (ref 0–1.5)
LYMPHOCYTES # BLD AUTO: 1.47 K/UL — SIGNIFICANT CHANGE UP (ref 1–3.3)
LYMPHOCYTES # BLD AUTO: 32.9 % — SIGNIFICANT CHANGE UP (ref 13–44)
MCHC RBC-ENTMCNC: 27.8 PG — SIGNIFICANT CHANGE UP (ref 27–34)
MCHC RBC-ENTMCNC: 32.8 G/DL — SIGNIFICANT CHANGE UP (ref 32–36)
MCV RBC AUTO: 84.8 FL — SIGNIFICANT CHANGE UP (ref 80–100)
MONOCYTES # BLD AUTO: 0.43 K/UL — SIGNIFICANT CHANGE UP (ref 0–0.9)
MONOCYTES NFR BLD AUTO: 9.6 % — SIGNIFICANT CHANGE UP (ref 2–14)
NEUTROPHILS # BLD AUTO: 2.36 K/UL — SIGNIFICANT CHANGE UP (ref 1.8–7.4)
NEUTROPHILS NFR BLD AUTO: 52.8 % — SIGNIFICANT CHANGE UP (ref 43–77)
NRBC # BLD: 0 /100 WBCS — SIGNIFICANT CHANGE UP (ref 0–0)
PLATELET # BLD AUTO: 239 K/UL — SIGNIFICANT CHANGE UP (ref 150–400)
RBC # BLD: 4.28 M/UL — SIGNIFICANT CHANGE UP (ref 3.8–5.2)
RBC # FLD: 13.1 % — SIGNIFICANT CHANGE UP (ref 10.3–14.5)
WBC # BLD: 4.47 K/UL — SIGNIFICANT CHANGE UP (ref 3.8–10.5)
WBC # FLD AUTO: 4.47 K/UL — SIGNIFICANT CHANGE UP (ref 3.8–10.5)

## 2021-11-18 PROCEDURE — 99214 OFFICE O/P EST MOD 30 MIN: CPT

## 2021-11-28 NOTE — ASSESSMENT
[FreeTextEntry1] : 40 year old woman, history of allergic rhinitis and well controlled asthma, follicular lymphoma diagnosed in February 2019. nephrolithiasis resulting in hospitalization October 2020 s/p ureteral stent and removal, now s/p maintenance therapy with rituximab per hematology no active disease. \par \par #Fatigue and dyspnea\par -patient appears euvolemic on exam, prior chemotherapy not known to have cardiotoxic effects, however given exercise limitation to one block/5 minutes will f/u TTE to r/o cardiovascular etiology\par -f/u TSH/T4 \par \par #Congestion\par -advised to trial humidifier at night and steam, can trial Robitussin/Mucinex to relieve congestion\par \par #Follicular lymphoma\par -following closely with hem/onc, now off rituximab, patient to continue close follow up, currently without B-symptoms, no palpable LAD\par \par #Nephrolithiasis\par -currently asymptomatic, s/p ureteral stent removal October 2020\par \par \par #HCM\par -Received last TDAP during her last pregnancy 7 years ago, next due in 2023\par -flu shot today\par -patient has referral for mammogram\par -UTD on pap July 2020, can likely do co-testing HPV/pap q 5 years\par -f/u A1c, lipid panel\par \par Brenna Ramirez\par PGY-2 [employer]\par \par Case discussed with Dr. Olvera\par \par F/u in 2-3 weeks for nursing visit\par F/u in 6 months for follow up\par \par

## 2021-11-28 NOTE — REVIEW OF SYSTEMS
[Fatigue] : fatigue [Dryness] : dryness  [Memory Loss] : memory loss [Negative] : Integumentary [Muscle Weakness] : no muscle weakness [Dizziness] : no dizziness [Fainting] : no fainting [Unsteady Walking] : no ataxia [Suicidal] : not suicidal [Depression] : no depression [Swollen Glands] : no swollen glands [FreeTextEntry9] : g [de-identified] : improving memory loss

## 2021-11-28 NOTE — HISTORY OF PRESENT ILLNESS
[FreeTextEntry1] : "I'm doing well." [de-identified] : 41 year old woman, history of allergic rhinitis and mild intermittent asthma, follicular lymphoma diagnosed in 2019, now s/p maintenance therapy with rituximab, per oncology repeat scans with no acute disease, presenting for follow up visit. \par \par Follicular lymphoma: Initially presented in 2019 with a L inguinal mass, biopsy, CTAP, and PET scan confirmed diagnoses with B cell lymphoma Stage IV, now s/p treatment, monitored closely by hem/onc, noted to now have no active disease. Patient denies night sweats, weight loss, fevers, chills. She described prior anhedonia, survivors guilt several months ago was exacerbated by transition into remission status, comparing it to "post- period." At the time, she had loss of several friends from cancer. She now states mood is much improved, she now realizes although it was sad to lose friends and she was guilty of her "survivor status," she appreciates the blessings in her life and that she is able to be here for her kids. She has not seen psychiatrist, feels that her mood has now much improved.\par \par Patient had recent URI 2 weeks ago, tested negative for COVID, still has nasal congestion with AM mucous production. No SOB or cough currently. No blood in mucous. 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  Patient saw GYN yesterday, is pending mammogram, PAP UTD. \par \par Patient has had weight gain over past few months, she is not physically active describing exercise limitation ("only able to tolerate walking one block").

## 2021-11-29 ENCOUNTER — APPOINTMENT (OUTPATIENT)
Dept: CV DIAGNOSITCS | Facility: HOSPITAL | Age: 41
End: 2021-11-29
Payer: MEDICAID

## 2021-11-29 PROCEDURE — 93306 TTE W/DOPPLER COMPLETE: CPT | Mod: 26

## 2021-11-29 NOTE — HISTORY OF PRESENT ILLNESS
[de-identified] : 39yo F w/ asthma here for evaluation of newly diagnosed follicular lymphoma. \par \par Patient sates she initially noted left inguinal pain in February 2019, had a palpable mass for the last 6 years which she noticed after delivery of her second baby in 2013. She saw GYN, had TVUS: Uterus: 5.6 x 3.6 x 4.3 cm. EMS 3 mm. Right ovary normal. Left ovary normal. Complex elongated left adnexal lesion suggestive of hydrosalpinx, 9.2 x 4.9 x 9 cm. She is s/p b/l salpingectomy on 5/2/19. She remains on depo injection. She states that she has had more swelling and more pain since her procedure. \par \par She had CT A/P done on 5/9/19 which demonstrated extensive retroperitoneal, pelvic, and inguinal lymphadenopathy, concerning for malignancy with small amount of abdominal and pelvic ascites.\par s/p IR biopsy of left inguinal lymph node - follicular lymphoma grade 1-2. \par \par Also having cervical LAD, noticed for the last few months, L>R. Noted R inguinal swelling for the last week with pain. No fevers of chills. Notes having itching and that her breathing is a bit more labored than usual.  [de-identified] : BMbx (6/10/19): - Focal lymphoid infiltrate consistent with involvement by follicular lymphoma (history of follicular lymphoma)\par - Small paracortical sample with trilineage hematopoiesis with maturation and iron stores present\par \par PET/CT (6/16/19): 1. Hypermetabolic lymphadenopathy in neck, thorax, abdomen, pelvis, and bilateral inguinal/femoral regions corresponds to biopsy-proven follicular lymphoma. Hypermetabolic subcutaneous nodule, right posterolateral chest wall, is compatible with an additional site of lymphoma.\par 2. Nonspecific left greater than right tonsillar hypermetabolism may represent additional sites of lymphoma.\par 3. Findings compatible with bone marrow involvement by lymphoma in bilateral humeri and axial skeleton, as described above.\par \par She reports having increasing fatigue. Still having slight shortness of breath.\par \par We started BR on 6/24. She had a reaction to Rituxan. \par \par Eating fine. Neck LAD, shortness of breath and abdominal bloating improved/resolved. \par She had a yeast infection, saw GYN. Also reports having pelvic pain and was found to have a small polyp. \par \par C2 on 7/22. She notes having burning as she was getting the chemo (please see chart note for details). Notes that her arms were still hurting for a week after. \par She had a portacath placed 8/12\par Felt more fatigued and weak. \par Had transvaginal US, saw GYN this morning (9/5/19). Pelvic pain has resolved. \par \par C3 on 8/19. Tolerated it well. \par \par CT N/C/A/P (9/10/19): Marked decrease in size of cervical lymph nodes when compared with the prior exam compatible with a favorable response to therapy. Significant interval decrease in size of left supraclavicular lymphadenopathy.\par Focal enhancement involving the right anterior tonsillar without associated tonsillar expansion. Correlation with direct visualization and continued follow-up is advised.\par Significant interval decrease in size of previously noted hypermetabolic nodule in the soft tissues of the right posterior lateral chest.\par Significant interval decrease in size of retroperitoneal, pelvic, inguinal, and mesenteric lymphadenopathy as described above.\par \par C4 on 9/16. Tolerated well, had some tremors afterwards. \par \par C5 was delayed secondary to neutropenia. Given on 10/30/19. Had an episode of chest tightness w/ Pillo on day 1. Premedicated on day 2 with no events. Tremors better this cycle but notes 3 episodes of restless legs. \par \par C6 on 11/26/19. She reports having muscle aches. \par \par PET/CT 1/18/20: 1. Interval resolution of FDG activity associated with lymphadenopathy in the neck, chest, upper abdomen and pelvis with either resolution or significant decrease in size of the corresponding lymph nodes and not well delineated on CT as compared to PET/CT from 6/16/2019. Near total resolution of FDG avid left inguinal lymphadenopathy which is significantly decreased in size now demonstrating background activity.\par 2. Interval significant decrease in size and metabolism of hypermetabolic retroperitoneal and mesenteric lymphadenopathy.\par 3. Interval resolution of asymmetric tonsillar hypermetabolism.\par 4. Interval resolution of FDG avidity in bone marrow of bilateral humeri, T10, and sacrum.\par \par Having pain on L side of lower abdomen and groin around 3-4 weeks ago. Having night sweats again too. Had GYN f/u 2 weeks ago.\par We did a PET/CT on 6/13 which showed: 1. Small focus of mild FDG activity, decreased in size and metabolism, is associated with mesenteric haziness which is unchanged on CT as compared to prior study dated 1/18/2020 ([facility] 4). Resolution of minimally FDG-avid amorphous density, left periaortic region.\par 2. Minimally FDG-avid density, left inguinal region, unchanged, may represent postsurgical change versus lymph node. Please correlate clinically.\par \par Started Rituxan maintenance on 6/25/20. She had a reaction to Rituxan -please see chart note for details. \par Second dose on 8/31/20, tolerated better. Reports having back pain after premeds wore off. \par 3rd dose on 11/4/20, she felt tired and slept through the treatment, denied nausea, but c/o tingling in the finger tips and feet comes and goes. Patient admitted tremor improved.\par birth control pills d/c 3 months ago, she attributed the nausea after Rituxan to the withdrawal of birth control pills. Kidney stone stent removed today. \par Fourth dose on 12/30/20. Pt reports nausea and fatigue. States that does not feel well. Does not want to continue at this time. \par \par PET/CT 2/6/21: 1. Resolution of small focus of mild FDG activity associated with mesenteric haziness which is unchanged on CT ([facility]ville 1).\par 2. Minimally FDG-avid density, left inguinal region, unchanged, may represent postsurgical change versus lymph node. Please correlate clinically.\par 3. Remainder study is unremarkable and not significantly changed, demonstrating no evidence of FDG-avid disease.\par \par She is doing well, no new complaints. \par Continues to be fatigued. Reports having a difficult time coping with her diagnosis and her transition off treatment. \par She received both her COVID vaccine doses - 5/27 and 6/17\par \par Had GYN and PCP annual check up 2 weeks ago, she admitted cholesterol level was slightly high other than that no remarkable findings, will f/u in 3 months. PCP ordered Echo, will do after Thanksgiving. will have Mammogram done next month. \par Never went to psychotherapy, she felt everything calmed down now, the working gets better, she rested better, she felt much less stressful now. \par Denied any new complaints.

## 2021-11-29 NOTE — ASSESSMENT
[FreeTextEntry1] : 41yo F w/ asthma here for f/u of stage IV follicular lymphoma grade 1-2. \par We started treatment with Bendamustine/Rituxan on 6/24/19. LAD has improved. C5 delayed due to neutropenia, received on 10/30/19. C6 on 11/26/19, tolerated treatment well. PET/CT done at end of treatment showed interval significant decrease in size and metabolism of LAD. \par \par PET/CT unchanged. Started on Rituximab maintenance on 6/25/20. Cont every 2 mo. s/p 4th dose of Rituxan on 12/30/20. She had worsened side effects after 4th dose and does not want to continue. Will hold further Rituxan maintenance given intolerance. PET/CT post Rituxan stable\par f/u w/ PMD and gyn\par Pt refered to psycho-oncology -pt exhibits psychological distress including anxious thought, ruminations, tearfulness, and anhedonia. These symptoms interfere with functioning across domains. Pt is likely to benefit from psychotherapy. She has joined a support group through [facility]. She didn't have psychotherapy since she felt less stressful now, but she admitted she will go to the support group. \par All questions answered\par port flush every 6 wks\par RTC 3 mo\par \par \par Case and management discussed with Dr. Vega\par \par  none

## 2021-11-29 NOTE — PHYSICAL EXAM
[Fully active, able to carry on all pre-disease performance without restriction] : Status 0 - Fully active, able to carry on all pre-disease performance without restriction [Normal] : affect appropriate [de-identified] : cervical adenopathy resolved

## 2021-12-22 ENCOUNTER — NON-APPOINTMENT (OUTPATIENT)
Age: 41
End: 2021-12-22

## 2021-12-28 ENCOUNTER — NON-APPOINTMENT (OUTPATIENT)
Age: 41
End: 2021-12-28

## 2021-12-28 ENCOUNTER — EMERGENCY (EMERGENCY)
Facility: HOSPITAL | Age: 41
LOS: 1 days | Discharge: HOME | End: 2021-12-28
Attending: EMERGENCY MEDICINE | Admitting: EMERGENCY MEDICINE
Payer: MEDICAID

## 2021-12-28 ENCOUNTER — OUTPATIENT (OUTPATIENT)
Dept: OUTPATIENT SERVICES | Facility: HOSPITAL | Age: 41
LOS: 1 days | Discharge: HOME | End: 2021-12-28

## 2021-12-28 VITALS
OXYGEN SATURATION: 100 % | SYSTOLIC BLOOD PRESSURE: 134 MMHG | DIASTOLIC BLOOD PRESSURE: 76 MMHG | HEART RATE: 86 BPM | RESPIRATION RATE: 18 BRPM | TEMPERATURE: 98 F

## 2021-12-28 VITALS
RESPIRATION RATE: 20 BRPM | OXYGEN SATURATION: 100 % | HEIGHT: 64 IN | SYSTOLIC BLOOD PRESSURE: 127 MMHG | DIASTOLIC BLOOD PRESSURE: 79 MMHG | HEART RATE: 100 BPM | TEMPERATURE: 98 F

## 2021-12-28 DIAGNOSIS — C85.88 OTHER SPECIFIED TYPES OF NON-HODGKIN LYMPHOMA, LYMPH NODES OF MULTIPLE SITES: ICD-10-CM

## 2021-12-28 LAB
ALBUMIN SERPL ELPH-MCNC: 4.5 G/DL — SIGNIFICANT CHANGE UP (ref 3.3–5)
ALP SERPL-CCNC: 115 U/L — SIGNIFICANT CHANGE UP (ref 40–120)
ALT FLD-CCNC: 46 U/L — HIGH (ref 4–33)
ANION GAP SERPL CALC-SCNC: 12 MMOL/L — SIGNIFICANT CHANGE UP (ref 7–14)
APPEARANCE UR: CLEAR — SIGNIFICANT CHANGE UP
AST SERPL-CCNC: 22 U/L — SIGNIFICANT CHANGE UP (ref 4–32)
BASOPHILS # BLD AUTO: 0.02 K/UL — SIGNIFICANT CHANGE UP (ref 0–0.2)
BASOPHILS NFR BLD AUTO: 0.4 % — SIGNIFICANT CHANGE UP (ref 0–2)
BILIRUB SERPL-MCNC: 0.3 MG/DL — SIGNIFICANT CHANGE UP (ref 0.2–1.2)
BILIRUB UR-MCNC: NEGATIVE — SIGNIFICANT CHANGE UP
BUN SERPL-MCNC: 9 MG/DL — SIGNIFICANT CHANGE UP (ref 7–23)
CALCIUM SERPL-MCNC: 9.3 MG/DL — SIGNIFICANT CHANGE UP (ref 8.4–10.5)
CHLORIDE SERPL-SCNC: 106 MMOL/L — SIGNIFICANT CHANGE UP (ref 98–107)
CO2 SERPL-SCNC: 23 MMOL/L — SIGNIFICANT CHANGE UP (ref 22–31)
COLOR SPEC: SIGNIFICANT CHANGE UP
CREAT SERPL-MCNC: 0.76 MG/DL — SIGNIFICANT CHANGE UP (ref 0.5–1.3)
D DIMER BLD IA.RAPID-MCNC: 200 NG/ML DDU — SIGNIFICANT CHANGE UP
DIFF PNL FLD: ABNORMAL
EOSINOPHIL # BLD AUTO: 0.14 K/UL — SIGNIFICANT CHANGE UP (ref 0–0.5)
EOSINOPHIL NFR BLD AUTO: 2.6 % — SIGNIFICANT CHANGE UP (ref 0–6)
GLUCOSE SERPL-MCNC: 104 MG/DL — HIGH (ref 70–99)
GLUCOSE UR QL: NEGATIVE — SIGNIFICANT CHANGE UP
HCT VFR BLD CALC: 39 % — SIGNIFICANT CHANGE UP (ref 34.5–45)
HGB BLD-MCNC: 12.4 G/DL — SIGNIFICANT CHANGE UP (ref 11.5–15.5)
IANC: 3.17 K/UL — SIGNIFICANT CHANGE UP (ref 1.5–8.5)
IMM GRANULOCYTES NFR BLD AUTO: 0.4 % — SIGNIFICANT CHANGE UP (ref 0–1.5)
KETONES UR-MCNC: NEGATIVE — SIGNIFICANT CHANGE UP
LEUKOCYTE ESTERASE UR-ACNC: ABNORMAL
LIDOCAIN IGE QN: 20 U/L — SIGNIFICANT CHANGE UP (ref 7–60)
LYMPHOCYTES # BLD AUTO: 1.4 K/UL — SIGNIFICANT CHANGE UP (ref 1–3.3)
LYMPHOCYTES # BLD AUTO: 26.5 % — SIGNIFICANT CHANGE UP (ref 13–44)
MCHC RBC-ENTMCNC: 27 PG — SIGNIFICANT CHANGE UP (ref 27–34)
MCHC RBC-ENTMCNC: 31.8 GM/DL — LOW (ref 32–36)
MCV RBC AUTO: 85 FL — SIGNIFICANT CHANGE UP (ref 80–100)
MONOCYTES # BLD AUTO: 0.54 K/UL — SIGNIFICANT CHANGE UP (ref 0–0.9)
MONOCYTES NFR BLD AUTO: 10.2 % — SIGNIFICANT CHANGE UP (ref 2–14)
NEUTROPHILS # BLD AUTO: 3.17 K/UL — SIGNIFICANT CHANGE UP (ref 1.8–7.4)
NEUTROPHILS NFR BLD AUTO: 59.9 % — SIGNIFICANT CHANGE UP (ref 43–77)
NITRITE UR-MCNC: NEGATIVE — SIGNIFICANT CHANGE UP
NRBC # BLD: 0 /100 WBCS — SIGNIFICANT CHANGE UP
NRBC # FLD: 0 K/UL — SIGNIFICANT CHANGE UP
PH UR: 6.5 — SIGNIFICANT CHANGE UP (ref 5–8)
PLATELET # BLD AUTO: 254 K/UL — SIGNIFICANT CHANGE UP (ref 150–400)
POTASSIUM SERPL-MCNC: 3.9 MMOL/L — SIGNIFICANT CHANGE UP (ref 3.5–5.3)
POTASSIUM SERPL-SCNC: 3.9 MMOL/L — SIGNIFICANT CHANGE UP (ref 3.5–5.3)
PROT SERPL-MCNC: 7.2 G/DL — SIGNIFICANT CHANGE UP (ref 6–8.3)
PROT UR-MCNC: ABNORMAL
RBC # BLD: 4.59 M/UL — SIGNIFICANT CHANGE UP (ref 3.8–5.2)
RBC # FLD: 13.5 % — SIGNIFICANT CHANGE UP (ref 10.3–14.5)
SODIUM SERPL-SCNC: 141 MMOL/L — SIGNIFICANT CHANGE UP (ref 135–145)
SP GR SPEC: 1.01 — SIGNIFICANT CHANGE UP (ref 1–1.05)
TROPONIN T, HIGH SENSITIVITY RESULT: <6 NG/L — SIGNIFICANT CHANGE UP
UROBILINOGEN FLD QL: SIGNIFICANT CHANGE UP
WBC # BLD: 5.29 K/UL — SIGNIFICANT CHANGE UP (ref 3.8–10.5)
WBC # FLD AUTO: 5.29 K/UL — SIGNIFICANT CHANGE UP (ref 3.8–10.5)

## 2021-12-28 PROCEDURE — 71046 X-RAY EXAM CHEST 2 VIEWS: CPT | Mod: 26

## 2021-12-28 PROCEDURE — 99285 EMERGENCY DEPT VISIT HI MDM: CPT | Mod: 25

## 2021-12-28 PROCEDURE — 74177 CT ABD & PELVIS W/CONTRAST: CPT | Mod: 26,MA

## 2021-12-28 PROCEDURE — 93010 ELECTROCARDIOGRAM REPORT: CPT

## 2021-12-28 RX ORDER — FAMOTIDINE 10 MG/ML
20 INJECTION INTRAVENOUS ONCE
Refills: 0 | Status: COMPLETED | OUTPATIENT
Start: 2021-12-28 | End: 2021-12-28

## 2021-12-28 RX ORDER — SODIUM CHLORIDE 9 MG/ML
1000 INJECTION, SOLUTION INTRAVENOUS ONCE
Refills: 0 | Status: COMPLETED | OUTPATIENT
Start: 2021-12-28 | End: 2021-12-28

## 2021-12-28 RX ADMIN — Medication 30 MILLILITER(S): at 19:32

## 2021-12-28 RX ADMIN — FAMOTIDINE 20 MILLIGRAM(S): 10 INJECTION INTRAVENOUS at 19:33

## 2021-12-28 RX ADMIN — SODIUM CHLORIDE 1000 MILLILITER(S): 9 INJECTION, SOLUTION INTRAVENOUS at 19:33

## 2021-12-28 NOTE — ED PROVIDER NOTE - PROGRESS NOTE DETAILS
Socorro Aguilar DO PGY-1  Patient signed out to me. Discussed CT results with patient. Plan for gastroenterology f/u. Strict return precautions advised. Pt expresses understanding and agrees to plan.

## 2021-12-28 NOTE — ED PROVIDER NOTE - NSFOLLOWUPINSTRUCTIONS_ED_ALL_ED_FT
Abdominal pain is a very common reason for people to visit the emergency room. Frequently the exact cause of these painful symptoms is not diagnosed in the ED. Very often, your emergency provider will focus on ruling out a dangerous diagnosis and trying to help you feel better. Luckily most abdominal pain gets better with time and rest. In rare circumstances, abdominal pain can be a sign of a much more severe medical condition.     HOME CARE INSTRUCTIONS   -Please follow up with your cardiologist within 1 week.  - rest  - drink plenty of non-caffeinated non-alcoholic fluids. Keep in mind salt restrictions if you are eating store bought soups  - avoid excessive motrin / Advil / ibuprofen (NSAID's) as these can make abdominal pain worse. Tylenol is a good choice for pain (make sure that you follow dosage guidelines)  - avoid fatty or spicy foods  - make an appointment to see your doctor  - If you have vomiting or diarrhea - DO NOT go to work while you have these contagious symptoms     RETURN TO THE EMERGENCY ROOM RIGHT AWAY  - IF YOUR PAIN CHANGES OR GETS WORSE  - IF YOU HAVE NAUSEA AND VOMITING THAT PERSISTS AND YOU CANNOT KEEP DOWN SOLIDS OR LIQUIDS  - YOUR PAIN RADIATES TO YOUR BACK  - YOU HAVE CHEST PAIN, SHORTNESS OF BREATH OR DIZZINESS  - IF YOU FEEL DIZZY OR WEAK  - IF YOU HAVE CHEST PAIN  - YOU HAVE BLEEDING FROM YOUR RECTUM OR DARK OR TARRY/STICKY STOOLS  - YOU HAVE BLEEDING FROM YOUR PENIS OR VAGINA OR BLOOD IN YOUR URINE  - YOUR PAIN IS NOT IMPROVING IN 24 HOURS  - YOU FEEL SICK OR HAVE CONCERNS

## 2021-12-28 NOTE — ED PROVIDER NOTE - ATTENDING CONTRIBUTION TO CARE
DR. GALAN, ATTENDING MD-  I performed a face to face bedside interview with the patient regarding history of present illness, review of symptoms and past medical history. I completed an independent physical exam.  I have discussed the patient's plan of care with the PA.   Documentation as above in the note.    40 y/o female h/o lymphoma kidney stone here with left sided cp and epig pain x2 days.  Eval for pe, acs, lyte abn, pna, ptx, viral syndrome.  Obtain cbc cmp trop dimer ekg cxr ct a/p reassess.

## 2021-12-28 NOTE — ED PROVIDER NOTE - OBJECTIVE STATEMENT
40 y/o F with PMHx lymphoma (no active cancer now as per pt) presents to the ED c/o constant CP and congestion since last night. Pt also notes she needs to cough out her congestion, and noticed blood in her sputum today. Pt states her she cannot swallow and that "it does not go down." Pt endorses pain on inspiration and hormone use, but denies burning pain on the chest. Pt denies swelling on legs, or smoking. Pt also notes shooting pain on back and hand from prior cancer treatment.  [id number] for COVID test results. 42 y/o F with PMHx lymphoma (no active cancer now as per pt) presents to the ED c/o constant CP and congestion since last night. Pt notes she needs to cough out her congestion, and noticed blood in her sputum today. Pt states she cannot swallow and that "it does not go down." Pt endorses pain on inspiration and hormone use, but denies burning pain on the chest. Pt denies swelling on legs, or smoking. Pt also notes shooting pain on back and hand from prior cancer treatment.  [id number] for COVID test results. 42 y/o F with PMHx lymphoma (no active cancer now as per pt) presents to the ED c/o constant CP for the last 2 days and epigastric pain. patient spoke to PCP which advised to go to the ED for ACS evaluation. Pt endorses pain on inspiration. patient denies Nausea/Vomiting/Diarrhea, dizziness, weakness, confusion, vision changes, urinary symptoms, syncope, falls, trauma, discharge, bleeding, fevers 40 y/o F with PMHx lymphoma (no active cancer now as per pt) presents to the ED c/o constant CP for the last 2 days and epigastric pain. patient spoke to PCP which advised to go to the ED for ACS evaluation. Pt endorses pain on inspiration. patient denies Nausea/Vomiting/Diarrhea, dizziness, weakness, confusion, vision changes, urinary symptoms, syncope, falls, trauma, discharge, bleeding, fevers. patient had ECHO done Nov 21

## 2021-12-28 NOTE — ED PROVIDER NOTE - PATIENT PORTAL LINK FT
You can access the FollowMyHealth Patient Portal offered by [facility] by registering at the following website: [email or web address]. By joining [facility]’s FollowMyHealth portal, you will also be able to view your health information using other applications (apps) compatible with our system.

## 2021-12-28 NOTE — ED PROVIDER NOTE - CHPI ED SYMPTOMS POS
+congestion, productive cough, pain on inspiration/CHEST PAIN +congestion, pain on inspiration/CHEST PAIN

## 2021-12-28 NOTE — ED PROVIDER NOTE - CLINICAL SUMMARY MEDICAL DECISION MAKING FREE TEXT BOX
I have reviewed the notes, assessments, and/or procedures performed by Josephine Delacruz PA-C, I concur with her documentation of Trinidad Quinteros.     40 y/o F w/ PMHx lymphoma presents w/ constant CP and congestion since last night. Will obtain blood work, lipase, cardiac enzymes to r/o ACS. Will order CT a/p, D-dimer, IV fluids, pain management, and reassess.

## 2021-12-29 ENCOUNTER — NON-APPOINTMENT (OUTPATIENT)
Age: 41
End: 2021-12-29

## 2021-12-29 LAB

## 2022-01-03 ENCOUNTER — NON-APPOINTMENT (OUTPATIENT)
Age: 42
End: 2022-01-03

## 2022-01-08 ENCOUNTER — RESULT REVIEW (OUTPATIENT)
Age: 42
End: 2022-01-08

## 2022-01-08 ENCOUNTER — APPOINTMENT (OUTPATIENT)
Dept: INFUSION THERAPY | Facility: HOSPITAL | Age: 42
End: 2022-01-08

## 2022-01-08 LAB
ALBUMIN SERPL ELPH-MCNC: 4.4 G/DL — SIGNIFICANT CHANGE UP (ref 3.3–5)
ALP SERPL-CCNC: 99 U/L — SIGNIFICANT CHANGE UP (ref 40–120)
ALT FLD-CCNC: 30 U/L — SIGNIFICANT CHANGE UP (ref 10–45)
ANION GAP SERPL CALC-SCNC: 11 MMOL/L — SIGNIFICANT CHANGE UP (ref 5–17)
AST SERPL-CCNC: 19 U/L — SIGNIFICANT CHANGE UP (ref 10–40)
BASOPHILS # BLD AUTO: 0.06 K/UL — SIGNIFICANT CHANGE UP (ref 0–0.2)
BASOPHILS NFR BLD AUTO: 1.1 % — SIGNIFICANT CHANGE UP (ref 0–2)
BILIRUB SERPL-MCNC: 0.3 MG/DL — SIGNIFICANT CHANGE UP (ref 0.2–1.2)
BUN SERPL-MCNC: 11 MG/DL — SIGNIFICANT CHANGE UP (ref 7–23)
CALCIUM SERPL-MCNC: 9.4 MG/DL — SIGNIFICANT CHANGE UP (ref 8.4–10.5)
CHLORIDE SERPL-SCNC: 111 MMOL/L — HIGH (ref 96–108)
CO2 SERPL-SCNC: 19 MMOL/L — LOW (ref 22–31)
CREAT SERPL-MCNC: 0.68 MG/DL — SIGNIFICANT CHANGE UP (ref 0.5–1.3)
EOSINOPHIL # BLD AUTO: 0.3 K/UL — SIGNIFICANT CHANGE UP (ref 0–0.5)
EOSINOPHIL NFR BLD AUTO: 5.3 % — SIGNIFICANT CHANGE UP (ref 0–6)
GLUCOSE SERPL-MCNC: 117 MG/DL — HIGH (ref 70–99)
HCT VFR BLD CALC: 36.1 % — SIGNIFICANT CHANGE UP (ref 34.5–45)
HGB BLD-MCNC: 11.6 G/DL — SIGNIFICANT CHANGE UP (ref 11.5–15.5)
IMM GRANULOCYTES NFR BLD AUTO: 0.4 % — SIGNIFICANT CHANGE UP (ref 0–1.5)
LDH SERPL L TO P-CCNC: 180 U/L — SIGNIFICANT CHANGE UP (ref 50–242)
LYMPHOCYTES # BLD AUTO: 1.36 K/UL — SIGNIFICANT CHANGE UP (ref 1–3.3)
LYMPHOCYTES # BLD AUTO: 23.9 % — SIGNIFICANT CHANGE UP (ref 13–44)
MCHC RBC-ENTMCNC: 27.1 PG — SIGNIFICANT CHANGE UP (ref 27–34)
MCHC RBC-ENTMCNC: 32.1 G/DL — SIGNIFICANT CHANGE UP (ref 32–36)
MCV RBC AUTO: 84.3 FL — SIGNIFICANT CHANGE UP (ref 80–100)
MONOCYTES # BLD AUTO: 0.43 K/UL — SIGNIFICANT CHANGE UP (ref 0–0.9)
MONOCYTES NFR BLD AUTO: 7.5 % — SIGNIFICANT CHANGE UP (ref 2–14)
NEUTROPHILS # BLD AUTO: 3.53 K/UL — SIGNIFICANT CHANGE UP (ref 1.8–7.4)
NEUTROPHILS NFR BLD AUTO: 61.8 % — SIGNIFICANT CHANGE UP (ref 43–77)
NRBC # BLD: 0 /100 WBCS — SIGNIFICANT CHANGE UP (ref 0–0)
PLATELET # BLD AUTO: 265 K/UL — SIGNIFICANT CHANGE UP (ref 150–400)
POTASSIUM SERPL-MCNC: 4.1 MMOL/L — SIGNIFICANT CHANGE UP (ref 3.5–5.3)
POTASSIUM SERPL-SCNC: 4.1 MMOL/L — SIGNIFICANT CHANGE UP (ref 3.5–5.3)
PROT SERPL-MCNC: 6.3 G/DL — SIGNIFICANT CHANGE UP (ref 6–8.3)
RBC # BLD: 4.28 M/UL — SIGNIFICANT CHANGE UP (ref 3.8–5.2)
RBC # FLD: 13.3 % — SIGNIFICANT CHANGE UP (ref 10.3–14.5)
SODIUM SERPL-SCNC: 142 MMOL/L — SIGNIFICANT CHANGE UP (ref 135–145)
WBC # BLD: 5.7 K/UL — SIGNIFICANT CHANGE UP (ref 3.8–10.5)
WBC # FLD AUTO: 5.7 K/UL — SIGNIFICANT CHANGE UP (ref 3.8–10.5)

## 2022-01-10 ENCOUNTER — APPOINTMENT (OUTPATIENT)
Dept: GASTROENTEROLOGY | Facility: CLINIC | Age: 42
End: 2022-01-10
Payer: MEDICAID

## 2022-01-10 VITALS
DIASTOLIC BLOOD PRESSURE: 79 MMHG | TEMPERATURE: 98.1 F | HEART RATE: 76 BPM | SYSTOLIC BLOOD PRESSURE: 115 MMHG | RESPIRATION RATE: 18 BRPM | OXYGEN SATURATION: 100 %

## 2022-01-10 DIAGNOSIS — K30 FUNCTIONAL DYSPEPSIA: ICD-10-CM

## 2022-01-10 PROCEDURE — 99204 OFFICE O/P NEW MOD 45 MIN: CPT

## 2022-01-10 NOTE — REASON FOR VISIT
[Consultation] : a consultation visit [FreeTextEntry1] : Dysphagia, Choking, Vomiting and Epigastric Discomfort

## 2022-01-10 NOTE — ASSESSMENT
[FreeTextEntry1] : Attending Attestation\par Epigastric Pain, Nausea, Vomiting, Indigestion, and Dysphagia\par \par A low acid / reflux diet was discussed in great detail including not smoking, not drinking alcohol, and not\par consuming foods that irritate the esophagus. It is helpful to eat small meals throughout the day instead\par of large meals. You should avoid eating before bedtime or lying down after you eat. It can be helpful to\par raise the head of your bed six inches. Additionally, you should maintain a healthy weight and good\par posture.. The patient was given written material to take home and review. \par \par Patient will begin taking Omeprazole 40 mg PO daily. Medication reviewed side effects and adverse reactions. EGD procedure ordered The risks benefits alternatives and complications of the procedure/s were explained to the patient at length. The patient was agreeable and we will proceed. \par \par Time with patient 45 min \par \par Patient verbalized understanding of all instructions provided. All questions answered and reviewed.

## 2022-01-10 NOTE — REVIEW OF SYSTEMS
[As Noted in HPI] : as noted in HPI [Abdominal Pain] : abdominal pain [Vomiting] : vomiting [Heartburn] : heartburn [Negative] : Heme/Lymph

## 2022-01-10 NOTE — PHYSICAL EXAM
[General Appearance - Alert] : alert [General Appearance - In No Acute Distress] : in no acute distress [Sclera] : the sclera and conjunctiva were normal [PERRL With Normal Accommodation] : pupils were equal in size, round, and reactive to light [Extraocular Movements] : extraocular movements were intact [Outer Ear] : the ears and nose were normal in appearance [Oropharynx] : the oropharynx was normal [Neck Appearance] : the appearance of the neck was normal [Neck Cervical Mass (___cm)] : no neck mass was observed [Jugular Venous Distention Increased] : there was no jugular-venous distention [Thyroid Diffuse Enlargement] : the thyroid was not enlarged [Thyroid Nodule] : there were no palpable thyroid nodules [] : no respiratory distress [Auscultation Breath Sounds / Voice Sounds] : lungs were clear to auscultation bilaterally [Heart Rate And Rhythm] : heart rate was normal and rhythm regular [Heart Sounds] : normal S1 and S2 [Heart Sounds Gallop] : no gallops [Murmurs] : no murmurs [Heart Sounds Pericardial Friction Rub] : no pericardial rub [Flat] : flat [Normal] : normal to percussion [Epigastric] : in the epigastric area [Cervical Lymph Nodes Enlarged Posterior Bilaterally] : posterior cervical [Cervical Lymph Nodes Enlarged Anterior Bilaterally] : anterior cervical [Supraclavicular Lymph Nodes Enlarged Bilaterally] : supraclavicular [Axillary Lymph Nodes Enlarged Bilaterally] : axillary [Femoral Lymph Nodes Enlarged Bilaterally] : femoral [Inguinal Lymph Nodes Enlarged Bilaterally] : inguinal [No CVA Tenderness] : no ~M costovertebral angle tenderness [No Spinal Tenderness] : no spinal tenderness [Abnormal Walk] : normal gait [Nail Clubbing] : no clubbing  or cyanosis of the fingernails [Musculoskeletal - Swelling] : no joint swelling seen [Motor Tone] : muscle strength and tone were normal [Deep Tendon Reflexes (DTR)] : deep tendon reflexes were 2+ and symmetric [Sensation] : the sensory exam was normal to light touch and pinprick [No Focal Deficits] : no focal deficits [Oriented To Time, Place, And Person] : oriented to person, place, and time [Impaired Insight] : insight and judgment were intact [Affect] : the affect was normal [Firm] : not firm [Rigid] : not rigid [Rebound] : no rebound [Guarding] : no guarding [Castaneda's] : a negative Castaneda's sign

## 2022-01-10 NOTE — HISTORY OF PRESENT ILLNESS
[Heartburn] : heartburn worsened [Nausea] : denies nausea [Vomiting] : vomiting worsened [Diarrhea] : denies diarrhea [Constipation] : denies constipation [Yellow Skin Or Eyes (Jaundice)] : denies jaundice [Abdominal Pain] : abdominal pain worsened [Abdominal Swelling] : denies abdominal swelling [Rectal Pain] : denies rectal pain [Kidney Stone] : kidney stone [Malignancy] : malignancy [GERD] : no gastroesophageal reflux disease [Hiatus Hernia] : no hiatus hernia [Peptic Ulcer Disease] : no peptic ulcer disease [Pancreatitis] : no pancreatitis [Cholelithiasis] : no cholelithiasis [Inflammatory Bowel Disease] : no inflammatory bowel disease [Irritable Bowel Syndrome] : no irritable bowel syndrome [Diverticulitis] : no diverticulitis [Alcohol Abuse] : no alcohol abuse [Abdominal Surgery] : no abdominal surgery [Appendectomy] : no appendectomy [Cholecystectomy] : no cholecystectomy [de-identified] : 42 y/o F with PMHx B- Cell lymphoma (no active cancer now as\par per pt) presented to the ED a few weeks ago c/o constant CP for the last 2 days and epigastric\par pain. patient spoke to PCP which advised to go to the ED for ACS evaluation. Pt\par endorsed pain on inspiration. She denied  dizziness, weakness, confusion, vision changes, urinary symptoms, syncope, falls, trauma, discharge, bleeding, fevers. patient had ECHO done Nov 21. She has not had chemo in over 1 year. She complains of over a 2 week period progressively worsening of dysphagia along with a choking sensation when consuming oral solids or liquids. No specific triggers noted. She was only given Mylanta during office visit. Bowel movements are daily and regular. Denies rectal bleeding, blood in the stool, melena, or hematemesis.

## 2022-01-11 ENCOUNTER — APPOINTMENT (OUTPATIENT)
Dept: INTERNAL MEDICINE | Facility: CLINIC | Age: 42
End: 2022-01-11
Payer: MEDICAID

## 2022-01-11 ENCOUNTER — OUTPATIENT (OUTPATIENT)
Dept: OUTPATIENT SERVICES | Facility: HOSPITAL | Age: 42
LOS: 1 days | End: 2022-01-11

## 2022-01-11 VITALS — WEIGHT: 160 LBS | BODY MASS INDEX: 27.31 KG/M2 | HEIGHT: 64 IN

## 2022-01-11 PROCEDURE — ZZZZZ: CPT

## 2022-01-13 ENCOUNTER — APPOINTMENT (OUTPATIENT)
Dept: OBGYN | Facility: HOSPITAL | Age: 42
End: 2022-01-13

## 2022-01-13 ENCOUNTER — OUTPATIENT (OUTPATIENT)
Dept: OUTPATIENT SERVICES | Facility: HOSPITAL | Age: 42
LOS: 1 days | End: 2022-01-13

## 2022-01-13 VITALS
SYSTOLIC BLOOD PRESSURE: 119 MMHG | WEIGHT: 161 LBS | TEMPERATURE: 97.6 F | HEART RATE: 78 BPM | HEIGHT: 64 IN | DIASTOLIC BLOOD PRESSURE: 75 MMHG | BODY MASS INDEX: 27.49 KG/M2

## 2022-01-13 RX ORDER — MEDROXYPROGESTERONE ACETATE 150 MG/ML
150 INJECTION, SUSPENSION INTRAMUSCULAR
Qty: 0 | Refills: 0 | Status: COMPLETED | OUTPATIENT
Start: 2022-01-13

## 2022-01-13 RX ADMIN — MEDROXYPROGESTERONE ACETATE 0 MG/ML: 150 INJECTION, SUSPENSION INTRAMUSCULAR at 00:00

## 2022-01-14 ENCOUNTER — RESULT REVIEW (OUTPATIENT)
Age: 42
End: 2022-01-14

## 2022-01-14 ENCOUNTER — APPOINTMENT (OUTPATIENT)
Dept: ULTRASOUND IMAGING | Facility: IMAGING CENTER | Age: 42
End: 2022-01-14
Payer: MEDICAID

## 2022-01-14 ENCOUNTER — OUTPATIENT (OUTPATIENT)
Dept: OUTPATIENT SERVICES | Facility: HOSPITAL | Age: 42
LOS: 1 days | End: 2022-01-14
Payer: MEDICAID

## 2022-01-14 ENCOUNTER — APPOINTMENT (OUTPATIENT)
Dept: MAMMOGRAPHY | Facility: IMAGING CENTER | Age: 42
End: 2022-01-14
Payer: MEDICAID

## 2022-01-14 DIAGNOSIS — Z00.8 ENCOUNTER FOR OTHER GENERAL EXAMINATION: ICD-10-CM

## 2022-01-14 PROCEDURE — 76641 ULTRASOUND BREAST COMPLETE: CPT | Mod: 26,50

## 2022-01-14 PROCEDURE — 77063 BREAST TOMOSYNTHESIS BI: CPT

## 2022-01-14 PROCEDURE — 77063 BREAST TOMOSYNTHESIS BI: CPT | Mod: 26

## 2022-01-14 PROCEDURE — 77067 SCR MAMMO BI INCL CAD: CPT

## 2022-01-14 PROCEDURE — 77067 SCR MAMMO BI INCL CAD: CPT | Mod: 26

## 2022-01-14 PROCEDURE — 76641 ULTRASOUND BREAST COMPLETE: CPT

## 2022-01-18 DIAGNOSIS — Z30.42 ENCOUNTER FOR SURVEILLANCE OF INJECTABLE CONTRACEPTIVE: ICD-10-CM

## 2022-01-18 NOTE — HISTORY OF PRESENT ILLNESS
[Home] : at home, [unfilled] , at the time of the visit. [Other Location: e.g. Home (Enter Location, City,State)___] : at [unfilled] [Verbal consent obtained from patient] : the patient, [unfilled] [FreeTextEntry1] : "I thought this was the routine follow up." [de-identified] : 41 year old woman, history of allergic rhinitis and mild intermittent asthma, follicular lymphoma diagnosed in 2019, now s/p maintenance therapy with rituximab, per oncology repeat scans with no acute disease, presenting for follow up visit. \par \par Since last visit, patient had chest discomfort and dyspnea over a couple of days, prompting ED visit. Cardiovascular workup negative (EKG, cardiac enzymes, chest imaging), patient ntoed to be COVID positive (had recent booster ). Patient states at the time of these symptoms, she has had worsening of sensation of choking on food (solid foods more so than liquids, sensation that food not going down), and worsening heartburn and indigestion. She was refered to GI. Chest pain, dyspnea and congestion have resolved, she visited [place] recently and repeat EKG and oxygen saturations were within normal limits. Patient saw GI yesterday, will plan on EGD 2022. \par \par Patient denies n/v, deneia melena or hematochezia, denies fevers, chills, night sweats. Patient still has persistent fatigue and easily tires with day to day activity, denies persistent sadness or lost of interest in activities. She has to lay down in the middle of the day to take a nap. \par \par \par  \par \par Follicular lymphoma: Initially presented in 2019 with a L inguinal mass, biopsy, CTAP, and PET scan confirmed diagnoses with B cell lymphoma Stage IV, now s/p treatment, monitored closely by hem/onc, noted to now have no active disease. Patient denies night sweats, weight loss, fevers, chills. She described prior anhedonia, survivors guilt several months ago was exacerbated by transition into remission status, comparing it to "post-yovanny period." At the time, she had loss of several friends from cancer. She now states mood is much improved, she now realizes although it was sad to lose friends and she was guilty of her "survivor status," she appreciates the blessings in her life and that she is able to be here for her kids. 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  Patient saw GYN yesterday, is pending mammogram, PAP UTD. \par \par Patient has had weight gain over past few months, she is not physically active describing exercise limitation ("only able to tolerate walking one block").

## 2022-01-18 NOTE — PHYSICAL EXAM
[Normal] : no acute distress, well nourished, well developed and well-appearing [Normal Affect] : the affect was normal [Normal Insight/Judgement] : insight and judgment were intact [de-identified] : TELEPHONIC, UNABLE TO ASSESS not dyspneic, able to speak in full sentences

## 2022-01-18 NOTE — ASSESSMENT
[FreeTextEntry1] : 40 year old woman, history of allergic rhinitis and well controlled asthma, follicular lymphoma diagnosed in February 2019. nephrolithiasis resulting in hospitalization October 2020 s/p ureteral stent and removal, now s/p maintenance therapy with rituximab per hematology no active disease, presenting for follow up appointment\par \par #COVID\par Tested positive 112/29 (booster on 12/17) no longer symptomatic, saturating 100% on RA 12/31 per Katherine visit\par \par #Dysphagia\par -acutely worsening dysphagia to solid foods, saw GI yesterday, plan for EGD February 2022, will start omeprazole, trial lifestyle modifications of avoiding spicy foods, will eat small meals\par -patient without prior history of radiation treatments, follicular lymphoma now in remission \par \par \par #Chronic Fatigue \par -patient hemoglobin, TSH/T4 wnl. depression screen negative, TTE 11/21 wnl\par -if fatigue and minimal exercise tolerance persists and chest pain recurs will pursue a stress echo\par \par \par #Congestion, now resolved\par -advised continue humidifier at night, mucinex has relieved congstion, may have been 2/2 COVID infection\par \par Follicular lymphoma\par -following closely with hem/onc, now off rituximab, patient to continue close follow up, currently without B-symptoms, no palpable LAD\par \par #Nephrolithiasis\par -currently asymptomatic, s/p ureteral stent removal October 2020\par \par \par #HCM\par -Received last TDAP during her last pregnancy 7 years ago, next due in 2023\par -flu shot 11/21\par -patient has referral for mammogram\par -UTD on pap July 2020, can likely do co-testing HPV/pap q 5 years\par -A1c in pre-diabetic range, patient will trial life style mod, lipid panel with [facility] patient to trial lifestyle mod\par \par Brenna Ramirez\par PGY-2 [employer]\par \par Case discussed with Dr. Ocasio\par \par F/u in 5 months for follow up (May 2022)\par \par

## 2022-01-18 NOTE — END OF VISIT
[] : Resident [FreeTextEntry3] : 40 y/o F with mild intermittent asthma, follicular lympohma (2019) s/p chemo for ED follow up.\par Had chest pain for a few days, went to ED and had negative workup except for COVID+.\par Did supportive care and sx have resolved.\par Reports that whenever eats solid foods, feels like getting stuck. Saw GI 1/10/22, prescribed PPI and scheduled for EGD.\par Agree with additional plan as per Dr. Ramirez. [Time Spent: ___ minutes] : I have spent [unfilled] minutes of time on the encounter.

## 2022-01-19 DIAGNOSIS — R13.10 DYSPHAGIA, UNSPECIFIED: ICD-10-CM

## 2022-01-19 DIAGNOSIS — R53.83 OTHER FATIGUE: ICD-10-CM

## 2022-01-19 DIAGNOSIS — U07.1 COVID-19: ICD-10-CM

## 2022-02-01 ENCOUNTER — APPOINTMENT (OUTPATIENT)
Dept: GASTROENTEROLOGY | Facility: CLINIC | Age: 42
End: 2022-02-01

## 2022-02-02 LAB — SARS-COV-2 N GENE NPH QL NAA+PROBE: NOT DETECTED

## 2022-02-03 NOTE — ASU PATIENT PROFILE, ADULT - FALL HARM RISK - UNIVERSAL INTERVENTIONS
Bed in lowest position, wheels locked, appropriate side rails in place/Call bell, personal items and telephone in reach/Instruct patient to call for assistance before getting out of bed or chair/Non-slip footwear when patient is out of bed/[place] to call system/Physically safe environment - no spills, clutter or unnecessary equipment/Purposeful Proactive Rounding/Room/bathroom lighting operational, light cord in reach

## 2022-02-04 ENCOUNTER — APPOINTMENT (OUTPATIENT)
Dept: GASTROENTEROLOGY | Facility: HOSPITAL | Age: 42
End: 2022-02-04
Payer: MEDICAID

## 2022-02-04 ENCOUNTER — RESULT REVIEW (OUTPATIENT)
Age: 42
End: 2022-02-04

## 2022-02-04 ENCOUNTER — OUTPATIENT (OUTPATIENT)
Dept: OUTPATIENT SERVICES | Facility: HOSPITAL | Age: 42
LOS: 1 days | Discharge: HOME | End: 2022-02-04
Payer: MEDICAID

## 2022-02-04 VITALS
HEART RATE: 62 BPM | SYSTOLIC BLOOD PRESSURE: 109 MMHG | OXYGEN SATURATION: 99 % | DIASTOLIC BLOOD PRESSURE: 70 MMHG | RESPIRATION RATE: 16 BRPM

## 2022-02-04 VITALS
DIASTOLIC BLOOD PRESSURE: 71 MMHG | OXYGEN SATURATION: 99 % | TEMPERATURE: 98 F | HEART RATE: 74 BPM | HEIGHT: 64 IN | RESPIRATION RATE: 16 BRPM | WEIGHT: 162.04 LBS | SYSTOLIC BLOOD PRESSURE: 130 MMHG

## 2022-02-04 DIAGNOSIS — K21.9 GASTRO-ESOPHAGEAL REFLUX DISEASE WITHOUT ESOPHAGITIS: ICD-10-CM

## 2022-02-04 LAB — HCG UR QL: NEGATIVE — SIGNIFICANT CHANGE UP

## 2022-02-04 PROCEDURE — 43239 EGD BIOPSY SINGLE/MULTIPLE: CPT

## 2022-02-04 PROCEDURE — 88305 TISSUE EXAM BY PATHOLOGIST: CPT | Mod: 26

## 2022-02-04 RX ORDER — SODIUM CHLORIDE 9 MG/ML
500 INJECTION, SOLUTION INTRAVENOUS
Refills: 0 | Status: DISCONTINUED | OUTPATIENT
Start: 2022-02-04 | End: 2022-02-18

## 2022-02-08 ENCOUNTER — OUTPATIENT (OUTPATIENT)
Dept: OUTPATIENT SERVICES | Facility: HOSPITAL | Age: 42
LOS: 1 days | Discharge: HOME | End: 2022-02-08

## 2022-02-08 DIAGNOSIS — C85.88 OTHER SPECIFIED TYPES OF NON-HODGKIN LYMPHOMA, LYMPH NODES OF MULTIPLE SITES: ICD-10-CM

## 2022-02-08 LAB — SURGICAL PATHOLOGY STUDY: SIGNIFICANT CHANGE UP

## 2022-02-10 ENCOUNTER — RESULT REVIEW (OUTPATIENT)
Age: 42
End: 2022-02-10

## 2022-02-10 ENCOUNTER — APPOINTMENT (OUTPATIENT)
Dept: HEMATOLOGY ONCOLOGY | Facility: CLINIC | Age: 42
End: 2022-02-10
Payer: MEDICAID

## 2022-02-10 ENCOUNTER — APPOINTMENT (OUTPATIENT)
Dept: INFUSION THERAPY | Facility: HOSPITAL | Age: 42
End: 2022-02-10

## 2022-02-10 VITALS
BODY MASS INDEX: 27.62 KG/M2 | TEMPERATURE: 97.5 F | OXYGEN SATURATION: 99 % | HEART RATE: 73 BPM | SYSTOLIC BLOOD PRESSURE: 136 MMHG | WEIGHT: 160.92 LBS | RESPIRATION RATE: 18 BRPM | DIASTOLIC BLOOD PRESSURE: 79 MMHG

## 2022-02-10 LAB
BASOPHILS # BLD AUTO: 0.06 K/UL — SIGNIFICANT CHANGE UP (ref 0–0.2)
BASOPHILS NFR BLD AUTO: 1 % — SIGNIFICANT CHANGE UP (ref 0–2)
EOSINOPHIL # BLD AUTO: 0.11 K/UL — SIGNIFICANT CHANGE UP (ref 0–0.5)
EOSINOPHIL NFR BLD AUTO: 1.8 % — SIGNIFICANT CHANGE UP (ref 0–6)
HCT VFR BLD CALC: 39.8 % — SIGNIFICANT CHANGE UP (ref 34.5–45)
HGB BLD-MCNC: 12.6 G/DL — SIGNIFICANT CHANGE UP (ref 11.5–15.5)
IMM GRANULOCYTES NFR BLD AUTO: 0.3 % — SIGNIFICANT CHANGE UP (ref 0–1.5)
LYMPHOCYTES # BLD AUTO: 1.71 K/UL — SIGNIFICANT CHANGE UP (ref 1–3.3)
LYMPHOCYTES # BLD AUTO: 28.5 % — SIGNIFICANT CHANGE UP (ref 13–44)
MCHC RBC-ENTMCNC: 27.3 PG — SIGNIFICANT CHANGE UP (ref 27–34)
MCHC RBC-ENTMCNC: 31.7 G/DL — LOW (ref 32–36)
MCV RBC AUTO: 86.1 FL — SIGNIFICANT CHANGE UP (ref 80–100)
MONOCYTES # BLD AUTO: 0.41 K/UL — SIGNIFICANT CHANGE UP (ref 0–0.9)
MONOCYTES NFR BLD AUTO: 6.8 % — SIGNIFICANT CHANGE UP (ref 2–14)
NEUTROPHILS # BLD AUTO: 3.69 K/UL — SIGNIFICANT CHANGE UP (ref 1.8–7.4)
NEUTROPHILS NFR BLD AUTO: 61.6 % — SIGNIFICANT CHANGE UP (ref 43–77)
NRBC # BLD: 0 /100 WBCS — SIGNIFICANT CHANGE UP (ref 0–0)
PLATELET # BLD AUTO: 247 K/UL — SIGNIFICANT CHANGE UP (ref 150–400)
RBC # BLD: 4.62 M/UL — SIGNIFICANT CHANGE UP (ref 3.8–5.2)
RBC # FLD: 14.3 % — SIGNIFICANT CHANGE UP (ref 10.3–14.5)
WBC # BLD: 6 K/UL — SIGNIFICANT CHANGE UP (ref 3.8–10.5)
WBC # FLD AUTO: 6 K/UL — SIGNIFICANT CHANGE UP (ref 3.8–10.5)

## 2022-02-10 PROCEDURE — 99213 OFFICE O/P EST LOW 20 MIN: CPT

## 2022-02-10 NOTE — PHYSICAL EXAM
[Fully active, able to carry on all pre-disease performance without restriction] : Status 0 - Fully active, able to carry on all pre-disease performance without restriction [Normal] : affect appropriate [de-identified] : cervical adenopathy resolved

## 2022-02-10 NOTE — HISTORY OF PRESENT ILLNESS
[de-identified] : 39yo F w/ asthma here for evaluation of newly diagnosed follicular lymphoma. \par \par Patient sates she initially noted left inguinal pain in February 2019, had a palpable mass for the last 6 years which she noticed after delivery of her second baby in 2013. She saw GYN, had TVUS: Uterus: 5.6 x 3.6 x 4.3 cm. EMS 3 mm. Right ovary normal. Left ovary normal. Complex elongated left adnexal lesion suggestive of hydrosalpinx, 9.2 x 4.9 x 9 cm. She is s/p b/l salpingectomy on 5/2/19. She remains on depo injection. She states that she has had more swelling and more pain since her procedure. \par \par She had CT A/P done on 5/9/19 which demonstrated extensive retroperitoneal, pelvic, and inguinal lymphadenopathy, concerning for malignancy with small amount of abdominal and pelvic ascites.\par s/p IR biopsy of left inguinal lymph node - follicular lymphoma grade 1-2. \par \par Also having cervical LAD, noticed for the last few months, L>R. Noted R inguinal swelling for the last week with pain. No fevers of chills. Notes having itching and that her breathing is a bit more labored than usual.  [de-identified] : BMbx (6/10/19): - Focal lymphoid infiltrate consistent with involvement by follicular lymphoma (history of follicular lymphoma)\par - Small paracortical sample with trilineage hematopoiesis with maturation and iron stores present\par \par PET/CT (6/16/19): 1. Hypermetabolic lymphadenopathy in neck, thorax, abdomen, pelvis, and bilateral inguinal/femoral regions corresponds to biopsy-proven follicular lymphoma. Hypermetabolic subcutaneous nodule, right posterolateral chest wall, is compatible with an additional site of lymphoma.\par 2. Nonspecific left greater than right tonsillar hypermetabolism may represent additional sites of lymphoma.\par 3. Findings compatible with bone marrow involvement by lymphoma in bilateral humeri and axial skeleton, as described above.\par \par She reports having increasing fatigue. Still having slight shortness of breath.\par \par We started BR on 6/24. She had a reaction to Rituxan. \par \par Eating fine. Neck LAD, shortness of breath and abdominal bloating improved/resolved. \par She had a yeast infection, saw GYN. Also reports having pelvic pain and was found to have a small polyp. \par \par C2 on 7/22. She notes having burning as she was getting the chemo (please see chart note for details). Notes that her arms were still hurting for a week after. \par She had a portacath placed 8/12\par Felt more fatigued and weak. \par Had transvaginal US, saw GYN this morning (9/5/19). Pelvic pain has resolved. \par \par C3 on 8/19. Tolerated it well. \par \par CT N/C/A/P (9/10/19): Marked decrease in size of cervical lymph nodes when compared with the prior exam compatible with a favorable response to therapy. Significant interval decrease in size of left supraclavicular lymphadenopathy.\par Focal enhancement involving the right anterior tonsillar without associated tonsillar expansion. Correlation with direct visualization and continued follow-up is advised.\par Significant interval decrease in size of previously noted hypermetabolic nodule in the soft tissues of the right posterior lateral chest.\par Significant interval decrease in size of retroperitoneal, pelvic, inguinal, and mesenteric lymphadenopathy as described above.\par \par C4 on 9/16. Tolerated well, had some tremors afterwards. \par \par C5 was delayed secondary to neutropenia. Given on 10/30/19. Had an episode of chest tightness w/ Pillo on day 1. Premedicated on day 2 with no events. Tremors better this cycle but notes 3 episodes of restless legs. \par \par C6 on 11/26/19. She reports having muscle aches. \par \par PET/CT 1/18/20: 1. Interval resolution of FDG activity associated with lymphadenopathy in the neck, chest, upper abdomen and pelvis with either resolution or significant decrease in size of the corresponding lymph nodes and not well delineated on CT as compared to PET/CT from 6/16/2019. Near total resolution of FDG avid left inguinal lymphadenopathy which is significantly decreased in size now demonstrating background activity.\par 2. Interval significant decrease in size and metabolism of hypermetabolic retroperitoneal and mesenteric lymphadenopathy.\par 3. Interval resolution of asymmetric tonsillar hypermetabolism.\par 4. Interval resolution of FDG avidity in bone marrow of bilateral humeri, T10, and sacrum.\par \par Having pain on L side of lower abdomen and groin around 3-4 weeks ago. Having night sweats again too. Had GYN f/u 2 weeks ago.\par We did a PET/CT on 6/13 which showed: 1. Small focus of mild FDG activity, decreased in size and metabolism, is associated with mesenteric haziness which is unchanged on CT as compared to prior study dated 1/18/2020 ([facility] 4). Resolution of minimally FDG-avid amorphous density, left periaortic region.\par 2. Minimally FDG-avid density, left inguinal region, unchanged, may represent postsurgical change versus lymph node. Please correlate clinically.\par \par Started Rituxan maintenance on 6/25/20. She had a reaction to Rituxan -please see chart note for details. \par Second dose on 8/31/20, tolerated better. Reports having back pain after premeds wore off. \par 3rd dose on 11/4/20, she felt tired and slept through the treatment, denied nausea, but c/o tingling in the finger tips and feet comes and goes. Patient admitted tremor improved.\par birth control pills d/c 3 months ago, she attributed the nausea after Rituxan to the withdrawal of birth control pills. Kidney stone stent removed today. \par Fourth dose on 12/30/20. Pt reports nausea and fatigue. States that does not feel well. Does not want to continue at this time. \par \par PET/CT 2/6/21: 1. Resolution of small focus of mild FDG activity associated with mesenteric haziness which is unchanged on CT ([facility] 1).\par 2. Minimally FDG-avid density, left inguinal region, unchanged, may represent postsurgical change versus lymph node. Please correlate clinically.\par 3. Remainder study is unremarkable and not significantly changed, demonstrating no evidence of FDG-avid disease.\par \par She is doing well, no new complaints. \par Continues to be fatigued. Reports having a difficult time coping with her diagnosis and her transition off treatment. \par She received both her COVID vaccine doses - 5/27 and 6/17\par \par Had GYN and PCP annual check up 2 weeks ago, she admitted cholesterol level was slightly high other than that no remarkable findings, will f/u in 3 months. PCP ordered Echo, will do after Thanksgiving. will have Mammogram done next month. \par Never went to psychotherapy, she felt everything calmed down now, the working gets better, she rested better, she felt much less stressful now. \par Denied any new complaints. \par \par She had COVID in Dec - had URI symptoms and had CP/SOB. \par She saw GI on 1/10/22 and had endoscopy done. Taking omeprazole with relief.

## 2022-02-10 NOTE — ASSESSMENT
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 \par We started treatment with Bendamustine/Rituxan on 6/24/19. LAD has improved. C5 delayed due to neutropenia, received on 10/30/19. C6 on 11/26/19, tolerated treatment well. PET/CT done at end of treatment showed interval significant decrease in size and metabolism of LAD. \par \par PET/CT unchanged. Started on Rituximab maintenance on 6/25/20. Cont every 2 mo. s/p 4th dose of Rituxan on 12/30/20. She had worsened side effects after 4th dose and does not want to continue. Will hold further Rituxan maintenance given intolerance. PET/CT post Rituxan 2/2021 stable\par Check 1 year CT N/C/A/P \par f/u w/ PMD and gyn\par Pt referred to psycho-oncology -pt exhibits psychological distress including anxious thought, ruminations, tearfulness, and anhedonia. These symptoms interfere with functioning across domains. Pt is likely to benefit from psychotherapy. She has joined a support group through [facility]. She didn't have psychotherapy since she felt less stressful now, but she admitted she will go to the support group. \par All questions answered\par port flush every 6 wks\par RTC 3 mo\par

## 2022-02-11 ENCOUNTER — NON-APPOINTMENT (OUTPATIENT)
Age: 42
End: 2022-02-11

## 2022-02-11 LAB
ALBUMIN SERPL ELPH-MCNC: 5 G/DL
ALP BLD-CCNC: 101 U/L
ALT SERPL-CCNC: 42 U/L
ANION GAP SERPL CALC-SCNC: 15 MMOL/L
AST SERPL-CCNC: 24 U/L
BILIRUB SERPL-MCNC: 0.3 MG/DL
BUN SERPL-MCNC: 10 MG/DL
CALCIUM SERPL-MCNC: 9.6 MG/DL
CHLORIDE SERPL-SCNC: 109 MMOL/L
CO2 SERPL-SCNC: 17 MMOL/L
CREAT SERPL-MCNC: 0.72 MG/DL
GLUCOSE SERPL-MCNC: 106 MG/DL
LDH SERPL-CCNC: 160 U/L
POTASSIUM SERPL-SCNC: 4.4 MMOL/L
PROT SERPL-MCNC: 6.7 G/DL
SODIUM SERPL-SCNC: 142 MMOL/L

## 2022-02-24 ENCOUNTER — APPOINTMENT (OUTPATIENT)
Dept: GASTROENTEROLOGY | Facility: CLINIC | Age: 42
End: 2022-02-24
Payer: MEDICAID

## 2022-02-24 VITALS
WEIGHT: 160 LBS | DIASTOLIC BLOOD PRESSURE: 83 MMHG | OXYGEN SATURATION: 100 % | HEART RATE: 87 BPM | SYSTOLIC BLOOD PRESSURE: 121 MMHG | TEMPERATURE: 96.5 F | BODY MASS INDEX: 27.31 KG/M2 | HEIGHT: 64 IN

## 2022-02-24 PROCEDURE — 99213 OFFICE O/P EST LOW 20 MIN: CPT

## 2022-02-24 NOTE — HISTORY OF PRESENT ILLNESS
[de-identified] : 41-year-old woman with, was complaining of dysphagia.  Her dysphagia is improved on the omeprazole 40 mg a day.  She underwent an EGD and biopsy on February 4 that was unremarkable.

## 2022-02-24 NOTE — ASSESSMENT
[FreeTextEntry1] : Dysphagia patient with B-cell lymphoma.  EGD and biopsy was unremarkable. Dietary and lifestyle modification discussed with the patient.  Will taper off omeprazole.  Follow-up appointment in 3 months.\par

## 2022-02-24 NOTE — PHYSICAL EXAM
[General Appearance - Alert] : alert [General Appearance - In No Acute Distress] : in no acute distress [Neck Appearance] : the appearance of the neck was normal [Neck Cervical Mass (___cm)] : no neck mass was observed [Jugular Venous Distention Increased] : there was no jugular-venous distention [Thyroid Diffuse Enlargement] : the thyroid was not enlarged [Thyroid Nodule] : there were no palpable thyroid nodules [Auscultation Breath Sounds / Voice Sounds] : lungs were clear to auscultation bilaterally [Bowel Sounds] : normal bowel sounds [Abdomen Soft] : soft [Abdomen Tenderness] : non-tender [] : no hepato-splenomegaly [Abdomen Mass (___ Cm)] : no abdominal mass palpated [Cervical Lymph Nodes Enlarged Posterior Bilaterally] : posterior cervical [Cervical Lymph Nodes Enlarged Anterior Bilaterally] : anterior cervical [Supraclavicular Lymph Nodes Enlarged Bilaterally] : supraclavicular [Axillary Lymph Nodes Enlarged Bilaterally] : axillary [Femoral Lymph Nodes Enlarged Bilaterally] : femoral [Inguinal Lymph Nodes Enlarged Bilaterally] : inguinal [No CVA Tenderness] : no ~M costovertebral angle tenderness [No Spinal Tenderness] : no spinal tenderness

## 2022-03-01 ENCOUNTER — RESULT REVIEW (OUTPATIENT)
Age: 42
End: 2022-03-01

## 2022-03-05 ENCOUNTER — APPOINTMENT (OUTPATIENT)
Dept: CT IMAGING | Facility: IMAGING CENTER | Age: 42
End: 2022-03-05
Payer: MEDICAID

## 2022-03-05 ENCOUNTER — OUTPATIENT (OUTPATIENT)
Dept: OUTPATIENT SERVICES | Facility: HOSPITAL | Age: 42
LOS: 1 days | End: 2022-03-05
Payer: MEDICAID

## 2022-03-05 DIAGNOSIS — C85.10 UNSPECIFIED B-CELL LYMPHOMA, UNSPECIFIED SITE: ICD-10-CM

## 2022-03-05 PROCEDURE — 74177 CT ABD & PELVIS W/CONTRAST: CPT | Mod: 26

## 2022-03-05 PROCEDURE — 74177 CT ABD & PELVIS W/CONTRAST: CPT

## 2022-03-05 PROCEDURE — 71260 CT THORAX DX C+: CPT | Mod: 26

## 2022-03-05 PROCEDURE — 70491 CT SOFT TISSUE NECK W/DYE: CPT

## 2022-03-05 PROCEDURE — 70491 CT SOFT TISSUE NECK W/DYE: CPT | Mod: 26

## 2022-03-05 PROCEDURE — 71260 CT THORAX DX C+: CPT

## 2022-03-15 ENCOUNTER — APPOINTMENT (OUTPATIENT)
Dept: OPHTHALMOLOGY | Facility: CLINIC | Age: 42
End: 2022-03-15
Payer: MEDICAID

## 2022-03-15 ENCOUNTER — NON-APPOINTMENT (OUTPATIENT)
Age: 42
End: 2022-03-15

## 2022-03-15 PROCEDURE — 92250 FUNDUS PHOTOGRAPHY W/I&R: CPT

## 2022-03-15 PROCEDURE — 92014 COMPRE OPH EXAM EST PT 1/>: CPT

## 2022-03-22 ENCOUNTER — OUTPATIENT (OUTPATIENT)
Dept: OUTPATIENT SERVICES | Facility: HOSPITAL | Age: 42
LOS: 1 days | Discharge: HOME | End: 2022-03-22

## 2022-03-22 ENCOUNTER — APPOINTMENT (OUTPATIENT)
Dept: OTOLARYNGOLOGY | Facility: CLINIC | Age: 42
End: 2022-03-22
Payer: MEDICAID

## 2022-03-22 VITALS — HEIGHT: 64 IN

## 2022-03-22 VITALS
SYSTOLIC BLOOD PRESSURE: 118 MMHG | DIASTOLIC BLOOD PRESSURE: 82 MMHG | HEART RATE: 73 BPM | HEIGHT: 64 IN | BODY MASS INDEX: 27.31 KG/M2 | WEIGHT: 160 LBS

## 2022-03-22 DIAGNOSIS — C85.88 OTHER SPECIFIED TYPES OF NON-HODGKIN LYMPHOMA, LYMPH NODES OF MULTIPLE SITES: ICD-10-CM

## 2022-03-22 DIAGNOSIS — R59.0 LOCALIZED ENLARGED LYMPH NODES: ICD-10-CM

## 2022-03-22 PROCEDURE — 31575 DIAGNOSTIC LARYNGOSCOPY: CPT

## 2022-03-22 PROCEDURE — 99203 OFFICE O/P NEW LOW 30 MIN: CPT | Mod: 25

## 2022-03-22 NOTE — REASON FOR VISIT
[Initial Consultation] : an initial consultation for [FreeTextEntry2] : voice hoarseness and neck mass.

## 2022-03-22 NOTE — PROCEDURE
[Hoarseness] : hoarseness not clearly evaluated by indirect laryngoscopy [None] : none [Flexible Endoscope] : examined with the flexible endoscope [Serial Number: ___] : Serial Number: [unfilled] [de-identified] : No lesions in the NPx, OPx, HPx or larynx.  VC are mobile, airway patent.  Patient with significant changes of LPRD on exam with interarytenoid pachyderma and edema.\par

## 2022-03-22 NOTE — HISTORY OF PRESENT ILLNESS
[de-identified] : 41 year old female referred by Dr Dorcas Vega for voice hoarseness and neck mass. Patient has hx of stage IV lymphoma 2019 was treated with chemotherapy, last chemotherapy was December 2020. States had annual scan with Dr Vega and a neck mass was found unsure the location.  States had COVID in December, had voice hoarseness, dysphagia and dyspnea. Currently states voice remained hoarse, and occasionally gets night sweats.  States taking omeprazole daily. \par Patient denies dysphagia, odynophagia, dyspnea,  chills, fevers, otalgia, or sudden weight loss. Never smoker, and occasionally drinks alcohol.

## 2022-03-24 ENCOUNTER — MED ADMIN CHARGE (OUTPATIENT)
Age: 42
End: 2022-03-24

## 2022-03-24 ENCOUNTER — RESULT REVIEW (OUTPATIENT)
Age: 42
End: 2022-03-24

## 2022-03-24 ENCOUNTER — APPOINTMENT (OUTPATIENT)
Dept: INFUSION THERAPY | Facility: HOSPITAL | Age: 42
End: 2022-03-24

## 2022-03-24 ENCOUNTER — APPOINTMENT (OUTPATIENT)
Dept: OBGYN | Facility: HOSPITAL | Age: 42
End: 2022-03-24

## 2022-03-24 ENCOUNTER — OUTPATIENT (OUTPATIENT)
Dept: OUTPATIENT SERVICES | Facility: HOSPITAL | Age: 42
LOS: 1 days | End: 2022-03-24

## 2022-03-24 VITALS
HEIGHT: 64 IN | DIASTOLIC BLOOD PRESSURE: 74 MMHG | TEMPERATURE: 96.6 F | SYSTOLIC BLOOD PRESSURE: 123 MMHG | WEIGHT: 162 LBS | HEART RATE: 77 BPM | BODY MASS INDEX: 27.66 KG/M2

## 2022-03-24 LAB
ALBUMIN SERPL ELPH-MCNC: 4.6 G/DL — SIGNIFICANT CHANGE UP (ref 3.3–5)
ALP SERPL-CCNC: 92 U/L — SIGNIFICANT CHANGE UP (ref 40–120)
ALT FLD-CCNC: 34 U/L — SIGNIFICANT CHANGE UP (ref 10–45)
ANION GAP SERPL CALC-SCNC: 13 MMOL/L — SIGNIFICANT CHANGE UP (ref 5–17)
AST SERPL-CCNC: 18 U/L — SIGNIFICANT CHANGE UP (ref 10–40)
BASOPHILS # BLD AUTO: 0.03 K/UL — SIGNIFICANT CHANGE UP (ref 0–0.2)
BASOPHILS NFR BLD AUTO: 0.6 % — SIGNIFICANT CHANGE UP (ref 0–2)
BILIRUB SERPL-MCNC: 0.3 MG/DL — SIGNIFICANT CHANGE UP (ref 0.2–1.2)
BUN SERPL-MCNC: 12 MG/DL — SIGNIFICANT CHANGE UP (ref 7–23)
CALCIUM SERPL-MCNC: 9.5 MG/DL — SIGNIFICANT CHANGE UP (ref 8.4–10.5)
CHLORIDE SERPL-SCNC: 109 MMOL/L — HIGH (ref 96–108)
CO2 SERPL-SCNC: 20 MMOL/L — LOW (ref 22–31)
CREAT SERPL-MCNC: 0.75 MG/DL — SIGNIFICANT CHANGE UP (ref 0.5–1.3)
EGFR: 103 ML/MIN/1.73M2 — SIGNIFICANT CHANGE UP
EOSINOPHIL # BLD AUTO: 0.09 K/UL — SIGNIFICANT CHANGE UP (ref 0–0.5)
EOSINOPHIL NFR BLD AUTO: 1.8 % — SIGNIFICANT CHANGE UP (ref 0–6)
GLUCOSE SERPL-MCNC: 106 MG/DL — HIGH (ref 70–99)
HCT VFR BLD CALC: 36.4 % — SIGNIFICANT CHANGE UP (ref 34.5–45)
HGB BLD-MCNC: 11.8 G/DL — SIGNIFICANT CHANGE UP (ref 11.5–15.5)
IMM GRANULOCYTES NFR BLD AUTO: 0.4 % — SIGNIFICANT CHANGE UP (ref 0–1.5)
LDH SERPL L TO P-CCNC: 161 U/L — SIGNIFICANT CHANGE UP (ref 50–242)
LYMPHOCYTES # BLD AUTO: 1.64 K/UL — SIGNIFICANT CHANGE UP (ref 1–3.3)
LYMPHOCYTES # BLD AUTO: 33.5 % — SIGNIFICANT CHANGE UP (ref 13–44)
MCHC RBC-ENTMCNC: 27.2 PG — SIGNIFICANT CHANGE UP (ref 27–34)
MCHC RBC-ENTMCNC: 32.4 G/DL — SIGNIFICANT CHANGE UP (ref 32–36)
MCV RBC AUTO: 83.9 FL — SIGNIFICANT CHANGE UP (ref 80–100)
MONOCYTES # BLD AUTO: 0.38 K/UL — SIGNIFICANT CHANGE UP (ref 0–0.9)
MONOCYTES NFR BLD AUTO: 7.8 % — SIGNIFICANT CHANGE UP (ref 2–14)
NEUTROPHILS # BLD AUTO: 2.73 K/UL — SIGNIFICANT CHANGE UP (ref 1.8–7.4)
NEUTROPHILS NFR BLD AUTO: 55.9 % — SIGNIFICANT CHANGE UP (ref 43–77)
NRBC # BLD: 0 /100 WBCS — SIGNIFICANT CHANGE UP (ref 0–0)
PLATELET # BLD AUTO: 233 K/UL — SIGNIFICANT CHANGE UP (ref 150–400)
POTASSIUM SERPL-MCNC: 3.8 MMOL/L — SIGNIFICANT CHANGE UP (ref 3.5–5.3)
POTASSIUM SERPL-SCNC: 3.8 MMOL/L — SIGNIFICANT CHANGE UP (ref 3.5–5.3)
PROT SERPL-MCNC: 6.6 G/DL — SIGNIFICANT CHANGE UP (ref 6–8.3)
RBC # BLD: 4.34 M/UL — SIGNIFICANT CHANGE UP (ref 3.8–5.2)
RBC # FLD: 13.4 % — SIGNIFICANT CHANGE UP (ref 10.3–14.5)
SODIUM SERPL-SCNC: 142 MMOL/L — SIGNIFICANT CHANGE UP (ref 135–145)
WBC # BLD: 4.89 K/UL — SIGNIFICANT CHANGE UP (ref 3.8–10.5)
WBC # FLD AUTO: 4.89 K/UL — SIGNIFICANT CHANGE UP (ref 3.8–10.5)

## 2022-03-24 RX ORDER — MEDROXYPROGESTERONE ACETATE 150 MG/ML
150 INJECTION, SUSPENSION INTRAMUSCULAR
Qty: 0 | Refills: 0 | Status: COMPLETED | OUTPATIENT
Start: 2022-03-24

## 2022-03-24 RX ADMIN — MEDROXYPROGESTERONE ACETATE 0 MG/ML: 150 INJECTION, SUSPENSION INTRAMUSCULAR at 00:00

## 2022-03-25 DIAGNOSIS — Z30.42 ENCOUNTER FOR SURVEILLANCE OF INJECTABLE CONTRACEPTIVE: ICD-10-CM

## 2022-05-09 ENCOUNTER — OUTPATIENT (OUTPATIENT)
Dept: OUTPATIENT SERVICES | Facility: HOSPITAL | Age: 42
LOS: 1 days | Discharge: HOME | End: 2022-05-09

## 2022-05-09 DIAGNOSIS — C85.88 OTHER SPECIFIED TYPES OF NON-HODGKIN LYMPHOMA, LYMPH NODES OF MULTIPLE SITES: ICD-10-CM

## 2022-05-12 ENCOUNTER — RESULT REVIEW (OUTPATIENT)
Age: 42
End: 2022-05-12

## 2022-05-12 ENCOUNTER — APPOINTMENT (OUTPATIENT)
Dept: HEMATOLOGY ONCOLOGY | Facility: CLINIC | Age: 42
End: 2022-05-12
Payer: MEDICAID

## 2022-05-12 ENCOUNTER — APPOINTMENT (OUTPATIENT)
Dept: INFUSION THERAPY | Facility: HOSPITAL | Age: 42
End: 2022-05-12

## 2022-05-12 VITALS
RESPIRATION RATE: 16 BRPM | BODY MASS INDEX: 27.28 KG/M2 | HEART RATE: 78 BPM | TEMPERATURE: 97.2 F | DIASTOLIC BLOOD PRESSURE: 84 MMHG | SYSTOLIC BLOOD PRESSURE: 128 MMHG | OXYGEN SATURATION: 99 % | HEIGHT: 64 IN | WEIGHT: 159.81 LBS

## 2022-05-12 LAB
ALBUMIN SERPL ELPH-MCNC: 4.5 G/DL — SIGNIFICANT CHANGE UP (ref 3.3–5)
ALP SERPL-CCNC: 106 U/L — SIGNIFICANT CHANGE UP (ref 40–120)
ALT FLD-CCNC: 28 U/L — SIGNIFICANT CHANGE UP (ref 10–45)
ANION GAP SERPL CALC-SCNC: 12 MMOL/L — SIGNIFICANT CHANGE UP (ref 5–17)
AST SERPL-CCNC: 18 U/L — SIGNIFICANT CHANGE UP (ref 10–40)
BASOPHILS # BLD AUTO: 0.05 K/UL — SIGNIFICANT CHANGE UP (ref 0–0.2)
BASOPHILS NFR BLD AUTO: 0.9 % — SIGNIFICANT CHANGE UP (ref 0–2)
BILIRUB SERPL-MCNC: 0.4 MG/DL — SIGNIFICANT CHANGE UP (ref 0.2–1.2)
BUN SERPL-MCNC: 11 MG/DL — SIGNIFICANT CHANGE UP (ref 7–23)
CALCIUM SERPL-MCNC: 9.5 MG/DL — SIGNIFICANT CHANGE UP (ref 8.4–10.5)
CHLORIDE SERPL-SCNC: 110 MMOL/L — HIGH (ref 96–108)
CO2 SERPL-SCNC: 21 MMOL/L — LOW (ref 22–31)
CREAT SERPL-MCNC: 0.68 MG/DL — SIGNIFICANT CHANGE UP (ref 0.5–1.3)
EGFR: 113 ML/MIN/1.73M2 — SIGNIFICANT CHANGE UP
EOSINOPHIL # BLD AUTO: 0.13 K/UL — SIGNIFICANT CHANGE UP (ref 0–0.5)
EOSINOPHIL NFR BLD AUTO: 2.2 % — SIGNIFICANT CHANGE UP (ref 0–6)
GLUCOSE SERPL-MCNC: 110 MG/DL — HIGH (ref 70–99)
HCT VFR BLD CALC: 37.3 % — SIGNIFICANT CHANGE UP (ref 34.5–45)
HGB BLD-MCNC: 11.9 G/DL — SIGNIFICANT CHANGE UP (ref 11.5–15.5)
IMM GRANULOCYTES NFR BLD AUTO: 0.3 % — SIGNIFICANT CHANGE UP (ref 0–1.5)
LDH SERPL L TO P-CCNC: 166 U/L — SIGNIFICANT CHANGE UP (ref 50–242)
LYMPHOCYTES # BLD AUTO: 1.8 K/UL — SIGNIFICANT CHANGE UP (ref 1–3.3)
LYMPHOCYTES # BLD AUTO: 30.8 % — SIGNIFICANT CHANGE UP (ref 13–44)
MCHC RBC-ENTMCNC: 26.9 PG — LOW (ref 27–34)
MCHC RBC-ENTMCNC: 31.9 G/DL — LOW (ref 32–36)
MCV RBC AUTO: 84.2 FL — SIGNIFICANT CHANGE UP (ref 80–100)
MONOCYTES # BLD AUTO: 0.53 K/UL — SIGNIFICANT CHANGE UP (ref 0–0.9)
MONOCYTES NFR BLD AUTO: 9.1 % — SIGNIFICANT CHANGE UP (ref 2–14)
NEUTROPHILS # BLD AUTO: 3.32 K/UL — SIGNIFICANT CHANGE UP (ref 1.8–7.4)
NEUTROPHILS NFR BLD AUTO: 56.7 % — SIGNIFICANT CHANGE UP (ref 43–77)
NRBC # BLD: 0 /100 WBCS — SIGNIFICANT CHANGE UP (ref 0–0)
PLATELET # BLD AUTO: 266 K/UL — SIGNIFICANT CHANGE UP (ref 150–400)
POTASSIUM SERPL-MCNC: 4.1 MMOL/L — SIGNIFICANT CHANGE UP (ref 3.5–5.3)
POTASSIUM SERPL-SCNC: 4.1 MMOL/L — SIGNIFICANT CHANGE UP (ref 3.5–5.3)
PROT SERPL-MCNC: 6.4 G/DL — SIGNIFICANT CHANGE UP (ref 6–8.3)
RBC # BLD: 4.43 M/UL — SIGNIFICANT CHANGE UP (ref 3.8–5.2)
RBC # FLD: 13.4 % — SIGNIFICANT CHANGE UP (ref 10.3–14.5)
SODIUM SERPL-SCNC: 142 MMOL/L — SIGNIFICANT CHANGE UP (ref 135–145)
WBC # BLD: 5.85 K/UL — SIGNIFICANT CHANGE UP (ref 3.8–10.5)
WBC # FLD AUTO: 5.85 K/UL — SIGNIFICANT CHANGE UP (ref 3.8–10.5)

## 2022-05-12 PROCEDURE — 99214 OFFICE O/P EST MOD 30 MIN: CPT

## 2022-05-13 ENCOUNTER — NON-APPOINTMENT (OUTPATIENT)
Age: 42
End: 2022-05-13

## 2022-05-16 ENCOUNTER — OUTPATIENT (OUTPATIENT)
Dept: OUTPATIENT SERVICES | Facility: HOSPITAL | Age: 42
LOS: 1 days | End: 2022-05-16
Payer: MEDICAID

## 2022-05-16 ENCOUNTER — APPOINTMENT (OUTPATIENT)
Dept: CT IMAGING | Facility: IMAGING CENTER | Age: 42
End: 2022-05-16
Payer: MEDICAID

## 2022-05-16 DIAGNOSIS — Z00.8 ENCOUNTER FOR OTHER GENERAL EXAMINATION: ICD-10-CM

## 2022-05-16 DIAGNOSIS — C85.10 UNSPECIFIED B-CELL LYMPHOMA, UNSPECIFIED SITE: ICD-10-CM

## 2022-05-16 DIAGNOSIS — R59.0 LOCALIZED ENLARGED LYMPH NODES: ICD-10-CM

## 2022-05-16 PROCEDURE — 70491 CT SOFT TISSUE NECK W/DYE: CPT

## 2022-05-16 PROCEDURE — 70491 CT SOFT TISSUE NECK W/DYE: CPT | Mod: 26

## 2022-05-17 ENCOUNTER — APPOINTMENT (OUTPATIENT)
Dept: OTOLARYNGOLOGY | Facility: CLINIC | Age: 42
End: 2022-05-17
Payer: MEDICAID

## 2022-05-17 VITALS
DIASTOLIC BLOOD PRESSURE: 79 MMHG | SYSTOLIC BLOOD PRESSURE: 112 MMHG | BODY MASS INDEX: 27.14 KG/M2 | WEIGHT: 159 LBS | HEIGHT: 64 IN | HEART RATE: 80 BPM

## 2022-05-17 PROCEDURE — 99214 OFFICE O/P EST MOD 30 MIN: CPT | Mod: 25

## 2022-05-17 PROCEDURE — 31575 DIAGNOSTIC LARYNGOSCOPY: CPT

## 2022-05-17 NOTE — HISTORY OF PRESENT ILLNESS
[de-identified] : 41 year old female presents for a follow up  for voice hoarseness and neck mass.  Patient has hx of stage IV lymphoma 2019 was treated with chemotherapy, last chemotherapy was December 2020. States had annual scan with Dr Vega and a neck mass was found unsure the location. Currently states voice remained hoarse, and occasionally gets night sweats. States taking omeprazole daily.  States had COVID in December, had voice hoarseness, dysphagia and dyspnea.Patient denies dysphagia, odynophagia, dyspnea, chills, fevers, otalgia, or sudden weight loss.\par  \par \par  \par

## 2022-05-17 NOTE — PROCEDURE
[Globus] : globus [None] : none [Flexible Endoscope] : examined with the flexible endoscope [Serial Number: ___] : Serial Number: [unfilled] [de-identified] : No lesions in the NPx, OPx, HPx or larynx. VC are mobile, airway patent. Patient with significant improvement in changes of LPRD on exam with mild interarytenoid pachyderma and edema.\par

## 2022-05-17 NOTE — REASON FOR VISIT
[Subsequent Evaluation] : a subsequent evaluation for [FreeTextEntry2] :  for voice hoarseness and neck mass.

## 2022-05-23 ENCOUNTER — APPOINTMENT (OUTPATIENT)
Dept: GASTROENTEROLOGY | Facility: CLINIC | Age: 42
End: 2022-05-23

## 2022-05-23 NOTE — HISTORY OF PRESENT ILLNESS
[de-identified] : 39yo F w/ asthma here for evaluation of newly diagnosed follicular lymphoma. \par \par Patient sates she initially noted left inguinal pain in February 2019, had a palpable mass for the last 6 years which she noticed after delivery of her second baby in 2013. She saw GYN, had TVUS: Uterus: 5.6 x 3.6 x 4.3 cm. EMS 3 mm. Right ovary normal. Left ovary normal. Complex elongated left adnexal lesion suggestive of hydrosalpinx, 9.2 x 4.9 x 9 cm. She is s/p b/l salpingectomy on 5/2/19. She remains on depo injection. She states that she has had more swelling and more pain since her procedure. \par \par She had CT A/P done on 5/9/19 which demonstrated extensive retroperitoneal, pelvic, and inguinal lymphadenopathy, concerning for malignancy with small amount of abdominal and pelvic ascites.\par s/p IR biopsy of left inguinal lymph node - follicular lymphoma grade 1-2. \par \par Also having cervical LAD, noticed for the last few months, L>R. Noted R inguinal swelling for the last week with pain. No fevers of chills. Notes having itching and that her breathing is a bit more labored than usual.  [de-identified] : BMbx (6/10/19): - Focal lymphoid infiltrate consistent with involvement by follicular lymphoma (history of follicular lymphoma)\par - Small paracortical sample with trilineage hematopoiesis with maturation and iron stores present\par \par PET/CT (6/16/19): 1. Hypermetabolic lymphadenopathy in neck, thorax, abdomen, pelvis, and bilateral inguinal/femoral regions corresponds to biopsy-proven follicular lymphoma. Hypermetabolic subcutaneous nodule, right posterolateral chest wall, is compatible with an additional site of lymphoma.\par 2. Nonspecific left greater than right tonsillar hypermetabolism may represent additional sites of lymphoma.\par 3. Findings compatible with bone marrow involvement by lymphoma in bilateral humeri and axial skeleton, as described above.\par \par She reports having increasing fatigue. Still having slight shortness of breath.\par \par We started BR on 6/24. She had a reaction to Rituxan. \par \par Eating fine. Neck LAD, shortness of breath and abdominal bloating improved/resolved. \par She had a yeast infection, saw GYN. Also reports having pelvic pain and was found to have a small polyp. \par \par C2 on 7/22. She notes having burning as she was getting the chemo (please see chart note for details). Notes that her arms were still hurting for a week after. \par She had a portacath placed 8/12\par Felt more fatigued and weak. \par Had transvaginal US, saw GYN this morning (9/5/19). Pelvic pain has resolved. \par \par C3 on 8/19. Tolerated it well. \par \par CT N/C/A/P (9/10/19): Marked decrease in size of cervical lymph nodes when compared with the prior exam compatible with a favorable response to therapy. Significant interval decrease in size of left supraclavicular lymphadenopathy.\par Focal enhancement involving the right anterior tonsillar without associated tonsillar expansion. Correlation with direct visualization and continued follow-up is advised.\par Significant interval decrease in size of previously noted hypermetabolic nodule in the soft tissues of the right posterior lateral chest.\par Significant interval decrease in size of retroperitoneal, pelvic, inguinal, and mesenteric lymphadenopathy as described above.\par \par C4 on 9/16. Tolerated well, had some tremors afterwards. \par \par C5 was delayed secondary to neutropenia. Given on 10/30/19. Had an episode of chest tightness w/ Pillo on day 1. Premedicated on day 2 with no events. Tremors better this cycle but notes 3 episodes of restless legs. \par \par C6 on 11/26/19. She reports having muscle aches. \par \par PET/CT 1/18/20: 1. Interval resolution of FDG activity associated with lymphadenopathy in the neck, chest, upper abdomen and pelvis with either resolution or significant decrease in size of the corresponding lymph nodes and not well delineated on CT as compared to PET/CT from 6/16/2019. Near total resolution of FDG avid left inguinal lymphadenopathy which is significantly decreased in size now demonstrating background activity.\par 2. Interval significant decrease in size and metabolism of hypermetabolic retroperitoneal and mesenteric lymphadenopathy.\par 3. Interval resolution of asymmetric tonsillar hypermetabolism.\par 4. Interval resolution of FDG avidity in bone marrow of bilateral humeri, T10, and sacrum.\par \par Having pain on L side of lower abdomen and groin around 3-4 weeks ago. Having night sweats again too. Had GYN f/u 2 weeks ago.\par We did a PET/CT on 6/13 which showed: 1. Small focus of mild FDG activity, decreased in size and metabolism, is associated with mesenteric haziness which is unchanged on CT as compared to prior study dated 1/18/2020 ([facility] 4). Resolution of minimally FDG-avid amorphous density, left periaortic region.\par 2. Minimally FDG-avid density, left inguinal region, unchanged, may represent postsurgical change versus lymph node. Please correlate clinically.\par \par Started Rituxan maintenance on 6/25/20. She had a reaction to Rituxan -please see chart note for details. \par Second dose on 8/31/20, tolerated better. Reports having back pain after premeds wore off. \par 3rd dose on 11/4/20, she felt tired and slept through the treatment, denied nausea, but c/o tingling in the finger tips and feet comes and goes. Patient admitted tremor improved.\par birth control pills d/c 3 months ago, she attributed the nausea after Rituxan to the withdrawal of birth control pills. Kidney stone stent removed today. \par Fourth dose on 12/30/20. Pt reports nausea and fatigue. States that does not feel well. Does not want to continue at this time. \par \par PET/CT 2/6/21: 1. Resolution of small focus of mild FDG activity associated with mesenteric haziness which is unchanged on CT ([facility] 1).\par 2. Minimally FDG-avid density, left inguinal region, unchanged, may represent postsurgical change versus lymph node. Please correlate clinically.\par 3. Remainder study is unremarkable and not significantly changed, demonstrating no evidence of FDG-avid disease.\par \par She is doing well, no new complaints. \par Continues to be fatigued. Reports having a difficult time coping with her diagnosis and her transition off treatment. \par She received both her COVID vaccine doses - 5/27 and 6/17\par \par Had GYN and PCP annual check up 2 weeks ago, she admitted cholesterol level was slightly high other than that no remarkable findings, will f/u in 3 months. PCP ordered Echo, will do after Thanksgiving. will have Mammogram done next month. \par Never went to psychotherapy, she felt everything calmed down now, the working gets better, she rested better, she felt much less stressful now. \par Denied any new complaints. \par \par She had COVID in Dec - had URI symptoms and had CP/SOB. \par She saw GI on 1/10/22 and had endoscopy done. Taking omeprazole with relief. \par \par Saw [facility] Dr. Jolly on 3/22 for voice hoareness and neck mass,will have neck CT done tomorrow. \par She is on PPI and acid reflux improved. \par Denied any new complaints. \par CT C/A/P done 3/5/22: No evidence of recurrent or metastatic disease in the chest, abdomen, or pelvis.\par

## 2022-05-23 NOTE — PHYSICAL EXAM
[Fully active, able to carry on all pre-disease performance without restriction] : Status 0 - Fully active, able to carry on all pre-disease performance without restriction [Normal] : affect appropriate [de-identified] : cervical adenopathy resolved

## 2022-05-23 NOTE — ASSESSMENT
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 \par We started treatment with Bendamustine/Rituxan on 6/24/19. LAD has improved. C5 delayed due to neutropenia, received on 10/30/19. C6 on 11/26/19, tolerated treatment well. PET/CT done at end of treatment showed interval significant decrease in size and metabolism of LAD. \par \par PET/CT unchanged. Started on Rituximab maintenance on 6/25/20. Cont every 2 mo. s/p 4th dose of Rituxan on 12/30/20. She had worsened side effects after 4th dose and does not want to continue. Will hold further Rituxan maintenance given intolerance. PET/CT post Rituxan 2/2021 stable\par Check 1 year CT N/C/A/P, done on 3/5/22: no No evidence of recurrent or metastatic disease in the chest, abdomen, or pelvis.\par f/u w/ PMD and gyn\par f/u w/RUBEN Dr. Jolly, will have CT neck done tomorrow \par Pt referred to psycho-oncology -pt exhibits psychological distress including anxious thought, ruminations, tearfulness, and anhedonia. These symptoms interfere with functioning across domains. Pt is likely to benefit from psychotherapy. She has joined a support group through [facility]. She didn't have psychotherapy since she felt less stressful now, but she admitted she will go to the support group. \par All questions answered\par port flush every 6 wks\par RTC 3 mo\par \par \par Case and management discussed with Dr. Vega\par \par

## 2022-06-01 ENCOUNTER — APPOINTMENT (OUTPATIENT)
Dept: INTERNAL MEDICINE | Facility: CLINIC | Age: 42
End: 2022-06-01

## 2022-06-02 ENCOUNTER — OUTPATIENT (OUTPATIENT)
Dept: OUTPATIENT SERVICES | Facility: HOSPITAL | Age: 42
LOS: 1 days | End: 2022-06-02

## 2022-06-02 ENCOUNTER — APPOINTMENT (OUTPATIENT)
Dept: OBGYN | Facility: HOSPITAL | Age: 42
End: 2022-06-02

## 2022-06-02 VITALS
WEIGHT: 161 LBS | HEIGHT: 64 IN | SYSTOLIC BLOOD PRESSURE: 129 MMHG | DIASTOLIC BLOOD PRESSURE: 82 MMHG | TEMPERATURE: 97.4 F | BODY MASS INDEX: 27.49 KG/M2 | HEART RATE: 78 BPM

## 2022-06-02 RX ORDER — MEDROXYPROGESTERONE ACETATE 150 MG/ML
150 INJECTION, SUSPENSION INTRAMUSCULAR
Qty: 0 | Refills: 0 | Status: COMPLETED | OUTPATIENT
Start: 2022-06-02

## 2022-06-02 RX ADMIN — MEDROXYPROGESTERONE ACETATE 0 MG/ML: 150 INJECTION, SUSPENSION INTRAMUSCULAR at 00:00

## 2022-06-06 DIAGNOSIS — Z30.42 ENCOUNTER FOR SURVEILLANCE OF INJECTABLE CONTRACEPTIVE: ICD-10-CM

## 2022-06-16 ENCOUNTER — OUTPATIENT (OUTPATIENT)
Dept: OUTPATIENT SERVICES | Facility: HOSPITAL | Age: 42
LOS: 1 days | Discharge: HOME | End: 2022-06-16

## 2022-06-16 DIAGNOSIS — C85.88 OTHER SPECIFIED TYPES OF NON-HODGKIN LYMPHOMA, LYMPH NODES OF MULTIPLE SITES: ICD-10-CM

## 2022-06-23 ENCOUNTER — OUTPATIENT (OUTPATIENT)
Dept: OUTPATIENT SERVICES | Facility: HOSPITAL | Age: 42
LOS: 1 days | End: 2022-06-23

## 2022-06-23 VITALS
HEART RATE: 97 BPM | TEMPERATURE: 99 F | OXYGEN SATURATION: 99 % | HEIGHT: 64 IN | DIASTOLIC BLOOD PRESSURE: 78 MMHG | RESPIRATION RATE: 16 BRPM | WEIGHT: 154.1 LBS | SYSTOLIC BLOOD PRESSURE: 106 MMHG

## 2022-06-23 DIAGNOSIS — C85.10 UNSPECIFIED B-CELL LYMPHOMA, UNSPECIFIED SITE: ICD-10-CM

## 2022-06-23 DIAGNOSIS — T78.40XA ALLERGY, UNSPECIFIED, INITIAL ENCOUNTER: ICD-10-CM

## 2022-06-23 DIAGNOSIS — N20.0 CALCULUS OF KIDNEY: Chronic | ICD-10-CM

## 2022-06-23 DIAGNOSIS — Z95.828 PRESENCE OF OTHER VASCULAR IMPLANTS AND GRAFTS: Chronic | ICD-10-CM

## 2022-06-23 DIAGNOSIS — Z90.79 ACQUIRED ABSENCE OF OTHER GENITAL ORGAN(S): Chronic | ICD-10-CM

## 2022-06-23 RX ORDER — ACETAMINOPHEN 500 MG
1 TABLET ORAL
Qty: 0 | Refills: 0 | DISCHARGE

## 2022-06-23 RX ORDER — OLOPATADINE HYDROCHLORIDE 1 MG/ML
1 SOLUTION/ DROPS OPHTHALMIC
Qty: 0 | Refills: 0 | DISCHARGE

## 2022-06-23 RX ORDER — SODIUM CHLORIDE 9 MG/ML
1000 INJECTION, SOLUTION INTRAVENOUS
Refills: 0 | Status: DISCONTINUED | OUTPATIENT
Start: 2022-06-30 | End: 2022-07-14

## 2022-06-23 RX ORDER — RITUXIMAB 10 MG/ML
0 INJECTION, SOLUTION INTRAVENOUS
Qty: 0 | Refills: 0 | DISCHARGE

## 2022-06-23 NOTE — H&P PST ADULT - ASSESSMENT
40 yo female with history of B-Cell lymphoma scheduled for excisional biopsy left neck nodes with Dr. Jolly on 06/30/2022.

## 2022-06-23 NOTE — H&P PST ADULT - HISTORY OF PRESENT ILLNESS
42 yo female with history of B-Cell lymphoma scheduled for excisional biopsy left neck nodes with Dr. Jolly.

## 2022-06-23 NOTE — H&P PST ADULT - LYMPHATICS COMMENTS
no supraclavicular/cervical adenopathy palpated on exam. Unable to palpate cervical adenopathy . Pre-op diagnosis B-Cell lymphoma

## 2022-06-23 NOTE — H&P PST ADULT - PROBLEM SELECTOR PLAN 1
scheduled for excisional biopsy left neck nodes with Dr. Jolly on 06/30/2022.   Verbal and written pre-op instructions provided to patient. Patient verbalized understanding and will call surgeons office for revised instructions if surgery is rescheduled.   Continue Omeprazole for GI prophylaxis.   Patient given verbal and written instruction with teach back on chlorhexidine shampoo, and the patient verbalized understanding with return demonstration.   Patient aware of need for COVID testing prior to  procedure at a [facility], given list of locations and advised to get tested within 3 to 5 days of procedure.

## 2022-06-23 NOTE — H&P PST ADULT - NSICDXPASTMEDICALHX_GEN_ALL_CORE_FT
PAST MEDICAL HISTORY:  Asthma     Cancer B-cell lymphoma s/p chemo 12/30/20    Chronic salpingitis      PAST MEDICAL HISTORY:  Asthma     Cancer B-cell lymphoma s/p chemo 12/30/20    Chronic salpingitis     LPRD (laryngopharyngeal reflux disease)

## 2022-06-23 NOTE — H&P PST ADULT - NSICDXPASTSURGICALHX_GEN_ALL_CORE_FT
PAST SURGICAL HISTORY:  H/O bilateral salpingectomy May 2019    Kidney stones excsion 10/2020    Port-A-Cath in place

## 2022-06-23 NOTE — H&P PST ADULT - NEGATIVE ENMT SYMPTOMS
voice hoarseness/no hearing difficulty/no ear pain/no sinus symptoms/no nasal congestion/no throat pain/no dysphagia

## 2022-06-25 ENCOUNTER — RESULT REVIEW (OUTPATIENT)
Age: 42
End: 2022-06-25

## 2022-06-25 ENCOUNTER — APPOINTMENT (OUTPATIENT)
Dept: INFUSION THERAPY | Facility: HOSPITAL | Age: 42
End: 2022-06-25

## 2022-06-25 LAB
ALBUMIN SERPL ELPH-MCNC: 4.6 G/DL — SIGNIFICANT CHANGE UP (ref 3.3–5)
ALP SERPL-CCNC: 97 U/L — SIGNIFICANT CHANGE UP (ref 40–120)
ALT FLD-CCNC: 22 U/L — SIGNIFICANT CHANGE UP (ref 10–45)
ANION GAP SERPL CALC-SCNC: 13 MMOL/L — SIGNIFICANT CHANGE UP (ref 5–17)
AST SERPL-CCNC: 15 U/L — SIGNIFICANT CHANGE UP (ref 10–40)
BASOPHILS # BLD AUTO: 0.04 K/UL — SIGNIFICANT CHANGE UP (ref 0–0.2)
BASOPHILS NFR BLD AUTO: 0.8 % — SIGNIFICANT CHANGE UP (ref 0–2)
BILIRUB SERPL-MCNC: 0.2 MG/DL — SIGNIFICANT CHANGE UP (ref 0.2–1.2)
BUN SERPL-MCNC: 13 MG/DL — SIGNIFICANT CHANGE UP (ref 7–23)
CALCIUM SERPL-MCNC: 9.3 MG/DL — SIGNIFICANT CHANGE UP (ref 8.4–10.5)
CHLORIDE SERPL-SCNC: 110 MMOL/L — HIGH (ref 96–108)
CO2 SERPL-SCNC: 18 MMOL/L — LOW (ref 22–31)
CREAT SERPL-MCNC: 0.76 MG/DL — SIGNIFICANT CHANGE UP (ref 0.5–1.3)
EGFR: 102 ML/MIN/1.73M2 — SIGNIFICANT CHANGE UP
EOSINOPHIL # BLD AUTO: 0.11 K/UL — SIGNIFICANT CHANGE UP (ref 0–0.5)
EOSINOPHIL NFR BLD AUTO: 2.2 % — SIGNIFICANT CHANGE UP (ref 0–6)
GLUCOSE SERPL-MCNC: 95 MG/DL — SIGNIFICANT CHANGE UP (ref 70–99)
HCT VFR BLD CALC: 35.4 % — SIGNIFICANT CHANGE UP (ref 34.5–45)
HGB BLD-MCNC: 11.3 G/DL — LOW (ref 11.5–15.5)
IMM GRANULOCYTES NFR BLD AUTO: 0.4 % — SIGNIFICANT CHANGE UP (ref 0–1.5)
LDH SERPL L TO P-CCNC: 172 U/L — SIGNIFICANT CHANGE UP (ref 50–242)
LYMPHOCYTES # BLD AUTO: 1.74 K/UL — SIGNIFICANT CHANGE UP (ref 1–3.3)
LYMPHOCYTES # BLD AUTO: 34.3 % — SIGNIFICANT CHANGE UP (ref 13–44)
MCHC RBC-ENTMCNC: 26.5 PG — LOW (ref 27–34)
MCHC RBC-ENTMCNC: 31.9 G/DL — LOW (ref 32–36)
MCV RBC AUTO: 83.1 FL — SIGNIFICANT CHANGE UP (ref 80–100)
MONOCYTES # BLD AUTO: 0.4 K/UL — SIGNIFICANT CHANGE UP (ref 0–0.9)
MONOCYTES NFR BLD AUTO: 7.9 % — SIGNIFICANT CHANGE UP (ref 2–14)
NEUTROPHILS # BLD AUTO: 2.76 K/UL — SIGNIFICANT CHANGE UP (ref 1.8–7.4)
NEUTROPHILS NFR BLD AUTO: 54.4 % — SIGNIFICANT CHANGE UP (ref 43–77)
NRBC # BLD: 0 /100 WBCS — SIGNIFICANT CHANGE UP (ref 0–0)
PLATELET # BLD AUTO: 225 K/UL — SIGNIFICANT CHANGE UP (ref 150–400)
POTASSIUM SERPL-MCNC: 3.9 MMOL/L — SIGNIFICANT CHANGE UP (ref 3.5–5.3)
POTASSIUM SERPL-SCNC: 3.9 MMOL/L — SIGNIFICANT CHANGE UP (ref 3.5–5.3)
PROT SERPL-MCNC: 6.3 G/DL — SIGNIFICANT CHANGE UP (ref 6–8.3)
RBC # BLD: 4.26 M/UL — SIGNIFICANT CHANGE UP (ref 3.8–5.2)
RBC # FLD: 13.7 % — SIGNIFICANT CHANGE UP (ref 10.3–14.5)
SODIUM SERPL-SCNC: 141 MMOL/L — SIGNIFICANT CHANGE UP (ref 135–145)
WBC # BLD: 5.07 K/UL — SIGNIFICANT CHANGE UP (ref 3.8–10.5)
WBC # FLD AUTO: 5.07 K/UL — SIGNIFICANT CHANGE UP (ref 3.8–10.5)

## 2022-06-29 ENCOUNTER — TRANSCRIPTION ENCOUNTER (OUTPATIENT)
Age: 42
End: 2022-06-29

## 2022-06-29 NOTE — ASU PATIENT PROFILE, ADULT - TEACHING/LEARNING LEARNING PREFERENCES
I have reviewed and confirmed nurses' notes...
individual instruction/verbal instruction/written material

## 2022-06-29 NOTE — ASU PATIENT PROFILE, ADULT - NSICDXPASTMEDICALHX_GEN_ALL_CORE_FT
PAST MEDICAL HISTORY:  Asthma     Cancer B-cell lymphoma s/p chemo 12/30/20    Chronic salpingitis     LPRD (laryngopharyngeal reflux disease)

## 2022-06-30 ENCOUNTER — RESULT REVIEW (OUTPATIENT)
Age: 42
End: 2022-06-30

## 2022-06-30 ENCOUNTER — TRANSCRIPTION ENCOUNTER (OUTPATIENT)
Age: 42
End: 2022-06-30

## 2022-06-30 ENCOUNTER — OUTPATIENT (OUTPATIENT)
Dept: OUTPATIENT SERVICES | Facility: HOSPITAL | Age: 42
LOS: 1 days | Discharge: HOME | End: 2022-06-30

## 2022-06-30 ENCOUNTER — APPOINTMENT (OUTPATIENT)
Dept: OTOLARYNGOLOGY | Facility: HOSPITAL | Age: 42
End: 2022-06-30

## 2022-06-30 VITALS
OXYGEN SATURATION: 98 % | DIASTOLIC BLOOD PRESSURE: 65 MMHG | TEMPERATURE: 98 F | HEART RATE: 75 BPM | RESPIRATION RATE: 16 BRPM | SYSTOLIC BLOOD PRESSURE: 109 MMHG

## 2022-06-30 VITALS
WEIGHT: 154.1 LBS | HEIGHT: 64 IN | TEMPERATURE: 98 F | OXYGEN SATURATION: 99 % | RESPIRATION RATE: 14 BRPM | HEART RATE: 71 BPM | SYSTOLIC BLOOD PRESSURE: 109 MMHG | DIASTOLIC BLOOD PRESSURE: 72 MMHG

## 2022-06-30 DIAGNOSIS — C85.10 UNSPECIFIED B-CELL LYMPHOMA, UNSPECIFIED SITE: ICD-10-CM

## 2022-06-30 DIAGNOSIS — Z90.79 ACQUIRED ABSENCE OF OTHER GENITAL ORGAN(S): Chronic | ICD-10-CM

## 2022-06-30 DIAGNOSIS — N20.0 CALCULUS OF KIDNEY: Chronic | ICD-10-CM

## 2022-06-30 DIAGNOSIS — Z95.828 PRESENCE OF OTHER VASCULAR IMPLANTS AND GRAFTS: Chronic | ICD-10-CM

## 2022-06-30 LAB
GRAM STN FLD: SIGNIFICANT CHANGE UP
NIGHT BLUE STAIN TISS: SIGNIFICANT CHANGE UP
SPECIMEN SOURCE: SIGNIFICANT CHANGE UP
SPECIMEN SOURCE: SIGNIFICANT CHANGE UP

## 2022-06-30 PROCEDURE — 88360 TUMOR IMMUNOHISTOCHEM/MANUAL: CPT | Mod: 26

## 2022-06-30 PROCEDURE — 88189 FLOWCYTOMETRY/READ 16 & >: CPT

## 2022-06-30 PROCEDURE — 88367 INSITU HYBRIDIZATION AUTO: CPT | Mod: 26

## 2022-06-30 PROCEDURE — 88342 IMHCHEM/IMCYTCHM 1ST ANTB: CPT | Mod: 26,59

## 2022-06-30 PROCEDURE — 38510 BIOPSY/REMOVAL LYMPH NODES: CPT | Mod: GC

## 2022-06-30 PROCEDURE — 88365 INSITU HYBRIDIZATION (FISH): CPT | Mod: 26,59

## 2022-06-30 PROCEDURE — 88341 IMHCHEM/IMCYTCHM EA ADD ANTB: CPT | Mod: 26,59

## 2022-06-30 PROCEDURE — 88307 TISSUE EXAM BY PATHOLOGIST: CPT | Mod: 26

## 2022-06-30 DEVICE — LIGATING CLIPS WECK HORIZON MEDIUM (BLUE) 24: Type: IMPLANTABLE DEVICE | Status: FUNCTIONAL

## 2022-06-30 DEVICE — SURGIFLO MATRIX WITH THROMBIN KIT: Type: IMPLANTABLE DEVICE | Status: FUNCTIONAL

## 2022-06-30 DEVICE — LIGATING CLIPS WECK HORIZON SMALL-WIDE (RED) 24: Type: IMPLANTABLE DEVICE | Status: FUNCTIONAL

## 2022-06-30 RX ORDER — ACETAMINOPHEN 500 MG
650 TABLET ORAL EVERY 6 HOURS
Refills: 0 | Status: DISCONTINUED | OUTPATIENT
Start: 2022-06-30 | End: 2022-07-14

## 2022-06-30 RX ORDER — ACETAMINOPHEN 500 MG
2 TABLET ORAL
Qty: 0 | Refills: 0 | DISCHARGE

## 2022-06-30 RX ORDER — SODIUM CHLORIDE 9 MG/ML
1000 INJECTION, SOLUTION INTRAVENOUS
Refills: 0 | Status: DISCONTINUED | OUTPATIENT
Start: 2022-06-30 | End: 2022-07-14

## 2022-06-30 RX ORDER — ALBUTEROL 90 UG/1
2 AEROSOL, METERED ORAL
Qty: 0 | Refills: 0 | DISCHARGE

## 2022-06-30 RX ORDER — HYDROMORPHONE HYDROCHLORIDE 2 MG/ML
0.25 INJECTION INTRAMUSCULAR; INTRAVENOUS; SUBCUTANEOUS
Refills: 0 | Status: DISCONTINUED | OUTPATIENT
Start: 2022-06-30 | End: 2022-06-30

## 2022-06-30 RX ORDER — OMEPRAZOLE 10 MG/1
1 CAPSULE, DELAYED RELEASE ORAL
Qty: 0 | Refills: 0 | DISCHARGE

## 2022-06-30 RX ORDER — BACITRACIN ZINC 500 UNIT/G
1 OINTMENT IN PACKET (EA) TOPICAL
Refills: 0 | Status: DISCONTINUED | OUTPATIENT
Start: 2022-06-30 | End: 2022-07-14

## 2022-06-30 RX ORDER — ACETAMINOPHEN 500 MG
2 TABLET ORAL
Qty: 0 | Refills: 0 | DISCHARGE
Start: 2022-06-30

## 2022-06-30 RX ORDER — ONDANSETRON 8 MG/1
4 TABLET, FILM COATED ORAL ONCE
Refills: 0 | Status: DISCONTINUED | OUTPATIENT
Start: 2022-06-30 | End: 2022-07-14

## 2022-06-30 RX ORDER — MEDROXYPROGESTERONE ACETATE 150 MG/ML
0 INJECTION, SUSPENSION, EXTENDED RELEASE INTRAMUSCULAR
Qty: 0 | Refills: 0 | DISCHARGE

## 2022-06-30 RX ORDER — ACETAMINOPHEN 500 MG
650 TABLET ORAL EVERY 6 HOURS
Refills: 0 | Status: DISCONTINUED | OUTPATIENT
Start: 2022-06-30 | End: 2022-06-30

## 2022-06-30 RX ORDER — BACITRACIN ZINC 500 UNIT/G
1 OINTMENT IN PACKET (EA) TOPICAL
Qty: 15 | Refills: 0
Start: 2022-06-30 | End: 2022-07-06

## 2022-06-30 RX ORDER — ONDANSETRON 8 MG/1
4 TABLET, FILM COATED ORAL EVERY 6 HOURS
Refills: 0 | Status: DISCONTINUED | OUTPATIENT
Start: 2022-06-30 | End: 2022-07-14

## 2022-06-30 RX ADMIN — Medication 650 MILLIGRAM(S): at 14:21

## 2022-06-30 NOTE — ASU DISCHARGE PLAN (ADULT/PEDIATRIC) - NURSING INSTRUCTIONS
no tylenol or tylenol containing products till 2:30 pm no tylenol or tylenol containing products till 8:30 pm

## 2022-06-30 NOTE — ASU DISCHARGE PLAN (ADULT/PEDIATRIC) - FOLLOW UP APPOINTMENTS
[facility], [facility] PACU [phone]/[facility], [facility]

## 2022-06-30 NOTE — ASU DISCHARGE PLAN (ADULT/PEDIATRIC) - CARE PROVIDER_API CALL
Taz Jolly)  Otolaryngology  [address] [place] [address]  Phone: [phone]  Fax: [phone]  Follow Up Time:

## 2022-06-30 NOTE — ASU DISCHARGE PLAN (ADULT/PEDIATRIC) - NS MD DC FALL RISK RISK
For information on Fall & Injury Prevention, visit: [email or web address] OR  [email or web address] OR  https://www.cdc.gov/steadi/patient.html

## 2022-06-30 NOTE — BRIEF OPERATIVE NOTE - NSICDXBRIEFPROCEDURE_GEN_ALL_CORE_FT
PROCEDURES:  Biopsy of cervical lymph node of left side of neck 30-Jun-2022 09:45:46  Yuliet Sousa

## 2022-06-30 NOTE — ASU DISCHARGE PLAN (ADULT/PEDIATRIC) - CALL YOUR DOCTOR IF YOU HAVE ANY OF THE FOLLOWING:
Wound/Surgical Site with redness, or foul smelling discharge or pus Bleeding that does not stop/Pain not relieved by Medications/Wound/Surgical Site with redness, or foul smelling discharge or pus/Nausea and vomiting that does not stop

## 2022-07-05 LAB
CULTURE RESULTS: SIGNIFICANT CHANGE UP
SPECIMEN SOURCE: SIGNIFICANT CHANGE UP

## 2022-07-06 LAB — TM INTERPRETATION: SIGNIFICANT CHANGE UP

## 2022-07-07 PROBLEM — C80.1 MALIGNANT (PRIMARY) NEOPLASM, UNSPECIFIED: Chronic | Status: ACTIVE | Noted: 2020-10-23

## 2022-07-07 PROBLEM — K21.9 GASTRO-ESOPHAGEAL REFLUX DISEASE WITHOUT ESOPHAGITIS: Chronic | Status: ACTIVE | Noted: 2022-06-23

## 2022-07-07 PROBLEM — J45.909 UNSPECIFIED ASTHMA, UNCOMPLICATED: Chronic | Status: ACTIVE | Noted: 2022-06-23

## 2022-07-08 ENCOUNTER — APPOINTMENT (OUTPATIENT)
Dept: OTOLARYNGOLOGY | Facility: CLINIC | Age: 42
End: 2022-07-08

## 2022-07-08 LAB — SARS-COV-2 N GENE NPH QL NAA+PROBE: NOT DETECTED

## 2022-07-08 PROCEDURE — 99024 POSTOP FOLLOW-UP VISIT: CPT

## 2022-07-08 NOTE — PHYSICAL EXAM
[de-identified] : left neck surgical incision well approximated. No swelling, erythema or discharge noted.

## 2022-07-08 NOTE — HISTORY OF PRESENT ILLNESS
[de-identified] : 41 yro female pt who is s/p excisional biopsy of left neck node on 6/30/2022 present today for incision assessment and suture removal. Today pt c/o left upper shoulder pain and pain around the left neck. Denies dysphagia, dyspnea, dysphonia. No fever, chills, or arm weakness. \par \par \par \par \par \par

## 2022-07-08 NOTE — REASON FOR VISIT
[Post-Operative Visit] : a post-operative visit [FreeTextEntry2] : s/p excisional biopsy of left neck node on 6/30/2022

## 2022-07-11 LAB — HEMATOPATHOLOGY REPORT: SIGNIFICANT CHANGE UP

## 2022-07-15 LAB — CHROM ANALY OVERALL INTERP SPEC-IMP: SIGNIFICANT CHANGE UP

## 2022-07-30 LAB
CULTURE RESULTS: SIGNIFICANT CHANGE UP
SPECIMEN SOURCE: SIGNIFICANT CHANGE UP

## 2022-08-09 ENCOUNTER — APPOINTMENT (OUTPATIENT)
Dept: OTOLARYNGOLOGY | Facility: CLINIC | Age: 42
End: 2022-08-09

## 2022-08-09 PROCEDURE — 99213 OFFICE O/P EST LOW 20 MIN: CPT

## 2022-08-09 NOTE — HISTORY OF PRESENT ILLNESS
[de-identified] : 41 year old female has hx of stage IV lymphoma 2019 was treated with chemotherapy, last chemotherapy was December 2020. States had annual scan with Dr Vega. pt has hx of voice hoarseness ( stable ) and neck mass. pt is post excisional bx on 6/30/2022 path c/w  Follicular and paracortical hyperplasia. since surgery, pt c.o left side base of skull and left arm pain, hard to lift up her arm, pain is constant, no numbness or tingling. pt needs uses her right hand to lift up the left arm.

## 2022-08-09 NOTE — PHYSICAL EXAM
[Normal] : no rashes [de-identified] : Incision healing well. [FreeTextEntry1] : No concerning lesions in the OC/OPx on inspection or palpation.\par  [de-identified] : She has symptoms of L. shoulder syndrome.

## 2022-08-10 ENCOUNTER — APPOINTMENT (OUTPATIENT)
Dept: INFUSION THERAPY | Facility: HOSPITAL | Age: 42
End: 2022-08-10

## 2022-08-10 ENCOUNTER — RESULT REVIEW (OUTPATIENT)
Age: 42
End: 2022-08-10

## 2022-08-10 ENCOUNTER — APPOINTMENT (OUTPATIENT)
Dept: HEMATOLOGY ONCOLOGY | Facility: CLINIC | Age: 42
End: 2022-08-10

## 2022-08-10 VITALS
WEIGHT: 158.73 LBS | HEART RATE: 85 BPM | OXYGEN SATURATION: 99 % | DIASTOLIC BLOOD PRESSURE: 86 MMHG | BODY MASS INDEX: 27.25 KG/M2 | SYSTOLIC BLOOD PRESSURE: 139 MMHG | TEMPERATURE: 97.3 F | RESPIRATION RATE: 14 BRPM

## 2022-08-10 LAB
ALBUMIN SERPL ELPH-MCNC: 4.4 G/DL — SIGNIFICANT CHANGE UP (ref 3.3–5)
ALP SERPL-CCNC: 106 U/L — SIGNIFICANT CHANGE UP (ref 40–120)
ALT FLD-CCNC: 27 U/L — SIGNIFICANT CHANGE UP (ref 10–45)
ANION GAP SERPL CALC-SCNC: 12 MMOL/L — SIGNIFICANT CHANGE UP (ref 5–17)
AST SERPL-CCNC: 22 U/L — SIGNIFICANT CHANGE UP (ref 10–40)
BASOPHILS # BLD AUTO: 0.04 K/UL — SIGNIFICANT CHANGE UP (ref 0–0.2)
BASOPHILS NFR BLD AUTO: 0.7 % — SIGNIFICANT CHANGE UP (ref 0–2)
BILIRUB SERPL-MCNC: 0.4 MG/DL — SIGNIFICANT CHANGE UP (ref 0.2–1.2)
BUN SERPL-MCNC: 11 MG/DL — SIGNIFICANT CHANGE UP (ref 7–23)
CALCIUM SERPL-MCNC: 9.6 MG/DL — SIGNIFICANT CHANGE UP (ref 8.4–10.5)
CHLORIDE SERPL-SCNC: 110 MMOL/L — HIGH (ref 96–108)
CO2 SERPL-SCNC: 20 MMOL/L — LOW (ref 22–31)
CREAT SERPL-MCNC: 0.65 MG/DL — SIGNIFICANT CHANGE UP (ref 0.5–1.3)
EGFR: 114 ML/MIN/1.73M2 — SIGNIFICANT CHANGE UP
EOSINOPHIL # BLD AUTO: 0.14 K/UL — SIGNIFICANT CHANGE UP (ref 0–0.5)
EOSINOPHIL NFR BLD AUTO: 2.4 % — SIGNIFICANT CHANGE UP (ref 0–6)
GLUCOSE SERPL-MCNC: 114 MG/DL — HIGH (ref 70–99)
HCT VFR BLD CALC: 35.9 % — SIGNIFICANT CHANGE UP (ref 34.5–45)
HGB BLD-MCNC: 11.6 G/DL — SIGNIFICANT CHANGE UP (ref 11.5–15.5)
IMM GRANULOCYTES NFR BLD AUTO: 0.2 % — SIGNIFICANT CHANGE UP (ref 0–1.5)
LDH SERPL L TO P-CCNC: 163 U/L — SIGNIFICANT CHANGE UP (ref 50–242)
LYMPHOCYTES # BLD AUTO: 1.84 K/UL — SIGNIFICANT CHANGE UP (ref 1–3.3)
LYMPHOCYTES # BLD AUTO: 32.1 % — SIGNIFICANT CHANGE UP (ref 13–44)
MCHC RBC-ENTMCNC: 26.4 PG — LOW (ref 27–34)
MCHC RBC-ENTMCNC: 32.3 G/DL — SIGNIFICANT CHANGE UP (ref 32–36)
MCV RBC AUTO: 81.8 FL — SIGNIFICANT CHANGE UP (ref 80–100)
MONOCYTES # BLD AUTO: 0.49 K/UL — SIGNIFICANT CHANGE UP (ref 0–0.9)
MONOCYTES NFR BLD AUTO: 8.5 % — SIGNIFICANT CHANGE UP (ref 2–14)
NEUTROPHILS # BLD AUTO: 3.22 K/UL — SIGNIFICANT CHANGE UP (ref 1.8–7.4)
NEUTROPHILS NFR BLD AUTO: 56.1 % — SIGNIFICANT CHANGE UP (ref 43–77)
NRBC # BLD: 0 /100 WBCS — SIGNIFICANT CHANGE UP (ref 0–0)
PLATELET # BLD AUTO: 229 K/UL — SIGNIFICANT CHANGE UP (ref 150–400)
POTASSIUM SERPL-MCNC: 3.9 MMOL/L — SIGNIFICANT CHANGE UP (ref 3.5–5.3)
POTASSIUM SERPL-SCNC: 3.9 MMOL/L — SIGNIFICANT CHANGE UP (ref 3.5–5.3)
PROT SERPL-MCNC: 6.4 G/DL — SIGNIFICANT CHANGE UP (ref 6–8.3)
RBC # BLD: 4.39 M/UL — SIGNIFICANT CHANGE UP (ref 3.8–5.2)
RBC # FLD: 13.7 % — SIGNIFICANT CHANGE UP (ref 10.3–14.5)
SODIUM SERPL-SCNC: 142 MMOL/L — SIGNIFICANT CHANGE UP (ref 135–145)
WBC # BLD: 5.74 K/UL — SIGNIFICANT CHANGE UP (ref 3.8–10.5)
WBC # FLD AUTO: 5.74 K/UL — SIGNIFICANT CHANGE UP (ref 3.8–10.5)

## 2022-08-10 PROCEDURE — 99213 OFFICE O/P EST LOW 20 MIN: CPT

## 2022-08-10 NOTE — ASSESSMENT
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 \par We started treatment with Bendamustine/Rituxan on 6/24/19. LAD has improved. C5 delayed due to neutropenia, received on 10/30/19. C6 on 11/26/19, tolerated treatment well. PET/CT done at end of treatment showed interval significant decrease in size and metabolism of LAD. \par \par PET/CT unchanged. Started on Rituximab maintenance on 6/25/20. Cont every 2 mo. s/p 4th dose of Rituxan on 12/30/20. She had worsened side effects after 4th dose and does not want to continue. Will hold further Rituxan maintenance given intolerance. PET/CT post Rituxan 2/2021 stable\par Last imaging from 3/2022 [facility]. s/p excisional biopsy of L neck mass that was unremarkable\par f/u w/ PMD and gyn\par f/u w/ Dr. Jolly. Will start PT of L shoulder syndrome\par Pt referred to psycho-oncology -pt exhibits psychological distress including anxious thought, ruminations, tearfulness, and anhedonia. These symptoms interfere with functioning across domains. Pt is likely to benefit from psychotherapy. She has joined a support group through [facility]. She hasn't done either yet\par All questions answered\par port flush every 6 wks\par RTC 3 mo\par

## 2022-08-10 NOTE — HISTORY OF PRESENT ILLNESS
[de-identified] : 39yo F w/ asthma here for evaluation of newly diagnosed follicular lymphoma. \par \par Patient sates she initially noted left inguinal pain in February 2019, had a palpable mass for the last 6 years which she noticed after delivery of her second baby in 2013. She saw GYN, had TVUS: Uterus: 5.6 x 3.6 x 4.3 cm. EMS 3 mm. Right ovary normal. Left ovary normal. Complex elongated left adnexal lesion suggestive of hydrosalpinx, 9.2 x 4.9 x 9 cm. She is s/p b/l salpingectomy on 5/2/19. She remains on depo injection. She states that she has had more swelling and more pain since her procedure. \par \par She had CT A/P done on 5/9/19 which demonstrated extensive retroperitoneal, pelvic, and inguinal lymphadenopathy, concerning for malignancy with small amount of abdominal and pelvic ascites.\par s/p IR biopsy of left inguinal lymph node - follicular lymphoma grade 1-2. \par \par Also having cervical LAD, noticed for the last few months, L>R. Noted R inguinal swelling for the last week with pain. No fevers of chills. Notes having itching and that her breathing is a bit more labored than usual.  [de-identified] : BMbx (6/10/19): - Focal lymphoid infiltrate consistent with involvement by follicular lymphoma (history of follicular lymphoma)\par - Small paracortical sample with trilineage hematopoiesis with maturation and iron stores present\par \par PET/CT (6/16/19): 1. Hypermetabolic lymphadenopathy in neck, thorax, abdomen, pelvis, and bilateral inguinal/femoral regions corresponds to biopsy-proven follicular lymphoma. Hypermetabolic subcutaneous nodule, right posterolateral chest wall, is compatible with an additional site of lymphoma.\par 2. Nonspecific left greater than right tonsillar hypermetabolism may represent additional sites of lymphoma.\par 3. Findings compatible with bone marrow involvement by lymphoma in bilateral humeri and axial skeleton, as described above.\par \par She reports having increasing fatigue. Still having slight shortness of breath.\par \par We started BR on 6/24. She had a reaction to Rituxan. \par \par Eating fine. Neck LAD, shortness of breath and abdominal bloating improved/resolved. \par She had a yeast infection, saw GYN. Also reports having pelvic pain and was found to have a small polyp. \par \par C2 on 7/22. She notes having burning as she was getting the chemo (please see chart note for details). Notes that her arms were still hurting for a week after. \par She had a portacath placed 8/12\par Felt more fatigued and weak. \par Had transvaginal US, saw GYN this morning (9/5/19). Pelvic pain has resolved. \par \par C3 on 8/19. Tolerated it well. \par \par CT N/C/A/P (9/10/19): Marked decrease in size of cervical lymph nodes when compared with the prior exam compatible with a favorable response to therapy. Significant interval decrease in size of left supraclavicular lymphadenopathy.\par Focal enhancement involving the right anterior tonsillar without associated tonsillar expansion. Correlation with direct visualization and continued follow-up is advised.\par Significant interval decrease in size of previously noted hypermetabolic nodule in the soft tissues of the right posterior lateral chest.\par Significant interval decrease in size of retroperitoneal, pelvic, inguinal, and mesenteric lymphadenopathy as described above.\par \par C4 on 9/16. Tolerated well, had some tremors afterwards. \par \par C5 was delayed secondary to neutropenia. Given on 10/30/19. Had an episode of chest tightness w/ Pillo on day 1. Premedicated on day 2 with no events. Tremors better this cycle but notes 3 episodes of restless legs. \par \par C6 on 11/26/19. She reports having muscle aches. \par \par PET/CT 1/18/20: 1. Interval resolution of FDG activity associated with lymphadenopathy in the neck, chest, upper abdomen and pelvis with either resolution or significant decrease in size of the corresponding lymph nodes and not well delineated on CT as compared to PET/CT from 6/16/2019. Near total resolution of FDG avid left inguinal lymphadenopathy which is significantly decreased in size now demonstrating background activity.\par 2. Interval significant decrease in size and metabolism of hypermetabolic retroperitoneal and mesenteric lymphadenopathy.\par 3. Interval resolution of asymmetric tonsillar hypermetabolism.\par 4. Interval resolution of FDG avidity in bone marrow of bilateral humeri, T10, and sacrum.\par \par Having pain on L side of lower abdomen and groin around 3-4 weeks ago. Having night sweats again too. Had GYN f/u 2 weeks ago.\par We did a PET/CT on 6/13 which showed: 1. Small focus of mild FDG activity, decreased in size and metabolism, is associated with mesenteric haziness which is unchanged on CT as compared to prior study dated 1/18/2020 ([facility] 4). Resolution of minimally FDG-avid amorphous density, left periaortic region.\par 2. Minimally FDG-avid density, left inguinal region, unchanged, may represent postsurgical change versus lymph node. Please correlate clinically.\par \par Started Rituxan maintenance on 6/25/20. She had a reaction to Rituxan -please see chart note for details. \par Second dose on 8/31/20, tolerated better. Reports having back pain after premeds wore off. \par 3rd dose on 11/4/20, she felt tired and slept through the treatment, denied nausea, but c/o tingling in the finger tips and feet comes and goes. Patient admitted tremor improved.\par birth control pills d/c 3 months ago, she attributed the nausea after Rituxan to the withdrawal of birth control pills. Kidney stone stent removed today. \par Fourth dose on 12/30/20. Pt reports nausea and fatigue. States that does not feel well. Does not want to continue at this time. \par \par PET/CT 2/6/21: 1. Resolution of small focus of mild FDG activity associated with mesenteric haziness which is unchanged on CT ([place]ville 1).\par 2. Minimally FDG-avid density, left inguinal region, unchanged, may represent postsurgical change versus lymph node. Please correlate clinically.\par 3. Remainder study is unremarkable and not significantly changed, demonstrating no evidence of FDG-avid disease.\par \par She is doing well, no new complaints. \par Continues to be fatigued. Reports having a difficult time coping with her diagnosis and her transition off treatment. \par She received both her COVID vaccine doses - 5/27 and 6/17\par \par Had GYN and PCP annual check up 2 weeks ago, she admitted cholesterol level was slightly high other than that no remarkable findings, will f/u in 3 months. PCP ordered Echo, will do after Thanksgiving. will have Mammogram done next month. \par Never went to psychotherapy, she felt everything calmed down now, the working gets better, she rested better, she felt much less stressful now. \par Denied any new complaints. \par \par She had COVID in Dec - had URI symptoms and had CP/SOB. \par She saw GI on 1/10/22 and had endoscopy done. Taking omeprazole with relief. \par \par Saw RUBEN Dr. Jolly on 3/22/22 for voice hoarseness and neck mass. \par CT C/A/P done 3/5/22: No evidence of recurrent or metastatic disease in the chest, abdomen, or pelvis.\par s/p excisional lymph node, left neck, biopsy- Follicular and paracortical hyperplasia \par \par She will start PT on Tues for L shoulder syndrome. \par Denied any new complaints.

## 2022-08-10 NOTE — PHYSICAL EXAM
[Fully active, able to carry on all pre-disease performance without restriction] : Status 0 - Fully active, able to carry on all pre-disease performance without restriction [Normal] : affect appropriate [de-identified] : L neck excisional site healing [de-identified] : limited ROM L shoulder

## 2022-08-11 ENCOUNTER — OUTPATIENT (OUTPATIENT)
Dept: OUTPATIENT SERVICES | Facility: HOSPITAL | Age: 42
LOS: 1 days | End: 2022-08-11

## 2022-08-11 ENCOUNTER — APPOINTMENT (OUTPATIENT)
Dept: OBGYN | Facility: HOSPITAL | Age: 42
End: 2022-08-11

## 2022-08-11 ENCOUNTER — MED ADMIN CHARGE (OUTPATIENT)
Age: 42
End: 2022-08-11

## 2022-08-11 VITALS
HEART RATE: 69 BPM | TEMPERATURE: 98.1 F | HEIGHT: 64 IN | BODY MASS INDEX: 27.14 KG/M2 | WEIGHT: 159 LBS | DIASTOLIC BLOOD PRESSURE: 72 MMHG | SYSTOLIC BLOOD PRESSURE: 122 MMHG

## 2022-08-11 DIAGNOSIS — Z30.42 ENCOUNTER FOR SURVEILLANCE OF INJECTABLE CONTRACEPTIVE: ICD-10-CM

## 2022-08-11 DIAGNOSIS — Z90.79 ACQUIRED ABSENCE OF OTHER GENITAL ORGAN(S): Chronic | ICD-10-CM

## 2022-08-11 DIAGNOSIS — N20.0 CALCULUS OF KIDNEY: Chronic | ICD-10-CM

## 2022-08-11 DIAGNOSIS — Z95.828 PRESENCE OF OTHER VASCULAR IMPLANTS AND GRAFTS: Chronic | ICD-10-CM

## 2022-08-11 RX ORDER — MEDROXYPROGESTERONE ACETATE 150 MG/ML
150 INJECTION, SUSPENSION INTRAMUSCULAR
Qty: 0 | Refills: 0 | Status: COMPLETED | OUTPATIENT
Start: 2022-08-11

## 2022-08-11 RX ADMIN — MEDROXYPROGESTERONE ACETATE 0 MG/ML: 150 INJECTION, SUSPENSION INTRAMUSCULAR at 00:00

## 2022-08-15 ENCOUNTER — RX RENEWAL (OUTPATIENT)
Age: 42
End: 2022-08-15

## 2022-08-20 LAB
CULTURE RESULTS: SIGNIFICANT CHANGE UP
SPECIMEN SOURCE: SIGNIFICANT CHANGE UP

## 2022-09-15 ENCOUNTER — APPOINTMENT (OUTPATIENT)
Dept: OPHTHALMOLOGY | Facility: CLINIC | Age: 42
End: 2022-09-15

## 2022-09-17 NOTE — END OF VISIT
Patient: Karla Shelby    Procedure: * No procedures listed *       Anesthesia Type:  General    Note:  Disposition: Outpatient   Postop Pain Control: Uneventful            Sign Out: Well controlled pain   PONV: No   Neuro/Psych: Uneventful            Sign Out: Acceptable/Baseline neuro status   Airway/Respiratory: Uneventful            Sign Out: Acceptable/Baseline resp. status   CV/Hemodynamics: Uneventful            Sign Out: Acceptable CV status; No obvious hypovolemia; No obvious fluid overload   Other NRE: NONE   DID A NON-ROUTINE EVENT OCCUR? No           Last vitals:  Vitals:    09/17/22 1430 09/17/22 1542 09/17/22 1557   BP:      Pulse:  73 81   Resp:  19 12   Temp:      SpO2: 95% 100% 99%       Electronically Signed By: ANIBAL Javier CRNA  September 17, 2022  4:11 PM  
[Time Spent: ___ minutes] : I have spent [unfilled] minutes of time on the encounter.

## 2022-09-21 ENCOUNTER — OUTPATIENT (OUTPATIENT)
Dept: OUTPATIENT SERVICES | Facility: HOSPITAL | Age: 42
LOS: 1 days | Discharge: HOME | End: 2022-09-21

## 2022-09-21 DIAGNOSIS — Z95.828 PRESENCE OF OTHER VASCULAR IMPLANTS AND GRAFTS: Chronic | ICD-10-CM

## 2022-09-21 DIAGNOSIS — N20.0 CALCULUS OF KIDNEY: Chronic | ICD-10-CM

## 2022-09-21 DIAGNOSIS — Z90.79 ACQUIRED ABSENCE OF OTHER GENITAL ORGAN(S): Chronic | ICD-10-CM

## 2022-09-21 DIAGNOSIS — C85.88 OTHER SPECIFIED TYPES OF NON-HODGKIN LYMPHOMA, LYMPH NODES OF MULTIPLE SITES: ICD-10-CM

## 2022-09-24 ENCOUNTER — RESULT REVIEW (OUTPATIENT)
Age: 42
End: 2022-09-24

## 2022-09-24 ENCOUNTER — APPOINTMENT (OUTPATIENT)
Dept: INFUSION THERAPY | Facility: HOSPITAL | Age: 42
End: 2022-09-24

## 2022-09-24 LAB
ALBUMIN SERPL ELPH-MCNC: 4.4 G/DL — SIGNIFICANT CHANGE UP (ref 3.3–5)
ALP SERPL-CCNC: 107 U/L — SIGNIFICANT CHANGE UP (ref 40–120)
ALT FLD-CCNC: 27 U/L — SIGNIFICANT CHANGE UP (ref 10–45)
ANION GAP SERPL CALC-SCNC: 11 MMOL/L — SIGNIFICANT CHANGE UP (ref 5–17)
AST SERPL-CCNC: 21 U/L — SIGNIFICANT CHANGE UP (ref 10–40)
BASOPHILS # BLD AUTO: 0.04 K/UL — SIGNIFICANT CHANGE UP (ref 0–0.2)
BASOPHILS NFR BLD AUTO: 0.6 % — SIGNIFICANT CHANGE UP (ref 0–2)
BILIRUB SERPL-MCNC: 0.4 MG/DL — SIGNIFICANT CHANGE UP (ref 0.2–1.2)
BUN SERPL-MCNC: 11 MG/DL — SIGNIFICANT CHANGE UP (ref 7–23)
CALCIUM SERPL-MCNC: 9.3 MG/DL — SIGNIFICANT CHANGE UP (ref 8.4–10.5)
CHLORIDE SERPL-SCNC: 111 MMOL/L — HIGH (ref 96–108)
CO2 SERPL-SCNC: 19 MMOL/L — LOW (ref 22–31)
CREAT SERPL-MCNC: 0.68 MG/DL — SIGNIFICANT CHANGE UP (ref 0.5–1.3)
EGFR: 113 ML/MIN/1.73M2 — SIGNIFICANT CHANGE UP
EOSINOPHIL # BLD AUTO: 0.23 K/UL — SIGNIFICANT CHANGE UP (ref 0–0.5)
EOSINOPHIL NFR BLD AUTO: 3.7 % — SIGNIFICANT CHANGE UP (ref 0–6)
GLUCOSE SERPL-MCNC: 101 MG/DL — HIGH (ref 70–99)
HCT VFR BLD CALC: 35.3 % — SIGNIFICANT CHANGE UP (ref 34.5–45)
HGB BLD-MCNC: 11.4 G/DL — LOW (ref 11.5–15.5)
IMM GRANULOCYTES NFR BLD AUTO: 0.3 % — SIGNIFICANT CHANGE UP (ref 0–0.9)
LDH SERPL L TO P-CCNC: 160 U/L — SIGNIFICANT CHANGE UP (ref 50–242)
LYMPHOCYTES # BLD AUTO: 1.92 K/UL — SIGNIFICANT CHANGE UP (ref 1–3.3)
LYMPHOCYTES # BLD AUTO: 31 % — SIGNIFICANT CHANGE UP (ref 13–44)
MCHC RBC-ENTMCNC: 26.7 PG — LOW (ref 27–34)
MCHC RBC-ENTMCNC: 32.3 G/DL — SIGNIFICANT CHANGE UP (ref 32–36)
MCV RBC AUTO: 82.7 FL — SIGNIFICANT CHANGE UP (ref 80–100)
MONOCYTES # BLD AUTO: 0.47 K/UL — SIGNIFICANT CHANGE UP (ref 0–0.9)
MONOCYTES NFR BLD AUTO: 7.6 % — SIGNIFICANT CHANGE UP (ref 2–14)
NEUTROPHILS # BLD AUTO: 3.51 K/UL — SIGNIFICANT CHANGE UP (ref 1.8–7.4)
NEUTROPHILS NFR BLD AUTO: 56.8 % — SIGNIFICANT CHANGE UP (ref 43–77)
NRBC # BLD: 0 /100 WBCS — SIGNIFICANT CHANGE UP (ref 0–0)
PLATELET # BLD AUTO: 227 K/UL — SIGNIFICANT CHANGE UP (ref 150–400)
POTASSIUM SERPL-MCNC: 4.1 MMOL/L — SIGNIFICANT CHANGE UP (ref 3.5–5.3)
POTASSIUM SERPL-SCNC: 4.1 MMOL/L — SIGNIFICANT CHANGE UP (ref 3.5–5.3)
PROT SERPL-MCNC: 6.1 G/DL — SIGNIFICANT CHANGE UP (ref 6–8.3)
RBC # BLD: 4.27 M/UL — SIGNIFICANT CHANGE UP (ref 3.8–5.2)
RBC # FLD: 13.8 % — SIGNIFICANT CHANGE UP (ref 10.3–14.5)
SODIUM SERPL-SCNC: 141 MMOL/L — SIGNIFICANT CHANGE UP (ref 135–145)
WBC # BLD: 6.19 K/UL — SIGNIFICANT CHANGE UP (ref 3.8–10.5)
WBC # FLD AUTO: 6.19 K/UL — SIGNIFICANT CHANGE UP (ref 3.8–10.5)

## 2022-10-03 ENCOUNTER — APPOINTMENT (OUTPATIENT)
Dept: OTOLARYNGOLOGY | Facility: CLINIC | Age: 42
End: 2022-10-03

## 2022-10-03 VITALS
HEART RATE: 66 BPM | WEIGHT: 160 LBS | HEIGHT: 64 IN | SYSTOLIC BLOOD PRESSURE: 121 MMHG | BODY MASS INDEX: 27.31 KG/M2 | DIASTOLIC BLOOD PRESSURE: 81 MMHG

## 2022-10-03 PROCEDURE — 99213 OFFICE O/P EST LOW 20 MIN: CPT

## 2022-10-03 RX ORDER — LIDOCAINE AND PRILOCAINE 25; 25 MG/G; MG/G
2.5-2.5 CREAM TOPICAL
Qty: 1 | Refills: 1 | Status: DISCONTINUED | COMMUNITY
Start: 2022-08-10 | End: 2022-10-03

## 2022-10-03 RX ORDER — BACITRACIN 500 [IU]/G
500 OINTMENT TOPICAL
Qty: 28 | Refills: 0 | Status: DISCONTINUED | COMMUNITY
Start: 2022-06-30

## 2022-10-03 RX ORDER — DEXTROMETHORPHAN HYDROBROMIDE, GUAIFENESIN 20; 200 MG/20ML; MG/20ML
20-200 SOLUTION ORAL
Qty: 1 | Refills: 0 | Status: DISCONTINUED | COMMUNITY
Start: 2021-12-10 | End: 2022-10-03

## 2022-10-03 NOTE — HISTORY OF PRESENT ILLNESS
[de-identified] : 41 year old female has Hx of stage IV lymphoma 2019 was treated with chemotherapy, last chemotherapy was December 2020. States he had annual scan with Dr Vega. Patient has Hx of voice hoarseness (stable) and neck mass. Patient is post excisional biopsy on 6/30/2022 path c/w  Follicular and paracortical hyperplasia. \par Reports nerve pain in still presents in the left neck and behind left ear--PT is helping but is still unable to hand something over with the left arm because the left shoulder just drops.\par Denies dysphagia, odynophagia, dyspnea, dysphonia, otalgia, recent fevers/infections, chills, night sweats, weight loss.

## 2022-10-03 NOTE — PHYSICAL EXAM
[Normal] : no rashes [de-identified] : Incision healing well. [FreeTextEntry1] : No concerning lesions in the OC/OPx on inspection or palpation.\par  [de-identified] : She has symptoms of L. shoulder syndrome.

## 2022-10-18 ENCOUNTER — APPOINTMENT (OUTPATIENT)
Dept: OPHTHALMOLOGY | Facility: CLINIC | Age: 42
End: 2022-10-18

## 2022-10-18 ENCOUNTER — NON-APPOINTMENT (OUTPATIENT)
Age: 42
End: 2022-10-18

## 2022-10-18 PROCEDURE — 92012 INTRM OPH EXAM EST PATIENT: CPT

## 2022-10-18 PROCEDURE — 76514 ECHO EXAM OF EYE THICKNESS: CPT

## 2022-10-18 PROCEDURE — 92133 CPTRZD OPH DX IMG PST SGM ON: CPT

## 2022-10-18 PROCEDURE — 92083 EXTENDED VISUAL FIELD XM: CPT

## 2022-10-20 ENCOUNTER — RESULT REVIEW (OUTPATIENT)
Age: 42
End: 2022-10-20

## 2022-10-20 ENCOUNTER — OUTPATIENT (OUTPATIENT)
Dept: OUTPATIENT SERVICES | Facility: HOSPITAL | Age: 42
LOS: 1 days | End: 2022-10-20

## 2022-10-20 ENCOUNTER — APPOINTMENT (OUTPATIENT)
Dept: OBGYN | Facility: HOSPITAL | Age: 42
End: 2022-10-20

## 2022-10-20 VITALS
DIASTOLIC BLOOD PRESSURE: 75 MMHG | HEIGHT: 64 IN | SYSTOLIC BLOOD PRESSURE: 124 MMHG | WEIGHT: 163 LBS | TEMPERATURE: 97.9 F | BODY MASS INDEX: 27.83 KG/M2 | HEART RATE: 79 BPM

## 2022-10-20 DIAGNOSIS — Z95.828 PRESENCE OF OTHER VASCULAR IMPLANTS AND GRAFTS: Chronic | ICD-10-CM

## 2022-10-20 DIAGNOSIS — R92.2 INCONCLUSIVE MAMMOGRAM: ICD-10-CM

## 2022-10-20 DIAGNOSIS — N20.0 CALCULUS OF KIDNEY: Chronic | ICD-10-CM

## 2022-10-20 DIAGNOSIS — Z90.79 ACQUIRED ABSENCE OF OTHER GENITAL ORGAN(S): Chronic | ICD-10-CM

## 2022-10-20 LAB
A1C WITH ESTIMATED AVERAGE GLUCOSE RESULT: 5.9 % — HIGH (ref 4–5.6)
ALBUMIN SERPL ELPH-MCNC: 4.6 G/DL — SIGNIFICANT CHANGE UP (ref 3.3–5)
ALP SERPL-CCNC: 113 U/L — SIGNIFICANT CHANGE UP (ref 40–120)
ALT FLD-CCNC: 30 U/L — SIGNIFICANT CHANGE UP (ref 4–33)
ANION GAP SERPL CALC-SCNC: 12 MMOL/L — SIGNIFICANT CHANGE UP (ref 7–14)
AST SERPL-CCNC: 22 U/L — SIGNIFICANT CHANGE UP (ref 4–32)
BASOPHILS # BLD AUTO: 0.06 K/UL — SIGNIFICANT CHANGE UP (ref 0–0.2)
BASOPHILS NFR BLD AUTO: 1.2 % — SIGNIFICANT CHANGE UP (ref 0–2)
BILIRUB SERPL-MCNC: 0.6 MG/DL — SIGNIFICANT CHANGE UP (ref 0.2–1.2)
BUN SERPL-MCNC: 10 MG/DL — SIGNIFICANT CHANGE UP (ref 7–23)
CALCIUM SERPL-MCNC: 9.7 MG/DL — SIGNIFICANT CHANGE UP (ref 8.4–10.5)
CHLORIDE SERPL-SCNC: 106 MMOL/L — SIGNIFICANT CHANGE UP (ref 98–107)
CHOLEST SERPL-MCNC: 239 MG/DL — HIGH
CO2 SERPL-SCNC: 22 MMOL/L — SIGNIFICANT CHANGE UP (ref 22–31)
CREAT SERPL-MCNC: 0.78 MG/DL — SIGNIFICANT CHANGE UP (ref 0.5–1.3)
EGFR: 98 ML/MIN/1.73M2 — SIGNIFICANT CHANGE UP
EOSINOPHIL # BLD AUTO: 0.16 K/UL — SIGNIFICANT CHANGE UP (ref 0–0.5)
EOSINOPHIL NFR BLD AUTO: 3.2 % — SIGNIFICANT CHANGE UP (ref 0–6)
ESTIMATED AVERAGE GLUCOSE: 123 — SIGNIFICANT CHANGE UP
GLUCOSE SERPL-MCNC: 102 MG/DL — HIGH (ref 70–99)
HCT VFR BLD CALC: 38.4 % — SIGNIFICANT CHANGE UP (ref 34.5–45)
HDLC SERPL-MCNC: 35 MG/DL — LOW
HGB BLD-MCNC: 12 G/DL — SIGNIFICANT CHANGE UP (ref 11.5–15.5)
HIV 1+2 AB+HIV1 P24 AG SERPL QL IA: SIGNIFICANT CHANGE UP
IANC: 2.89 K/UL — SIGNIFICANT CHANGE UP (ref 1.8–7.4)
IMM GRANULOCYTES NFR BLD AUTO: 0.4 % — SIGNIFICANT CHANGE UP (ref 0–0.9)
LIPID PNL WITH DIRECT LDL SERPL: 182 MG/DL — HIGH
LYMPHOCYTES # BLD AUTO: 1.56 K/UL — SIGNIFICANT CHANGE UP (ref 1–3.3)
LYMPHOCYTES # BLD AUTO: 30.8 % — SIGNIFICANT CHANGE UP (ref 13–44)
MCHC RBC-ENTMCNC: 25.5 PG — LOW (ref 27–34)
MCHC RBC-ENTMCNC: 31.3 GM/DL — LOW (ref 32–36)
MCV RBC AUTO: 81.5 FL — SIGNIFICANT CHANGE UP (ref 80–100)
MONOCYTES # BLD AUTO: 0.37 K/UL — SIGNIFICANT CHANGE UP (ref 0–0.9)
MONOCYTES NFR BLD AUTO: 7.3 % — SIGNIFICANT CHANGE UP (ref 2–14)
NEUTROPHILS # BLD AUTO: 2.89 K/UL — SIGNIFICANT CHANGE UP (ref 1.8–7.4)
NEUTROPHILS NFR BLD AUTO: 57.1 % — SIGNIFICANT CHANGE UP (ref 43–77)
NON HDL CHOLESTEROL: 204 MG/DL — HIGH
NRBC # BLD: 0 /100 WBCS — SIGNIFICANT CHANGE UP (ref 0–0)
NRBC # FLD: 0 K/UL — SIGNIFICANT CHANGE UP (ref 0–0)
PLATELET # BLD AUTO: 260 K/UL — SIGNIFICANT CHANGE UP (ref 150–400)
POTASSIUM SERPL-MCNC: 3.9 MMOL/L — SIGNIFICANT CHANGE UP (ref 3.5–5.3)
POTASSIUM SERPL-SCNC: 3.9 MMOL/L — SIGNIFICANT CHANGE UP (ref 3.5–5.3)
PROT SERPL-MCNC: 6.8 G/DL — SIGNIFICANT CHANGE UP (ref 6–8.3)
RBC # BLD: 4.71 M/UL — SIGNIFICANT CHANGE UP (ref 3.8–5.2)
RBC # FLD: 13.4 % — SIGNIFICANT CHANGE UP (ref 10.3–14.5)
SODIUM SERPL-SCNC: 140 MMOL/L — SIGNIFICANT CHANGE UP (ref 135–145)
TRIGL SERPL-MCNC: 109 MG/DL — SIGNIFICANT CHANGE UP
WBC # BLD: 5.06 K/UL — SIGNIFICANT CHANGE UP (ref 3.8–10.5)
WBC # FLD AUTO: 5.06 K/UL — SIGNIFICANT CHANGE UP (ref 3.8–10.5)

## 2022-10-20 RX ORDER — MEDROXYPROGESTERONE ACETATE 150 MG/ML
150 INJECTION, SUSPENSION INTRAMUSCULAR
Qty: 0 | Refills: 0 | Status: COMPLETED | OUTPATIENT
Start: 2022-10-20

## 2022-10-20 RX ADMIN — MEDROXYPROGESTERONE ACETATE 0 MG/ML: 150 INJECTION, SUSPENSION INTRAMUSCULAR at 00:00

## 2022-10-20 NOTE — HISTORY OF PRESENT ILLNESS
[Patient reported mammogram was normal] : Patient reported mammogram was normal [Patient reported PAP Smear was normal] : Patient reported PAP Smear was normal [HIV Test offered] : HIV Test offered [Syphilis test offered] : Syphilis test offered [Gonorrhea test offered] : Gonorrhea test offered [Chlamydia test offered] : Chlamydia test offered [Hepatitis B test offered] : Hepatitis B test offered [Hepatitis C test offered] : Hepatitis C test offered [N] : Patient reports normal menses [DepoProvera] : uses depo-medroxyprogesterone [Y] : Positive pregnancy history [TextBox_4] : 43 yo  here for annual Gyn exam and repeat Depo Provera.  Uses Depo for history of irregular bleeding and  ovarian cysts and pain.  Happy with method and wants to continue use.  Hx bilateral salpingectomy.  Hx B-cell lymphoma an followed by Oncology. [Mammogramdate] : 01/21 [TextBox_19] : Dense breasts [PapSmeardate] : 2020 [PGHxTotal] : 3 [[facility]] : 2 [PGHxPremature] : 0 [PGHxAbortions] : 1 [[facility] CampusxLiving] : 2

## 2022-10-20 NOTE — DISCUSSION/SUMMARY
[FreeTextEntry1] : Annual Gyn exam\par --last pap done in 2020--due in 2023\par --GC/Chlam done today\par --annual blood work including STD/HIV testing with consent\par --mammo and breast sono referral\par --SBE monthly\par --diet and exercise\par \par Family planning/Hx ovarian cysts/bleeding\par --Depo Provera administered today\par --advised calcium supplements for patients on long term Depo\par --patient prefers to take Depo every 10 weeks to avoid any bleeding\par Follow up Dec 29th for next Depo

## 2022-10-21 DIAGNOSIS — Z01.419 ENCOUNTER FOR GYNECOLOGICAL EXAMINATION (GENERAL) (ROUTINE) WITHOUT ABNORMAL FINDINGS: ICD-10-CM

## 2022-10-21 DIAGNOSIS — R92.2 INCONCLUSIVE MAMMOGRAM: ICD-10-CM

## 2022-10-21 DIAGNOSIS — Z30.42 ENCOUNTER FOR SURVEILLANCE OF INJECTABLE CONTRACEPTIVE: ICD-10-CM

## 2022-10-21 LAB
HBV SURFACE AG SER-ACNC: SIGNIFICANT CHANGE UP
HCV AB S/CO SERPL IA: 0.07 S/CO — SIGNIFICANT CHANGE UP (ref 0–0.99)
HCV AB SERPL-IMP: SIGNIFICANT CHANGE UP
T PALLIDUM AB TITR SER: NEGATIVE — SIGNIFICANT CHANGE UP

## 2022-11-04 ENCOUNTER — APPOINTMENT (OUTPATIENT)
Dept: HEMATOLOGY ONCOLOGY | Facility: CLINIC | Age: 42
End: 2022-11-04

## 2022-11-04 ENCOUNTER — RESULT REVIEW (OUTPATIENT)
Age: 42
End: 2022-11-04

## 2022-11-04 ENCOUNTER — APPOINTMENT (OUTPATIENT)
Dept: INFUSION THERAPY | Facility: HOSPITAL | Age: 42
End: 2022-11-04

## 2022-11-04 VITALS
OXYGEN SATURATION: 100 % | SYSTOLIC BLOOD PRESSURE: 124 MMHG | WEIGHT: 160.93 LBS | TEMPERATURE: 97.2 F | HEART RATE: 77 BPM | RESPIRATION RATE: 14 BRPM | BODY MASS INDEX: 27.62 KG/M2 | DIASTOLIC BLOOD PRESSURE: 86 MMHG

## 2022-11-04 LAB
ALBUMIN SERPL ELPH-MCNC: 4.6 G/DL — SIGNIFICANT CHANGE UP (ref 3.3–5)
ALP SERPL-CCNC: 107 U/L — SIGNIFICANT CHANGE UP (ref 40–120)
ALT FLD-CCNC: 21 U/L — SIGNIFICANT CHANGE UP (ref 10–45)
ANION GAP SERPL CALC-SCNC: 14 MMOL/L — SIGNIFICANT CHANGE UP (ref 5–17)
AST SERPL-CCNC: 19 U/L — SIGNIFICANT CHANGE UP (ref 10–40)
BASOPHILS # BLD AUTO: 0.05 K/UL — SIGNIFICANT CHANGE UP (ref 0–0.2)
BASOPHILS NFR BLD AUTO: 0.8 % — SIGNIFICANT CHANGE UP (ref 0–2)
BILIRUB SERPL-MCNC: 0.4 MG/DL — SIGNIFICANT CHANGE UP (ref 0.2–1.2)
BUN SERPL-MCNC: 12 MG/DL — SIGNIFICANT CHANGE UP (ref 7–23)
CALCIUM SERPL-MCNC: 9.5 MG/DL — SIGNIFICANT CHANGE UP (ref 8.4–10.5)
CHLORIDE SERPL-SCNC: 109 MMOL/L — HIGH (ref 96–108)
CO2 SERPL-SCNC: 18 MMOL/L — LOW (ref 22–31)
CREAT SERPL-MCNC: 0.72 MG/DL — SIGNIFICANT CHANGE UP (ref 0.5–1.3)
EGFR: 108 ML/MIN/1.73M2 — SIGNIFICANT CHANGE UP
EOSINOPHIL # BLD AUTO: 0.22 K/UL — SIGNIFICANT CHANGE UP (ref 0–0.5)
EOSINOPHIL NFR BLD AUTO: 3.7 % — SIGNIFICANT CHANGE UP (ref 0–6)
GLUCOSE SERPL-MCNC: 104 MG/DL — HIGH (ref 70–99)
HCT VFR BLD CALC: 35.7 % — SIGNIFICANT CHANGE UP (ref 34.5–45)
HGB BLD-MCNC: 11.5 G/DL — SIGNIFICANT CHANGE UP (ref 11.5–15.5)
IMM GRANULOCYTES NFR BLD AUTO: 0.5 % — SIGNIFICANT CHANGE UP (ref 0–0.9)
LDH SERPL L TO P-CCNC: 155 U/L — SIGNIFICANT CHANGE UP (ref 50–242)
LYMPHOCYTES # BLD AUTO: 2.16 K/UL — SIGNIFICANT CHANGE UP (ref 1–3.3)
LYMPHOCYTES # BLD AUTO: 35.9 % — SIGNIFICANT CHANGE UP (ref 13–44)
MCHC RBC-ENTMCNC: 26.1 PG — LOW (ref 27–34)
MCHC RBC-ENTMCNC: 32.2 G/DL — SIGNIFICANT CHANGE UP (ref 32–36)
MCV RBC AUTO: 81.1 FL — SIGNIFICANT CHANGE UP (ref 80–100)
MONOCYTES # BLD AUTO: 0.42 K/UL — SIGNIFICANT CHANGE UP (ref 0–0.9)
MONOCYTES NFR BLD AUTO: 7 % — SIGNIFICANT CHANGE UP (ref 2–14)
NEUTROPHILS # BLD AUTO: 3.14 K/UL — SIGNIFICANT CHANGE UP (ref 1.8–7.4)
NEUTROPHILS NFR BLD AUTO: 52.1 % — SIGNIFICANT CHANGE UP (ref 43–77)
NRBC # BLD: 0 /100 WBCS — SIGNIFICANT CHANGE UP (ref 0–0)
PLATELET # BLD AUTO: 243 K/UL — SIGNIFICANT CHANGE UP (ref 150–400)
POTASSIUM SERPL-MCNC: 4.1 MMOL/L — SIGNIFICANT CHANGE UP (ref 3.5–5.3)
POTASSIUM SERPL-SCNC: 4.1 MMOL/L — SIGNIFICANT CHANGE UP (ref 3.5–5.3)
PROT SERPL-MCNC: 6.6 G/DL — SIGNIFICANT CHANGE UP (ref 6–8.3)
RBC # BLD: 4.4 M/UL — SIGNIFICANT CHANGE UP (ref 3.8–5.2)
RBC # FLD: 13.5 % — SIGNIFICANT CHANGE UP (ref 10.3–14.5)
SODIUM SERPL-SCNC: 140 MMOL/L — SIGNIFICANT CHANGE UP (ref 135–145)
WBC # BLD: 6.02 K/UL — SIGNIFICANT CHANGE UP (ref 3.8–10.5)
WBC # FLD AUTO: 6.02 K/UL — SIGNIFICANT CHANGE UP (ref 3.8–10.5)

## 2022-11-04 PROCEDURE — 99214 OFFICE O/P EST MOD 30 MIN: CPT

## 2022-11-04 NOTE — PHYSICAL EXAM
[Fully active, able to carry on all pre-disease performance without restriction] : Status 0 - Fully active, able to carry on all pre-disease performance without restriction [Normal] : affect appropriate [de-identified] : L neck excisional site healing [de-identified] : limited ROM L shoulder

## 2022-11-04 NOTE — ASSESSMENT
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 \par We started treatment with Bendamustine/Rituxan on 6/24/19. LAD has improved. C5 delayed due to neutropenia, received on 10/30/19. C6 on 11/26/19, tolerated treatment well. PET/CT done at end of treatment showed interval significant decrease in size and metabolism of LAD. \par \par PET/CT unchanged. Started on Rituximab maintenance on 6/25/20. Cont every 2 mo. s/p 4th dose of Rituxan on 12/30/20. She had worsened side effects after 4th dose and does not want to continue. Will hold further Rituxan maintenance given intolerance. PET/CT post Rituxan 2/2021 stable\par Last imaging from 3/2022 [facility]. s/p excisional biopsy of L neck mass that was unremarkable\par f/u w/ PMD and gyn\par f/u w/ Dr. Jolly. Will start PT of L shoulder syndrome, improved after PT\par Pt referred to psycho-oncology -pt exhibits psychological distress including anxious thought, ruminations, tearfulness, and anhedonia. These symptoms interfere with functioning across domains. Pt is likely to benefit from psychotherapy. She has joined a support group through [facility]. She hasn't done either yet\par All questions answered\par port flush every 6 wks\par CT neck soft tissue, CT c/a/p ordered today be done by middle January 2023. If [facility] will plan for port removal next visit. \par RTC 3 mo\par \par Case and management discussed with Dr. Vega\par

## 2022-11-04 NOTE — HISTORY OF PRESENT ILLNESS
[de-identified] : 39yo F w/ asthma here for evaluation of newly diagnosed follicular lymphoma. \par \par Patient sates she initially noted left inguinal pain in February 2019, had a palpable mass for the last 6 years which she noticed after delivery of her second baby in 2013. She saw GYN, had TVUS: Uterus: 5.6 x 3.6 x 4.3 cm. EMS 3 mm. Right ovary normal. Left ovary normal. Complex elongated left adnexal lesion suggestive of hydrosalpinx, 9.2 x 4.9 x 9 cm. She is s/p b/l salpingectomy on 5/2/19. She remains on depo injection. She states that she has had more swelling and more pain since her procedure. \par \par She had CT A/P done on 5/9/19 which demonstrated extensive retroperitoneal, pelvic, and inguinal lymphadenopathy, concerning for malignancy with small amount of abdominal and pelvic ascites.\par s/p IR biopsy of left inguinal lymph node - follicular lymphoma grade 1-2. \par \par Also having cervical LAD, noticed for the last few months, L>R. Noted R inguinal swelling for the last week with pain. No fevers of chills. Notes having itching and that her breathing is a bit more labored than usual.  [de-identified] : BMbx (6/10/19): - Focal lymphoid infiltrate consistent with involvement by follicular lymphoma (history of follicular lymphoma)\par - Small paracortical sample with trilineage hematopoiesis with maturation and iron stores present\par \par PET/CT (6/16/19): 1. Hypermetabolic lymphadenopathy in neck, thorax, abdomen, pelvis, and bilateral inguinal/femoral regions corresponds to biopsy-proven follicular lymphoma. Hypermetabolic subcutaneous nodule, right posterolateral chest wall, is compatible with an additional site of lymphoma.\par 2. Nonspecific left greater than right tonsillar hypermetabolism may represent additional sites of lymphoma.\par 3. Findings compatible with bone marrow involvement by lymphoma in bilateral humeri and axial skeleton, as described above.\par \par She reports having increasing fatigue. Still having slight shortness of breath.\par \par We started BR on 6/24. She had a reaction to Rituxan. \par \par Eating fine. Neck LAD, shortness of breath and abdominal bloating improved/resolved. \par She had a yeast infection, saw GYN. Also reports having pelvic pain and was found to have a small polyp. \par \par C2 on 7/22. She notes having burning as she was getting the chemo (please see chart note for details). Notes that her arms were still hurting for a week after. \par She had a portacath placed 8/12\par Felt more fatigued and weak. \par Had transvaginal US, saw GYN this morning (9/5/19). Pelvic pain has resolved. \par \par C3 on 8/19. Tolerated it well. \par \par CT N/C/A/P (9/10/19): Marked decrease in size of cervical lymph nodes when compared with the prior exam compatible with a favorable response to therapy. Significant interval decrease in size of left supraclavicular lymphadenopathy.\par Focal enhancement involving the right anterior tonsillar without associated tonsillar expansion. Correlation with direct visualization and continued follow-up is advised.\par Significant interval decrease in size of previously noted hypermetabolic nodule in the soft tissues of the right posterior lateral chest.\par Significant interval decrease in size of retroperitoneal, pelvic, inguinal, and mesenteric lymphadenopathy as described above.\par \par C4 on 9/16. Tolerated well, had some tremors afterwards. \par \par C5 was delayed secondary to neutropenia. Given on 10/30/19. Had an episode of chest tightness w/ Pillo on day 1. Premedicated on day 2 with no events. Tremors better this cycle but notes 3 episodes of restless legs. \par \par C6 on 11/26/19. She reports having muscle aches. \par \par PET/CT 1/18/20: 1. Interval resolution of FDG activity associated with lymphadenopathy in the neck, chest, upper abdomen and pelvis with either resolution or significant decrease in size of the corresponding lymph nodes and not well delineated on CT as compared to PET/CT from 6/16/2019. Near total resolution of FDG avid left inguinal lymphadenopathy which is significantly decreased in size now demonstrating background activity.\par 2. Interval significant decrease in size and metabolism of hypermetabolic retroperitoneal and mesenteric lymphadenopathy.\par 3. Interval resolution of asymmetric tonsillar hypermetabolism.\par 4. Interval resolution of FDG avidity in bone marrow of bilateral humeri, T10, and sacrum.\par \par Having pain on L side of lower abdomen and groin around 3-4 weeks ago. Having night sweats again too. Had GYN f/u 2 weeks ago.\par We did a PET/CT on 6/13 which showed: 1. Small focus of mild FDG activity, decreased in size and metabolism, is associated with mesenteric haziness which is unchanged on CT as compared to prior study dated 1/18/2020 (Deauville 4). Resolution of minimally FDG-avid amorphous density, left periaortic region.\par 2. Minimally FDG-avid density, left inguinal region, unchanged, may represent postsurgical change versus lymph node. Please correlate clinically.\par \par Started Rituxan maintenance on 6/25/20. She had a reaction to Rituxan -please see chart note for details. \par Second dose on 8/31/20, tolerated better. Reports having back pain after premeds wore off. \par 3rd dose on 11/4/20, she felt tired and slept through the treatment, denied nausea, but c/o tingling in the finger tips and feet comes and goes. Patient admitted tremor improved.\par birth control pills d/c 3 months ago, she attributed the nausea after Rituxan to the withdrawal of birth control pills. Kidney stone stent removed today. \par Fourth dose on 12/30/20. Pt reports nausea and fatigue. States that does not feel well. Does not want to continue at this time. \par \par PET/CT 2/6/21: 1. Resolution of small focus of mild FDG activity associated with mesenteric haziness which is unchanged on CT (Deauville 1).\par 2. Minimally FDG-avid density, left inguinal region, unchanged, may represent postsurgical change versus lymph node. Please correlate clinically.\par 3. Remainder study is unremarkable and not significantly changed, demonstrating no evidence of FDG-avid disease.\par \par She is doing well, no new complaints. \par Continues to be fatigued. Reports having a difficult time coping with her diagnosis and her transition off treatment. \par She received both her COVID vaccine doses - 5/27 and 6/17\par \par Had GYN and PCP annual check up 2 weeks ago, she admitted cholesterol level was slightly high other than that no remarkable findings, will f/u in 3 months. PCP ordered Echo, will do after Thanksgiving. will have Mammogram done next month. \par Never went to psychotherapy, she felt everything calmed down now, the working gets better, she rested better, she felt much less stressful now. \par Denied any new complaints. \par \par She had COVID in Dec - had URI symptoms and had CP/SOB. \par She saw GI on 1/10/22 and had endoscopy done. Taking omeprazole with relief. \par \par Saw [facility] Dr. Jolly on 3/22/22 for voice hoarseness and neck mass. \par CT C/A/P done 3/5/22: No evidence of recurrent or metastatic disease in the chest, abdomen, or pelvis.\par s/p excisional lymph node, left neck, biopsy- Follicular and paracortical hyperplasia \par \par She will start PT on Tues for L shoulder syndrome. \par Denied any new complaints. \par \par Patient was seen today, feeling fine overall. had left shoulder pain since biopsy of left neck. \par Started PT early July 2 day/wk now 1 day a week, left should pain improved. \par She f/u w/GYN, Ophthalmologist, [facility] doc recently no remark findings. \par Denied any constitutional symptoms. \par Intermittent voice hoarseness, most likely due to reflux per [facility]

## 2022-11-08 ENCOUNTER — APPOINTMENT (OUTPATIENT)
Dept: OBGYN | Facility: HOSPITAL | Age: 42
End: 2022-11-08

## 2022-12-10 ENCOUNTER — NON-APPOINTMENT (OUTPATIENT)
Age: 42
End: 2022-12-10

## 2022-12-13 ENCOUNTER — OUTPATIENT (OUTPATIENT)
Dept: OUTPATIENT SERVICES | Facility: HOSPITAL | Age: 42
LOS: 1 days | Discharge: HOME | End: 2022-12-13

## 2022-12-13 DIAGNOSIS — Z95.828 PRESENCE OF OTHER VASCULAR IMPLANTS AND GRAFTS: Chronic | ICD-10-CM

## 2022-12-13 DIAGNOSIS — C85.88 OTHER SPECIFIED TYPES OF NON-HODGKIN LYMPHOMA, LYMPH NODES OF MULTIPLE SITES: ICD-10-CM

## 2022-12-13 DIAGNOSIS — Z90.79 ACQUIRED ABSENCE OF OTHER GENITAL ORGAN(S): Chronic | ICD-10-CM

## 2022-12-13 DIAGNOSIS — N20.0 CALCULUS OF KIDNEY: Chronic | ICD-10-CM

## 2022-12-17 ENCOUNTER — RESULT REVIEW (OUTPATIENT)
Age: 42
End: 2022-12-17

## 2022-12-17 ENCOUNTER — APPOINTMENT (OUTPATIENT)
Dept: INFUSION THERAPY | Facility: HOSPITAL | Age: 42
End: 2022-12-17

## 2022-12-17 LAB
ALBUMIN SERPL ELPH-MCNC: 4.5 G/DL — SIGNIFICANT CHANGE UP (ref 3.3–5)
ALP SERPL-CCNC: 99 U/L — SIGNIFICANT CHANGE UP (ref 40–120)
ALT FLD-CCNC: 23 U/L — SIGNIFICANT CHANGE UP (ref 10–45)
ANION GAP SERPL CALC-SCNC: 11 MMOL/L — SIGNIFICANT CHANGE UP (ref 5–17)
AST SERPL-CCNC: 13 U/L — SIGNIFICANT CHANGE UP (ref 10–40)
BASOPHILS # BLD AUTO: 0.05 K/UL — SIGNIFICANT CHANGE UP (ref 0–0.2)
BASOPHILS NFR BLD AUTO: 1 % — SIGNIFICANT CHANGE UP (ref 0–2)
BILIRUB SERPL-MCNC: 0.3 MG/DL — SIGNIFICANT CHANGE UP (ref 0.2–1.2)
BUN SERPL-MCNC: 9 MG/DL — SIGNIFICANT CHANGE UP (ref 7–23)
CALCIUM SERPL-MCNC: 9.7 MG/DL — SIGNIFICANT CHANGE UP (ref 8.4–10.5)
CHLORIDE SERPL-SCNC: 107 MMOL/L — SIGNIFICANT CHANGE UP (ref 96–108)
CO2 SERPL-SCNC: 22 MMOL/L — SIGNIFICANT CHANGE UP (ref 22–31)
CREAT SERPL-MCNC: 0.68 MG/DL — SIGNIFICANT CHANGE UP (ref 0.5–1.3)
EGFR: 112 ML/MIN/1.73M2 — SIGNIFICANT CHANGE UP
EOSINOPHIL # BLD AUTO: 0.11 K/UL — SIGNIFICANT CHANGE UP (ref 0–0.5)
EOSINOPHIL NFR BLD AUTO: 2.3 % — SIGNIFICANT CHANGE UP (ref 0–6)
GLUCOSE SERPL-MCNC: 100 MG/DL — HIGH (ref 70–99)
HCT VFR BLD CALC: 36.2 % — SIGNIFICANT CHANGE UP (ref 34.5–45)
HGB BLD-MCNC: 11.7 G/DL — SIGNIFICANT CHANGE UP (ref 11.5–15.5)
IMM GRANULOCYTES NFR BLD AUTO: 1 % — HIGH (ref 0–0.9)
LDH SERPL L TO P-CCNC: 160 U/L — SIGNIFICANT CHANGE UP (ref 50–242)
LYMPHOCYTES # BLD AUTO: 1.85 K/UL — SIGNIFICANT CHANGE UP (ref 1–3.3)
LYMPHOCYTES # BLD AUTO: 38.4 % — SIGNIFICANT CHANGE UP (ref 13–44)
MCHC RBC-ENTMCNC: 25.8 PG — LOW (ref 27–34)
MCHC RBC-ENTMCNC: 32.3 G/DL — SIGNIFICANT CHANGE UP (ref 32–36)
MCV RBC AUTO: 79.7 FL — LOW (ref 80–100)
MONOCYTES # BLD AUTO: 0.32 K/UL — SIGNIFICANT CHANGE UP (ref 0–0.9)
MONOCYTES NFR BLD AUTO: 6.6 % — SIGNIFICANT CHANGE UP (ref 2–14)
NEUTROPHILS # BLD AUTO: 2.44 K/UL — SIGNIFICANT CHANGE UP (ref 1.8–7.4)
NEUTROPHILS NFR BLD AUTO: 50.7 % — SIGNIFICANT CHANGE UP (ref 43–77)
NRBC # BLD: 0 /100 WBCS — SIGNIFICANT CHANGE UP (ref 0–0)
PLATELET # BLD AUTO: 280 K/UL — SIGNIFICANT CHANGE UP (ref 150–400)
POTASSIUM SERPL-MCNC: 3.7 MMOL/L — SIGNIFICANT CHANGE UP (ref 3.5–5.3)
POTASSIUM SERPL-SCNC: 3.7 MMOL/L — SIGNIFICANT CHANGE UP (ref 3.5–5.3)
PROT SERPL-MCNC: 6.2 G/DL — SIGNIFICANT CHANGE UP (ref 6–8.3)
RBC # BLD: 4.54 M/UL — SIGNIFICANT CHANGE UP (ref 3.8–5.2)
RBC # FLD: 13.2 % — SIGNIFICANT CHANGE UP (ref 10.3–14.5)
SODIUM SERPL-SCNC: 140 MMOL/L — SIGNIFICANT CHANGE UP (ref 135–145)
WBC # BLD: 4.82 K/UL — SIGNIFICANT CHANGE UP (ref 3.8–10.5)
WBC # FLD AUTO: 4.82 K/UL — SIGNIFICANT CHANGE UP (ref 3.8–10.5)

## 2022-12-19 DIAGNOSIS — C85.10 UNSPECIFIED B-CELL LYMPHOMA, UNSPECIFIED SITE: ICD-10-CM

## 2022-12-21 ENCOUNTER — RESULT REVIEW (OUTPATIENT)
Age: 42
End: 2022-12-21

## 2022-12-28 ENCOUNTER — APPOINTMENT (OUTPATIENT)
Dept: INTERNAL MEDICINE | Facility: CLINIC | Age: 42
End: 2022-12-28

## 2022-12-29 ENCOUNTER — APPOINTMENT (OUTPATIENT)
Dept: OBGYN | Facility: HOSPITAL | Age: 42
End: 2022-12-29

## 2022-12-29 ENCOUNTER — OUTPATIENT (OUTPATIENT)
Dept: OUTPATIENT SERVICES | Facility: HOSPITAL | Age: 42
LOS: 1 days | End: 2022-12-29

## 2022-12-29 VITALS
TEMPERATURE: 97.4 F | BODY MASS INDEX: 28 KG/M2 | HEART RATE: 88 BPM | WEIGHT: 164 LBS | DIASTOLIC BLOOD PRESSURE: 69 MMHG | SYSTOLIC BLOOD PRESSURE: 135 MMHG | HEIGHT: 64 IN

## 2022-12-29 DIAGNOSIS — Z90.79 ACQUIRED ABSENCE OF OTHER GENITAL ORGAN(S): Chronic | ICD-10-CM

## 2022-12-29 DIAGNOSIS — N20.0 CALCULUS OF KIDNEY: Chronic | ICD-10-CM

## 2022-12-29 DIAGNOSIS — Z95.828 PRESENCE OF OTHER VASCULAR IMPLANTS AND GRAFTS: Chronic | ICD-10-CM

## 2022-12-29 RX ORDER — MEDROXYPROGESTERONE ACETATE 150 MG/ML
150 INJECTION, SUSPENSION INTRAMUSCULAR
Qty: 0 | Refills: 0 | Status: COMPLETED | OUTPATIENT
Start: 2022-12-29

## 2022-12-29 RX ADMIN — MEDROXYPROGESTERONE ACETATE 0 MG/ML: 150 INJECTION, SUSPENSION INTRAMUSCULAR at 00:00

## 2022-12-29 NOTE — DISCUSSION/SUMMARY
[FreeTextEntry1] : Depo Provera today\par Calcium supplements advised\par Keep mammo appt in Jan\par Safe sex practices and condoms\par Follow up March 9th 2023 for next Depo

## 2022-12-29 NOTE — HISTORY OF PRESENT ILLNESS
[FreeTextEntry1] : 41 yo  here for Depo Provera.  On time for injection.  Happy with method and wants to continue use.  Uses Depo for history of irregular bleeding and ovarian cysts,  Hx of bilateral salpingectomy.  Hx B-cell lymphoma and followed closely by oncology.

## 2022-12-30 DIAGNOSIS — Z30.42 ENCOUNTER FOR SURVEILLANCE OF INJECTABLE CONTRACEPTIVE: ICD-10-CM

## 2023-01-12 ENCOUNTER — OUTPATIENT (OUTPATIENT)
Dept: OUTPATIENT SERVICES | Facility: HOSPITAL | Age: 43
LOS: 1 days | End: 2023-01-12
Payer: MEDICAID

## 2023-01-12 ENCOUNTER — APPOINTMENT (OUTPATIENT)
Dept: CT IMAGING | Facility: IMAGING CENTER | Age: 43
End: 2023-01-12
Payer: MEDICAID

## 2023-01-12 DIAGNOSIS — Z00.8 ENCOUNTER FOR OTHER GENERAL EXAMINATION: ICD-10-CM

## 2023-01-12 DIAGNOSIS — C85.10 UNSPECIFIED B-CELL LYMPHOMA, UNSPECIFIED SITE: ICD-10-CM

## 2023-01-12 PROCEDURE — 74177 CT ABD & PELVIS W/CONTRAST: CPT | Mod: 26

## 2023-01-12 PROCEDURE — 70491 CT SOFT TISSUE NECK W/DYE: CPT | Mod: 26

## 2023-01-12 PROCEDURE — 70491 CT SOFT TISSUE NECK W/DYE: CPT

## 2023-01-12 PROCEDURE — 71260 CT THORAX DX C+: CPT | Mod: 26

## 2023-01-12 PROCEDURE — 74177 CT ABD & PELVIS W/CONTRAST: CPT

## 2023-01-12 PROCEDURE — 71260 CT THORAX DX C+: CPT

## 2023-01-17 ENCOUNTER — APPOINTMENT (OUTPATIENT)
Dept: MAMMOGRAPHY | Facility: IMAGING CENTER | Age: 43
End: 2023-01-17
Payer: MEDICAID

## 2023-01-17 ENCOUNTER — RESULT REVIEW (OUTPATIENT)
Age: 43
End: 2023-01-17

## 2023-01-17 ENCOUNTER — APPOINTMENT (OUTPATIENT)
Dept: ULTRASOUND IMAGING | Facility: IMAGING CENTER | Age: 43
End: 2023-01-17
Payer: MEDICAID

## 2023-01-17 ENCOUNTER — OUTPATIENT (OUTPATIENT)
Dept: OUTPATIENT SERVICES | Facility: HOSPITAL | Age: 43
LOS: 1 days | End: 2023-01-17
Payer: MEDICAID

## 2023-01-17 DIAGNOSIS — N20.0 CALCULUS OF KIDNEY: Chronic | ICD-10-CM

## 2023-01-17 DIAGNOSIS — Z95.828 PRESENCE OF OTHER VASCULAR IMPLANTS AND GRAFTS: Chronic | ICD-10-CM

## 2023-01-17 DIAGNOSIS — Z90.79 ACQUIRED ABSENCE OF OTHER GENITAL ORGAN(S): Chronic | ICD-10-CM

## 2023-01-17 DIAGNOSIS — R92.2 INCONCLUSIVE MAMMOGRAM: ICD-10-CM

## 2023-01-17 DIAGNOSIS — Z00.8 ENCOUNTER FOR OTHER GENERAL EXAMINATION: ICD-10-CM

## 2023-01-17 PROCEDURE — 77063 BREAST TOMOSYNTHESIS BI: CPT

## 2023-01-17 PROCEDURE — 76641 ULTRASOUND BREAST COMPLETE: CPT | Mod: 26,50

## 2023-01-17 PROCEDURE — 77067 SCR MAMMO BI INCL CAD: CPT | Mod: 26

## 2023-01-17 PROCEDURE — 77067 SCR MAMMO BI INCL CAD: CPT

## 2023-01-17 PROCEDURE — 77063 BREAST TOMOSYNTHESIS BI: CPT | Mod: 26

## 2023-01-17 PROCEDURE — 76641 ULTRASOUND BREAST COMPLETE: CPT

## 2023-01-19 NOTE — ED ADULT TRIAGE NOTE - BP NONINVASIVE DIASTOLIC (MM HG)
76F PMHx CVA, seizure disorder, HTN here with shortness of breath.    A/P:   Acute HFrEF: new diagnosis.   SOB, leg edema.   CXR showed bilateral infiltrates.   Echo showed LVEF 28%, severe reduced RV systolic function, mod to severe MR, mild to mod AR, mild TR, mod pulmonary HTN,    Lasix, Aldactone and Losartan on hold due to hypotension.   Continue Metoprolol.   Per Cardiology cardiomyopathy is likely from tachycardia   Low sodium diet,     New Paroxysmal Atrial Fibrillation with Rapid Ventricular Response:   HR is controlled today.   Echo showed severe biatrial enlargement.   CT heart of left atrium was negative for clot in RYANN, s/p Cardioversion 1/17, back to sinus rhythm.   Increase Metoprolol to 50mg BID, Continue Lovenox, plan for JOSE C and cardioversion once hypervolemia improve.     Hypotension   lasix, losartan, spironolactone, afib rvr  continue to hold antihypertensives for now    History of CVA  Family Reports expressive aphasia, Not on ASA or Statin ,     Seizure disorder  Continue Keppra 500 bid and phenytoin 100 tid    DVT Prophylaxis: Lovenox  GI Prophylaxis: None  Code Status: Full    #Progress Note Handoff:  Pending (specify): placement  Family discussion: with her brother, update about diuresis, CHF.   Disposition: STR   71

## 2023-01-23 ENCOUNTER — RESULT REVIEW (OUTPATIENT)
Age: 43
End: 2023-01-23

## 2023-01-23 ENCOUNTER — APPOINTMENT (OUTPATIENT)
Dept: INFUSION THERAPY | Facility: HOSPITAL | Age: 43
End: 2023-01-23

## 2023-01-23 ENCOUNTER — APPOINTMENT (OUTPATIENT)
Dept: HEMATOLOGY ONCOLOGY | Facility: CLINIC | Age: 43
End: 2023-01-23
Payer: MEDICAID

## 2023-01-23 VITALS
DIASTOLIC BLOOD PRESSURE: 84 MMHG | RESPIRATION RATE: 13 BRPM | SYSTOLIC BLOOD PRESSURE: 129 MMHG | WEIGHT: 165.35 LBS | OXYGEN SATURATION: 100 % | BODY MASS INDEX: 28.38 KG/M2 | HEART RATE: 79 BPM | TEMPERATURE: 97.2 F

## 2023-01-23 LAB
ALBUMIN SERPL ELPH-MCNC: 4.4 G/DL — SIGNIFICANT CHANGE UP (ref 3.3–5)
ALP SERPL-CCNC: 116 U/L — SIGNIFICANT CHANGE UP (ref 40–120)
ALT FLD-CCNC: 20 U/L — SIGNIFICANT CHANGE UP (ref 10–45)
ANION GAP SERPL CALC-SCNC: 14 MMOL/L — SIGNIFICANT CHANGE UP (ref 5–17)
AST SERPL-CCNC: 12 U/L — SIGNIFICANT CHANGE UP (ref 10–40)
BASOPHILS # BLD AUTO: 0.06 K/UL — SIGNIFICANT CHANGE UP (ref 0–0.2)
BASOPHILS NFR BLD AUTO: 1 % — SIGNIFICANT CHANGE UP (ref 0–2)
BILIRUB SERPL-MCNC: 0.3 MG/DL — SIGNIFICANT CHANGE UP (ref 0.2–1.2)
BUN SERPL-MCNC: 9 MG/DL — SIGNIFICANT CHANGE UP (ref 7–23)
CALCIUM SERPL-MCNC: 9.8 MG/DL — SIGNIFICANT CHANGE UP (ref 8.4–10.5)
CHLORIDE SERPL-SCNC: 108 MMOL/L — SIGNIFICANT CHANGE UP (ref 96–108)
CO2 SERPL-SCNC: 20 MMOL/L — LOW (ref 22–31)
CREAT SERPL-MCNC: 0.71 MG/DL — SIGNIFICANT CHANGE UP (ref 0.5–1.3)
EGFR: 109 ML/MIN/1.73M2 — SIGNIFICANT CHANGE UP
EOSINOPHIL # BLD AUTO: 0.28 K/UL — SIGNIFICANT CHANGE UP (ref 0–0.5)
EOSINOPHIL NFR BLD AUTO: 4.7 % — SIGNIFICANT CHANGE UP (ref 0–6)
GLUCOSE SERPL-MCNC: 87 MG/DL — SIGNIFICANT CHANGE UP (ref 70–99)
HCT VFR BLD CALC: 37.2 % — SIGNIFICANT CHANGE UP (ref 34.5–45)
HGB BLD-MCNC: 11.9 G/DL — SIGNIFICANT CHANGE UP (ref 11.5–15.5)
IMM GRANULOCYTES NFR BLD AUTO: 0.3 % — SIGNIFICANT CHANGE UP (ref 0–0.9)
LDH SERPL L TO P-CCNC: 163 U/L — SIGNIFICANT CHANGE UP (ref 50–242)
LYMPHOCYTES # BLD AUTO: 1.86 K/UL — SIGNIFICANT CHANGE UP (ref 1–3.3)
LYMPHOCYTES # BLD AUTO: 31.1 % — SIGNIFICANT CHANGE UP (ref 13–44)
MCHC RBC-ENTMCNC: 25.8 PG — LOW (ref 27–34)
MCHC RBC-ENTMCNC: 32 G/DL — SIGNIFICANT CHANGE UP (ref 32–36)
MCV RBC AUTO: 80.7 FL — SIGNIFICANT CHANGE UP (ref 80–100)
MONOCYTES # BLD AUTO: 0.4 K/UL — SIGNIFICANT CHANGE UP (ref 0–0.9)
MONOCYTES NFR BLD AUTO: 6.7 % — SIGNIFICANT CHANGE UP (ref 2–14)
NEUTROPHILS # BLD AUTO: 3.36 K/UL — SIGNIFICANT CHANGE UP (ref 1.8–7.4)
NEUTROPHILS NFR BLD AUTO: 56.2 % — SIGNIFICANT CHANGE UP (ref 43–77)
NRBC # BLD: 0 /100 WBCS — SIGNIFICANT CHANGE UP (ref 0–0)
PLATELET # BLD AUTO: 231 K/UL — SIGNIFICANT CHANGE UP (ref 150–400)
POTASSIUM SERPL-MCNC: 3.8 MMOL/L — SIGNIFICANT CHANGE UP (ref 3.5–5.3)
POTASSIUM SERPL-SCNC: 3.8 MMOL/L — SIGNIFICANT CHANGE UP (ref 3.5–5.3)
PROT SERPL-MCNC: 6.3 G/DL — SIGNIFICANT CHANGE UP (ref 6–8.3)
RBC # BLD: 4.61 M/UL — SIGNIFICANT CHANGE UP (ref 3.8–5.2)
RBC # FLD: 14.1 % — SIGNIFICANT CHANGE UP (ref 10.3–14.5)
SODIUM SERPL-SCNC: 142 MMOL/L — SIGNIFICANT CHANGE UP (ref 135–145)
WBC # BLD: 5.98 K/UL — SIGNIFICANT CHANGE UP (ref 3.8–10.5)
WBC # FLD AUTO: 5.98 K/UL — SIGNIFICANT CHANGE UP (ref 3.8–10.5)

## 2023-01-23 PROCEDURE — 99214 OFFICE O/P EST MOD 30 MIN: CPT

## 2023-01-23 NOTE — HISTORY OF PRESENT ILLNESS
[de-identified] : 39yo F w/ asthma here for evaluation of newly diagnosed follicular lymphoma. \par \par Patient sates she initially noted left inguinal pain in February 2019, had a palpable mass for the last 6 years which she noticed after delivery of her second baby in 2013. She saw GYN, had TVUS: Uterus: 5.6 x 3.6 x 4.3 cm. EMS 3 mm. Right ovary normal. Left ovary normal. Complex elongated left adnexal lesion suggestive of hydrosalpinx, 9.2 x 4.9 x 9 cm. She is s/p b/l salpingectomy on 5/2/19. She remains on depo injection. She states that she has had more swelling and more pain since her procedure. \par \par She had CT A/P done on 5/9/19 which demonstrated extensive retroperitoneal, pelvic, and inguinal lymphadenopathy, concerning for malignancy with small amount of abdominal and pelvic ascites.\par s/p IR biopsy of left inguinal lymph node - follicular lymphoma grade 1-2. \par \par Also having cervical LAD, noticed for the last few months, L>R. Noted R inguinal swelling for the last week with pain. No fevers of chills. Notes having itching and that her breathing is a bit more labored than usual.  [de-identified] : BMbx (6/10/19): - Focal lymphoid infiltrate consistent with involvement by follicular lymphoma (history of follicular lymphoma)\par - Small paracortical sample with trilineage hematopoiesis with maturation and iron stores present\par \par PET/CT (6/16/19): 1. Hypermetabolic lymphadenopathy in neck, thorax, abdomen, pelvis, and bilateral inguinal/femoral regions corresponds to biopsy-proven follicular lymphoma. Hypermetabolic subcutaneous nodule, right posterolateral chest wall, is compatible with an additional site of lymphoma.\par 2. Nonspecific left greater than right tonsillar hypermetabolism may represent additional sites of lymphoma.\par 3. Findings compatible with bone marrow involvement by lymphoma in bilateral humeri and axial skeleton, as described above.\par \par She reports having increasing fatigue. Still having slight shortness of breath.\par \par We started BR on 6/24. She had a reaction to Rituxan. \par \par Eating fine. Neck LAD, shortness of breath and abdominal bloating improved/resolved. \par She had a yeast infection, saw GYN. Also reports having pelvic pain and was found to have a small polyp. \par \par C2 on 7/22. She notes having burning as she was getting the chemo (please see chart note for details). Notes that her arms were still hurting for a week after. \par She had a portacath placed 8/12\par Felt more fatigued and weak. \par Had transvaginal US, saw GYN this morning (9/5/19). Pelvic pain has resolved. \par \par C3 on 8/19. Tolerated it well. \par \par CT N/C/A/P (9/10/19): Marked decrease in size of cervical lymph nodes when compared with the prior exam compatible with a favorable response to therapy. Significant interval decrease in size of left supraclavicular lymphadenopathy.\par Focal enhancement involving the right anterior tonsillar without associated tonsillar expansion. Correlation with direct visualization and continued follow-up is advised.\par Significant interval decrease in size of previously noted hypermetabolic nodule in the soft tissues of the right posterior lateral chest.\par Significant interval decrease in size of retroperitoneal, pelvic, inguinal, and mesenteric lymphadenopathy as described above.\par \par C4 on 9/16. Tolerated well, had some tremors afterwards. \par \par C5 was delayed secondary to neutropenia. Given on 10/30/19. Had an episode of chest tightness w/ Pillo on day 1. Premedicated on day 2 with no events. Tremors better this cycle but notes 3 episodes of restless legs. \par \par C6 on 11/26/19. She reports having muscle aches. \par \par PET/CT 1/18/20: 1. Interval resolution of FDG activity associated with lymphadenopathy in the neck, chest, upper abdomen and pelvis with either resolution or significant decrease in size of the corresponding lymph nodes and not well delineated on CT as compared to PET/CT from 6/16/2019. Near total resolution of FDG avid left inguinal lymphadenopathy which is significantly decreased in size now demonstrating background activity.\par 2. Interval significant decrease in size and metabolism of hypermetabolic retroperitoneal and mesenteric lymphadenopathy.\par 3. Interval resolution of asymmetric tonsillar hypermetabolism.\par 4. Interval resolution of FDG avidity in bone marrow of bilateral humeri, T10, and sacrum.\par \par Having pain on L side of lower abdomen and groin around 3-4 weeks ago. Having night sweats again too. Had GYN f/u 2 weeks ago.\par We did a PET/CT on 6/13 which showed: 1. Small focus of mild FDG activity, decreased in size and metabolism, is associated with mesenteric haziness which is unchanged on CT as compared to prior study dated 1/18/2020 ([facility]auville 4). Resolution of minimally FDG-avid amorphous density, left periaortic region.\par 2. Minimally FDG-avid density, left inguinal region, unchanged, may represent postsurgical change versus lymph node. Please correlate clinically.\par \par Started Rituxan maintenance on 6/25/20. She had a reaction to Rituxan -please see chart note for details. \par Second dose on 8/31/20, tolerated better. Reports having back pain after premeds wore off. \par 3rd dose on 11/4/20, she felt tired and slept through the treatment, denied nausea, but c/o tingling in the finger tips and feet comes and goes. Patient admitted tremor improved.\par birth control pills d/c 3 months ago, she attributed the nausea after Rituxan to the withdrawal of birth control pills. Kidney stone stent removed today. \par Fourth dose on 12/30/20. Pt reports nausea and fatigue. States that does not feel well. Does not want to continue at this time. \par \par PET/CT 2/6/21: 1. Resolution of small focus of mild FDG activity associated with mesenteric haziness which is unchanged on CT (Deauville 1).\par 2. Minimally FDG-avid density, left inguinal region, unchanged, may represent postsurgical change versus lymph node. Please correlate clinically.\par 3. Remainder study is unremarkable and not significantly changed, demonstrating no evidence of FDG-avid disease.\par \par She is doing well, no new complaints. \par Continues to be fatigued. Reports having a difficult time coping with her diagnosis and her transition off treatment. \par She received both her COVID vaccine doses - 5/27 and 6/17\par \par Had GYN and PCP annual check up 2 weeks ago, she admitted cholesterol level was slightly high other than that no remarkable findings, will f/u in 3 months. PCP ordered Echo, will do after Thanksgiving. will have Mammogram done next month. \par Never went to psychotherapy, she felt everything calmed down now, the working gets better, she rested better, she felt much less stressful now. \par Denied any new complaints. \par \par She had COVID in Dec - had URI symptoms and had CP/SOB. \par She saw GI on 1/10/22 and had endoscopy done. Taking omeprazole with relief. \par \par Saw [facility] Dr. Jolly on 3/22/22 for voice hoarseness and neck mass. \par CT C/A/P done 3/5/22: No evidence of recurrent or metastatic disease in the chest, abdomen, or pelvis.\par s/p excisional lymph node, left neck, biopsy- Follicular and paracortical hyperplasia \par \par She will start PT on Tues for L shoulder syndrome. \par Denied any new complaints. \par \par Patient was seen today, feeling fine overall. had left shoulder pain since biopsy of left neck. \par Started PT early July 2 day/wk now 1 day a week, left should pain improved. \par She f/u w/GYN, Ophthalmologist, [facility] doc recently no remark findings. \par Denied any constitutional symptoms. \par Intermittent voice hoarseness, following with ENT\par \par CT N/C/A/P 1/12/23: Slight interval increased size left level 2 lymph nodes as detailed above. Stable additional retropharyngeal and right cervical chain lymph nodes. No new adenopathy.\par Stable nonspecific prominence of Waldeyer's ring.\par Nonspecific increase in prominence of subcentimeter mesenteric lymph nodes largest measuring 1.0 x 0.9 cm in the right lower quadrant. Consider further evaluation with PET/CT scan.\par \par She had COVID in Dec 2022, s/p Paxlovid. She continues having hoarseness. She continues having cough, worse at night. She has intermittent chest pain.\par \par Mammo/US done 1/19/23: BI-RADS 0 -incomplete. Has repeat imaging scheduled.

## 2023-01-23 NOTE — PHYSICAL EXAM
[Fully active, able to carry on all pre-disease performance without restriction] : Status 0 - Fully active, able to carry on all pre-disease performance without restriction [Normal] : affect appropriate [de-identified] : L neck excisional site healed [de-identified] : L shoulder ROM much improved

## 2023-01-23 NOTE — ASSESSMENT
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 \par We started treatment with Bendamustine/Rituxan on 6/24/19. LAD has improved. C5 delayed due to neutropenia, received on 10/30/19. C6 on 11/26/19, tolerated treatment well. PET/CT done at end of treatment showed interval significant decrease in size and metabolism of LAD. \par \par PET/CT unchanged. Started on Rituximab maintenance on 6/25/20. Cont every 2 mo. s/p 4th dose of Rituxan on 12/30/20. She had worsened side effects after 4th dose and does not want to continue. Will hold further Rituxan maintenance given intolerance. PET/CT post Rituxan 2/2021 stable\par Last imaging from 3/2022 [facility]. s/p excisional biopsy of L neck mass that was unremarkable\par f/u w/ PMD and gyn\par f/u w/ Dr. Jolly. Will start PT of L shoulder syndrome, improved after PT\par Pt referred to psycho-oncology -pt exhibits psychological distress including anxious thought, ruminations, tearfulness, and anhedonia. These symptoms interfere with functioning across domains. Pt is likely to benefit from psychotherapy. She has joined a support group through [facility]. She hasn't done either yet\par Advised pt to f/u w/ PMD for persistent cough/intermittent chest pains since COVID\par CT N/C/A/P done 1/2023 showed slight increase in LAD but still ~1-1.5cm. \par Will plan for port removal after mammo/US repeat imaging\par All questions answered\par RTC 4 mo\par \par

## 2023-01-24 LAB
INR BLD: 1.24 RATIO — HIGH (ref 0.88–1.16)
PROTHROM AB SERPL-ACNC: 14.4 SEC — HIGH (ref 10.5–13.4)

## 2023-01-30 ENCOUNTER — RESULT REVIEW (OUTPATIENT)
Age: 43
End: 2023-01-30

## 2023-01-30 ENCOUNTER — OUTPATIENT (OUTPATIENT)
Dept: OUTPATIENT SERVICES | Facility: HOSPITAL | Age: 43
LOS: 1 days | End: 2023-01-30
Payer: MEDICAID

## 2023-01-30 ENCOUNTER — APPOINTMENT (OUTPATIENT)
Dept: ULTRASOUND IMAGING | Facility: IMAGING CENTER | Age: 43
End: 2023-01-30
Payer: MEDICAID

## 2023-01-30 DIAGNOSIS — Z90.79 ACQUIRED ABSENCE OF OTHER GENITAL ORGAN(S): Chronic | ICD-10-CM

## 2023-01-30 DIAGNOSIS — Z00.8 ENCOUNTER FOR OTHER GENERAL EXAMINATION: ICD-10-CM

## 2023-01-30 DIAGNOSIS — N20.0 CALCULUS OF KIDNEY: Chronic | ICD-10-CM

## 2023-01-30 DIAGNOSIS — Z95.828 PRESENCE OF OTHER VASCULAR IMPLANTS AND GRAFTS: Chronic | ICD-10-CM

## 2023-01-30 PROCEDURE — 76642 ULTRASOUND BREAST LIMITED: CPT

## 2023-01-30 PROCEDURE — 76642 ULTRASOUND BREAST LIMITED: CPT | Mod: 26,50

## 2023-01-31 ENCOUNTER — NON-APPOINTMENT (OUTPATIENT)
Age: 43
End: 2023-01-31

## 2023-02-01 ENCOUNTER — OUTPATIENT (OUTPATIENT)
Dept: OUTPATIENT SERVICES | Facility: HOSPITAL | Age: 43
LOS: 1 days | End: 2023-02-01

## 2023-02-01 ENCOUNTER — APPOINTMENT (OUTPATIENT)
Dept: INTERNAL MEDICINE | Facility: CLINIC | Age: 43
End: 2023-02-01
Payer: MEDICAID

## 2023-02-01 ENCOUNTER — LABORATORY RESULT (OUTPATIENT)
Age: 43
End: 2023-02-01

## 2023-02-01 ENCOUNTER — NON-APPOINTMENT (OUTPATIENT)
Age: 43
End: 2023-02-01

## 2023-02-01 VITALS
DIASTOLIC BLOOD PRESSURE: 90 MMHG | OXYGEN SATURATION: 99 % | HEART RATE: 88 BPM | SYSTOLIC BLOOD PRESSURE: 120 MMHG | HEIGHT: 64 IN | WEIGHT: 165 LBS | BODY MASS INDEX: 28.17 KG/M2

## 2023-02-01 DIAGNOSIS — R25.2 CRAMP AND SPASM: ICD-10-CM

## 2023-02-01 DIAGNOSIS — N20.0 CALCULUS OF KIDNEY: Chronic | ICD-10-CM

## 2023-02-01 DIAGNOSIS — Z90.79 ACQUIRED ABSENCE OF OTHER GENITAL ORGAN(S): Chronic | ICD-10-CM

## 2023-02-01 DIAGNOSIS — Z95.828 PRESENCE OF OTHER VASCULAR IMPLANTS AND GRAFTS: Chronic | ICD-10-CM

## 2023-02-01 PROCEDURE — 99214 OFFICE O/P EST MOD 30 MIN: CPT | Mod: GC

## 2023-02-01 RX ORDER — NIRMATRELVIR AND RITONAVIR 300-100 MG
20 X 150 MG & KIT ORAL
Qty: 1 | Refills: 0 | Status: DISCONTINUED | COMMUNITY
Start: 2022-12-10 | End: 2023-02-01

## 2023-02-02 ENCOUNTER — EMERGENCY (EMERGENCY)
Facility: HOSPITAL | Age: 43
LOS: 1 days | Discharge: HOME | End: 2023-02-02
Attending: EMERGENCY MEDICINE | Admitting: EMERGENCY MEDICINE
Payer: MEDICAID

## 2023-02-02 ENCOUNTER — NON-APPOINTMENT (OUTPATIENT)
Age: 43
End: 2023-02-02

## 2023-02-02 VITALS
TEMPERATURE: 98 F | RESPIRATION RATE: 18 BRPM | HEART RATE: 95 BPM | OXYGEN SATURATION: 100 % | DIASTOLIC BLOOD PRESSURE: 86 MMHG | SYSTOLIC BLOOD PRESSURE: 135 MMHG

## 2023-02-02 VITALS
RESPIRATION RATE: 16 BRPM | HEART RATE: 90 BPM | DIASTOLIC BLOOD PRESSURE: 89 MMHG | OXYGEN SATURATION: 100 % | SYSTOLIC BLOOD PRESSURE: 125 MMHG | TEMPERATURE: 98 F

## 2023-02-02 DIAGNOSIS — N20.0 CALCULUS OF KIDNEY: Chronic | ICD-10-CM

## 2023-02-02 DIAGNOSIS — Z90.79 ACQUIRED ABSENCE OF OTHER GENITAL ORGAN(S): Chronic | ICD-10-CM

## 2023-02-02 DIAGNOSIS — Z95.828 PRESENCE OF OTHER VASCULAR IMPLANTS AND GRAFTS: Chronic | ICD-10-CM

## 2023-02-02 LAB
ALBUMIN SERPL ELPH-MCNC: 4.7 G/DL — SIGNIFICANT CHANGE UP (ref 3.3–5)
ALP SERPL-CCNC: 111 U/L — SIGNIFICANT CHANGE UP (ref 40–120)
ALT FLD-CCNC: 25 U/L — SIGNIFICANT CHANGE UP (ref 4–33)
ANION GAP SERPL CALC-SCNC: 12 MMOL/L — SIGNIFICANT CHANGE UP (ref 7–14)
APTT BLD: 25.7 SEC — LOW (ref 27–36.3)
AST SERPL-CCNC: 15 U/L — SIGNIFICANT CHANGE UP (ref 4–32)
BASE EXCESS BLDV CALC-SCNC: -3.2 MMOL/L — LOW (ref -2–3)
BASOPHILS # BLD AUTO: 0.07 K/UL — SIGNIFICANT CHANGE UP (ref 0–0.2)
BASOPHILS NFR BLD AUTO: 0.9 % — SIGNIFICANT CHANGE UP (ref 0–2)
BILIRUB SERPL-MCNC: 0.3 MG/DL — SIGNIFICANT CHANGE UP (ref 0.2–1.2)
BLOOD GAS VENOUS COMPREHENSIVE RESULT: SIGNIFICANT CHANGE UP
BUN SERPL-MCNC: 10 MG/DL — SIGNIFICANT CHANGE UP (ref 7–23)
CALCIUM SERPL-MCNC: 9.6 MG/DL — SIGNIFICANT CHANGE UP (ref 8.4–10.5)
CHLORIDE BLDV-SCNC: 106 MMOL/L — SIGNIFICANT CHANGE UP (ref 96–108)
CHLORIDE SERPL-SCNC: 107 MMOL/L — SIGNIFICANT CHANGE UP (ref 98–107)
CO2 BLDV-SCNC: 23.2 MMOL/L — SIGNIFICANT CHANGE UP (ref 22–26)
CO2 SERPL-SCNC: 21 MMOL/L — LOW (ref 22–31)
CREAT SERPL-MCNC: 0.72 MG/DL — SIGNIFICANT CHANGE UP (ref 0.5–1.3)
DEPRECATED D DIMER PPP IA-ACNC: 374 NG/ML DDU
EGFR: 108 ML/MIN/1.73M2 — SIGNIFICANT CHANGE UP
EOSINOPHIL # BLD AUTO: 0.37 K/UL — SIGNIFICANT CHANGE UP (ref 0–0.5)
EOSINOPHIL NFR BLD AUTO: 4.6 % — SIGNIFICANT CHANGE UP (ref 0–6)
GAS PNL BLDV: 136 MMOL/L — SIGNIFICANT CHANGE UP (ref 136–145)
GLUCOSE BLDV-MCNC: 97 MG/DL — SIGNIFICANT CHANGE UP (ref 70–99)
GLUCOSE SERPL-MCNC: 93 MG/DL — SIGNIFICANT CHANGE UP (ref 70–99)
HCG SERPL-ACNC: <5 MIU/ML — SIGNIFICANT CHANGE UP
HCO3 BLDV-SCNC: 22 MMOL/L — SIGNIFICANT CHANGE UP (ref 22–29)
HCT VFR BLD CALC: 37.7 % — SIGNIFICANT CHANGE UP (ref 34.5–45)
HCT VFR BLDA CALC: 37 % — SIGNIFICANT CHANGE UP (ref 34.5–46.5)
HGB BLD CALC-MCNC: 12.4 G/DL — SIGNIFICANT CHANGE UP (ref 11.5–15.5)
HGB BLD-MCNC: 11.7 G/DL — SIGNIFICANT CHANGE UP (ref 11.5–15.5)
IANC: 4.85 K/UL — SIGNIFICANT CHANGE UP (ref 1.8–7.4)
IMM GRANULOCYTES NFR BLD AUTO: 0.4 % — SIGNIFICANT CHANGE UP (ref 0–0.9)
INR BLD: 1.19 RATIO — HIGH (ref 0.88–1.16)
LACTATE BLDV-MCNC: 1.5 MMOL/L — SIGNIFICANT CHANGE UP (ref 0.5–2)
LYMPHOCYTES # BLD AUTO: 2.23 K/UL — SIGNIFICANT CHANGE UP (ref 1–3.3)
LYMPHOCYTES # BLD AUTO: 27.6 % — SIGNIFICANT CHANGE UP (ref 13–44)
MAGNESIUM SERPL-MCNC: 2.1 MG/DL — SIGNIFICANT CHANGE UP (ref 1.6–2.6)
MCHC RBC-ENTMCNC: 25.3 PG — LOW (ref 27–34)
MCHC RBC-ENTMCNC: 31 GM/DL — LOW (ref 32–36)
MCV RBC AUTO: 81.4 FL — SIGNIFICANT CHANGE UP (ref 80–100)
MONOCYTES # BLD AUTO: 0.53 K/UL — SIGNIFICANT CHANGE UP (ref 0–0.9)
MONOCYTES NFR BLD AUTO: 6.6 % — SIGNIFICANT CHANGE UP (ref 2–14)
NEUTROPHILS # BLD AUTO: 4.85 K/UL — SIGNIFICANT CHANGE UP (ref 1.8–7.4)
NEUTROPHILS NFR BLD AUTO: 59.9 % — SIGNIFICANT CHANGE UP (ref 43–77)
NRBC # BLD: 0 /100 WBCS — SIGNIFICANT CHANGE UP (ref 0–0)
NRBC # FLD: 0 K/UL — SIGNIFICANT CHANGE UP (ref 0–0)
NT-PROBNP SERPL-SCNC: <5 PG/ML — SIGNIFICANT CHANGE UP
PCO2 BLDV: 39 MMHG — SIGNIFICANT CHANGE UP (ref 39–42)
PH BLDV: 7.36 — SIGNIFICANT CHANGE UP (ref 7.32–7.43)
PHOSPHATE SERPL-MCNC: 3.4 MG/DL — SIGNIFICANT CHANGE UP (ref 2.5–4.5)
PLATELET # BLD AUTO: 271 K/UL — SIGNIFICANT CHANGE UP (ref 150–400)
PO2 BLDV: 33 MMHG — SIGNIFICANT CHANGE UP
POTASSIUM BLDV-SCNC: 3.4 MMOL/L — LOW (ref 3.5–5.1)
POTASSIUM SERPL-MCNC: 3.6 MMOL/L — SIGNIFICANT CHANGE UP (ref 3.5–5.3)
POTASSIUM SERPL-SCNC: 3.6 MMOL/L — SIGNIFICANT CHANGE UP (ref 3.5–5.3)
PROT SERPL-MCNC: 7 G/DL — SIGNIFICANT CHANGE UP (ref 6–8.3)
PROTHROM AB SERPL-ACNC: 13.8 SEC — HIGH (ref 10.5–13.4)
RBC # BLD: 4.63 M/UL — SIGNIFICANT CHANGE UP (ref 3.8–5.2)
RBC # FLD: 14.3 % — SIGNIFICANT CHANGE UP (ref 10.3–14.5)
SAO2 % BLDV: 51.4 % — SIGNIFICANT CHANGE UP
SODIUM SERPL-SCNC: 140 MMOL/L — SIGNIFICANT CHANGE UP (ref 135–145)
T4 AB SER-ACNC: 8.74 UG/DL — SIGNIFICANT CHANGE UP (ref 5.1–13)
TROPONIN T, HIGH SENSITIVITY RESULT: 10 NG/L — SIGNIFICANT CHANGE UP
TSH SERPL-MCNC: 1.43 UIU/ML — SIGNIFICANT CHANGE UP (ref 0.27–4.2)
WBC # BLD: 8.08 K/UL — SIGNIFICANT CHANGE UP (ref 3.8–10.5)
WBC # FLD AUTO: 8.08 K/UL — SIGNIFICANT CHANGE UP (ref 3.8–10.5)

## 2023-02-02 PROCEDURE — 71275 CT ANGIOGRAPHY CHEST: CPT | Mod: 26,MA

## 2023-02-02 PROCEDURE — 99285 EMERGENCY DEPT VISIT HI MDM: CPT

## 2023-02-02 NOTE — ED ADULT NURSE NOTE - OBJECTIVE STATEMENT
pt. received to Tr-A A&Ox4 ambulatory p/w SOB and intermittent CP. pt. endorses CP and SOB x2 months, and had recently seen her hem/onc MD where she was told she had an elevated D-Dimer, and advised to come in. pt. endorses dyspnea upon talking, no accessory muscle use noted, denies hemoptysis. NAD noted. respirations even and unlabored on RA. NSR on cardiac monitor.  20g IV placed in the R AC. Labs drawn and sent. comfort measures provided. safety precautions maintained.

## 2023-02-02 NOTE — PHYSICAL EXAM
[No Lymphadenopathy] : no lymphadenopathy [Pedal Pulses Present] : the pedal pulses are present [No Edema] : there was no peripheral edema [Soft] : abdomen soft [Non-distended] : non-distended [Normal Bowel Sounds] : normal bowel sounds [No CVA Tenderness] : no CVA  tenderness [No Spinal Tenderness] : no spinal tenderness [Normal] : affect was normal and insight and judgment were intact [de-identified] : tenderness to palpation in substernal region and L side of chest [de-identified] : tenderness to palpation in LUQ and suprapubic region

## 2023-02-02 NOTE — ED PROVIDER NOTE - ATTENDING CONTRIBUTION TO CARE
Attending Statement: I have personally seen and examined this patient. I have fully participated in the care of this patient. I have reviewed all pertinent clinical information, including history physical exam, plan and the Resident's note and agree except as noted  42year-old female history of lymphoma, GERD, asthma sent in for CT angio rule out PE.  Patient states she was diagnosed with COVID in December 2022 and since then has been experiencing intermittent chest pain and shortness of breath.  Describes intermittent exertional midsternal chest pain associated with shortness of breath.  No fever no chills.  No nausea no vomiting.  No history "machine or DVT in the past.  Saw her primary doctor who had blood work and was called today that her dimer was elevated to come to the emergency department.  Denies any travel denies any calf pain or leg pain.  Non-smoker.  States at this time chest pain is very mild.  Vital signs noted heart rate 89 regular, blood pressure 136/81, pulse ox 99 to 100% room air good waveform.  Nontoxic female sitting up ANO x3.  No work of breathing.  No retractions.  Benign abdomen.  No leg swelling or calf tenderness bilaterally.    Plan EKG, labs, CT angio rule out PE, cardiac monitoring and reassess. .  ekg reviewed: hr 80 sr twi III, v2-3 qtc 442

## 2023-02-02 NOTE — ED PROVIDER NOTE - CARE PROVIDER_API CALL
Scott Adams)  Cardiovascular Disease; Nuclear Cardiology  [id number] [place] [address]  Phone: [phone]  Fax: [phone]  Follow Up Time: Urgent

## 2023-02-02 NOTE — ED PROVIDER NOTE - PROGRESS NOTE DETAILS
Patient hemodynamically stable no respiratory distress.  Pending CT angio report.  First troponin was 10, pending repeat troponin. Patient reassessed, was sleeping prior to my waking her up.  We discussed the results of her negative CT scan, and plan for admitting to the observation unit for a stress test and echocardiogram.  She is agreeable with the plan. After additional discussion with patient she is disagreeable with staying for the stress test and echo, I would like to follow-up for those test outpatient.  We discussed the benefits of staying inpatient for that testing in expedited fashion and she would still like the testing to be done with an outpatient cardiologist.  We will attempt to obtain expedited referral for cardiology testing and follow-up.  She is agreeable with this plan.

## 2023-02-02 NOTE — PLAN
[FreeTextEntry1] : \par \par 42 year old woman, history of allergic rhinitis and mild intermittent asthma, follicular lymphoma s/p maintenance therapy with rituximab, laryngopharyngeal reflux disease, presenting for abdominal pain, chest pain, and persistent cough since COVID infection.\par \par #abnormal mammogram/US\par - pending breast bx given L breast intraductal nodules on sono\par \par #HCM\par - pt to verify last flu and tdap reports got recently for work\par - 2nd dose of Pzifer in 12/22 per pt\par \par \par Case discussed with Dr. Elyse Garcia. RTC in 1 month for follow-up for abdominal pain, chest pain and cough.

## 2023-02-02 NOTE — ED PROVIDER NOTE - PATIENT PORTAL LINK FT
You can access the FollowMyHealth Patient Portal offered by [facility] by registering at the following website: [email or web address]. By joining [employer]’s FollowMyHealth portal, you will also be able to view your health information using other applications (apps) compatible with our system.

## 2023-02-02 NOTE — ED PROVIDER NOTE - CLINICAL SUMMARY MEDICAL DECISION MAKING FREE TEXT BOX
42-year-old female past medical history of lymphoma, possible malignancy in the left breast presents to the ER today complaining of chest pain and shortness of breath.  Differential includes ACS vs PE vs costochondritis vs pericarditis. Plan to obtain labs, imaging, reassess.

## 2023-02-02 NOTE — ED PROVIDER NOTE - OBJECTIVE STATEMENT
42-year-old female past medical history of lymphoma, possible malignancy in the left breast presents to the ER today complaining of chest pain and shortness of breath.  Ongoing since end of December at which time she had COVID.  States that her PCP obtained a D-dimer which was elevated at 354 and patient was sent to the ER for CT to rule out PE.  Patient states that she had pan scans couple weeks ago by her oncologist at which time we had a couple of findings including lymphadenopathy in the abdomen as well as the breast.  Patient states pain is intermittent in nature localized to the center of her chest radiates to her left arm.  Has seen a cardiologist previously in 2021 at which time she had an echocardiogram.  Had an EKG performed by PCP yesterday which was unremarkable per patient.  States pain worsens upon palpation of the chest.  COVID back in December at which time she took Paxlovid x4 days.

## 2023-02-02 NOTE — REVIEW OF SYSTEMS
[Night Sweats] : night sweats [Hoarseness] : hoarseness [Chest Pain] : chest pain [Abdominal Pain] : abdominal pain [Diarrhea] : diarrhea [Poor Libido] : poor libido [Joint Pain] : joint pain [Back Pain] : back pain [Negative] : Psychiatric [Fever] : no fever [Earache] : no earache [Palpitations] : no palpitations [Lower Ext Edema] : no lower extremity edema [Orthopnea] : no orthopnea [Paroxysmal Nocturnal Dyspnea] : no paroxysmal nocturnal dyspnea [Nausea] : no nausea [Constipation] : no constipation [Vomiting] : no vomiting [Melena] : no melena [Dysuria] : no dysuria [Hematuria] : no hematuria [Vaginal Discharge] : no vaginal discharge

## 2023-02-02 NOTE — ED ADULT TRIAGE NOTE - CHIEF COMPLAINT QUOTE
Pt c/o chest pain and SOB since December. Pt sent to ED by PCP for elevated D dimer 354 and CT to rule out PE. Pt states shortness of breath is worse with movement. Hx lymphoma not on treatment.

## 2023-02-02 NOTE — ED PROVIDER NOTE - NSFOLLOWUPINSTRUCTIONS_ED_ALL_ED_FT
You were evaluated in the Emergency Department today for chest pain. Your evaluation has shown no signs of medical conditions requiring emergent intervention at this time, however we recommend that you follow up with your primary care physician or your cardiologist as soon as possible for further testing as an outpatient.    Please schedule an appointment for follow up with your primary care physician as soon as possible.    Return to the Emergency Department if you experience worsening or uncontrolled chest pain, shortness of breath, light-headedness, feeling faint or passing out, nausea, vomiting, or any other concerning symptoms.    Thank you for choosing us for your care. You were evaluated in the Emergency Department today for chest pain. Your evaluation has shown no signs of medical conditions requiring emergent intervention at this time, however we recommend that you follow up with your primary care physician or your cardiologist as soon as possible for further testing as an outpatient.    You were recommended to stay in the observation unit for a stress test and echocardiogram, and preferred to have a referral for this test in the outpatient setting with your cardiologist.  We will have our discharge coordinator attempt to schedule these tests in an urgent manner with you.  Your primary team may also be able to assist in these, so call them for an appointment.    Please schedule an appointment for follow up with your primary care physician as soon as possible.    Return to the Emergency Department if you experience worsening or uncontrolled chest pain, shortness of breath, light-headedness, feeling faint or passing out, nausea, vomiting, or any other concerning symptoms.    Thank you for choosing us for your care.

## 2023-02-02 NOTE — HISTORY OF PRESENT ILLNESS
[FreeTextEntry8] : 42 year old woman, history of allergic rhinitis and mild intermittent asthma, follicular lymphoma s/p maintenance therapy with rituximab, laryngopharyngeal reflux disease, presenting for abdominal pain, chest pain, and persistent cough since COVID infection.\par \par Cough: Patient reports relief of COVID symptoms with Paxlovid, but that the cough has persisted. A non-productive cough that occurs mainly at night and has been occurring nightly for over 1 month (since COVID).Reports congestion on the L side from chest to nose, hoarseness and now resolved earache . Denies rhinorrhea, but endorses post nasal drip. 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 Denies wheezing. Reports she's still taking omeprazole as prescribed and has maintained a bland diet. States she attempted a trial off omeprazole 2 weeks ago but reflex symptoms persisted.\par \par Chest pain: States she had "massive chest pain" with her first COVID infection in 2021. The pain resolved and returned with her infection in 12/22, but was less severe than previously. Sharp substernal intermittent pain that worsens with inspiration and radiates to R side of chest. Occurs at rest and not present with exercise. Denies palpitation, decreased exercise tolerance, and leg swelling. \par \par Abdominal pain: Reports mid-lower abdominal pain for the past few weeks. States radiates to her b/l low back. States pain has improved with cranberry juice and hot packs as well as tylenol when pain becomes more severe. Denies dysuria, hematuria, vaginal discharge, N/V. Also, reports 2 day history of watery yellowish diarrhea. Reports normally 2 BMs/day, but 5 BMs yesterday and 3 BMs today. Denies fever, hx of travel or antibiotic use, melena or hematochezia. \par \par Also, reports cramping and tingling of hands and feet b/l.

## 2023-02-02 NOTE — ED PROVIDER NOTE - PHYSICAL EXAMINATION
General: Patient awake alert NAD.   HEENT: normocephalic, atraumatic, EOMI, MMM   Cardiac: RRR, S1, S2, no murmur. Chest pain elicited upon palpation of the chest.    LUNGS: nwob, ctab, no wheeze, rhonchi, speaking full sentences.     Abdomen: soft NT, ND, no rebound no guarding.   EXT: Moving all extremities, no edema.   Neuro: A&Ox3, no focal neurological deficits   Skin: warm, dry, no rash.

## 2023-02-03 ENCOUNTER — NON-APPOINTMENT (OUTPATIENT)
Age: 43
End: 2023-02-03

## 2023-02-03 LAB
APPEARANCE: CLEAR
BACTERIA UR CULT: NORMAL
BACTERIA: NEGATIVE
BILIRUBIN URINE: NEGATIVE
BLOOD URINE: ABNORMAL
CHOLEST SERPL-MCNC: 251 MG/DL
COLOR: NORMAL
ESTIMATED AVERAGE GLUCOSE: 123 MG/DL
FLUAV AG NPH QL: SIGNIFICANT CHANGE UP
FLUBV AG NPH QL: SIGNIFICANT CHANGE UP
GLUCOSE QUALITATIVE U: NEGATIVE
HBA1C MFR BLD HPLC: 5.9 %
HDLC SERPL-MCNC: 39 MG/DL
HYALINE CASTS: 1 /LPF
KETONES URINE: NEGATIVE
LDLC SERPL CALC-MCNC: 189 MG/DL
LDLC SERPL DIRECT ASSAY-MCNC: 191 MG/DL
LEUKOCYTE ESTERASE URINE: NEGATIVE
M PROTEIN SPEC IFE-MCNC: NORMAL
MICROSCOPIC-UA: NORMAL
NITRITE URINE: NEGATIVE
NONHDLC SERPL-MCNC: 212 MG/DL
PH URINE: 6
PROTEIN URINE: NORMAL
RED BLOOD CELLS URINE: 3 /HPF
RSV RNA NPH QL NAA+NON-PROBE: SIGNIFICANT CHANGE UP
SARS-COV-2 RNA SPEC QL NAA+PROBE: SIGNIFICANT CHANGE UP
SPECIFIC GRAVITY URINE: 1.02
SQUAMOUS EPITHELIAL CELLS: 1 /HPF
TRIGL SERPL-MCNC: 115 MG/DL
TROPONIN T, HIGH SENSITIVITY RESULT: 6 NG/L — SIGNIFICANT CHANGE UP
TSH SERPL-ACNC: 1.01 UIU/ML
UROBILINOGEN URINE: NORMAL
VIT B12 SERPL-MCNC: 787 PG/ML
WHITE BLOOD CELLS URINE: 2 /HPF

## 2023-02-03 RX ORDER — METHYLPREDNISOLONE ACETATE 40 MG/ML
40 INJECTION, SUSPENSION INTRA-ARTICULAR; INTRALESIONAL; INTRAMUSCULAR; SOFT TISSUE
Refills: 0 | Status: DISCONTINUED | COMMUNITY
End: 2023-02-03

## 2023-02-03 NOTE — ED ADULT NURSE REASSESSMENT NOTE - NS ED NURSE REASSESS COMMENT FT1
Pt A&Ox4, respirations equal and unlabored. Pt resting in stretcher comfortably at this time. Repeat troponin drawn and sent. IV patent. VSS. Pending CTA results. Will continue to monitor.
pt. remains A&Ox4, awake and resting. pt. offers no new complaints at this time. no acute distress noted. respirations even and unlabored. VS as noted. pending CTr.

## 2023-02-06 ENCOUNTER — RESULT REVIEW (OUTPATIENT)
Age: 43
End: 2023-02-06

## 2023-02-06 ENCOUNTER — APPOINTMENT (OUTPATIENT)
Dept: OTOLARYNGOLOGY | Facility: CLINIC | Age: 43
End: 2023-02-06
Payer: MEDICAID

## 2023-02-06 VITALS
DIASTOLIC BLOOD PRESSURE: 84 MMHG | OXYGEN SATURATION: 100 % | HEIGHT: 64 IN | WEIGHT: 160 LBS | HEART RATE: 78 BPM | BODY MASS INDEX: 27.31 KG/M2 | SYSTOLIC BLOOD PRESSURE: 121 MMHG

## 2023-02-06 DIAGNOSIS — N63.20 UNSPECIFIED LUMP IN THE LEFT BREAST, UNSPECIFIED QUADRANT: ICD-10-CM

## 2023-02-06 PROCEDURE — 31575 DIAGNOSTIC LARYNGOSCOPY: CPT

## 2023-02-06 PROCEDURE — 99214 OFFICE O/P EST MOD 30 MIN: CPT | Mod: 25

## 2023-02-06 NOTE — PROCEDURE
[Globus] : globus [None] : none [Flexible Endoscope] : examined with the flexible endoscope [Serial Number: ___] : Serial Number: [unfilled] [de-identified] : No lesions in the NPx, OPx, HPx or larynx.  VC are mobile, airway patent.  Patient with significant changes of LPRD on exam with interarytenoid pachyderma and edema.\par \par

## 2023-02-06 NOTE — PHYSICAL EXAM
[Normal] : no rashes [de-identified] : Incision healing well. [Laryngoscopy Performed] : laryngoscopy was performed, see procedure section for findings [FreeTextEntry1] : No concerning lesions in the OC/OPx on inspection or palpation.\par  [de-identified] : She has symptoms of L. shoulder syndrome.

## 2023-02-06 NOTE — HISTORY OF PRESENT ILLNESS
[de-identified] : 42 year old female has Hx of stage IV lymphoma 2019 was treated with chemotherapy, last chemotherapy was December 2020.  excisional biopsy on 6/30/2022 path c/w  Follicular and paracortical hyperplasia.  Pt is being f.u by Dr Vega. Last ct of neck, chest and abd on 1/12/2023. PT was in ED r.o PE on 2/2/2023 and ct of angiogram of chest, neg for PE.   Patient has Hx of voice hoarseness, worsen on and off, on PPI for reflux.  Reports nerve pain in still presents in the left neck and behind left ear. pt is working with physical therapy for the left arm and  the left shoulder drop and stated much better.  Denies dysphagia, odynophagia, dyspnea, dysphonia, otalgia, recent fevers/infections, chills, night sweats, weight loss.

## 2023-02-08 ENCOUNTER — APPOINTMENT (OUTPATIENT)
Dept: ULTRASOUND IMAGING | Facility: IMAGING CENTER | Age: 43
End: 2023-02-08
Payer: MEDICAID

## 2023-02-08 ENCOUNTER — OUTPATIENT (OUTPATIENT)
Dept: OUTPATIENT SERVICES | Facility: HOSPITAL | Age: 43
LOS: 1 days | End: 2023-02-08
Payer: MEDICAID

## 2023-02-08 ENCOUNTER — RESULT REVIEW (OUTPATIENT)
Age: 43
End: 2023-02-08

## 2023-02-08 DIAGNOSIS — R05.9 COUGH, UNSPECIFIED: ICD-10-CM

## 2023-02-08 DIAGNOSIS — N20.0 CALCULUS OF KIDNEY: Chronic | ICD-10-CM

## 2023-02-08 DIAGNOSIS — N63.20 UNSPECIFIED LUMP IN THE LEFT BREAST, UNSPECIFIED QUADRANT: ICD-10-CM

## 2023-02-08 DIAGNOSIS — E78.5 HYPERLIPIDEMIA, UNSPECIFIED: ICD-10-CM

## 2023-02-08 DIAGNOSIS — Z95.828 PRESENCE OF OTHER VASCULAR IMPLANTS AND GRAFTS: Chronic | ICD-10-CM

## 2023-02-08 DIAGNOSIS — Z90.79 ACQUIRED ABSENCE OF OTHER GENITAL ORGAN(S): Chronic | ICD-10-CM

## 2023-02-08 DIAGNOSIS — R07.9 CHEST PAIN, UNSPECIFIED: ICD-10-CM

## 2023-02-08 DIAGNOSIS — R20.2 PARESTHESIA OF SKIN: ICD-10-CM

## 2023-02-08 DIAGNOSIS — R10.9 UNSPECIFIED ABDOMINAL PAIN: ICD-10-CM

## 2023-02-08 PROCEDURE — 77065 DX MAMMO INCL CAD UNI: CPT | Mod: 26,LT

## 2023-02-08 PROCEDURE — 77065 DX MAMMO INCL CAD UNI: CPT

## 2023-02-08 PROCEDURE — 88305 TISSUE EXAM BY PATHOLOGIST: CPT | Mod: 26

## 2023-02-08 PROCEDURE — 88305 TISSUE EXAM BY PATHOLOGIST: CPT

## 2023-02-08 PROCEDURE — 19083 BX BREAST 1ST LESION US IMAG: CPT | Mod: LT

## 2023-02-08 PROCEDURE — A4648: CPT

## 2023-02-08 PROCEDURE — 19083 BX BREAST 1ST LESION US IMAG: CPT

## 2023-02-09 ENCOUNTER — OUTPATIENT (OUTPATIENT)
Dept: OUTPATIENT SERVICES | Facility: HOSPITAL | Age: 43
LOS: 1 days | End: 2023-02-09
Payer: MEDICAID

## 2023-02-09 ENCOUNTER — APPOINTMENT (OUTPATIENT)
Dept: NEUROLOGY | Facility: HOSPITAL | Age: 43
End: 2023-02-09

## 2023-02-09 VITALS
TEMPERATURE: 98.4 F | DIASTOLIC BLOOD PRESSURE: 77 MMHG | RESPIRATION RATE: 14 BRPM | SYSTOLIC BLOOD PRESSURE: 112 MMHG | HEART RATE: 92 BPM | HEIGHT: 64 IN | BODY MASS INDEX: 27.31 KG/M2 | WEIGHT: 160 LBS

## 2023-02-09 DIAGNOSIS — N20.0 CALCULUS OF KIDNEY: Chronic | ICD-10-CM

## 2023-02-09 DIAGNOSIS — Z95.828 PRESENCE OF OTHER VASCULAR IMPLANTS AND GRAFTS: Chronic | ICD-10-CM

## 2023-02-09 DIAGNOSIS — R51.9 HEADACHE, UNSPECIFIED: ICD-10-CM

## 2023-02-09 DIAGNOSIS — Z90.79 ACQUIRED ABSENCE OF OTHER GENITAL ORGAN(S): Chronic | ICD-10-CM

## 2023-02-09 PROCEDURE — G0463: CPT

## 2023-02-09 RX ORDER — MEDROXYPROGESTERONE ACETATE 150 MG/ML
150 INJECTION, SUSPENSION INTRAMUSCULAR
Qty: 1 | Refills: 0 | Status: DISCONTINUED | COMMUNITY
Start: 2023-02-03 | End: 2023-02-09

## 2023-02-09 NOTE — DISCUSSION/SUMMARY
[FreeTextEntry1] : 42 year old female, PMH follicular lymphoma (diagnosed 2019) s/p chemotherapy Bendamustine+Rituxan (last chemo 12/2020), allergic rhinitis, persistent cough since Covid infection presenting as referral from PCP for b/l UE and b/l LE numbness/tingling/pain, as well as intermittent whole-body tremors. The sx's in the LE's and UE's have started since her chemo regimen initiation and has been persistent since the chemo ceased. Does not follow a dermatomal distribution. Starts at feet and sometimes radiates to the thighs. In the UE's, starts at the hands and sometimes radiates upwards to the shoulders. Usually occurs when she is standing for 30 minutes or more. Still able to perform her ADL's and was able to drive here. The whole body tremors started since chemo initiation and have been persistent since ending the chemo. Per patient, she does not notice these tremors and a witness (usually family) would tell her. Sometimes occurs 1-2/day and lasting ~ 2min. No neuro deficits on exam. \par \par Assessment: Suspect iatrogenic peripheral neuropathy (likely small-fiber), given past chemo regimen. Appears to be improving. \par \par PLAN:\par -Start Gabapentin 100mg (1st week qHS, 2nd week morning and night, 3 week TID)\par -Provided counseling on peripheral neuropathy diagnosis and Gabapentin usage\par -Will consider NCS/EMG at next visit\par -RTC 2 months\par \par Case discussed w/ neuro attending Dr. Amaro

## 2023-02-09 NOTE — HISTORY OF PRESENT ILLNESS
[FreeTextEntry1] : 42 year old female, PMH follicular lymphoma (diagnosed 2019) s/p chemotherapy Bendamustine+Rituxan (last chemo 12/2020), allergic rhinitis, persistent cough since Covid infection presenting as referral from PCP for b/l UE and b/l LE numbness/tingling/pain, as well as intermittent whole-body tremors. The sx's in the LE's and UE's have started since her chemo regimen initiation and has been persistent since the chemo ceased. Does not follow a dermatomal distribution. Starts at feet and sometimes radiates to the thighs. In the UE's, starts at the hands and sometimes radiates upwards to the shoulders. Last occurred over the weekend when she was standing for prolonged period of time cooking. States usually occurs when she is standing for 30 minutes or more. Still able to perform her ADL's and was able to drive here. The whole body tremors started since chemo initiation and have been persistent since ending the chemo. Per patient, she does not notice these tremors and a witness (usually family) would tell her. Sometimes occurs 1-2/day and lasting ~ 2min. Denies any weakness. Was in the ED last week for elevated d-dimer. She was told to go to ED where CT PE study was neg. As per her lymphoma management, still present however monitoring for now.

## 2023-02-09 NOTE — PHYSICAL EXAM
[General Appearance - Alert] : alert [General Appearance - Well Nourished] : well nourished [General Appearance - Well-Appearing] : healthy appearing [Oriented To Time, Place, And Person] : oriented to person, place, and time [Person] : oriented to person [Place] : oriented to place [Time] : oriented to time [Cranial Nerves Optic (II)] : visual acuity intact bilaterally,  visual fields full to confrontation, pupils equal round and reactive to light [Cranial Nerves Oculomotor (III)] : extraocular motion intact [Cranial Nerves Trigeminal (V)] : facial sensation intact symmetrically [Cranial Nerves Facial (VII)] : face symmetrical [Cranial Nerves Accessory (XI - Cranial And Spinal)] : head turning and shoulder shrug symmetric [Cranial Nerves Hypoglossal (XII)] : there was no tongue deviation with protrusion [Motor Tone] : muscle tone was normal in all four extremities [Motor Strength] : muscle strength was normal in all four extremities [Involuntary Movements] : no involuntary movements were seen [Motor Handedness Right-Handed] : the patient is right hand dominant [Sensation Tactile Decrease] : light touch was intact [Sensation Pain / Temperature Decrease] : pain and temperature was intact [Proprioception] : proprioception was intact [Balance] : balance was intact [2+] : Patella left 2+ [PERRL With Normal Accommodation] : pupils were equal in size, round, reactive to light, with normal accommodation [Extraocular Movements] : extraocular movements were intact [No APD] : no afferent pupillary defect [Abnormal Walk] : normal gait [Paresis Pronator Drift Right-Sided] : no pronator drift on the right [Paresis Pronator Drift Left-Sided] : no pronator drift on the left [Motor Strength Upper Extremities Bilaterally] : strength was normal in both upper extremities [Motor Strength Lower Extremities Bilaterally] : strength was normal in both lower extremities [Tremor] : no tremor present [Coordination - Dysmetria Impaired Finger-to-Nose Bilateral] : not present [Coordination - Dysmetria Impaired Heel-to-Shin Bilateral] : not present

## 2023-02-10 ENCOUNTER — OUTPATIENT (OUTPATIENT)
Dept: OUTPATIENT SERVICES | Facility: HOSPITAL | Age: 43
LOS: 1 days | End: 2023-02-10

## 2023-02-10 ENCOUNTER — APPOINTMENT (OUTPATIENT)
Dept: INTERNAL MEDICINE | Facility: CLINIC | Age: 43
End: 2023-02-10
Payer: MEDICAID

## 2023-02-10 VITALS
HEART RATE: 90 BPM | SYSTOLIC BLOOD PRESSURE: 130 MMHG | HEIGHT: 64 IN | OXYGEN SATURATION: 99 % | WEIGHT: 165 LBS | DIASTOLIC BLOOD PRESSURE: 76 MMHG | BODY MASS INDEX: 28.17 KG/M2

## 2023-02-10 DIAGNOSIS — N20.0 CALCULUS OF KIDNEY: Chronic | ICD-10-CM

## 2023-02-10 DIAGNOSIS — G62.0 DRUG-INDUCED POLYNEUROPATHY: ICD-10-CM

## 2023-02-10 DIAGNOSIS — Z95.828 PRESENCE OF OTHER VASCULAR IMPLANTS AND GRAFTS: Chronic | ICD-10-CM

## 2023-02-10 DIAGNOSIS — Z90.79 ACQUIRED ABSENCE OF OTHER GENITAL ORGAN(S): Chronic | ICD-10-CM

## 2023-02-10 DIAGNOSIS — R07.9 CHEST PAIN, UNSPECIFIED: ICD-10-CM

## 2023-02-10 DIAGNOSIS — G62.9 POLYNEUROPATHY, UNSPECIFIED: ICD-10-CM

## 2023-02-10 DIAGNOSIS — E78.5 HYPERLIPIDEMIA, UNSPECIFIED: ICD-10-CM

## 2023-02-10 LAB
ALBUMIN MFR SERPL ELPH: 65 %
ALBUMIN SERPL-MCNC: 4.4 G/DL
ALBUMIN/GLOB SERPL: 1.9 RATIO
ALBUPE: 38.2 %
ALPHA1 GLOB MFR SERPL ELPH: 5 %
ALPHA1 GLOB SERPL ELPH-MCNC: 0.3 G/DL
ALPHA1UPE: 27.6 %
ALPHA2 GLOB MFR SERPL ELPH: 11 %
ALPHA2 GLOB SERPL ELPH-MCNC: 0.7 G/DL
ALPHA2UPE: 16.3 %
B-GLOBULIN MFR SERPL ELPH: 12.5 %
B-GLOBULIN SERPL ELPH-MCNC: 0.8 G/DL
BETAUPE: 12.2 %
GAMMA GLOB FLD ELPH-MCNC: 0.4 G/DL
GAMMA GLOB MFR SERPL ELPH: 6.5 %
GAMMAUPE: 5.7 %
IGA 24H UR QL IFE: NORMAL
INTERPRETATION SERPL IEP-IMP: NORMAL
KAPPA LC 24H UR QL: NORMAL
PROT PATTERN 24H UR ELPH-IMP: NORMAL
PROT SERPL-MCNC: 6.7 G/DL
PROT SERPL-MCNC: 6.7 G/DL
PROT UR-MCNC: 10 MG/DL
PROT UR-MCNC: 10 MG/DL

## 2023-02-10 PROCEDURE — 99213 OFFICE O/P EST LOW 20 MIN: CPT | Mod: GE

## 2023-02-10 NOTE — PLAN
[FreeTextEntry1] : \par 42 year old woman, history of allergic rhinitis and mild intermittent asthma, follicular lymphoma s/p maintenance therapy with rituximab, laryngopharyngeal reflux disease, HLD, pre-DM (A1c of 5.9%) previous COVID infections x2 last Dec 2022 presenting for hospital follow up.\par \par Case discussed with Dr. GEORGETTE Stewart. RTC as scheduled 3/08/23.\par

## 2023-02-10 NOTE — PHYSICAL EXAM
[Normal Oropharynx] : the oropharynx was normal [Supple] : supple [Normal] : normal rate, regular rhythm, normal S1 and S2 and no murmur heard [Pedal Pulses Present] : the pedal pulses are present [No Edema] : there was no peripheral edema [Soft] : abdomen soft [Non Tender] : non-tender [Normal Bowel Sounds] : normal bowel sounds

## 2023-02-10 NOTE — REVIEW OF SYSTEMS
[Hoarseness] : hoarseness [Chest Pain] : chest pain [Negative] : Eyes [Palpitations] : no palpitations [Lower Ext Edema] : no lower extremity edema [Orthopnea] : no orthopnea [Paroxysmal Nocturnal Dyspnea] : no paroxysmal nocturnal dyspnea [Shortness Of Breath] : no shortness of breath [Wheezing] : no wheezing [Dyspnea on Exertion] : no dyspnea on exertion [Dysuria] : no dysuria [Hematuria] : no hematuria [Vaginal Discharge] : no vaginal discharge [FreeTextEntry7] : abdominal discomfort

## 2023-02-10 NOTE — HISTORY OF PRESENT ILLNESS
[FreeTextEntry1] : hospital f/u [de-identified] : 42 year old woman, history of allergic rhinitis and mild intermittent asthma, follicular lymphoma s/p maintenance therapy with rituximab, laryngopharyngeal reflux disease, HLD, pre-DM (A1c of 5.9%) previous COVID infections x2 last Dec 2022 presenting for hospital follow up.\par \par Patient was sent to the ED on 2/2/23 of CTA when found to have an elevated d-dimer of 374 in the setting of c/o chest pain on last visit. CTA was negative, troponins were 10 and 6, and a EKG with no ST changes. Patient was to be evaluated by cardiology, but wanted to go home so an outpatient stress test and Echo was recommended. Today, patient states that she still has the intermittent sharp substernal chest pain last this AM. States the pain is 5/10 and does not radiate to her L arm. Denies relief with omeprazole and states it's different than her acid reflux. States it more noticeable when she inspires but improves with controlled breathing. Denies palpitations, SOB at rest or during exercise, decreased exercise tolerance or leg swelling. Patient states her insurance company told her was denied prior authorization for the previously placed cardiology referrals.\par

## 2023-02-13 LAB — SURGICAL PATHOLOGY STUDY: SIGNIFICANT CHANGE UP

## 2023-02-23 ENCOUNTER — APPOINTMENT (OUTPATIENT)
Dept: CV DIAGNOSITCS | Facility: HOSPITAL | Age: 43
End: 2023-02-23

## 2023-02-23 ENCOUNTER — OUTPATIENT (OUTPATIENT)
Dept: OUTPATIENT SERVICES | Facility: HOSPITAL | Age: 43
LOS: 1 days | End: 2023-02-23
Payer: MEDICAID

## 2023-02-23 DIAGNOSIS — Z95.828 PRESENCE OF OTHER VASCULAR IMPLANTS AND GRAFTS: Chronic | ICD-10-CM

## 2023-02-23 DIAGNOSIS — N20.0 CALCULUS OF KIDNEY: Chronic | ICD-10-CM

## 2023-02-23 DIAGNOSIS — R07.9 CHEST PAIN, UNSPECIFIED: ICD-10-CM

## 2023-02-23 DIAGNOSIS — Z90.79 ACQUIRED ABSENCE OF OTHER GENITAL ORGAN(S): Chronic | ICD-10-CM

## 2023-02-23 PROCEDURE — 93320 DOPPLER ECHO COMPLETE: CPT | Mod: 26,GC

## 2023-02-23 PROCEDURE — 93325 DOPPLER ECHO COLOR FLOW MAPG: CPT | Mod: 26,GC

## 2023-02-23 PROCEDURE — 93350 STRESS TTE ONLY: CPT | Mod: 26

## 2023-03-08 ENCOUNTER — APPOINTMENT (OUTPATIENT)
Dept: INTERNAL MEDICINE | Facility: CLINIC | Age: 43
End: 2023-03-08
Payer: MEDICAID

## 2023-03-08 ENCOUNTER — OUTPATIENT (OUTPATIENT)
Dept: OUTPATIENT SERVICES | Facility: HOSPITAL | Age: 43
LOS: 1 days | End: 2023-03-08

## 2023-03-08 VITALS
OXYGEN SATURATION: 99 % | BODY MASS INDEX: 27.14 KG/M2 | SYSTOLIC BLOOD PRESSURE: 110 MMHG | HEIGHT: 64 IN | WEIGHT: 159 LBS | DIASTOLIC BLOOD PRESSURE: 70 MMHG | HEART RATE: 77 BPM

## 2023-03-08 DIAGNOSIS — N20.0 CALCULUS OF KIDNEY: Chronic | ICD-10-CM

## 2023-03-08 DIAGNOSIS — J06.9 ACUTE UPPER RESPIRATORY INFECTION, UNSPECIFIED: ICD-10-CM

## 2023-03-08 DIAGNOSIS — R07.9 CHEST PAIN, UNSPECIFIED: ICD-10-CM

## 2023-03-08 DIAGNOSIS — C85.10 UNSPECIFIED B-CELL LYMPHOMA, UNSPECIFIED SITE: ICD-10-CM

## 2023-03-08 DIAGNOSIS — J45.20 MILD INTERMITTENT ASTHMA, UNCOMPLICATED: ICD-10-CM

## 2023-03-08 DIAGNOSIS — R20.2 PARESTHESIA OF SKIN: ICD-10-CM

## 2023-03-08 DIAGNOSIS — Z23 ENCOUNTER FOR IMMUNIZATION: ICD-10-CM

## 2023-03-08 DIAGNOSIS — Z95.828 PRESENCE OF OTHER VASCULAR IMPLANTS AND GRAFTS: Chronic | ICD-10-CM

## 2023-03-08 DIAGNOSIS — E78.5 HYPERLIPIDEMIA, UNSPECIFIED: ICD-10-CM

## 2023-03-08 PROCEDURE — 99214 OFFICE O/P EST MOD 30 MIN: CPT | Mod: GC

## 2023-03-08 RX ORDER — GABAPENTIN 100 MG/1
100 CAPSULE ORAL
Qty: 90 | Refills: 0 | Status: DISCONTINUED | COMMUNITY
Start: 2023-02-09 | End: 2023-03-08

## 2023-03-08 NOTE — HISTORY OF PRESENT ILLNESS
[FreeTextEntry1] : f/u [de-identified] : Pt is a 42 year old woman, history of allergic rhinitis and mild intermittent asthma, follicular lymphoma s/p maintenance therapy with rituximab, laryngopharyngeal reflux disease, HLD, pre-DM (A1c of 5.9%) previous COVID infections x2 last Dec 2022 presenting for follow up after recent respiratory infection. \par \par Patient was sent to the ED on 2/2/23 to rule out PE when found to have an elevated d-dimer of 374 in the setting of chest pain in office.CTA was negative, troponins were 10 and 6, and a EKG with no ST changes. Patient was to be evaluated by cardiology, but wanted to go home so an outpatient stress test and Echo was recommended. She saw cardiology and had negative stress echo. Today, patient states that she still has the intermittent sternal chest pain only when pressing on the chest. No palpitations, tightness, or pain with exertion.\par \dian Called into clinic c/o 3 weeks congestion and cough so was prescribed levofloxicin over phone. Only took 3 doses with a day in between because she says it worsened her headache/tremors that she sometimes gets ever since cancer treatment. She used her inhaler more while she was feeling sick but not every day. Her breathing/cough never woke her from sleep. Feels much better now, mild cough and minimal brown sputum production. No fever. \par \dian Had recent breast mass biopsy revealing duct ectasia with rupture (benign). \par

## 2023-03-08 NOTE — PHYSICAL EXAM
[Normal Oropharynx] : the oropharynx was normal [Supple] : supple [Soft] : abdomen soft [Non Tender] : non-tender [Normal Bowel Sounds] : normal bowel sounds [Well Nourished] : well nourished [Well-Appearing] : well-appearing [No Respiratory Distress] : no respiratory distress  [No Accessory Muscle Use] : no accessory muscle use [Clear to Auscultation] : lungs were clear to auscultation bilaterally [Normal] : no carotid or abdominal bruits heard, no varicosities, pedal pulses are present, no peripheral edema, no extremity clubbing or cyanosis and no palpable aorta [de-identified] : anxious

## 2023-03-08 NOTE — REVIEW OF SYSTEMS
[Chest Pain] : chest pain [Chills] : chills [Headache] : headache [Anxiety] : anxiety [Negative] : ENT [Fever] : no fever [Night Sweats] : no night sweats [Recent Change In Weight] : ~T no recent weight change [Palpitations] : no palpitations [Lower Ext Edema] : no lower extremity edema [Orthopnea] : no orthopnea [Paroxysmal Nocturnal Dyspnea] : no paroxysmal nocturnal dyspnea [Shortness Of Breath] : no shortness of breath [Wheezing] : no wheezing [Dyspnea on Exertion] : no dyspnea on exertion [Dysuria] : no dysuria [Hematuria] : no hematuria [Vaginal Discharge] : no vaginal discharge [FreeTextEntry7] : abdominal discomfort

## 2023-03-08 NOTE — ASSESSMENT
[FreeTextEntry1] : 41 yo woman with recent URI, follicular lymphoma, asthma, GERD, pre-DM here for f/u\par - dc antibiotics for URI\par - lifestyle mods\par - flu shot today\par - recent negative cards w/u\par - follows with heme/onc\par \par Pt reports having Tdap in 2018 for employment but we do not have record \par \par CDW Dr. Mccauley\par RTC in 3 months\par \par Deon Guerrero PGY 1 [employer] 1

## 2023-03-09 ENCOUNTER — APPOINTMENT (OUTPATIENT)
Dept: OBGYN | Facility: HOSPITAL | Age: 43
End: 2023-03-09

## 2023-03-09 ENCOUNTER — OUTPATIENT (OUTPATIENT)
Dept: OUTPATIENT SERVICES | Facility: HOSPITAL | Age: 43
LOS: 1 days | End: 2023-03-09

## 2023-03-09 VITALS
WEIGHT: 163 LBS | HEIGHT: 64 IN | BODY MASS INDEX: 27.83 KG/M2 | HEART RATE: 71 BPM | TEMPERATURE: 97.3 F | SYSTOLIC BLOOD PRESSURE: 121 MMHG | DIASTOLIC BLOOD PRESSURE: 76 MMHG

## 2023-03-09 DIAGNOSIS — N20.0 CALCULUS OF KIDNEY: Chronic | ICD-10-CM

## 2023-03-09 RX ORDER — MEDROXYPROGESTERONE ACETATE 150 MG/ML
150 INJECTION, SUSPENSION INTRAMUSCULAR
Qty: 0 | Refills: 0 | Status: COMPLETED | OUTPATIENT
Start: 2023-03-09

## 2023-03-09 RX ADMIN — MEDROXYPROGESTERONE ACETATE 0 MG/ML: 150 INJECTION, SUSPENSION, EXTENDED RELEASE INTRAMUSCULAR at 00:00

## 2023-03-09 NOTE — HISTORY OF PRESENT ILLNESS
[FreeTextEntry1] : 43 yo  here for Depo Provera. On time for injection. Happy with method and wants to continue use. Uses Depo for history of irregular bleeding and ovarian cysts, Hx of bilateral salpingectomy. Hx B-cell lymphoma and followed closely by oncology. 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

## 2023-03-09 NOTE — DISCUSSION/SUMMARY
[FreeTextEntry1] : Depo Provera administered today\par Diet and calcium supplements reviewed\par STD counseling, safe sex and condoms\par Reviewed results of breast biopsy--benign\par Follow up for next Depo May 25-June 8, 2023

## 2023-03-13 DIAGNOSIS — Z30.42 ENCOUNTER FOR SURVEILLANCE OF INJECTABLE CONTRACEPTIVE: ICD-10-CM

## 2023-03-16 ENCOUNTER — OUTPATIENT (OUTPATIENT)
Dept: OUTPATIENT SERVICES | Facility: HOSPITAL | Age: 43
LOS: 1 days | Discharge: HOME | End: 2023-03-16

## 2023-03-16 DIAGNOSIS — N20.0 CALCULUS OF KIDNEY: Chronic | ICD-10-CM

## 2023-03-16 DIAGNOSIS — Z90.79 ACQUIRED ABSENCE OF OTHER GENITAL ORGAN(S): Chronic | ICD-10-CM

## 2023-03-16 DIAGNOSIS — Z95.828 PRESENCE OF OTHER VASCULAR IMPLANTS AND GRAFTS: Chronic | ICD-10-CM

## 2023-03-16 DIAGNOSIS — C85.10 UNSPECIFIED B-CELL LYMPHOMA, UNSPECIFIED SITE: ICD-10-CM

## 2023-03-21 ENCOUNTER — APPOINTMENT (OUTPATIENT)
Dept: INFUSION THERAPY | Facility: HOSPITAL | Age: 43
End: 2023-03-21

## 2023-03-28 NOTE — ED ADULT NURSE NOTE - FINAL NURSING ELECTRONIC SIGNATURE
Assistance with ambulation/Assistance OOB with selected safe patient handling equipment/Communicate Risk of Fall with Harm to all staff/Reinforce activity limits and safety measures with patient and family/Tailored Fall Risk Interventions/Visual Cue: Yellow wristband and red socks/Bed in lowest position, wheels locked, appropriate side rails in place/Call bell, personal items and telephone in reach/Instruct patient to call for assistance before getting out of bed or chair/Non-slip footwear when patient is out of bed/[employer] to call system/Physically safe environment - no spills, clutter or unnecessary equipment/Purposeful Proactive Rounding/Room/bathroom lighting operational, light cord in reach
12-Jul-2020 18:56
Split-Thickness Skin Graft Text: The defect edges were debeveled with a #15 scalpel blade.  Given the location of the defect, shape of the defect and the proximity to free margins a split thickness skin graft was deemed most appropriate.  Using a sterile surgical marker, the primary defect shape was transferred to the donor site. The split thickness graft was then harvested.  The skin graft was then placed in the primary defect and oriented appropriately.

## 2023-04-27 ENCOUNTER — APPOINTMENT (OUTPATIENT)
Dept: NEUROLOGY | Facility: HOSPITAL | Age: 43
End: 2023-04-27

## 2023-05-03 ENCOUNTER — APPOINTMENT (OUTPATIENT)
Dept: OPHTHALMOLOGY | Facility: CLINIC | Age: 43
End: 2023-05-03
Payer: MEDICAID

## 2023-05-03 ENCOUNTER — NON-APPOINTMENT (OUTPATIENT)
Age: 43
End: 2023-05-03

## 2023-05-03 PROCEDURE — 92250 FUNDUS PHOTOGRAPHY W/I&R: CPT

## 2023-05-03 PROCEDURE — 92014 COMPRE OPH EXAM EST PT 1/>: CPT

## 2023-05-03 PROCEDURE — 92083 EXTENDED VISUAL FIELD XM: CPT

## 2023-05-10 ENCOUNTER — OUTPATIENT (OUTPATIENT)
Dept: OUTPATIENT SERVICES | Facility: HOSPITAL | Age: 43
LOS: 1 days | Discharge: HOME | End: 2023-05-10

## 2023-05-10 DIAGNOSIS — Z95.828 PRESENCE OF OTHER VASCULAR IMPLANTS AND GRAFTS: Chronic | ICD-10-CM

## 2023-05-10 DIAGNOSIS — Z90.79 ACQUIRED ABSENCE OF OTHER GENITAL ORGAN(S): Chronic | ICD-10-CM

## 2023-05-10 DIAGNOSIS — N20.0 CALCULUS OF KIDNEY: Chronic | ICD-10-CM

## 2023-05-10 DIAGNOSIS — C85.10 UNSPECIFIED B-CELL LYMPHOMA, UNSPECIFIED SITE: ICD-10-CM

## 2023-05-22 ENCOUNTER — RESULT REVIEW (OUTPATIENT)
Age: 43
End: 2023-05-22

## 2023-05-22 ENCOUNTER — APPOINTMENT (OUTPATIENT)
Dept: INFUSION THERAPY | Facility: HOSPITAL | Age: 43
End: 2023-05-22

## 2023-05-22 ENCOUNTER — APPOINTMENT (OUTPATIENT)
Dept: HEMATOLOGY ONCOLOGY | Facility: CLINIC | Age: 43
End: 2023-05-22
Payer: MEDICAID

## 2023-05-22 VITALS
DIASTOLIC BLOOD PRESSURE: 85 MMHG | BODY MASS INDEX: 28.84 KG/M2 | TEMPERATURE: 97.1 F | OXYGEN SATURATION: 99 % | WEIGHT: 167.99 LBS | HEART RATE: 84 BPM | SYSTOLIC BLOOD PRESSURE: 135 MMHG | RESPIRATION RATE: 16 BRPM

## 2023-05-22 LAB
ALBUMIN SERPL ELPH-MCNC: 4.4 G/DL — SIGNIFICANT CHANGE UP (ref 3.3–5)
ALP SERPL-CCNC: 143 U/L — HIGH (ref 40–120)
ALT FLD-CCNC: 104 U/L — HIGH (ref 10–45)
ANION GAP SERPL CALC-SCNC: 14 MMOL/L — SIGNIFICANT CHANGE UP (ref 5–17)
AST SERPL-CCNC: 41 U/L — HIGH (ref 10–40)
BASOPHILS # BLD AUTO: 0.05 K/UL — SIGNIFICANT CHANGE UP (ref 0–0.2)
BASOPHILS NFR BLD AUTO: 0.8 % — SIGNIFICANT CHANGE UP (ref 0–2)
BILIRUB SERPL-MCNC: 0.2 MG/DL — SIGNIFICANT CHANGE UP (ref 0.2–1.2)
BUN SERPL-MCNC: 11 MG/DL — SIGNIFICANT CHANGE UP (ref 7–23)
CALCIUM SERPL-MCNC: 9.1 MG/DL — SIGNIFICANT CHANGE UP (ref 8.4–10.5)
CHLORIDE SERPL-SCNC: 108 MMOL/L — SIGNIFICANT CHANGE UP (ref 96–108)
CO2 SERPL-SCNC: 19 MMOL/L — LOW (ref 22–31)
CREAT SERPL-MCNC: 0.71 MG/DL — SIGNIFICANT CHANGE UP (ref 0.5–1.3)
EGFR: 109 ML/MIN/1.73M2 — SIGNIFICANT CHANGE UP
EOSINOPHIL # BLD AUTO: 0.38 K/UL — SIGNIFICANT CHANGE UP (ref 0–0.5)
EOSINOPHIL NFR BLD AUTO: 6.1 % — HIGH (ref 0–6)
GLUCOSE SERPL-MCNC: 105 MG/DL — HIGH (ref 70–99)
HCT VFR BLD CALC: 39.3 % — SIGNIFICANT CHANGE UP (ref 34.5–45)
HGB BLD-MCNC: 12.2 G/DL — SIGNIFICANT CHANGE UP (ref 11.5–15.5)
IMM GRANULOCYTES NFR BLD AUTO: 0.6 % — SIGNIFICANT CHANGE UP (ref 0–0.9)
INR BLD: 1.15 RATIO — SIGNIFICANT CHANGE UP (ref 0.88–1.16)
LDH SERPL L TO P-CCNC: 229 U/L — SIGNIFICANT CHANGE UP (ref 50–242)
LYMPHOCYTES # BLD AUTO: 2.12 K/UL — SIGNIFICANT CHANGE UP (ref 1–3.3)
LYMPHOCYTES # BLD AUTO: 34 % — SIGNIFICANT CHANGE UP (ref 13–44)
MCHC RBC-ENTMCNC: 24.7 PG — LOW (ref 27–34)
MCHC RBC-ENTMCNC: 31 G/DL — LOW (ref 32–36)
MCV RBC AUTO: 79.7 FL — LOW (ref 80–100)
MONOCYTES # BLD AUTO: 0.41 K/UL — SIGNIFICANT CHANGE UP (ref 0–0.9)
MONOCYTES NFR BLD AUTO: 6.6 % — SIGNIFICANT CHANGE UP (ref 2–14)
NEUTROPHILS # BLD AUTO: 3.24 K/UL — SIGNIFICANT CHANGE UP (ref 1.8–7.4)
NEUTROPHILS NFR BLD AUTO: 51.9 % — SIGNIFICANT CHANGE UP (ref 43–77)
NRBC # BLD: 0 /100 WBCS — SIGNIFICANT CHANGE UP (ref 0–0)
PLATELET # BLD AUTO: 233 K/UL — SIGNIFICANT CHANGE UP (ref 150–400)
POTASSIUM SERPL-MCNC: 4.3 MMOL/L — SIGNIFICANT CHANGE UP (ref 3.5–5.3)
POTASSIUM SERPL-SCNC: 4.3 MMOL/L — SIGNIFICANT CHANGE UP (ref 3.5–5.3)
PROT SERPL-MCNC: 6.3 G/DL — SIGNIFICANT CHANGE UP (ref 6–8.3)
PROTHROM AB SERPL-ACNC: 13.5 SEC — HIGH (ref 10.5–13.4)
RBC # BLD: 4.93 M/UL — SIGNIFICANT CHANGE UP (ref 3.8–5.2)
RBC # FLD: 14.5 % — SIGNIFICANT CHANGE UP (ref 10.3–14.5)
SODIUM SERPL-SCNC: 141 MMOL/L — SIGNIFICANT CHANGE UP (ref 135–145)
WBC # BLD: 6.24 K/UL — SIGNIFICANT CHANGE UP (ref 3.8–10.5)
WBC # FLD AUTO: 6.24 K/UL — SIGNIFICANT CHANGE UP (ref 3.8–10.5)

## 2023-05-22 PROCEDURE — 99214 OFFICE O/P EST MOD 30 MIN: CPT

## 2023-05-25 ENCOUNTER — APPOINTMENT (OUTPATIENT)
Dept: OBGYN | Facility: HOSPITAL | Age: 43
End: 2023-05-25

## 2023-05-25 ENCOUNTER — MED ADMIN CHARGE (OUTPATIENT)
Age: 43
End: 2023-05-25

## 2023-05-25 ENCOUNTER — OUTPATIENT (OUTPATIENT)
Dept: OUTPATIENT SERVICES | Facility: HOSPITAL | Age: 43
LOS: 1 days | End: 2023-05-25

## 2023-05-25 VITALS
WEIGHT: 169 LBS | SYSTOLIC BLOOD PRESSURE: 124 MMHG | BODY MASS INDEX: 28.85 KG/M2 | TEMPERATURE: 98.1 F | DIASTOLIC BLOOD PRESSURE: 82 MMHG | HEART RATE: 73 BPM | HEIGHT: 64 IN

## 2023-05-25 DIAGNOSIS — Z90.79 ACQUIRED ABSENCE OF OTHER GENITAL ORGAN(S): Chronic | ICD-10-CM

## 2023-05-25 DIAGNOSIS — Z95.828 PRESENCE OF OTHER VASCULAR IMPLANTS AND GRAFTS: Chronic | ICD-10-CM

## 2023-05-25 RX ORDER — MEDROXYPROGESTERONE ACETATE 150 MG/ML
150 INJECTION, SUSPENSION INTRAMUSCULAR
Qty: 0 | Refills: 0 | Status: COMPLETED | OUTPATIENT
Start: 2023-05-25

## 2023-05-25 RX ADMIN — MEDROXYPROGESTERONE ACETATE 0 MG/ML: 150 INJECTION, SUSPENSION INTRAMUSCULAR at 00:00

## 2023-05-25 NOTE — DISCUSSION/SUMMARY
[FreeTextEntry1] : Depo Provera given.  Risks and instructions reviewed.  RTC for next injection 10-12 weeks.\par Annual exam due in October 2023.

## 2023-05-25 NOTE — COUNSELING
[Nutrition/ Exercise/ Weight Management] : nutrition, exercise, weight management [Vitamins/Supplements] : vitamins/supplements [Confidentiality] : confidentiality

## 2023-05-25 NOTE — HISTORY OF PRESENT ILLNESS
[FreeTextEntry1] : 42 years old  presents for Depo Provera injection.  She uses Depo for history of irregular bleeding and ovarian cysts and is happy with this method.  She is on time for injection.  Patient has a history of bilateral salpingectomy and B-Cell lymphoma.  She follows closely with oncology.\par Recent breast biopsy done; benign ductal ectasia

## 2023-05-30 DIAGNOSIS — Z30.42 ENCOUNTER FOR SURVEILLANCE OF INJECTABLE CONTRACEPTIVE: ICD-10-CM

## 2023-05-30 NOTE — PHYSICAL EXAM
[Fully active, able to carry on all pre-disease performance without restriction] : Status 0 - Fully active, able to carry on all pre-disease performance without restriction [Normal] : affect appropriate [de-identified] : L neck excisional site healed [de-identified] : L shoulder ROM much improved

## 2023-05-30 NOTE — ASSESSMENT
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 \par We started treatment with Bendamustine/Rituxan on 6/24/19. LAD has improved. C5 delayed due to neutropenia, received on 10/30/19. C6 on 11/26/19, tolerated treatment well. PET/CT done at end of treatment showed interval significant decrease in size and metabolism of LAD. \par \par PET/CT unchanged. Started on Rituximab maintenance on 6/25/20. Cont every 2 mo. s/p 4th dose of Rituxan on 12/30/20. She had worsened side effects after 4th dose and does not want to continue. Will hold further Rituxan maintenance given intolerance. PET/CT post Rituxan 2/2021 stable\par Last imaging from 3/2022 [facility]. s/p excisional biopsy of L neck mass that was unremarkable\par f/u w/ PMD and gyn\par f/u w/ Dr. Jolly. Will start PT of L shoulder syndrome, improved after PT\par Pt referred to psycho-oncology -pt exhibits psychological distress including anxious thought, ruminations, tearfulness, and anhedonia. These symptoms interfere with functioning across domains. Pt is likely to benefit from psychotherapy. She has joined a support group through [facility]. She hasn't done either yet\par Advised pt to f/u w/ PMD for persistent cough/intermittent chest pains since COVID, now symtoms resolved. \par CT N/C/A/P done 1/2023 showed slight increase in LAD but still ~1-1.5cm. \par Repeated US breast  BI-ARDS 4A, US guided biopsy of left breast done on 2/8/23: begin ductal ectasia; \par Will plan for port removal, PT/INR ordered today via port\par All questions answered\par RTC 4 mo\par \par Case and management discussed with Dr. Vega\par \par

## 2023-05-30 NOTE — HISTORY OF PRESENT ILLNESS
[de-identified] : 37yo F w/ asthma here for evaluation of newly diagnosed follicular lymphoma. \par \par Patient sates she initially noted left inguinal pain in February 2019, had a palpable mass for the last 6 years which she noticed after delivery of her second baby in 2013. She saw GYN, had TVUS: Uterus: 5.6 x 3.6 x 4.3 cm. EMS 3 mm. Right ovary normal. Left ovary normal. Complex elongated left adnexal lesion suggestive of hydrosalpinx, 9.2 x 4.9 x 9 cm. She is s/p b/l salpingectomy on 5/2/19. She remains on depo injection. She states that she has had more swelling and more pain since her procedure. \par \par She had CT A/P done on 5/9/19 which demonstrated extensive retroperitoneal, pelvic, and inguinal lymphadenopathy, concerning for malignancy with small amount of abdominal and pelvic ascites.\par s/p IR biopsy of left inguinal lymph node - follicular lymphoma grade 1-2. \par \par Also having cervical LAD, noticed for the last few months, L>R. Noted R inguinal swelling for the last week with pain. No fevers of chills. Notes having itching and that her breathing is a bit more labored than usual.  [de-identified] : BMbx (6/10/19): - Focal lymphoid infiltrate consistent with involvement by follicular lymphoma (history of follicular lymphoma)\par - Small paracortical sample with trilineage hematopoiesis with maturation and iron stores present\par \par PET/CT (6/16/19): 1. Hypermetabolic lymphadenopathy in neck, thorax, abdomen, pelvis, and bilateral inguinal/femoral regions corresponds to biopsy-proven follicular lymphoma. Hypermetabolic subcutaneous nodule, right posterolateral chest wall, is compatible with an additional site of lymphoma.\par 2. Nonspecific left greater than right tonsillar hypermetabolism may represent additional sites of lymphoma.\par 3. Findings compatible with bone marrow involvement by lymphoma in bilateral humeri and axial skeleton, as described above.\par \par She reports having increasing fatigue. Still having slight shortness of breath.\par \par We started BR on 6/24. She had a reaction to Rituxan. \par \par Eating fine. Neck LAD, shortness of breath and abdominal bloating improved/resolved. \par She had a yeast infection, saw GYN. Also reports having pelvic pain and was found to have a small polyp. \par \par C2 on 7/22. She notes having burning as she was getting the chemo (please see chart note for details). Notes that her arms were still hurting for a week after. \par She had a portacath placed 8/12\par Felt more fatigued and weak. \par Had transvaginal US, saw GYN this morning (9/5/19). Pelvic pain has resolved. \par \par C3 on 8/19. Tolerated it well. \par \par CT N/C/A/P (9/10/19): Marked decrease in size of cervical lymph nodes when compared with the prior exam compatible with a favorable response to therapy. Significant interval decrease in size of left supraclavicular lymphadenopathy.\par Focal enhancement involving the right anterior tonsillar without associated tonsillar expansion. Correlation with direct visualization and continued follow-up is advised.\par Significant interval decrease in size of previously noted hypermetabolic nodule in the soft tissues of the right posterior lateral chest.\par Significant interval decrease in size of retroperitoneal, pelvic, inguinal, and mesenteric lymphadenopathy as described above.\par \par C4 on 9/16. Tolerated well, had some tremors afterwards. \par \par C5 was delayed secondary to neutropenia. Given on 10/30/19. Had an episode of chest tightness w/ Pillo on day 1. Premedicated on day 2 with no events. Tremors better this cycle but notes 3 episodes of restless legs. \par \par C6 on 11/26/19. She reports having muscle aches. \par \par PET/CT 1/18/20: 1. Interval resolution of FDG activity associated with lymphadenopathy in the neck, chest, upper abdomen and pelvis with either resolution or significant decrease in size of the corresponding lymph nodes and not well delineated on CT as compared to PET/CT from 6/16/2019. Near total resolution of FDG avid left inguinal lymphadenopathy which is significantly decreased in size now demonstrating background activity.\par 2. Interval significant decrease in size and metabolism of hypermetabolic retroperitoneal and mesenteric lymphadenopathy.\par 3. Interval resolution of asymmetric tonsillar hypermetabolism.\par 4. Interval resolution of FDG avidity in bone marrow of bilateral humeri, T10, and sacrum.\par \par Having pain on L side of lower abdomen and groin around 3-4 weeks ago. Having night sweats again too. Had GYN f/u 2 weeks ago.\par We did a PET/CT on 6/13 which showed: 1. Small focus of mild FDG activity, decreased in size and metabolism, is associated with mesenteric haziness which is unchanged on CT as compared to prior study dated 1/18/2020 ([facility]). Resolution of minimally FDG-avid amorphous density, left periaortic region.\par 2. Minimally FDG-avid density, left inguinal region, unchanged, may represent postsurgical change versus lymph node. Please correlate clinically.\par \par Started Rituxan maintenance on 6/25/20. She had a reaction to Rituxan -please see chart note for details. \par Second dose on 8/31/20, tolerated better. Reports having back pain after premeds wore off. \par 3rd dose on 11/4/20, she felt tired and slept through the treatment, denied nausea, but c/o tingling in the finger tips and feet comes and goes. Patient admitted tremor improved.\par birth control pills d/c 3 months ago, she attributed the nausea after Rituxan to the withdrawal of birth control pills. Kidney stone stent removed today. \par Fourth dose on 12/30/20. Pt reports nausea and fatigue. States that does not feel well. Does not want to continue at this time. \par \par PET/CT 2/6/21: 1. Resolution of small focus of mild FDG activity associated with mesenteric haziness which is unchanged on CT ([facility] 1).\par 2. Minimally FDG-avid density, left inguinal region, unchanged, may represent postsurgical change versus lymph node. Please correlate clinically.\par 3. Remainder study is unremarkable and not significantly changed, demonstrating no evidence of FDG-avid disease.\par \par She is doing well, no new complaints. \par Continues to be fatigued. Reports having a difficult time coping with her diagnosis and her transition off treatment. \par She received both her COVID vaccine doses - 5/27 and 6/17\par \par Had GYN and PCP annual check up 2 weeks ago, she admitted cholesterol level was slightly high other than that no remarkable findings, will f/u in 3 months. PCP ordered Echo, will do after Thanksgiving. will have Mammogram done next month. \par Never went to psychotherapy, she felt everything calmed down now, the working gets better, she rested better, she felt much less stressful now. \par Denied any new complaints. \par \par She had COVID in Dec - had URI symptoms and had CP/SOB. \par She saw GI on 1/10/22 and had endoscopy done. Taking omeprazole with relief. \par \par Saw [facility] Dr. Jolly on 3/22/22 for voice hoarseness and neck mass. \par CT C/A/P done 3/5/22: No evidence of recurrent or metastatic disease in the chest, abdomen, or pelvis.\par s/p excisional lymph node, left neck, biopsy- Follicular and paracortical hyperplasia \par \par She will start PT on Tues for L shoulder syndrome. \par Denied any new complaints. \par \par Patient was seen today, feeling fine overall. had left shoulder pain since biopsy of left neck. \par Started PT early July 2 day/wk now 1 day a week, left should pain improved. \par She f/u w/GYN, Ophthalmologist, [facility] doc recently no remark findings. \par Denied any constitutional symptoms. \par Intermittent voice hoarseness, following with ENT\par \par CT N/C/A/P 1/12/23: Slight interval increased size left level 2 lymph nodes as detailed above. Stable additional retropharyngeal and right cervical chain lymph nodes. No new adenopathy.\par Stable nonspecific prominence of Waldeyer's ring.\par Nonspecific increase in prominence of subcentimeter mesenteric lymph nodes largest measuring 1.0 x 0.9 cm in the right lower quadrant. Consider further evaluation with PET/CT scan.\par \par She had COVID in Dec 2022, s/p Paxlovid. She continues having hoarseness. She continues having cough, worse at night. She has intermittent chest pain.\par \par Mammo/US done 1/19/23: BI-RADS 0 -incomplete.  Repeated US breast on 1/31/23: BI-RADS 4A- suspicious findings; low suspicion for Malignancy; Ultrasound guided core needle biopsy of the left breast done on 3/9: revealed begin ductal ectasia; \par c/o neuropathy in feet, f/u neurologist Rx of gabapentin but she didn’t' take it. Only took Tylenol for pain relief. \par She admitted chest pain and cough subsided now. \par

## 2023-05-31 NOTE — ED PROVIDER NOTE - CARE PLAN
Skyri[facility] Counseling: I discussed with the patient the risks of risankizumab-rzaa including but not limited to immunosuppression, and serious infections.  The patient understands that monitoring is required including a PPD at baseline and must alert us or the primary physician if symptoms of infection or other concerning signs are noted. 1 Principal Discharge DX:	Chest pain

## 2023-06-21 ENCOUNTER — APPOINTMENT (OUTPATIENT)
Dept: INTERNAL MEDICINE | Facility: CLINIC | Age: 43
End: 2023-06-21
Payer: MEDICAID

## 2023-06-21 ENCOUNTER — OUTPATIENT (OUTPATIENT)
Dept: OUTPATIENT SERVICES | Facility: HOSPITAL | Age: 43
LOS: 1 days | End: 2023-06-21

## 2023-06-21 VITALS
BODY MASS INDEX: 26.29 KG/M2 | DIASTOLIC BLOOD PRESSURE: 78 MMHG | HEIGHT: 64 IN | HEART RATE: 78 BPM | WEIGHT: 154 LBS | OXYGEN SATURATION: 99 % | SYSTOLIC BLOOD PRESSURE: 120 MMHG

## 2023-06-21 DIAGNOSIS — Z95.828 PRESENCE OF OTHER VASCULAR IMPLANTS AND GRAFTS: Chronic | ICD-10-CM

## 2023-06-21 DIAGNOSIS — N20.0 CALCULUS OF KIDNEY: Chronic | ICD-10-CM

## 2023-06-21 DIAGNOSIS — H93.19 TINNITUS, UNSPECIFIED EAR: ICD-10-CM

## 2023-06-21 DIAGNOSIS — Z90.79 ACQUIRED ABSENCE OF OTHER GENITAL ORGAN(S): Chronic | ICD-10-CM

## 2023-06-21 DIAGNOSIS — R74.01 ELEVATION OF LEVELS OF LIVER TRANSAMINASE LEVELS: ICD-10-CM

## 2023-06-21 DIAGNOSIS — G62.9 POLYNEUROPATHY, UNSPECIFIED: ICD-10-CM

## 2023-06-21 DIAGNOSIS — Z00.00 ENCOUNTER FOR GENERAL ADULT MEDICAL EXAMINATION W/OUT ABNORMAL FINDINGS: ICD-10-CM

## 2023-06-21 PROCEDURE — 99213 OFFICE O/P EST LOW 20 MIN: CPT | Mod: GE

## 2023-06-21 RX ORDER — LEVOFLOXACIN 500 MG/1
500 TABLET, FILM COATED ORAL
Qty: 7 | Refills: 0 | Status: DISCONTINUED | COMMUNITY
Start: 2023-02-27 | End: 2023-06-21

## 2023-06-22 PROBLEM — R74.01 TRANSAMINITIS: Status: ACTIVE | Noted: 2023-06-21

## 2023-06-22 PROBLEM — G62.9 PERIPHERAL NEUROPATHY: Status: ACTIVE | Noted: 2023-02-09

## 2023-06-22 PROBLEM — H93.19 TINNITUS: Status: ACTIVE | Noted: 2023-06-22

## 2023-06-22 PROBLEM — Z00.00 HEALTHCARE MAINTENANCE: Status: ACTIVE | Noted: 2023-06-22

## 2023-06-22 LAB
ALBUMIN SERPL ELPH-MCNC: 4.7 G/DL
ALP BLD-CCNC: 122 U/L
ALT SERPL-CCNC: 17 U/L
ANION GAP SERPL CALC-SCNC: 13 MMOL/L
AST SERPL-CCNC: 14 U/L
BILIRUB SERPL-MCNC: 0.4 MG/DL
BUN SERPL-MCNC: 11 MG/DL
CALCIUM SERPL-MCNC: 9.8 MG/DL
CHLORIDE SERPL-SCNC: 110 MMOL/L
CO2 SERPL-SCNC: 23 MMOL/L
CREAT SERPL-MCNC: 0.82 MG/DL
EGFR: 92 ML/MIN/1.73M2
GLUCOSE SERPL-MCNC: 101 MG/DL
POTASSIUM SERPL-SCNC: 4 MMOL/L
PROT SERPL-MCNC: 6.8 G/DL
SODIUM SERPL-SCNC: 145 MMOL/L

## 2023-06-22 NOTE — HISTORY OF PRESENT ILLNESS
[de-identified] :  42 year old woman, history of allergic rhinitis and mild intermittent asthma, follicular lymphoma s/p maintenance therapy with rituximab, laryngopharyngeal reflux disease, HLD, pre-DM (A1c of 5.9%) previous COVID infections x2 last Dec 2022 presenting for follow up. Pt reports interim visit to urgent care where she tested positive for strep. Pt prescribed azithromycin x5 days b/c of a penicillin allergy. She reports resolution of symptoms. Denies sore throat, cough, fevers/chills. Pt had felt mild asthma exacerbation/allergy symptoms 2 weeks ago relieved w/ inhaler and her asthma medications. Reports resolution of symptoms today in office. Pt saw heme/onc and is planned for chemoport removal with them. Pt reports intermittent tinnitus since her chemotherapy, but denies ear pain, vertigo, or hearing loss. Pt reports she has had exertional knee pain bilaterally that is worse with activity including stairs. It has not limited her ability to carry out her ADLs. She occasionally uses NSAIDs but reports she avoids taking medications for her pain.

## 2023-06-22 NOTE — PHYSICAL EXAM
[Normal Outer Ear/Nose] : the outer ears and nose were normal in appearance [Normal Oropharynx] : the oropharynx was normal [Normal] : normal rate, regular rhythm, normal S1 and S2 and no murmur heard [Pedal Pulses Present] : the pedal pulses are present [No Edema] : there was no peripheral edema [Soft] : abdomen soft [Non Tender] : non-tender [Non-distended] : non-distended [Normal Bowel Sounds] : normal bowel sounds [No CVA Tenderness] : no CVA  tenderness [No Spinal Tenderness] : no spinal tenderness [No Joint Swelling] : no joint swelling [Grossly Normal Strength/Tone] : grossly normal strength/tone [Coordination Grossly Intact] : coordination grossly intact [No Focal Deficits] : no focal deficits [Normal Gait] : normal gait [Speech Grossly Normal] : speech grossly normal [Memory Grossly Normal] : memory grossly normal [Normal Affect] : the affect was normal [Alert and Oriented x3] : oriented to person, place, and time [Normal Insight/Judgement] : insight and judgment were intact [de-identified] : TM clear on right. L TM obscured by cerumen [de-identified] : No knee effusion b/l. No joint line tenderness to palpation of knee b/l. Full ROM knees b/l

## 2023-06-22 NOTE — ASSESSMENT
[FreeTextEntry1] : RTC in 6 months or sooner if needed\par \par Case D/w Dr. Hobbs\par \par - Holly Foley, PGY1\par   Internal Medicine, [facility]\par   Firm [facility]

## 2023-06-22 NOTE — REVIEW OF SYSTEMS
[Joint Pain] : joint pain [Fever] : no fever [Chills] : no chills [Night Sweats] : no night sweats [Discharge] : no discharge [Pain] : no pain [Vision Problems] : no vision problems [Earache] : no earache [Hearing Loss] : no hearing loss [Sore Throat] : no sore throat [Chest Pain] : no chest pain [Palpitations] : no palpitations [Lower Ext Edema] : no lower extremity edema [Orthopnea] : no orthopnea [Paroxysmal Nocturnal Dyspnea] : no paroxysmal nocturnal dyspnea [Shortness Of Breath] : no shortness of breath [Wheezing] : no wheezing [Cough] : no cough [Dyspnea on Exertion] : no dyspnea on exertion [Abdominal Pain] : no abdominal pain [Nausea] : no nausea [Constipation] : no constipation [Diarrhea] : diarrhea [Vomiting] : no vomiting [Melena] : no melena [Dysuria] : no dysuria [Incontinence] : no incontinence [Hematuria] : no hematuria [Frequency] : no frequency [Joint Stiffness] : no joint stiffness [Back Pain] : no back pain [Skin Rash] : no skin rash [FreeTextEntry4] : +tinnitus  [FreeTextEntry9] : +knee pain b/l

## 2023-06-23 DIAGNOSIS — G62.9 POLYNEUROPATHY, UNSPECIFIED: ICD-10-CM

## 2023-06-23 DIAGNOSIS — R74.01 ELEVATION OF LEVELS OF LIVER TRANSAMINASE LEVELS: ICD-10-CM

## 2023-06-23 DIAGNOSIS — J45.20 MILD INTERMITTENT ASTHMA, UNCOMPLICATED: ICD-10-CM

## 2023-06-23 DIAGNOSIS — M25.569 PAIN IN UNSPECIFIED KNEE: ICD-10-CM

## 2023-06-23 DIAGNOSIS — Z87.09 PERSONAL HISTORY OF OTHER DISEASES OF THE RESPIRATORY SYSTEM: ICD-10-CM

## 2023-06-23 DIAGNOSIS — H93.19 TINNITUS, UNSPECIFIED EAR: ICD-10-CM

## 2023-06-23 DIAGNOSIS — Z00.00 ENCOUNTER FOR GENERAL ADULT MEDICAL EXAMINATION WITHOUT ABNORMAL FINDINGS: ICD-10-CM

## 2023-06-29 ENCOUNTER — RESULT REVIEW (OUTPATIENT)
Age: 43
End: 2023-06-29

## 2023-06-29 ENCOUNTER — APPOINTMENT (OUTPATIENT)
Dept: ENDOVASCULAR SURGERY | Facility: CLINIC | Age: 43
End: 2023-06-29
Payer: MEDICAID

## 2023-06-29 VITALS
HEART RATE: 83 BPM | HEIGHT: 64 IN | BODY MASS INDEX: 27.66 KG/M2 | SYSTOLIC BLOOD PRESSURE: 122 MMHG | RESPIRATION RATE: 18 BRPM | DIASTOLIC BLOOD PRESSURE: 80 MMHG | WEIGHT: 162 LBS | TEMPERATURE: 98.3 F | OXYGEN SATURATION: 100 %

## 2023-06-29 PROCEDURE — 36590 REMOVAL TUNNELED CV CATH: CPT | Mod: RT

## 2023-06-29 PROCEDURE — 77001 FLUOROGUIDE FOR VEIN DEVICE: CPT

## 2023-06-29 RX ORDER — MEDROXYPROGESTERONE ACETATE 400 MG/ML
INJECTION, SUSPENSION INTRAMUSCULAR
Refills: 0 | Status: ACTIVE | COMMUNITY

## 2023-06-29 NOTE — PROCEDURE
[D/C IV on discharge] : D/C IV on discharge [Resume diet] : resume diet [Site check for bleeding/hematoma] : Site check for bleeding/hematoma [Vital signs on admission the q 15 mins x2] : Vital signs on admission the q 15 mins x2 [FreeTextEntry1] : Right mediport removal

## 2023-06-29 NOTE — PAST MEDICAL HISTORY
[Increasing age ( >40 years old)] : Increasing age ( >40 years old) [Malignancy] : Malignancy [No therapy indicated for cases scheduled for less than one hour] : No therapy indicated for cases scheduled for less than one hour. [FreeTextEntry1] : Malignant Hyperthermia Screening Tool and Risk of Bleeding Assessment \par \par SONDRA HELLER  denies family of unexpected death following Anesthesia or Exercise.\par Denies family history of Malignant Hyperthermia, Muscle or Neuromuscular disorder and High Temperature following exercise. \par \par SONDRA HELLER Patient denies history of Muscle Spasm, Dark or Chocolate- Colored and unanticipated fever immediately following anesthesia or serious exercise. \par \par SONDRA HELLER Patient also denies bleeding tendencies/risks of bleeding.

## 2023-06-29 NOTE — REASON FOR VISIT
[Other ___] : a [unfilled] visit for [Family Member] : family member [FreeTextEntry2] : mediport removal/lymphoma

## 2023-06-29 NOTE — ASSESSMENT
[Other: _____] : [unfilled] [FreeTextEntry1] : Lymphoma with completion of Rx for port removal. Consent obtained.\par \par patient is s/p R chest port removal. 3 parts of port all accounted for. port pocket closed with absorbable sutures. post procedure CXR demonstrates no residual foreign body or complication. patient tolerated the procedure well.\par \par EBL=minimal.  full report to follow.

## 2023-06-29 NOTE — HISTORY OF PRESENT ILLNESS
[FreeTextEntry1] : alert and oriented\par accompanied by friend Jeannette [phone]\par no medications taken this morning \par HCG negative \par  [FreeTextEntry5] : yesterday at 10pm  [FreeTextEntry6] : Dr Cordero

## 2023-08-08 ENCOUNTER — APPOINTMENT (OUTPATIENT)
Dept: OBGYN | Facility: HOSPITAL | Age: 43
End: 2023-08-08

## 2023-08-08 ENCOUNTER — OUTPATIENT (OUTPATIENT)
Dept: OUTPATIENT SERVICES | Facility: HOSPITAL | Age: 43
LOS: 1 days | End: 2023-08-08

## 2023-08-08 VITALS
BODY MASS INDEX: 28.68 KG/M2 | SYSTOLIC BLOOD PRESSURE: 123 MMHG | DIASTOLIC BLOOD PRESSURE: 82 MMHG | WEIGHT: 168 LBS | HEIGHT: 64 IN | TEMPERATURE: 98 F | HEART RATE: 81 BPM

## 2023-08-08 DIAGNOSIS — Z95.828 PRESENCE OF OTHER VASCULAR IMPLANTS AND GRAFTS: Chronic | ICD-10-CM

## 2023-08-08 DIAGNOSIS — Z90.79 ACQUIRED ABSENCE OF OTHER GENITAL ORGAN(S): Chronic | ICD-10-CM

## 2023-08-08 DIAGNOSIS — N20.0 CALCULUS OF KIDNEY: Chronic | ICD-10-CM

## 2023-08-08 RX ORDER — MEDROXYPROGESTERONE ACETATE 150 MG/ML
150 INJECTION, SUSPENSION INTRAMUSCULAR
Qty: 0 | Refills: 0 | Status: COMPLETED | OUTPATIENT
Start: 2023-08-08

## 2023-08-08 RX ADMIN — MEDROXYPROGESTERONE ACETATE 0 MG/ML: 150 INJECTION, SUSPENSION, EXTENDED RELEASE INTRAMUSCULAR at 00:00

## 2023-08-08 NOTE — DISCUSSION/SUMMARY
[FreeTextEntry1] : Depo Provera given today. Calcium supplements advised. STD counseling, safe sex practices and condoms F/U for annual and repeat Depo Oct 24-Nov 7, 2023

## 2023-08-08 NOTE — HISTORY OF PRESENT ILLNESS
[FreeTextEntry1] : 43 yo  here for repeat Depo Provera injection.  She uses Depo for history of irregular bleeding and ovarian cysts and is happy with this method. She is on time for injection. Patient has a history of bilateral salpingectomy and B-Cell lymphoma. She follows closely with oncology.

## 2023-08-09 DIAGNOSIS — Z30.42 ENCOUNTER FOR SURVEILLANCE OF INJECTABLE CONTRACEPTIVE: ICD-10-CM

## 2023-08-15 ENCOUNTER — APPOINTMENT (OUTPATIENT)
Dept: OTOLARYNGOLOGY | Facility: CLINIC | Age: 43
End: 2023-08-15
Payer: MEDICAID

## 2023-08-15 VITALS
HEIGHT: 64 IN | BODY MASS INDEX: 28.68 KG/M2 | WEIGHT: 168 LBS | DIASTOLIC BLOOD PRESSURE: 84 MMHG | SYSTOLIC BLOOD PRESSURE: 119 MMHG

## 2023-08-15 DIAGNOSIS — K21.9 GASTRO-ESOPHAGEAL REFLUX DISEASE W/OUT ESOPHAGITIS: ICD-10-CM

## 2023-08-15 PROCEDURE — 99213 OFFICE O/P EST LOW 20 MIN: CPT | Mod: 25

## 2023-08-15 PROCEDURE — 31575 DIAGNOSTIC LARYNGOSCOPY: CPT

## 2023-08-15 NOTE — HISTORY OF PRESENT ILLNESS
[de-identified] : 42 year old female has Hx of stage IV lymphoma 2019 was treated with chemotherapy, last chemotherapy was December 2020.  excisional biopsy on 6/30/2022 path c/w  Follicular and paracortical hyperplasia. Last ct of neck, chest and abd on 1/12/2023.  Pt is being f.u by Dr Vega. Patient has Hx of voice hoarseness, worsen on and off, on PPI for reflux.  Patient reports feeling good since last visit, states that presently she has a cold and a lingering cough/sore throat-currently taking antibiotics.  Reports reflux has significantly improved since changing diet, has not taken any omeprazole this week.  Voice remains slightly hoarsed but has improved.  Reports nerve pain in left neck and left ear has subsided. Reports she is working with physical therapy for her knees, left shoulder drop has significantly improved.  Denies dysphagia, odynophagia, dyspnea, dysphonia, otalgia, chills, night sweats, weight loss.

## 2023-08-15 NOTE — PHYSICAL EXAM
[Normal] : no rashes [Laryngoscopy Performed] : laryngoscopy was performed, see procedure section for findings [de-identified] : Incision healing well. [de-identified] : L. shoulder ROM is essentially normal.   [FreeTextEntry1] : No concerning lesions in the OC/OPx on inspection or palpation.\par

## 2023-08-15 NOTE — PROCEDURE
[Globus] : globus [None] : none [Flexible Endoscope] : examined with the flexible endoscope [Serial Number: ___] : Serial Number: [unfilled] [de-identified] : No lesions in the NPx, OPx, HPx or larynx.  VC are mobile, airway patent.  Patient with mild changes of LPRD on exam with interarytenoid pachyderma and edema.

## 2023-09-01 ENCOUNTER — OUTPATIENT (OUTPATIENT)
Dept: OUTPATIENT SERVICES | Facility: HOSPITAL | Age: 43
LOS: 1 days | End: 2023-09-01

## 2023-09-01 ENCOUNTER — APPOINTMENT (OUTPATIENT)
Dept: INTERNAL MEDICINE | Facility: CLINIC | Age: 43
End: 2023-09-01
Payer: MEDICAID

## 2023-09-01 VITALS
BODY MASS INDEX: 30.05 KG/M2 | OXYGEN SATURATION: 99 % | HEART RATE: 96 BPM | WEIGHT: 176 LBS | SYSTOLIC BLOOD PRESSURE: 100 MMHG | DIASTOLIC BLOOD PRESSURE: 70 MMHG | HEIGHT: 64 IN

## 2023-09-01 DIAGNOSIS — N20.0 CALCULUS OF KIDNEY: Chronic | ICD-10-CM

## 2023-09-01 DIAGNOSIS — J45.20 MILD INTERMITTENT ASTHMA, UNCOMPLICATED: ICD-10-CM

## 2023-09-01 DIAGNOSIS — R05.9 COUGH, UNSPECIFIED: ICD-10-CM

## 2023-09-01 DIAGNOSIS — Z95.828 PRESENCE OF OTHER VASCULAR IMPLANTS AND GRAFTS: Chronic | ICD-10-CM

## 2023-09-01 PROCEDURE — 99214 OFFICE O/P EST MOD 30 MIN: CPT

## 2023-09-01 RX ORDER — HYDROCODONE BITARTRATE AND HOMATROPINE METHYLBROMIDE 1.5; 5 MG/5ML; MG/5ML
5-1.5 SOLUTION ORAL EVERY 6 HOURS
Qty: 1 | Refills: 0 | Status: COMPLETED | COMMUNITY
Start: 2023-09-01 | End: 2023-10-01

## 2023-09-05 ENCOUNTER — NON-APPOINTMENT (OUTPATIENT)
Age: 43
End: 2023-09-05

## 2023-09-05 NOTE — END OF VISIT
[] : Resident [FreeTextEntry3] : 42F who presents for chronic cough after COVID infection 12/2022.  Ddx includes but is not limited to worsening of GERD (patient on PPI), allergic vs non-allergic rhinitis/post-nasal drip (taking cetirizine and flonase), and post-viral worsening of reactive airway disease.  Patient has been to ED for this issue, seen by myself, where a CTA showed no evidence of thromboembolic disease and follow up stress echocardiogram 2/2023 showed normal LV and RV systolic function and no evidence of diastolic dysfunction.  POCUS today again shows grossly normal LV and RV systolic function, no effusion, and lungs with A-line pattern bilaterally without pleural effusions.  Although timing wise this exacerbation correlates with her trip to [place] I have low suspicion it relates to mosquito borne illness, and told patient positive titers would not  she would still like to know if she had been infected.  Also gave symptomatic cough syrup.   Will get PFTs done and refer to pulmonology for continued workup of chronic cough.  Charlie Weir MD EM/IM, PGY-5 Chief Resident

## 2023-09-05 NOTE — ASSESSMENT
[FreeTextEntry1] : 42 year old woman, history of allergic rhinitis and mild intermittent asthma, follicular lymphoma s/p maintenance therapy with rituximab 2 years ago, laryngopharyngeal reflux disease, HLD, pre-DM (A1c of 5.9%) previous COVID infections x2 last Dec 2022 presenting for acute visit for 4 weeks of productive cough.  #Productive cough  - POCUS this visit w/o evidence of cardiac dysfunction, minimal B lines - Imaging Feb 2023 w/o evidence of chronic lung process - Patient w/ hx of prolonged cough after COVID - Given recent hx of travel, mosquito bites, LE edema, will obtain Zika, Dengue, [place]ung[place]ya - Referral to pulmonology for further PFTs and pulm evaluation  - Hycodan prescribed for cough symptoms - Pt educated to RTC for new or worsening symptoms  Asthma - c/w albuterol PRN - PFTs and pulm referral   D/w Charlie Weir

## 2023-09-05 NOTE — REVIEW OF SYSTEMS
[Lower Ext Edema] : lower extremity edema [Wheezing] : wheezing [Cough] : cough [Joint Pain] : joint pain [Negative] : Psychiatric [Discharge] : no discharge [Pain] : no pain [Redness] : no redness [Vision Problems] : no vision problems [Itching] : no itching [Chest Pain] : no chest pain [Palpitations] : no palpitations [Leg Claudication] : no leg claudication [Orthopnea] : no orthopnea [Shortness Of Breath] : no shortness of breath [Dyspnea on Exertion] : no dyspnea on exertion [FreeTextEntry3] : Some crusting around the eyes for few days, now resolved [FreeTextEntry9] : B/l knee pain, as previous

## 2023-09-05 NOTE — HISTORY OF PRESENT ILLNESS
[FreeTextEntry1] : severe cough [de-identified] : 42 year old woman, history of allergic rhinitis and mild intermittent asthma, follicular lymphoma s/p maintenance therapy with rituximab 2 years ago, laryngopharyngeal reflux disease, HLD, pre-DM (A1c of 5.9%) previous COVID infections x2 last Dec 2022 presenting for acute visit for cough. Of note, pt was seen in ED in Feb 2023 for chest pain/cough where patient had a CTA chest w/o PE and stress echo w/o wall motion abnormalities. Patient went to [place] end of July, where she developed an unknown acute swelling in b/l LEs. She was told at the time the leg swelling was a reaction to mosquitoe bites. Patient then started having productive cough associated with throat discomfort, which has not improved through today. Of note, patient previously had episode of such prolonged cough after COVID. Upon returning to [place], on Aug 3 patient went to urgent care regarding the foot swelling and cough. Patient says a COVID test, an RVP, and Strep throat were negative. She was prescribed doxycicline and a short course of prednisone which she completed few weeks ago. Today, the LE swelling has resolved but the productive cough with white/green sputum persists. No orthopnea, no SOB, no F/C/N/V, malaise, lethargy. Patient uses her albuteron few times per week for wheezing. Cough minimally improved with Mucinex and benzonatate   Otherwise, pt has been going to physical therapy for b/l knee pain from prior visit, and has visited ENT for tinnitus as previously documented. Patient continues to have neuropathy but at this time still deffers starting gabapentin due to concerns for sedation.

## 2023-09-05 NOTE — PHYSICAL EXAM
[No Accessory Muscle Use] : no accessory muscle use [No Respiratory Distress] : no respiratory distress  [Normal] : affect was normal and insight and judgment were intact [de-identified] : Mild scattered crackles b/l [de-identified] : 2+ pitting edema on shins

## 2023-09-06 LAB — ZIKV IGM SERPL QL IA: NORMAL

## 2023-09-08 ENCOUNTER — NON-APPOINTMENT (OUTPATIENT)
Age: 43
End: 2023-09-08

## 2023-09-08 LAB
DENV IGG SERPL QL IA: 3.77 ISR
DENV IGM SERPL QL IA: 1.31 ISR

## 2023-09-12 ENCOUNTER — NON-APPOINTMENT (OUTPATIENT)
Age: 43
End: 2023-09-12

## 2023-09-13 ENCOUNTER — NON-APPOINTMENT (OUTPATIENT)
Age: 43
End: 2023-09-13

## 2023-09-14 ENCOUNTER — OUTPATIENT (OUTPATIENT)
Dept: OUTPATIENT SERVICES | Facility: HOSPITAL | Age: 43
LOS: 1 days | Discharge: HOME | End: 2023-09-14

## 2023-09-14 DIAGNOSIS — Z95.828 PRESENCE OF OTHER VASCULAR IMPLANTS AND GRAFTS: Chronic | ICD-10-CM

## 2023-09-14 DIAGNOSIS — C85.10 UNSPECIFIED B-CELL LYMPHOMA, UNSPECIFIED SITE: ICD-10-CM

## 2023-09-14 DIAGNOSIS — Z90.79 ACQUIRED ABSENCE OF OTHER GENITAL ORGAN(S): Chronic | ICD-10-CM

## 2023-09-14 DIAGNOSIS — N20.0 CALCULUS OF KIDNEY: Chronic | ICD-10-CM

## 2023-09-15 LAB
CHIKUNGUNYA AB IGG (REFLEX): NEGATIVE
CHIKUNGUNYA AB IGM (REFLEX): NEGATIVE

## 2023-09-25 ENCOUNTER — RESULT REVIEW (OUTPATIENT)
Age: 43
End: 2023-09-25

## 2023-09-25 ENCOUNTER — APPOINTMENT (OUTPATIENT)
Dept: HEMATOLOGY ONCOLOGY | Facility: CLINIC | Age: 43
End: 2023-09-25
Payer: MEDICAID

## 2023-09-25 VITALS
OXYGEN SATURATION: 99 % | RESPIRATION RATE: 16 BRPM | HEIGHT: 64.06 IN | DIASTOLIC BLOOD PRESSURE: 81 MMHG | HEART RATE: 90 BPM | BODY MASS INDEX: 28.45 KG/M2 | SYSTOLIC BLOOD PRESSURE: 124 MMHG | WEIGHT: 166.67 LBS | TEMPERATURE: 98 F

## 2023-09-25 LAB
BASOPHILS # BLD AUTO: 0.05 K/UL — SIGNIFICANT CHANGE UP (ref 0–0.2)
BASOPHILS NFR BLD AUTO: 0.8 % — SIGNIFICANT CHANGE UP (ref 0–2)
EOSINOPHIL # BLD AUTO: 0.19 K/UL — SIGNIFICANT CHANGE UP (ref 0–0.5)
EOSINOPHIL NFR BLD AUTO: 3.1 % — SIGNIFICANT CHANGE UP (ref 0–6)
HCT VFR BLD CALC: 38.7 % — SIGNIFICANT CHANGE UP (ref 34.5–45)
HGB BLD-MCNC: 12 G/DL — SIGNIFICANT CHANGE UP (ref 11.5–15.5)
IMM GRANULOCYTES NFR BLD AUTO: 0.5 % — SIGNIFICANT CHANGE UP (ref 0–0.9)
LYMPHOCYTES # BLD AUTO: 1.92 K/UL — SIGNIFICANT CHANGE UP (ref 1–3.3)
LYMPHOCYTES # BLD AUTO: 31 % — SIGNIFICANT CHANGE UP (ref 13–44)
MCHC RBC-ENTMCNC: 24.7 PG — LOW (ref 27–34)
MCHC RBC-ENTMCNC: 31 G/DL — LOW (ref 32–36)
MCV RBC AUTO: 79.6 FL — LOW (ref 80–100)
MONOCYTES # BLD AUTO: 0.46 K/UL — SIGNIFICANT CHANGE UP (ref 0–0.9)
MONOCYTES NFR BLD AUTO: 7.4 % — SIGNIFICANT CHANGE UP (ref 2–14)
NEUTROPHILS # BLD AUTO: 3.55 K/UL — SIGNIFICANT CHANGE UP (ref 1.8–7.4)
NEUTROPHILS NFR BLD AUTO: 57.2 % — SIGNIFICANT CHANGE UP (ref 43–77)
NRBC # BLD: 0 /100 WBCS — SIGNIFICANT CHANGE UP (ref 0–0)
PLATELET # BLD AUTO: 268 K/UL — SIGNIFICANT CHANGE UP (ref 150–400)
RBC # BLD: 4.86 M/UL — SIGNIFICANT CHANGE UP (ref 3.8–5.2)
RBC # FLD: 13.9 % — SIGNIFICANT CHANGE UP (ref 10.3–14.5)
WBC # BLD: 6.2 K/UL — SIGNIFICANT CHANGE UP (ref 3.8–10.5)
WBC # FLD AUTO: 6.2 K/UL — SIGNIFICANT CHANGE UP (ref 3.8–10.5)

## 2023-09-25 PROCEDURE — 99213 OFFICE O/P EST LOW 20 MIN: CPT

## 2023-09-29 LAB
ALBUMIN SERPL ELPH-MCNC: 4.3 G/DL
ALP BLD-CCNC: 118 U/L
ALT SERPL-CCNC: 20 U/L
ANION GAP SERPL CALC-SCNC: 14 MMOL/L
AST SERPL-CCNC: 17 U/L
BILIRUB SERPL-MCNC: 0.3 MG/DL
BUN SERPL-MCNC: 13 MG/DL
CALCIUM SERPL-MCNC: 9.4 MG/DL
CHLORIDE SERPL-SCNC: 107 MMOL/L
CO2 SERPL-SCNC: 20 MMOL/L
CREAT SERPL-MCNC: 0.76 MG/DL
EGFR: 101 ML/MIN/1.73M2
GLUCOSE SERPL-MCNC: 105 MG/DL
LDH SERPL-CCNC: 151 U/L
POTASSIUM SERPL-SCNC: 4.3 MMOL/L
PROT SERPL-MCNC: 6.5 G/DL
SODIUM SERPL-SCNC: 141 MMOL/L

## 2023-10-06 ENCOUNTER — MED ADMIN CHARGE (OUTPATIENT)
Age: 43
End: 2023-10-06

## 2023-10-06 ENCOUNTER — LABORATORY RESULT (OUTPATIENT)
Age: 43
End: 2023-10-06

## 2023-10-06 ENCOUNTER — APPOINTMENT (OUTPATIENT)
Dept: INTERNAL MEDICINE | Facility: CLINIC | Age: 43
End: 2023-10-06
Payer: MEDICAID

## 2023-10-06 ENCOUNTER — OUTPATIENT (OUTPATIENT)
Dept: OUTPATIENT SERVICES | Facility: HOSPITAL | Age: 43
LOS: 1 days | End: 2023-10-06

## 2023-10-06 VITALS
SYSTOLIC BLOOD PRESSURE: 122 MMHG | HEIGHT: 64.06 IN | RESPIRATION RATE: 16 BRPM | WEIGHT: 170 LBS | DIASTOLIC BLOOD PRESSURE: 74 MMHG | HEART RATE: 76 BPM | BODY MASS INDEX: 29.02 KG/M2 | OXYGEN SATURATION: 99 %

## 2023-10-06 DIAGNOSIS — Z95.828 PRESENCE OF OTHER VASCULAR IMPLANTS AND GRAFTS: Chronic | ICD-10-CM

## 2023-10-06 DIAGNOSIS — N20.0 CALCULUS OF KIDNEY: Chronic | ICD-10-CM

## 2023-10-06 DIAGNOSIS — Z90.79 ACQUIRED ABSENCE OF OTHER GENITAL ORGAN(S): Chronic | ICD-10-CM

## 2023-10-06 LAB
ALBUMIN SERPL ELPH-MCNC: 4.6 G/DL
ALP BLD-CCNC: 127 U/L
ALT SERPL-CCNC: 27 U/L
ANION GAP SERPL CALC-SCNC: 14 MMOL/L
AST SERPL-CCNC: 19 U/L
BASOPHILS # BLD AUTO: 0.08 K/UL
BASOPHILS NFR BLD AUTO: 1.1 %
BILIRUB SERPL-MCNC: 0.5 MG/DL
BUN SERPL-MCNC: 9 MG/DL
CALCIUM SERPL-MCNC: 9.4 MG/DL
CHLORIDE SERPL-SCNC: 106 MMOL/L
CHOLEST SERPL-MCNC: 226 MG/DL
CO2 SERPL-SCNC: 22 MMOL/L
CREAT SERPL-MCNC: 0.69 MG/DL
EGFR: 112 ML/MIN/1.73M2
EOSINOPHIL # BLD AUTO: 0.2 K/UL
EOSINOPHIL NFR BLD AUTO: 2.8 %
GLUCOSE SERPL-MCNC: 99 MG/DL
HCT VFR BLD CALC: 37.7 %
HDLC SERPL-MCNC: 32 MG/DL
HGB BLD-MCNC: 11.6 G/DL
IMM GRANULOCYTES NFR BLD AUTO: 0.3 %
LDLC SERPL CALC-MCNC: 168 MG/DL
LYMPHOCYTES # BLD AUTO: 2.27 K/UL
LYMPHOCYTES NFR BLD AUTO: 32.2 %
MAGNESIUM SERPL-MCNC: 2.1 MG/DL
MAN DIFF?: NORMAL
MCHC RBC-ENTMCNC: 24.9 PG
MCHC RBC-ENTMCNC: 30.8 GM/DL
MCV RBC AUTO: 80.9 FL
MONOCYTES # BLD AUTO: 0.55 K/UL
MONOCYTES NFR BLD AUTO: 7.8 %
NEUTROPHILS # BLD AUTO: 3.92 K/UL
NEUTROPHILS NFR BLD AUTO: 55.8 %
NONHDLC SERPL-MCNC: 194 MG/DL
PHOSPHATE SERPL-MCNC: 3.4 MG/DL
PLATELET # BLD AUTO: 274 K/UL
POTASSIUM SERPL-SCNC: 3.9 MMOL/L
PROT SERPL-MCNC: 6.7 G/DL
RBC # BLD: 4.66 M/UL
RBC # FLD: 14.8 %
SODIUM SERPL-SCNC: 142 MMOL/L
TRIGL SERPL-MCNC: 141 MG/DL
TSH SERPL-ACNC: 0.92 UIU/ML
WBC # FLD AUTO: 7.04 K/UL

## 2023-10-06 PROCEDURE — 99214 OFFICE O/P EST MOD 30 MIN: CPT | Mod: 25,GC

## 2023-10-09 LAB
ESTIMATED AVERAGE GLUCOSE: 157 MG/DL
HBA1C MFR BLD HPLC: 7.1 %

## 2023-10-09 RX ORDER — METFORMIN HYDROCHLORIDE 500 MG/1
500 TABLET, COATED ORAL
Qty: 60 | Refills: 2 | Status: ACTIVE | COMMUNITY
Start: 2023-10-09 | End: 1900-01-01

## 2023-10-10 LAB
M TB IFN-G BLD-IMP: NEGATIVE
QUANTIFERON TB PLUS MITOGEN MINUS NIL: >10 IU/ML
QUANTIFERON TB PLUS NIL: 0.02 IU/ML
QUANTIFERON TB PLUS TB1 MINUS NIL: 0 IU/ML
QUANTIFERON TB PLUS TB2 MINUS NIL: 0 IU/ML

## 2023-10-10 NOTE — END OF VISIT
When patient woke up today her left eye was crusted shut,bright red all day long, itching, looks like pink eye. Asking if Kyler Mcneil DNP would prescribe something or does she need to be seen. Patient was in the office yesterday.  Send hoang franco [>50% of Time Spent on Counseling and Coordination of Care for  ___] : Greater than 50% of the encounter time was spent on counseling and coordination of care for [unfilled] [Time Spent: ___ minutes] : I have spent [unfilled] minutes of face to face time with the patient

## 2023-10-16 ENCOUNTER — OUTPATIENT (OUTPATIENT)
Dept: OUTPATIENT SERVICES | Facility: HOSPITAL | Age: 43
LOS: 1 days | End: 2023-10-16
Payer: MEDICAID

## 2023-10-16 ENCOUNTER — APPOINTMENT (OUTPATIENT)
Dept: ULTRASOUND IMAGING | Facility: CLINIC | Age: 43
End: 2023-10-16
Payer: MEDICAID

## 2023-10-16 DIAGNOSIS — N20.0 CALCULUS OF KIDNEY: Chronic | ICD-10-CM

## 2023-10-16 DIAGNOSIS — J45.20 MILD INTERMITTENT ASTHMA, UNCOMPLICATED: ICD-10-CM

## 2023-10-16 DIAGNOSIS — N93.9 ABNORMAL UTERINE AND VAGINAL BLEEDING, UNSPECIFIED: ICD-10-CM

## 2023-10-16 DIAGNOSIS — J30.9 ALLERGIC RHINITIS, UNSPECIFIED: ICD-10-CM

## 2023-10-16 DIAGNOSIS — Z00.8 ENCOUNTER FOR OTHER GENERAL EXAMINATION: ICD-10-CM

## 2023-10-16 DIAGNOSIS — Z90.79 ACQUIRED ABSENCE OF OTHER GENITAL ORGAN(S): Chronic | ICD-10-CM

## 2023-10-16 DIAGNOSIS — Z95.828 PRESENCE OF OTHER VASCULAR IMPLANTS AND GRAFTS: Chronic | ICD-10-CM

## 2023-10-16 PROCEDURE — 76830 TRANSVAGINAL US NON-OB: CPT

## 2023-10-16 PROCEDURE — 76830 TRANSVAGINAL US NON-OB: CPT | Mod: 26

## 2023-10-18 ENCOUNTER — OUTPATIENT (OUTPATIENT)
Dept: OUTPATIENT SERVICES | Facility: HOSPITAL | Age: 43
LOS: 1 days | End: 2023-10-18

## 2023-10-18 ENCOUNTER — APPOINTMENT (OUTPATIENT)
Dept: INTERNAL MEDICINE | Facility: CLINIC | Age: 43
End: 2023-10-18

## 2023-10-18 DIAGNOSIS — N20.0 CALCULUS OF KIDNEY: Chronic | ICD-10-CM

## 2023-10-18 DIAGNOSIS — Z90.79 ACQUIRED ABSENCE OF OTHER GENITAL ORGAN(S): Chronic | ICD-10-CM

## 2023-10-18 DIAGNOSIS — Z95.828 PRESENCE OF OTHER VASCULAR IMPLANTS AND GRAFTS: Chronic | ICD-10-CM

## 2023-10-19 ENCOUNTER — NON-APPOINTMENT (OUTPATIENT)
Age: 43
End: 2023-10-19

## 2023-10-19 DIAGNOSIS — E11.9 TYPE 2 DIABETES MELLITUS WITHOUT COMPLICATIONS: ICD-10-CM

## 2023-10-20 LAB — INSULIN SERPL-MCNC: 30.6 UU/ML

## 2023-10-23 ENCOUNTER — APPOINTMENT (OUTPATIENT)
Dept: PULMONOLOGY | Facility: CLINIC | Age: 43
End: 2023-10-23
Payer: MEDICAID

## 2023-10-23 VITALS
WEIGHT: 168 LBS | DIASTOLIC BLOOD PRESSURE: 84 MMHG | HEIGHT: 64 IN | SYSTOLIC BLOOD PRESSURE: 133 MMHG | HEART RATE: 75 BPM | BODY MASS INDEX: 28.68 KG/M2

## 2023-10-23 PROCEDURE — 94060 EVALUATION OF WHEEZING: CPT

## 2023-10-23 PROCEDURE — 94726 PLETHYSMOGRAPHY LUNG VOLUMES: CPT

## 2023-10-23 PROCEDURE — 94729 DIFFUSING CAPACITY: CPT

## 2023-10-24 ENCOUNTER — RESULT REVIEW (OUTPATIENT)
Age: 43
End: 2023-10-24

## 2023-10-24 ENCOUNTER — APPOINTMENT (OUTPATIENT)
Dept: PULMONOLOGY | Facility: CLINIC | Age: 43
End: 2023-10-24
Payer: MEDICAID

## 2023-10-24 ENCOUNTER — OUTPATIENT (OUTPATIENT)
Dept: OUTPATIENT SERVICES | Facility: HOSPITAL | Age: 43
LOS: 1 days | End: 2023-10-24

## 2023-10-24 ENCOUNTER — APPOINTMENT (OUTPATIENT)
Dept: OBGYN | Facility: HOSPITAL | Age: 43
End: 2023-10-24
Payer: MEDICAID

## 2023-10-24 VITALS
SYSTOLIC BLOOD PRESSURE: 125 MMHG | HEIGHT: 64 IN | DIASTOLIC BLOOD PRESSURE: 84 MMHG | TEMPERATURE: 97.9 F | HEART RATE: 73 BPM | WEIGHT: 168 LBS | BODY MASS INDEX: 28.68 KG/M2

## 2023-10-24 VITALS
HEART RATE: 86 BPM | OXYGEN SATURATION: 97 % | DIASTOLIC BLOOD PRESSURE: 81 MMHG | HEIGHT: 64 IN | SYSTOLIC BLOOD PRESSURE: 128 MMHG | WEIGHT: 167 LBS | RESPIRATION RATE: 17 BRPM | BODY MASS INDEX: 28.51 KG/M2

## 2023-10-24 DIAGNOSIS — Z95.828 PRESENCE OF OTHER VASCULAR IMPLANTS AND GRAFTS: Chronic | ICD-10-CM

## 2023-10-24 DIAGNOSIS — N20.0 CALCULUS OF KIDNEY: Chronic | ICD-10-CM

## 2023-10-24 DIAGNOSIS — Z90.79 ACQUIRED ABSENCE OF OTHER GENITAL ORGAN(S): Chronic | ICD-10-CM

## 2023-10-24 DIAGNOSIS — Z82.3 FAMILY HISTORY OF STROKE: ICD-10-CM

## 2023-10-24 DIAGNOSIS — Z01.419 ENCOUNTER FOR GYNECOLOGICAL EXAMINATION (GENERAL) (ROUTINE) W/OUT ABNORMAL FINDINGS: ICD-10-CM

## 2023-10-24 LAB
C TRACH RRNA SPEC QL NAA+PROBE: SIGNIFICANT CHANGE UP
C TRACH RRNA SPEC QL NAA+PROBE: SIGNIFICANT CHANGE UP
HBV SURFACE AG SER-ACNC: SIGNIFICANT CHANGE UP
HBV SURFACE AG SER-ACNC: SIGNIFICANT CHANGE UP
HCV AB S/CO SERPL IA: 0.08 S/CO — SIGNIFICANT CHANGE UP (ref 0–0.99)
HCV AB S/CO SERPL IA: 0.08 S/CO — SIGNIFICANT CHANGE UP (ref 0–0.99)
HCV AB SERPL-IMP: SIGNIFICANT CHANGE UP
HCV AB SERPL-IMP: SIGNIFICANT CHANGE UP
HCV RNA SPEC NAA+PROBE-LOG IU: SIGNIFICANT CHANGE UP
HCV RNA SPEC NAA+PROBE-LOG IU: SIGNIFICANT CHANGE UP
HCV RNA SPEC NAA+PROBE-LOG IU: SIGNIFICANT CHANGE UP LOGIU/ML
HCV RNA SPEC NAA+PROBE-LOG IU: SIGNIFICANT CHANGE UP LOGIU/ML
HIV 1+2 AB+HIV1 P24 AG SERPL QL IA: SIGNIFICANT CHANGE UP
HIV 1+2 AB+HIV1 P24 AG SERPL QL IA: SIGNIFICANT CHANGE UP
N GONORRHOEA RRNA SPEC QL NAA+PROBE: SIGNIFICANT CHANGE UP
N GONORRHOEA RRNA SPEC QL NAA+PROBE: SIGNIFICANT CHANGE UP
SPECIMEN SOURCE: SIGNIFICANT CHANGE UP
SPECIMEN SOURCE: SIGNIFICANT CHANGE UP
T PALLIDUM AB TITR SER: NEGATIVE — SIGNIFICANT CHANGE UP
T PALLIDUM AB TITR SER: NEGATIVE — SIGNIFICANT CHANGE UP

## 2023-10-24 PROCEDURE — 99204 OFFICE O/P NEW MOD 45 MIN: CPT

## 2023-10-24 PROCEDURE — 99396 PREV VISIT EST AGE 40-64: CPT | Mod: 25

## 2023-10-24 RX ORDER — MEDROXYPROGESTERONE ACETATE 150 MG/ML
150 INJECTION, SUSPENSION INTRAMUSCULAR
Refills: 0 | Status: COMPLETED | COMMUNITY
Start: 2023-02-09 | End: 2023-10-24

## 2023-10-24 RX ORDER — MEDROXYPROGESTERONE ACETATE 150 MG/ML
150 INJECTION, SUSPENSION INTRAMUSCULAR
Qty: 0 | Refills: 0 | Status: COMPLETED | OUTPATIENT
Start: 2023-10-24

## 2023-10-24 RX ORDER — BUDESONIDE 90 UG/1
90 AEROSOL, POWDER RESPIRATORY (INHALATION)
Qty: 1 | Refills: 1 | Status: DISCONTINUED | COMMUNITY
Start: 2023-10-06 | End: 2023-10-24

## 2023-10-24 RX ADMIN — MEDROXYPROGESTERONE ACETATE 0 MG/ML: 150 INJECTION, SUSPENSION INTRAMUSCULAR at 00:00

## 2023-10-25 ENCOUNTER — APPOINTMENT (OUTPATIENT)
Dept: INTERNAL MEDICINE | Facility: CLINIC | Age: 43
End: 2023-10-25
Payer: MEDICAID

## 2023-10-25 ENCOUNTER — OUTPATIENT (OUTPATIENT)
Dept: OUTPATIENT SERVICES | Facility: HOSPITAL | Age: 43
LOS: 1 days | End: 2023-10-25

## 2023-10-25 VITALS — BODY MASS INDEX: 28.68 KG/M2 | WEIGHT: 168 LBS | HEIGHT: 64 IN

## 2023-10-25 DIAGNOSIS — N20.0 CALCULUS OF KIDNEY: Chronic | ICD-10-CM

## 2023-10-25 DIAGNOSIS — Z30.42 ENCOUNTER FOR SURVEILLANCE OF INJECTABLE CONTRACEPTIVE: ICD-10-CM

## 2023-10-25 DIAGNOSIS — N93.9 ABNORMAL UTERINE AND VAGINAL BLEEDING, UNSPECIFIED: ICD-10-CM

## 2023-10-25 DIAGNOSIS — Z01.419 ENCOUNTER FOR GYNECOLOGICAL EXAMINATION (GENERAL) (ROUTINE) WITHOUT ABNORMAL FINDINGS: ICD-10-CM

## 2023-10-25 DIAGNOSIS — Z90.79 ACQUIRED ABSENCE OF OTHER GENITAL ORGAN(S): Chronic | ICD-10-CM

## 2023-10-25 DIAGNOSIS — Z95.828 PRESENCE OF OTHER VASCULAR IMPLANTS AND GRAFTS: Chronic | ICD-10-CM

## 2023-10-25 LAB
GAD65 AB SER-MCNC: 0 NMOL/L
HPV HIGH+LOW RISK DNA PNL CVX: SIGNIFICANT CHANGE UP
HPV HIGH+LOW RISK DNA PNL CVX: SIGNIFICANT CHANGE UP
ISLET CELL512 AB SER-SCNC: 0 NMOL/L

## 2023-10-25 PROCEDURE — ZZZZZ: CPT

## 2023-10-27 LAB
CYTOLOGY SPEC DOC CYTO: SIGNIFICANT CHANGE UP
CYTOLOGY SPEC DOC CYTO: SIGNIFICANT CHANGE UP

## 2023-10-30 LAB — ZINC TRANSPORTER 8 AB: <15 U/ML

## 2023-11-01 DIAGNOSIS — E11.9 TYPE 2 DIABETES MELLITUS WITHOUT COMPLICATIONS: ICD-10-CM

## 2023-11-01 DIAGNOSIS — J45.909 UNSPECIFIED ASTHMA, UNCOMPLICATED: ICD-10-CM

## 2023-11-01 DIAGNOSIS — J30.9 ALLERGIC RHINITIS, UNSPECIFIED: ICD-10-CM

## 2023-11-06 ENCOUNTER — APPOINTMENT (OUTPATIENT)
Dept: INTERNAL MEDICINE | Facility: CLINIC | Age: 43
End: 2023-11-06
Payer: MEDICAID

## 2023-11-06 ENCOUNTER — TRANSCRIPTION ENCOUNTER (OUTPATIENT)
Age: 43
End: 2023-11-06

## 2023-11-06 ENCOUNTER — OUTPATIENT (OUTPATIENT)
Dept: OUTPATIENT SERVICES | Facility: HOSPITAL | Age: 43
LOS: 1 days | End: 2023-11-06

## 2023-11-06 VITALS
WEIGHT: 166.13 LBS | HEART RATE: 92 BPM | SYSTOLIC BLOOD PRESSURE: 110 MMHG | BODY MASS INDEX: 28.36 KG/M2 | DIASTOLIC BLOOD PRESSURE: 80 MMHG | OXYGEN SATURATION: 99 % | HEIGHT: 64 IN

## 2023-11-06 DIAGNOSIS — N93.9 ABNORMAL UTERINE AND VAGINAL BLEEDING, UNSPECIFIED: ICD-10-CM

## 2023-11-06 DIAGNOSIS — E11.9 TYPE 2 DIABETES MELLITUS WITHOUT COMPLICATIONS: ICD-10-CM

## 2023-11-06 DIAGNOSIS — Z95.828 PRESENCE OF OTHER VASCULAR IMPLANTS AND GRAFTS: Chronic | ICD-10-CM

## 2023-11-06 DIAGNOSIS — F32.A DEPRESSION, UNSPECIFIED: ICD-10-CM

## 2023-11-06 DIAGNOSIS — R05.9 COUGH, UNSPECIFIED: ICD-10-CM

## 2023-11-06 DIAGNOSIS — Z90.79 ACQUIRED ABSENCE OF OTHER GENITAL ORGAN(S): Chronic | ICD-10-CM

## 2023-11-06 DIAGNOSIS — E78.5 HYPERLIPIDEMIA, UNSPECIFIED: ICD-10-CM

## 2023-11-06 DIAGNOSIS — N20.0 CALCULUS OF KIDNEY: Chronic | ICD-10-CM

## 2023-11-06 DIAGNOSIS — F41.9 ANXIETY DISORDER, UNSPECIFIED: ICD-10-CM

## 2023-11-06 DIAGNOSIS — M25.569 PAIN IN UNSPECIFIED KNEE: ICD-10-CM

## 2023-11-06 PROCEDURE — 99214 OFFICE O/P EST MOD 30 MIN: CPT | Mod: GC

## 2023-11-07 ENCOUNTER — APPOINTMENT (OUTPATIENT)
Dept: ULTRASOUND IMAGING | Facility: CLINIC | Age: 43
End: 2023-11-07
Payer: MEDICAID

## 2023-11-07 PROBLEM — N93.9 ABNORMAL UTERINE BLEEDING (AUB): Status: ACTIVE | Noted: 2023-10-06

## 2023-11-07 PROCEDURE — 76831 ECHO EXAM UTERUS: CPT

## 2023-11-07 PROCEDURE — 58340 CATHETER FOR HYSTEROGRAPHY: CPT

## 2023-11-13 PROBLEM — Z82.3 FAMILY HISTORY OF STROKE: Status: ACTIVE | Noted: 2023-10-24

## 2023-11-14 ENCOUNTER — TRANSCRIPTION ENCOUNTER (OUTPATIENT)
Age: 43
End: 2023-11-14

## 2023-11-21 ENCOUNTER — APPOINTMENT (OUTPATIENT)
Dept: OPHTHALMOLOGY | Facility: CLINIC | Age: 43
End: 2023-11-21

## 2023-12-05 ENCOUNTER — APPOINTMENT (OUTPATIENT)
Dept: OBGYN | Facility: HOSPITAL | Age: 43
End: 2023-12-05

## 2023-12-12 ENCOUNTER — OUTPATIENT (OUTPATIENT)
Dept: OUTPATIENT SERVICES | Facility: HOSPITAL | Age: 43
LOS: 1 days | End: 2023-12-12

## 2023-12-12 ENCOUNTER — APPOINTMENT (OUTPATIENT)
Dept: INTERNAL MEDICINE | Facility: CLINIC | Age: 43
End: 2023-12-12
Payer: MEDICAID

## 2023-12-12 VITALS — HEIGHT: 64 IN | BODY MASS INDEX: 28.34 KG/M2 | WEIGHT: 166 LBS

## 2023-12-12 DIAGNOSIS — Z90.79 ACQUIRED ABSENCE OF OTHER GENITAL ORGAN(S): Chronic | ICD-10-CM

## 2023-12-12 DIAGNOSIS — N20.0 CALCULUS OF KIDNEY: Chronic | ICD-10-CM

## 2023-12-12 DIAGNOSIS — Z95.828 PRESENCE OF OTHER VASCULAR IMPLANTS AND GRAFTS: Chronic | ICD-10-CM

## 2023-12-12 DIAGNOSIS — F32.A DEPRESSION, UNSPECIFIED: ICD-10-CM

## 2023-12-12 PROCEDURE — ZZZZZ: CPT

## 2023-12-13 NOTE — REVIEW OF SYSTEMS
[Fever] : no fever [Chills] : no chills [Night Sweats] : no night sweats [Discharge] : no discharge [Pain] : no pain [Vision Problems] : no vision problems [Earache] : no earache [Hearing Loss] : no hearing loss [Nasal Discharge] : no nasal discharge [Sore Throat] : no sore throat [Chest Pain] : no chest pain [Palpitations] : no palpitations [Orthopnea] : no orthopnea [Lower Ext Edema] : no lower extremity edema [Shortness Of Breath] : no shortness of breath [Wheezing] : no wheezing [Dyspnea on Exertion] : no dyspnea on exertion [Abdominal Pain] : no abdominal pain [Nausea] : no nausea [Constipation] : no constipation [Diarrhea] : diarrhea [Vomiting] : no vomiting [Melena] : no melena [Dysuria] : no dysuria [Incontinence] : no incontinence [Hematuria] : no hematuria [Frequency] : no frequency [Joint Pain] : no joint pain [Muscle Pain] : no muscle pain [Skin Rash] : no skin rash [Headache] : no headache [Dizziness] : no dizziness [Fainting] : no fainting [Suicidal] : not suicidal [Insomnia] : no insomnia [Anxiety] : no anxiety

## 2023-12-13 NOTE — ASSESSMENT
[FreeTextEntry1] : Time spent on telephonic visit 15 min  RTC Jan 19th 2024 for scheduled f/u appt  Case d/w Dr. Rm Foley MD   Internal Medicine, PGY2   [facility], [employer] [facility]
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
## 2023-12-14 DIAGNOSIS — F32.A DEPRESSION, UNSPECIFIED: ICD-10-CM

## 2024-01-09 ENCOUNTER — RESULT REVIEW (OUTPATIENT)
Age: 44
End: 2024-01-09

## 2024-01-09 ENCOUNTER — APPOINTMENT (OUTPATIENT)
Dept: OBGYN | Facility: HOSPITAL | Age: 44
End: 2024-01-09
Payer: MEDICAID

## 2024-01-09 ENCOUNTER — OUTPATIENT (OUTPATIENT)
Dept: OUTPATIENT SERVICES | Facility: HOSPITAL | Age: 44
LOS: 1 days | End: 2024-01-09

## 2024-01-09 VITALS
HEART RATE: 79 BPM | WEIGHT: 167 LBS | SYSTOLIC BLOOD PRESSURE: 124 MMHG | BODY MASS INDEX: 28.51 KG/M2 | HEIGHT: 64 IN | DIASTOLIC BLOOD PRESSURE: 86 MMHG | TEMPERATURE: 97.7 F

## 2024-01-09 DIAGNOSIS — Z95.828 PRESENCE OF OTHER VASCULAR IMPLANTS AND GRAFTS: Chronic | ICD-10-CM

## 2024-01-09 DIAGNOSIS — Z90.79 ACQUIRED ABSENCE OF OTHER GENITAL ORGAN(S): Chronic | ICD-10-CM

## 2024-01-09 DIAGNOSIS — N20.0 CALCULUS OF KIDNEY: Chronic | ICD-10-CM

## 2024-01-09 PROCEDURE — 99213 OFFICE O/P EST LOW 20 MIN: CPT | Mod: 25

## 2024-01-09 RX ORDER — MEDROXYPROGESTERONE ACETATE 150 MG/ML
150 INJECTION, SUSPENSION INTRAMUSCULAR
Qty: 0 | Refills: 0 | Status: COMPLETED | OUTPATIENT
Start: 2024-01-09

## 2024-01-09 RX ADMIN — MEDROXYPROGESTERONE ACETATE 0 MG/ML: 150 INJECTION, SUSPENSION INTRAMUSCULAR at 00:00

## 2024-01-10 DIAGNOSIS — Z30.42 ENCOUNTER FOR SURVEILLANCE OF INJECTABLE CONTRACEPTIVE: ICD-10-CM

## 2024-01-16 ENCOUNTER — RESULT REVIEW (OUTPATIENT)
Age: 44
End: 2024-01-16

## 2024-01-17 ENCOUNTER — RESULT REVIEW (OUTPATIENT)
Age: 44
End: 2024-01-17

## 2024-01-17 ENCOUNTER — OUTPATIENT (OUTPATIENT)
Dept: OUTPATIENT SERVICES | Facility: HOSPITAL | Age: 44
LOS: 1 days | Discharge: HOME | End: 2024-01-17

## 2024-01-17 DIAGNOSIS — C85.10 UNSPECIFIED B-CELL LYMPHOMA, UNSPECIFIED SITE: ICD-10-CM

## 2024-01-17 DIAGNOSIS — Z95.828 PRESENCE OF OTHER VASCULAR IMPLANTS AND GRAFTS: Chronic | ICD-10-CM

## 2024-01-17 DIAGNOSIS — Z90.79 ACQUIRED ABSENCE OF OTHER GENITAL ORGAN(S): Chronic | ICD-10-CM

## 2024-01-17 DIAGNOSIS — N20.0 CALCULUS OF KIDNEY: Chronic | ICD-10-CM

## 2024-01-19 ENCOUNTER — APPOINTMENT (OUTPATIENT)
Dept: INTERNAL MEDICINE | Facility: CLINIC | Age: 44
End: 2024-01-19

## 2024-01-21 ENCOUNTER — APPOINTMENT (OUTPATIENT)
Dept: CT IMAGING | Facility: IMAGING CENTER | Age: 44
End: 2024-01-21
Payer: MEDICAID

## 2024-01-21 ENCOUNTER — OUTPATIENT (OUTPATIENT)
Dept: OUTPATIENT SERVICES | Facility: HOSPITAL | Age: 44
LOS: 1 days | End: 2024-01-21
Payer: MEDICAID

## 2024-01-21 DIAGNOSIS — Z00.8 ENCOUNTER FOR OTHER GENERAL EXAMINATION: ICD-10-CM

## 2024-01-21 DIAGNOSIS — C85.10 UNSPECIFIED B-CELL LYMPHOMA, UNSPECIFIED SITE: ICD-10-CM

## 2024-01-21 DIAGNOSIS — N20.0 CALCULUS OF KIDNEY: Chronic | ICD-10-CM

## 2024-01-21 DIAGNOSIS — Z95.828 PRESENCE OF OTHER VASCULAR IMPLANTS AND GRAFTS: Chronic | ICD-10-CM

## 2024-01-21 DIAGNOSIS — Z90.79 ACQUIRED ABSENCE OF OTHER GENITAL ORGAN(S): Chronic | ICD-10-CM

## 2024-01-21 PROCEDURE — 74177 CT ABD & PELVIS W/CONTRAST: CPT | Mod: 26

## 2024-01-21 PROCEDURE — 70491 CT SOFT TISSUE NECK W/DYE: CPT | Mod: 26

## 2024-01-21 PROCEDURE — 74177 CT ABD & PELVIS W/CONTRAST: CPT

## 2024-01-21 PROCEDURE — 71260 CT THORAX DX C+: CPT | Mod: 26

## 2024-01-21 PROCEDURE — 71260 CT THORAX DX C+: CPT

## 2024-01-21 PROCEDURE — 70491 CT SOFT TISSUE NECK W/DYE: CPT

## 2024-01-24 ENCOUNTER — RESULT REVIEW (OUTPATIENT)
Age: 44
End: 2024-01-24

## 2024-01-24 ENCOUNTER — APPOINTMENT (OUTPATIENT)
Dept: HEMATOLOGY ONCOLOGY | Facility: CLINIC | Age: 44
End: 2024-01-24
Payer: MEDICAID

## 2024-01-24 VITALS
HEART RATE: 76 BPM | BODY MASS INDEX: 28.6 KG/M2 | SYSTOLIC BLOOD PRESSURE: 122 MMHG | HEIGHT: 64.02 IN | WEIGHT: 167.55 LBS | RESPIRATION RATE: 16 BRPM | DIASTOLIC BLOOD PRESSURE: 85 MMHG | OXYGEN SATURATION: 100 % | TEMPERATURE: 98 F

## 2024-01-24 LAB
BASOPHILS # BLD AUTO: 0.05 K/UL — SIGNIFICANT CHANGE UP (ref 0–0.2)
BASOPHILS NFR BLD AUTO: 0.7 % — SIGNIFICANT CHANGE UP (ref 0–2)
EOSINOPHIL # BLD AUTO: 0.17 K/UL — SIGNIFICANT CHANGE UP (ref 0–0.5)
EOSINOPHIL NFR BLD AUTO: 2.3 % — SIGNIFICANT CHANGE UP (ref 0–6)
HCT VFR BLD CALC: 37.2 % — SIGNIFICANT CHANGE UP (ref 34.5–45)
HGB BLD-MCNC: 11.8 G/DL — SIGNIFICANT CHANGE UP (ref 11.5–15.5)
IMM GRANULOCYTES NFR BLD AUTO: 0.4 % — SIGNIFICANT CHANGE UP (ref 0–0.9)
LYMPHOCYTES # BLD AUTO: 1.79 K/UL — SIGNIFICANT CHANGE UP (ref 1–3.3)
LYMPHOCYTES # BLD AUTO: 23.9 % — SIGNIFICANT CHANGE UP (ref 13–44)
MCHC RBC-ENTMCNC: 24.9 PG — LOW (ref 27–34)
MCHC RBC-ENTMCNC: 31.7 G/DL — LOW (ref 32–36)
MCV RBC AUTO: 78.5 FL — LOW (ref 80–100)
MONOCYTES # BLD AUTO: 0.39 K/UL — SIGNIFICANT CHANGE UP (ref 0–0.9)
MONOCYTES NFR BLD AUTO: 5.2 % — SIGNIFICANT CHANGE UP (ref 2–14)
NEUTROPHILS # BLD AUTO: 5.05 K/UL — SIGNIFICANT CHANGE UP (ref 1.8–7.4)
NEUTROPHILS NFR BLD AUTO: 67.5 % — SIGNIFICANT CHANGE UP (ref 43–77)
NRBC # BLD: 0 /100 WBCS — SIGNIFICANT CHANGE UP (ref 0–0)
PLATELET # BLD AUTO: 291 K/UL — SIGNIFICANT CHANGE UP (ref 150–400)
RBC # BLD: 4.74 M/UL — SIGNIFICANT CHANGE UP (ref 3.8–5.2)
RBC # FLD: 14.4 % — SIGNIFICANT CHANGE UP (ref 10.3–14.5)
WBC # BLD: 7.48 K/UL — SIGNIFICANT CHANGE UP (ref 3.8–10.5)
WBC # FLD AUTO: 7.48 K/UL — SIGNIFICANT CHANGE UP (ref 3.8–10.5)

## 2024-01-24 PROCEDURE — 99214 OFFICE O/P EST MOD 30 MIN: CPT

## 2024-01-24 NOTE — PHYSICAL EXAM
[Fully active, able to carry on all pre-disease performance without restriction] : Status 0 - Fully active, able to carry on all pre-disease performance without restriction [Normal] : affect appropriate [de-identified] : L neck excisional site healed

## 2024-01-24 NOTE — HISTORY OF PRESENT ILLNESS
[de-identified] : 39yo F w/ asthma here for evaluation of newly diagnosed follicular lymphoma. \par  \par  Patient sates she initially noted left inguinal pain in February 2019, had a palpable mass for the last 6 years which she noticed after delivery of her second baby in 2013. She saw GYN, had TVUS: Uterus: 5.6 x 3.6 x 4.3 cm. EMS 3 mm. Right ovary normal. Left ovary normal. Complex elongated left adnexal lesion suggestive of hydrosalpinx, 9.2 x 4.9 x 9 cm. She is s/p b/l salpingectomy on 5/2/19. She remains on depo injection. She states that she has had more swelling and more pain since her procedure. \par  \par  She had CT A/P done on 5/9/19 which demonstrated extensive retroperitoneal, pelvic, and inguinal lymphadenopathy, concerning for malignancy with small amount of abdominal and pelvic ascites.\par  s/p IR biopsy of left inguinal lymph node - follicular lymphoma grade 1-2. \par  \par  Also having cervical LAD, noticed for the last few months, L>R. Noted R inguinal swelling for the last week with pain. No fevers of chills. Notes having itching and that her breathing is a bit more labored than usual.  [de-identified] : BMbx (6/10/19): - Focal lymphoid infiltrate consistent with involvement by follicular lymphoma (history of follicular lymphoma) - Small paracortical sample with trilineage hematopoiesis with maturation and iron stores present  PET/CT (6/16/19): 1. Hypermetabolic lymphadenopathy in neck, thorax, abdomen, pelvis, and bilateral inguinal/femoral regions corresponds to biopsy-proven follicular lymphoma. Hypermetabolic subcutaneous nodule, right posterolateral chest wall, is compatible with an additional site of lymphoma. 2. Nonspecific left greater than right tonsillar hypermetabolism may represent additional sites of lymphoma. 3. Findings compatible with bone marrow involvement by lymphoma in bilateral humeri and axial skeleton, as described above.  She reports having increasing fatigue. Still having slight shortness of breath.  We started BR on 6/24. She had a reaction to Rituxan.   Eating fine. Neck LAD, shortness of breath and abdominal bloating improved/resolved.  She had a yeast infection, saw GYN. Also reports having pelvic pain and was found to have a small polyp.   C2 on 7/22. She notes having burning as she was getting the chemo (please see chart note for details). Notes that her arms were still hurting for a week after.  She had a portacath placed 8/12 Felt more fatigued and weak.  Had transvaginal US, saw GYN this morning (9/5/19). Pelvic pain has resolved.   C3 on 8/19. Tolerated it well.   CT N/C/A/P (9/10/19): Marked decrease in size of cervical lymph nodes when compared with the prior exam compatible with a favorable response to therapy. Significant interval decrease in size of left supraclavicular lymphadenopathy. Focal enhancement involving the right anterior tonsillar without associated tonsillar expansion. Correlation with direct visualization and continued follow-up is advised. Significant interval decrease in size of previously noted hypermetabolic nodule in the soft tissues of the right posterior lateral chest. Significant interval decrease in size of retroperitoneal, pelvic, inguinal, and mesenteric lymphadenopathy as described above.  C4 on 9/16. Tolerated well, had some tremors afterwards.   C5 was delayed secondary to neutropenia. Given on 10/30/19. Had an episode of chest tightness w/ Pillo on day 1. Premedicated on day 2 with no events. Tremors better this cycle but notes 3 episodes of restless legs.   C6 on 11/26/19. She reports having muscle aches.   PET/CT 1/18/20: 1. Interval resolution of FDG activity associated with lymphadenopathy in the neck, chest, upper abdomen and pelvis with either resolution or significant decrease in size of the corresponding lymph nodes and not well delineated on CT as compared to PET/CT from 6/16/2019. Near total resolution of FDG avid left inguinal lymphadenopathy which is significantly decreased in size now demonstrating background activity. 2. Interval significant decrease in size and metabolism of hypermetabolic retroperitoneal and mesenteric lymphadenopathy. 3. Interval resolution of asymmetric tonsillar hypermetabolism. 4. Interval resolution of FDG avidity in bone marrow of bilateral humeri, T10, and sacrum.  Having pain on L side of lower abdomen and groin around 3-4 weeks ago. Having night sweats again too. Had GYN f/u 2 weeks ago. We did a PET/CT on 6/13 which showed: 1. Small focus of mild FDG activity, decreased in size and metabolism, is associated with mesenteric haziness which is unchanged on CT as compared to prior study dated 1/18/2020 ([facility] 4). Resolution of minimally FDG-avid amorphous density, left periaortic region. 2. Minimally FDG-avid density, left inguinal region, unchanged, may represent postsurgical change versus lymph node. Please correlate clinically.  Started Rituxan maintenance on 6/25/20. She had a reaction to Rituxan -please see chart note for details.  Second dose on 8/31/20, tolerated better. Reports having back pain after premeds wore off.  3rd dose on 11/4/20, she felt tired and slept through the treatment, denied nausea, but c/o tingling in the finger tips and feet comes and goes. Patient admitted tremor improved. birth control pills d/c 3 months ago, she attributed the nausea after Rituxan to the withdrawal of birth control pills. Kidney stone stent removed today.  Fourth dose on 12/30/20. Pt reports nausea and fatigue. States that does not feel well. Does not want to continue at this time.   PET/CT 2/6/21: 1. Resolution of small focus of mild FDG activity associated with mesenteric haziness which is unchanged on CT ([facility] 1). 2. Minimally FDG-avid density, left inguinal region, unchanged, may represent postsurgical change versus lymph node. Please correlate clinically. 3. Remainder study is unremarkable and not significantly changed, demonstrating no evidence of FDG-avid disease.  She is doing well, no new complaints.  Continues to be fatigued. Reports having a difficult time coping with her diagnosis and her transition off treatment.  She received both her COVID vaccine doses - 5/27 and 6/17  Had GYN and PCP annual check up 2 weeks ago, she admitted cholesterol level was slightly high other than that no remarkable findings, will f/u in 3 months. PCP ordered Echo, will do after Thanksgiving. will have Mammogram done next month.  Never went to psychotherapy, she felt everything calmed down now, the working gets better, she rested better, she felt much less stressful now.  Denied any new complaints.   She had COVID in Dec - had URI symptoms and had CP/SOB.  She saw GI on 1/10/22 and had endoscopy done. Taking omeprazole with relief.   Saw [facility] Dr. Jolly on 3/22/22 for voice hoarseness and neck mass.  CT C/A/P done 3/5/22: No evidence of recurrent or metastatic disease in the chest, abdomen, or pelvis. s/p excisional lymph node, left neck, biopsy- Follicular and paracortical hyperplasia   She will start PT on Tues for L shoulder syndrome.  Denied any new complaints.   Patient was seen today, feeling fine overall. had left shoulder pain since biopsy of left neck.  Started PT early July 2 day/wk now 1 day a week, left should pain improved.  She f/u w/ GYN, Ophthalmologist, [facility] doc recently no remark findings.  Denied any constitutional symptoms.  Intermittent voice hoarseness, following with ENT  CT N/C/A/P 1/12/23: Slight interval increased size left level 2 lymph nodes as detailed above. Stable additional retropharyngeal and right cervical chain lymph nodes. No new adenopathy. Stable nonspecific prominence of Waldeyer's ring. Nonspecific increase in prominence of subcentimeter mesenteric lymph nodes largest measuring 1.0 x 0.9 cm in the right lower quadrant. Consider further evaluation with PET/CT scan.  She had COVID in Dec 2022, s/p Paxlovid. She continues having hoarseness. She continues having cough, worse at night. She has intermittent chest pain.  Mammo/US done 1/19/23: BI-RADS 0 -incomplete.  Repeated US breast on 1/31/23: BI-RADS 4A- suspicious findings; low suspicion for Malignancy; Ultrasound guided core needle biopsy of the left breast done on 3/9: revealed begin ductal ectasia;  c/o neuropathy in feet, f/u neurologist Rx of gabapentin but she didn't' take it. Only took Tylenol for pain relief.  She admitted chest pain and cough subsided now.   Having a cough -went to urgent care, said it was 2/2 postnasal drip.  Continues with physical therapy  CT N/C/A/P 1/21/24: Scattered bilateral cervical lymph nodes are stable in number and distribution, all measuring mildly larger (few millimeter growth) compared to 1/12/2023. None grown by more than 25% and these are favored to be physiologic or reactive. Stable to decreased size of mesenteric nodes since 01/12/2023. Otherwise, no new lymphadenopathy in the chest, abdomen, or pelvis.  She had a sore throat and body aches x2 wks -improving. COVID negative

## 2024-01-24 NOTE — ASSESSMENT
[FreeTextEntry1] : 43yo F w/ asthma here for f/u of stage IV follicular lymphoma grade 1-2. We started treatment with Bendamustine/Rituxan on 6/24/19. LAD has improved. C5 delayed due to neutropenia, received on 10/30/19. C6 on 11/26/19, tolerated treatment well. PET/CT done at end of treatment showed interval significant decrease in size and metabolism of LAD.  PET/CT unchanged. Started on Rituximab maintenance on 6/25/20. Cont every 2 mo. s/p 4th dose of Rituxan on 12/30/20. She had worsened side effects after 4th dose and does not want to continue. Will hold further Rituxan maintenance given intolerance. PET/CT post Rituxan 2/2021 stable Last imaging from 3/2022 [facility]. s/p excisional biopsy of L neck mass that was unremarkable CT N/C/A/P done 1/2023 showed slight increase in LAD but still ~1-1.5cm.  CT N/C/A/P in 1/2024 - bilateral cervical lymph nodes slightly enlarged but she currently has URI. Otherwise LAD stable. Results reviewed with pt f/u w/ PMD and gyn Repeated US breast BI-ARDS 4A, US guided biopsy of left breast done on 2/8/23: begin ductal ectasia; Port removed All questions answered RTC 4 mo.

## 2024-01-26 ENCOUNTER — APPOINTMENT (OUTPATIENT)
Dept: ULTRASOUND IMAGING | Facility: IMAGING CENTER | Age: 44
End: 2024-01-26
Payer: MEDICAID

## 2024-01-26 ENCOUNTER — RESULT REVIEW (OUTPATIENT)
Age: 44
End: 2024-01-26

## 2024-01-26 ENCOUNTER — OUTPATIENT (OUTPATIENT)
Dept: OUTPATIENT SERVICES | Facility: HOSPITAL | Age: 44
LOS: 1 days | End: 2024-01-26
Payer: MEDICAID

## 2024-01-26 ENCOUNTER — APPOINTMENT (OUTPATIENT)
Dept: MAMMOGRAPHY | Facility: IMAGING CENTER | Age: 44
End: 2024-01-26
Payer: MEDICAID

## 2024-01-26 DIAGNOSIS — Z90.79 ACQUIRED ABSENCE OF OTHER GENITAL ORGAN(S): Chronic | ICD-10-CM

## 2024-01-26 DIAGNOSIS — Z95.828 PRESENCE OF OTHER VASCULAR IMPLANTS AND GRAFTS: Chronic | ICD-10-CM

## 2024-01-26 DIAGNOSIS — Z00.8 ENCOUNTER FOR OTHER GENERAL EXAMINATION: ICD-10-CM

## 2024-01-26 DIAGNOSIS — N20.0 CALCULUS OF KIDNEY: Chronic | ICD-10-CM

## 2024-01-26 LAB
ALBUMIN SERPL ELPH-MCNC: 4.2 G/DL
ALP BLD-CCNC: 114 U/L
ALT SERPL-CCNC: 27 U/L
ANION GAP SERPL CALC-SCNC: 12 MMOL/L
AST SERPL-CCNC: 14 U/L
BILIRUB SERPL-MCNC: 0.2 MG/DL
BUN SERPL-MCNC: 10 MG/DL
CALCIUM SERPL-MCNC: 9.1 MG/DL
CHLORIDE SERPL-SCNC: 106 MMOL/L
CO2 SERPL-SCNC: 21 MMOL/L
CREAT SERPL-MCNC: 0.7 MG/DL
EGFR: 111 ML/MIN/1.73M2
GLUCOSE SERPL-MCNC: 117 MG/DL
LDH SERPL-CCNC: 161 U/L
POTASSIUM SERPL-SCNC: 4.2 MMOL/L
PROT SERPL-MCNC: 6.1 G/DL
SODIUM SERPL-SCNC: 139 MMOL/L

## 2024-01-26 PROCEDURE — 76641 ULTRASOUND BREAST COMPLETE: CPT

## 2024-01-26 PROCEDURE — 77063 BREAST TOMOSYNTHESIS BI: CPT | Mod: 26

## 2024-01-26 PROCEDURE — 77067 SCR MAMMO BI INCL CAD: CPT | Mod: 26

## 2024-01-26 PROCEDURE — 76641 ULTRASOUND BREAST COMPLETE: CPT | Mod: 26,50

## 2024-01-26 PROCEDURE — 77063 BREAST TOMOSYNTHESIS BI: CPT

## 2024-01-26 PROCEDURE — 77067 SCR MAMMO BI INCL CAD: CPT

## 2024-01-28 NOTE — REVIEW OF SYSTEMS
[Postnasal Drip] : postnasal drip [Cough] : cough [Chest Tightness] : chest tightness [Wheezing] : wheezing [Negative] : Endocrine

## 2024-01-29 ENCOUNTER — APPOINTMENT (OUTPATIENT)
Dept: PULMONOLOGY | Facility: CLINIC | Age: 44
End: 2024-01-29
Payer: MEDICAID

## 2024-01-29 VITALS
WEIGHT: 164 LBS | HEIGHT: 64.02 IN | SYSTOLIC BLOOD PRESSURE: 125 MMHG | HEART RATE: 78 BPM | DIASTOLIC BLOOD PRESSURE: 87 MMHG | BODY MASS INDEX: 28 KG/M2 | OXYGEN SATURATION: 100 % | RESPIRATION RATE: 17 BRPM

## 2024-01-29 PROCEDURE — 99214 OFFICE O/P EST MOD 30 MIN: CPT

## 2024-01-29 NOTE — ASSESSMENT
OBSERVATION OT Acute Initial Evaluation  Plan of Care Note            Pt seen on 3 [facility] nursing unit.        ASSESSMENT:   Emphasis of session included initial evaluation. Pt presents to [facility] ED 1/17 with c/o HA, nausea, LLE pain, and increased blood pressure; transferred to [facility] 1/18. She has PMH of arthritis, fibromyalgia, LE weakness (post polio per chart), and recent R femur fracture. Prior to admit pt living with daughter in condo, functioning at mod independent level overall. Requires mod A with bathing & UE dressing. Utilizes crutches as ambulatory assistive device.    Currently pt presents with increased pain and weakness. Requires min A with bed mobility, mod A with sit<>stand transfers. Pt able to complete partial sit<>stand x 2; clears bottom from bed & remains in forward flexed posture with mod A support from therapist. Able to complete part of LE dressing task with supervision, however is unable to complete fully due to fatigue.   Patient's overall level of function is below baseline. Continued skilled therapy is indicated to address below stated deficits in order to ensure safest d/c once medically stable.       OT Identified Barriers to Discharge: pain, weakness, decreased activity tolerance, limited mobility, ADL status     PLAN:   Continue skilled OT, including the following Treatment Interventions: ADL retraining;Functional transfer training;Endurance training;Patient/Family training;Equipment eval/education;Compensatory technique education (01/20/17 1230)   OT Frequency: 6 days/week (01/20/17 1230)    Treatment Plan for Next Session: progress mobility, LE ADL, toileting via commode      Plan Comments: daughter has health problems, pt unable to return home non-ambulatory     DIAGNOSIS:  1. Viral gastroenteritis    2. Headache, unspecified headache type    3. Weakness        Co-morbidities:   Patient Active Problem List   Diagnosis   •  right breast cancer - 1993  status post mastectomy  + chemo -  [FreeTextEntry1] : -  Ms Singh is a 43-year old female with a history of allergic rhinitis and mild intermittent asthma, follicular lymphoma s/p maintenance therapy with rituximab (last dose Dec 2020), prediabetes, and HLD who presents for follow-up. evaluation. She was last seen in Oct 2023 and started on Symbicort 80-4.5 mcg 2 puffs bid for likely asthma. She reports that wheezing, coughing, chest tightness, and dyspnea have all resolved. She now takes Symbicort PRN, last use was 1 week ago. She recently had viral URI's in Dec 2023 and Jan 2024, now with resolved symptoms. She also takes levocetirizine 5 mg daily PRN and azelastine-fluticasone nasal spray twice daily.   CT Chest (Jan 2024) with patent central airways, calcified right lung granulomas, no pleural effusion, no thoracic lymphadenopathy. CT A/P with stable to decreased size of mesenteric nodes. No new lymphadenopathy in the chest, abdomen, pelvis.  PFTs (Oct 2023) with mild restrictive ventilatory impairment, low lung volumes, and moderately reduced diffusing capacity. No bronchodilator response.  Labs in Oct 2023 with normal peripheral eos (200), normal TSH, and normal CMP. Quantiferon Gold TB negative.  ASSESSMENT: Mild restrictive lung disease Mild persistent asthma Allergic rhinitis Upper airway cough syndrome  PLAN: - PFTs done 10/23/23 with mild restriction, low lung volumes, and moderately reduced diffusing capacity. No bronchodilator response - No evidence of parenchymal lung disease on CT chest in Jan 2024. Restrictive lung disease is likely extrathoracic - Repeat PFTs in May/June 2024 to assess for stability - Stress echo in Feb 2023 with normal LV systolic function, no significant valvular disease, no pericardial effusion, and normal RV size and systolic function with no evidence of pulmonary hypertension. Stress echo with no evidence of inducible ischemia - Continue Symbicort 80-4.5 mcg 2 puffs twice daily PRN, rinse after use - Levocetirizine 5 mg daily for allergies - Azelastine-fluticasone nasal spray 1 spray per nostril twice daily - Up to date with influenza and pneumococcal vaccinations - She is considering to receive the RSV vaccine - Follow-up in May/[place] with repeat PFTs see comment    • status post CHON + BSO - benign   • Fibromyalgia - Good response to massage   • osteopenia - see comment    • History of fracture - recent : MAJOR    • DJD (degenerative joint disease), lumbar   • much improved : incontinence issues    • last  colonoscopy 2012 - right colon polyp    • no further pap smears are indicated    • Benign positional vertigo   • Gait instability   • Frontal headache   • Nausea with vomiting   • Closed fracture of distal end of right femur       TASK MODIFICATION FOR EVALUATION ONLY (delete if not evaluation):  Task Modification: clinical decision making of moderate complexity, minimal to moderate task modification    SUBJECTIVE: Subjective: Pt agreeable/ motivated for session  (01/20/17 1230)    OBJECTIVE:  Precautions:  Precautions  Other Precautions: post polio LE weakness, recent R femur fx, h/o incontinence- wears depends (01/20/17 1230)  Precautions Comments: Per pt, unable to fully weight bear through either foot. Toe touch R, heel walks L.  (01/20/17 1230)  RN reported Cordova Fall Scale Score: 85 (>45 is considered at risk of fall)  Vitals:     Activity Tolerance:  limited by pain, weakness, decreased tolerance   Exercise:  Other Interventions 1: Increased time spent on pt/ family education in regards to role of OT, POC, and d/c planning.  (01/20/17 1230)    Functional Status (as of date/time noted)  ADLs:  Self Cares/ADL's  Footwear Assistance: Supervision;Edge of bed (01/20/17 1230)  Lower Body Dressing Deficit: Don/doff R sock (01/20/17 1230)  Self Cares/ADL's Comments #1: Pt becomes fatigued following partial LE dressing task; returned to supine per pt request.  (01/20/17 1230)    Household Mobility:  Household Mobility  Supine to Sit: Minimal Assist (Min) (RLE lifting over edge of bed ) (01/20/17 1230)  Sit to Supine: Minimal Assist (Min) (LLE lifting into bed ) (01/20/17 1230)  Sit to Stand: Moderate Assist (Mod) (clears bottom from bed, stands with mod A &  forward flexion ) (01/20/17 1230)  Stand to Sit: Moderate Assist (Mod) (01/20/17 1230)  Sitting - Static: Supervision (01/20/17 1230)  Sitting - Dynamic: Supervision (01/20/17 1230)  Standing - Static: Moderate Assist (Mod) (01/20/17 1230)  Household Mobility Comments #1: Pt able to clear bottom from bed & stand with mod A and forward flexed posture x 2.  (01/20/17 1230)    Home Management:       See OT flowsheet for full details regarding the OT therapy provided.    GOALS:  Short Term Goals to Be Reviewed On: 01/26/17 (01/20/17 1230)  Short Term Goals Are The Same as Discharge Goals: Yes (01/20/17 1230)  Goal Agreement: Patient agrees with goals and treatment plan (01/20/17 1230)  Grooming Discharge Goal 1: mod I  (01/20/17 1230)  Bathing Discharge Goal 1: mod A  (01/20/17 1230)  Dressing Discharge Goal 1: mod A (UE's), mod I LE's  (01/20/17 1230)  Toileting Discharge Goal 1: mod I  (01/20/17 1230)  Home Setting Transfer Discharge Goal 1: mod I  (01/20/17 1230)    EDUCATION:   On this date, the patient and family member was educated on role of OT, POC, d/c planning, transfers, ADLs.    The response to education was: Verbalizes understanding, Demonstrates understanding and Needs reinforcement    RECOMMENDATIONS FOR DISCHARGE:  Recommendations for Discharge: OT: Post acute therapy, 24 Hour assist    PT/OT Mobility Equipment for Discharge: Pt has crutches, transport chair (01/20/17 1230)  PT/OT ADL Equipment for Discharge: monitor needs  (01/20/17 1230)

## 2024-01-29 NOTE — HISTORY OF PRESENT ILLNESS
[Never] : never [TextBox_4] : Ms Singh is a 43-year old female with a history of allergic rhinitis and mild intermittent asthma, follicular lymphoma s/p maintenance therapy with rituximab (last dose Dec 2020), prediabetes, and HLD who presents for follow-up. evaluation. She was last seen in Oct 2023 and started on Symbicort 80-4.5 mcg 2 puffs bid for likely asthma. She reports that wheezing, coughing, chest tightness, and dyspnea have all resolved. She now takes Symbicort PRN, last use was 1 week ago. She recently had viral URI's in Dec 2023 and Jan 2024, now with resolved symptoms. She also takes levocetirizine 5 mg daily PRN and azelastine-fluticasone nasal spray twice daily.   CT Chest (Jan 2024) with patent central airways, calcified right lung granulomas, no pleural effusion, no thoracic lymphadenopathy. CT A/P with stable to decreased size of mesenteric nodes. No new lymphadenopathy in the chest, abdomen, pelvis.  PFTs (Oct 2023) with mild restrictive ventilatory impairment, low lung volumes, and moderately reduced diffusing capacity. No bronchodilator response.  Labs in Oct 2023 with normal peripheral eos (200), normal TSH, and normal CMP. Quantiferon Gold TB negative.  Allergic to PCN (rash) and ibuprofen.  Up to date with influenza and pneumococcal vaccinations.

## 2024-01-29 NOTE — PHYSICAL EXAM
[No Acute Distress] : no acute distress [Well Nourished] : well nourished [Normal Oropharynx] : normal oropharynx [Normal Appearance] : normal appearance [Supple] : supple [No Neck Mass] : no neck mass [No JVD] : no jvd [Normal Rate/Rhythm] : normal rate/rhythm [Normal Pulses] : normal pulses [Normal S1, S2] : normal s1, s2 [No Murmurs] : no murmurs [No Resp Distress] : no resp distress [No Acc Muscle Use] : no acc muscle use [Clear to Auscultation Bilaterally] : clear to auscultation bilaterally [No Abnormalities] : no abnormalities [Benign] : benign [Not Tender] : not tender [Normal Gait] : normal gait [No Clubbing] : no clubbing [No Cyanosis] : no cyanosis [No Edema] : no edema [FROM] : FROM [Normal Color/ Pigmentation] : normal color/ pigmentation [No Focal Deficits] : no focal deficits [Oriented x3] : oriented x3 [Normal Affect] : normal affect

## 2024-04-02 ENCOUNTER — OUTPATIENT (OUTPATIENT)
Dept: OUTPATIENT SERVICES | Facility: HOSPITAL | Age: 44
LOS: 1 days | End: 2024-04-02

## 2024-04-02 ENCOUNTER — APPOINTMENT (OUTPATIENT)
Dept: OBGYN | Facility: HOSPITAL | Age: 44
End: 2024-04-02
Payer: MEDICAID

## 2024-04-02 VITALS
BODY MASS INDEX: 27.49 KG/M2 | HEIGHT: 64 IN | SYSTOLIC BLOOD PRESSURE: 124 MMHG | WEIGHT: 161 LBS | DIASTOLIC BLOOD PRESSURE: 78 MMHG | HEART RATE: 78 BPM | TEMPERATURE: 97.9 F

## 2024-04-02 DIAGNOSIS — Z95.828 PRESENCE OF OTHER VASCULAR IMPLANTS AND GRAFTS: Chronic | ICD-10-CM

## 2024-04-02 DIAGNOSIS — Z90.79 ACQUIRED ABSENCE OF OTHER GENITAL ORGAN(S): Chronic | ICD-10-CM

## 2024-04-02 DIAGNOSIS — N20.0 CALCULUS OF KIDNEY: Chronic | ICD-10-CM

## 2024-04-02 PROCEDURE — 99213 OFFICE O/P EST LOW 20 MIN: CPT | Mod: 25

## 2024-04-02 RX ORDER — MEDROXYPROGESTERONE ACETATE 150 MG/ML
150 INJECTION, SUSPENSION INTRAMUSCULAR
Qty: 0 | Refills: 0 | Status: COMPLETED | OUTPATIENT
Start: 2024-04-02

## 2024-04-02 NOTE — DISCUSSION/SUMMARY
[FreeTextEntry1] : Depo Provera/Family Planning Depo Provera given today Calcium supplements advised STD counseling, safe sex practices and condoms advised Follow up June 18-July 2, 2024  for next Depo Provera

## 2024-04-02 NOTE — HISTORY OF PRESENT ILLNESS
[FreeTextEntry1] : 42 yo  here for repeat Depo Provera. Uses Depo for history of irregular bleeding and ovarian cysts and pain.  Hx bilateral salpingectomy. Hx B-cell lymphoma and followed by Oncology. [Currently Active] : currently active [Men] : men [Vaginal] : vaginal [No] : No [Yes] : Yes [FreeTextEntry3] : salpingectomy

## 2024-04-03 DIAGNOSIS — Z30.42 ENCOUNTER FOR SURVEILLANCE OF INJECTABLE CONTRACEPTIVE: ICD-10-CM

## 2024-04-12 ENCOUNTER — APPOINTMENT (OUTPATIENT)
Dept: DERMATOLOGY | Facility: CLINIC | Age: 44
End: 2024-04-12
Payer: MEDICAID

## 2024-04-12 PROCEDURE — 99204 OFFICE O/P NEW MOD 45 MIN: CPT | Mod: 25

## 2024-04-12 PROCEDURE — 11900 INJECT SKIN LESIONS </W 7: CPT

## 2024-05-03 RX ORDER — TRETINOIN 0.25 MG/G
0.03 CREAM TOPICAL
Qty: 1 | Refills: 2 | Status: ACTIVE | COMMUNITY
Start: 2024-04-12

## 2024-05-16 ENCOUNTER — OUTPATIENT (OUTPATIENT)
Dept: OUTPATIENT SERVICES | Facility: HOSPITAL | Age: 44
LOS: 1 days | Discharge: HOME | End: 2024-05-16

## 2024-05-16 DIAGNOSIS — Z95.828 PRESENCE OF OTHER VASCULAR IMPLANTS AND GRAFTS: Chronic | ICD-10-CM

## 2024-05-16 DIAGNOSIS — Z90.79 ACQUIRED ABSENCE OF OTHER GENITAL ORGAN(S): Chronic | ICD-10-CM

## 2024-05-16 DIAGNOSIS — C85.10 UNSPECIFIED B-CELL LYMPHOMA, UNSPECIFIED SITE: ICD-10-CM

## 2024-05-16 DIAGNOSIS — N20.0 CALCULUS OF KIDNEY: Chronic | ICD-10-CM

## 2024-05-22 ENCOUNTER — APPOINTMENT (OUTPATIENT)
Dept: HEMATOLOGY ONCOLOGY | Facility: CLINIC | Age: 44
End: 2024-05-22
Payer: MEDICAID

## 2024-05-22 ENCOUNTER — RESULT REVIEW (OUTPATIENT)
Age: 44
End: 2024-05-22

## 2024-05-22 VITALS
SYSTOLIC BLOOD PRESSURE: 122 MMHG | HEART RATE: 71 BPM | RESPIRATION RATE: 17 BRPM | OXYGEN SATURATION: 99 % | WEIGHT: 168.21 LBS | BODY MASS INDEX: 28.87 KG/M2 | TEMPERATURE: 97.4 F | DIASTOLIC BLOOD PRESSURE: 70 MMHG

## 2024-05-22 DIAGNOSIS — C85.10 UNSPECIFIED B-CELL LYMPHOMA, UNSPECIFIED SITE: ICD-10-CM

## 2024-05-22 LAB
BASOPHILS # BLD AUTO: 0.05 K/UL — SIGNIFICANT CHANGE UP (ref 0–0.2)
BASOPHILS NFR BLD AUTO: 0.8 % — SIGNIFICANT CHANGE UP (ref 0–2)
EOSINOPHIL # BLD AUTO: 0.14 K/UL — SIGNIFICANT CHANGE UP (ref 0–0.5)
EOSINOPHIL NFR BLD AUTO: 2.2 % — SIGNIFICANT CHANGE UP (ref 0–6)
HCT VFR BLD CALC: 40.1 % — SIGNIFICANT CHANGE UP (ref 34.5–45)
HGB BLD-MCNC: 12.9 G/DL — SIGNIFICANT CHANGE UP (ref 11.5–15.5)
IMM GRANULOCYTES NFR BLD AUTO: 0.6 % — SIGNIFICANT CHANGE UP (ref 0–0.9)
LYMPHOCYTES # BLD AUTO: 2.04 K/UL — SIGNIFICANT CHANGE UP (ref 1–3.3)
LYMPHOCYTES # BLD AUTO: 31.4 % — SIGNIFICANT CHANGE UP (ref 13–44)
MCHC RBC-ENTMCNC: 25.7 PG — LOW (ref 27–34)
MCHC RBC-ENTMCNC: 32.2 G/DL — SIGNIFICANT CHANGE UP (ref 32–36)
MCV RBC AUTO: 80 FL — SIGNIFICANT CHANGE UP (ref 80–100)
MONOCYTES # BLD AUTO: 0.44 K/UL — SIGNIFICANT CHANGE UP (ref 0–0.9)
MONOCYTES NFR BLD AUTO: 6.8 % — SIGNIFICANT CHANGE UP (ref 2–14)
NEUTROPHILS # BLD AUTO: 3.78 K/UL — SIGNIFICANT CHANGE UP (ref 1.8–7.4)
NEUTROPHILS NFR BLD AUTO: 58.2 % — SIGNIFICANT CHANGE UP (ref 43–77)
NRBC # BLD: 0 /100 WBCS — SIGNIFICANT CHANGE UP (ref 0–0)
PLATELET # BLD AUTO: 244 K/UL — SIGNIFICANT CHANGE UP (ref 150–400)
RBC # BLD: 5.01 M/UL — SIGNIFICANT CHANGE UP (ref 3.8–5.2)
RBC # FLD: 14.4 % — SIGNIFICANT CHANGE UP (ref 10.3–14.5)
WBC # BLD: 6.49 K/UL — SIGNIFICANT CHANGE UP (ref 3.8–10.5)
WBC # FLD AUTO: 6.49 K/UL — SIGNIFICANT CHANGE UP (ref 3.8–10.5)

## 2024-05-22 PROCEDURE — 99214 OFFICE O/P EST MOD 30 MIN: CPT

## 2024-05-22 PROCEDURE — G2211 COMPLEX E/M VISIT ADD ON: CPT | Mod: NC

## 2024-05-22 NOTE — HISTORY OF PRESENT ILLNESS
[de-identified] : 37yo F w/ asthma here for evaluation of newly diagnosed follicular lymphoma.   Patient mil she initially noted left inguinal pain in February 2019, had a palpable mass for the last 6 years which she noticed after delivery of her second baby in 2013. She saw GYN, had TVUS: Uterus: 5.6 x 3.6 x 4.3 cm. EMS 3 mm. Right ovary normal. Left ovary normal. Complex elongated left adnexal lesion suggestive of hydrosalpinx, 9.2 x 4.9 x 9 cm. She is s/p b/l salpingectomy on 5/2/19. She remains on depo injection. She states that she has had more swelling and more pain since her procedure.   She had CT A/P done on 5/9/19 which demonstrated extensive retroperitoneal, pelvic, and inguinal lymphadenopathy, concerning for malignancy with small amount of abdominal and pelvic ascites. s/p IR biopsy of left inguinal lymph node - follicular lymphoma grade 1-2.   Also having cervical LAD, noticed for the last few months, L>R. Noted R inguinal swelling for the last week with pain. No fevers of chills. Notes having itching and that her breathing is a bit more labored than usual.  [de-identified] : BMbx (6/10/19): - Focal lymphoid infiltrate consistent with involvement by follicular lymphoma (history of follicular lymphoma) - Small paracortical sample with trilineage hematopoiesis with maturation and iron stores present  PET/CT (6/16/19): 1. Hypermetabolic lymphadenopathy in neck, thorax, abdomen, pelvis, and bilateral inguinal/femoral regions corresponds to biopsy-proven follicular lymphoma. Hypermetabolic subcutaneous nodule, right posterolateral chest wall, is compatible with an additional site of lymphoma. 2. Nonspecific left greater than right tonsillar hypermetabolism may represent additional sites of lymphoma. 3. Findings compatible with bone marrow involvement by lymphoma in bilateral humeri and axial skeleton, as described above.  She reports having increasing fatigue. Still having slight shortness of breath.  We started BR on 6/24. She had a reaction to Rituxan.   Eating fine. Neck LAD, shortness of breath and abdominal bloating improved/resolved.  She had a yeast infection, saw GYN. Also reports having pelvic pain and was found to have a small polyp.   C2 on 7/22. She notes having burning as she was getting the chemo (please see chart note for details). Notes that her arms were still hurting for a week after.  She had a portacath placed 8/12 Felt more fatigued and weak.  Had transvaginal US, saw GYN this morning (9/5/19). Pelvic pain has resolved.   C3 on 8/19. Tolerated it well.   CT N/C/A/P (9/10/19): Marked decrease in size of cervical lymph nodes when compared with the prior exam compatible with a favorable response to therapy. Significant interval decrease in size of left supraclavicular lymphadenopathy. Focal enhancement involving the right anterior tonsillar without associated tonsillar expansion. Correlation with direct visualization and continued follow-up is advised. Significant interval decrease in size of previously noted hypermetabolic nodule in the soft tissues of the right posterior lateral chest. Significant interval decrease in size of retroperitoneal, pelvic, inguinal, and mesenteric lymphadenopathy as described above.  C4 on 9/16. Tolerated well, had some tremors afterwards.   C5 was delayed secondary to neutropenia. Given on 10/30/19. Had an episode of chest tightness w/ Pillo on day 1. Premedicated on day 2 with no events. Tremors better this cycle but notes 3 episodes of restless legs.   C6 on 11/26/19. She reports having muscle aches.   PET/CT 1/18/20: 1. Interval resolution of FDG activity associated with lymphadenopathy in the neck, chest, upper abdomen and pelvis with either resolution or significant decrease in size of the corresponding lymph nodes and not well delineated on CT as compared to PET/CT from 6/16/2019. Near total resolution of FDG avid left inguinal lymphadenopathy which is significantly decreased in size now demonstrating background activity. 2. Interval significant decrease in size and metabolism of hypermetabolic retroperitoneal and mesenteric lymphadenopathy. 3. Interval resolution of asymmetric tonsillar hypermetabolism. 4. Interval resolution of FDG avidity in bone marrow of bilateral humeri, T10, and sacrum.  Having pain on L side of lower abdomen and groin around 3-4 weeks ago. Having night sweats again too. Had GYN f/u 2 weeks ago. We did a PET/CT on 6/13 which showed: 1. Small focus of mild FDG activity, decreased in size and metabolism, is associated with mesenteric haziness which is unchanged on CT as compared to prior study dated 1/18/2020 ([facility] 4). Resolution of minimally FDG-avid amorphous density, left periaortic region. 2. Minimally FDG-avid density, left inguinal region, unchanged, may represent postsurgical change versus lymph node. Please correlate clinically.  Started Rituxan maintenance on 6/25/20. She had a reaction to Rituxan -please see chart note for details.  Second dose on 8/31/20, tolerated better. Reports having back pain after premeds wore off.  3rd dose on 11/4/20, she felt tired and slept through the treatment, denied nausea, but c/o tingling in the finger tips and feet comes and goes. Patient admitted tremor improved. birth control pills d/c 3 months ago, she attributed the nausea after Rituxan to the withdrawal of birth control pills. Kidney stone stent removed today.  Fourth dose on 12/30/20. Pt reports nausea and fatigue. States that does not feel well. Does not want to continue at this time.   PET/CT 2/6/21: 1. Resolution of small focus of mild FDG activity associated with mesenteric haziness which is unchanged on CT ([facility] 1). 2. Minimally FDG-avid density, left inguinal region, unchanged, may represent postsurgical change versus lymph node. Please correlate clinically. 3. Remainder study is unremarkable and not significantly changed, demonstrating no evidence of FDG-avid disease.  She is doing well, no new complaints.  Continues to be fatigued. Reports having a difficult time coping with her diagnosis and her transition off treatment.  She received both her COVID vaccine doses - 5/27 and 6/17  Had GYN and PCP annual check up 2 weeks ago, she admitted cholesterol level was slightly high other than that no remarkable findings, will f/u in 3 months. PCP ordered Echo, will do after Thanksgiving. will have Mammogram done next month.  Never went to psychotherapy, she felt everything calmed down now, the working gets better, she rested better, she felt much less stressful now.  Denied any new complaints.   She had COVID in Dec - had URI symptoms and had CP/SOB.  She saw GI on 1/10/22 and had endoscopy done. Taking omeprazole with relief.   Saw [facility] Dr. Jolly on 3/22/22 for voice hoarseness and neck mass.  CT C/A/P done 3/5/22: No evidence of recurrent or metastatic disease in the chest, abdomen, or pelvis. s/p excisional lymph node, left neck, biopsy- Follicular and paracortical hyperplasia   She will start PT on Tues for L shoulder syndrome.  Denied any new complaints.   Patient was seen today, feeling fine overall. had left shoulder pain since biopsy of left neck.  Started PT early July 2 day/wk now 1 day a week, left should pain improved.  She f/u w/ GYN, Ophthalmologist, [facility] doc recently no remark findings.  Denied any constitutional symptoms.  Intermittent voice hoarseness, following with ENT  CT N/C/A/P 1/12/23: Slight interval increased size left level 2 lymph nodes as detailed above. Stable additional retropharyngeal and right cervical chain lymph nodes. No new adenopathy. Stable nonspecific prominence of Waldeyer's ring. Nonspecific increase in prominence of subcentimeter mesenteric lymph nodes largest measuring 1.0 x 0.9 cm in the right lower quadrant. Consider further evaluation with PET/CT scan.  She had COVID in Dec 2022, s/p Paxlovid. She continues having hoarseness. She continues having cough, worse at night. She has intermittent chest pain.  Mammo/US done 1/19/23: BI-RADS 0 -incomplete.  Repeated US breast on 1/31/23: BI-RADS 4A- suspicious findings; low suspicion for Malignancy; Ultrasound guided core needle biopsy of the left breast done on 3/9: revealed begin ductal ectasia;  c/o neuropathy in feet, f/u neurologist Rx of gabapentin but she didn't' take it. Only took Tylenol for pain relief.  She admitted chest pain and cough subsided now.   Having a cough -went to urgent care, said it was 2/2 postnasal drip.  Continues with physical therapy  CT N/C/A/P 1/21/24: Scattered bilateral cervical lymph nodes are stable in number and distribution, all measuring mildly larger (few millimeter growth) compared to 1/12/2023. None grown by more than 25% and these are favored to be physiologic or reactive. Stable to decreased size of mesenteric nodes since 01/12/2023. Otherwise, no new lymphadenopathy in the chest, abdomen, or pelvis.  She had a sore throat and body aches x2 wks -improving. COVID negative   5/22/24:  Patient is seen today for a f/u apt. She feels well overall. Denied any new complaints.  She saw dermatologist for the keloid scar after port removal, had steroid injection, now the scar becomes flat and improved.   She is off Metformin now due to s/e of dialarhoea.  Otherwise she is doing well.

## 2024-05-22 NOTE — ASSESSMENT
[FreeTextEntry1] : 43yo F w/ asthma here for f/u of stage IV follicular lymphoma grade 1-2. We started treatment with Bendamustine/Rituxan on 6/24/19. LAD has improved. C5 delayed due to neutropenia, received on 10/30/19. C6 on 11/26/19, tolerated treatment well. PET/CT done at end of treatment showed interval significant decrease in size and metabolism of LAD.  PET/CT unchanged. Started on Rituximab maintenance on 6/25/20. Cont every 2 mo. s/p 4th dose of Rituxan on 12/30/20. She had worsened side effects after 4th dose and does not want to continue. Will hold further Rituxan maintenance given intolerance. PET/CT post Rituxan 2/2021 stable Last imaging from 3/2022 [facility]. s/p excisional biopsy of L neck mass that was unremarkable CT N/C/A/P done 1/2023 showed slight increase in LAD but still ~1-1.5cm.  CT N/C/A/P in 1/2024 - bilateral cervical lymph nodes slightly enlarged but she currently has URI. Otherwise LAD stable. Results reviewed with pt f/u w/ PMD and gyn Repeated US breast BI-ARDS 4A, US guided biopsy of left breast done on 2/8/23: begin ductal ectasia; Port removed, saw derm for keloid scar after port removal, had steroid injection, keloid resolved.  All questions answered RTC 4 mo.  Case and management discussed with Dr. Vega

## 2024-05-22 NOTE — PHYSICAL EXAM
[Fully active, able to carry on all pre-disease performance without restriction] : Status 0 - Fully active, able to carry on all pre-disease performance without restriction [Normal] : affect appropriate [de-identified] : L neck excisional site healed [de-identified] : flat red skin scar on the left chest after port removal.

## 2024-05-24 LAB
ALBUMIN SERPL ELPH-MCNC: 4.7 G/DL
ALP BLD-CCNC: 109 U/L
ALT SERPL-CCNC: 25 U/L
ANION GAP SERPL CALC-SCNC: 14 MMOL/L
AST SERPL-CCNC: 16 U/L
BILIRUB SERPL-MCNC: 0.4 MG/DL
BUN SERPL-MCNC: 12 MG/DL
CALCIUM SERPL-MCNC: 9.5 MG/DL
CHLORIDE SERPL-SCNC: 106 MMOL/L
CO2 SERPL-SCNC: 21 MMOL/L
CREAT SERPL-MCNC: 0.81 MG/DL
EGFR: 93 ML/MIN/1.73M2
GLUCOSE SERPL-MCNC: 136 MG/DL
LDH SERPL-CCNC: 161 U/L
POTASSIUM SERPL-SCNC: 4.4 MMOL/L
PROT SERPL-MCNC: 6.5 G/DL
SODIUM SERPL-SCNC: 141 MMOL/L

## 2024-06-04 ENCOUNTER — APPOINTMENT (OUTPATIENT)
Dept: PULMONOLOGY | Facility: CLINIC | Age: 44
End: 2024-06-04
Payer: MEDICAID

## 2024-06-04 VITALS
HEIGHT: 64 IN | WEIGHT: 168 LBS | BODY MASS INDEX: 28.68 KG/M2 | OXYGEN SATURATION: 99 % | SYSTOLIC BLOOD PRESSURE: 118 MMHG | HEART RATE: 80 BPM | DIASTOLIC BLOOD PRESSURE: 82 MMHG

## 2024-06-04 DIAGNOSIS — J30.9 ALLERGIC RHINITIS, UNSPECIFIED: ICD-10-CM

## 2024-06-04 DIAGNOSIS — R05.8 OTHER SPECIFIED COUGH: ICD-10-CM

## 2024-06-04 PROCEDURE — 94726 PLETHYSMOGRAPHY LUNG VOLUMES: CPT

## 2024-06-04 PROCEDURE — 99214 OFFICE O/P EST MOD 30 MIN: CPT | Mod: 25

## 2024-06-04 PROCEDURE — 94729 DIFFUSING CAPACITY: CPT

## 2024-06-04 PROCEDURE — ZZZZZ: CPT

## 2024-06-04 PROCEDURE — 94060 EVALUATION OF WHEEZING: CPT

## 2024-06-04 RX ORDER — AZELASTINE HYDROCHLORIDE AND FLUTICASONE PROPIONATE 137; 50 UG/1; UG/1
137-50 SPRAY, METERED NASAL
Qty: 1 | Refills: 11 | Status: ACTIVE | COMMUNITY
Start: 2023-10-06 | End: 1900-01-01

## 2024-06-04 RX ORDER — BUDESONIDE AND FORMOTEROL FUMARATE DIHYDRATE 80; 4.5 UG/1; UG/1
80-4.5 AEROSOL RESPIRATORY (INHALATION) TWICE DAILY
Qty: 1 | Refills: 11 | Status: ACTIVE | COMMUNITY
Start: 2023-10-24 | End: 1900-01-01

## 2024-06-04 RX ORDER — LEVOCETIRIZINE DIHYDROCHLORIDE 5 MG/1
5 TABLET ORAL
Qty: 1 | Refills: 3 | Status: ACTIVE | COMMUNITY
Start: 2023-10-06 | End: 1900-01-01

## 2024-06-04 NOTE — HISTORY OF PRESENT ILLNESS
[Never] : never [TextBox_4] : Ms. Singh is a 43-year old female with a history of allergic rhinitis and mild intermittent asthma, follicular lymphoma s/p maintenance therapy with rituximab (last dose Dec 2020), prediabetes, and HLD who presents for follow-up. She was previously found to have mild restriction and moderately reduced diffusing capacity on PFTs in Oct 2023, but CT chest in Jan 2024 with no parenchymal lung disease. Stress echo in Feb 2023 with no evidence of pulmonary hypertension. She is maintained on Symbicort 80-4.5 mcg 2 puffs bid as necessary and levocetirizine 5 mg daily for allergies as well as azelastine-fluticasone nasal spray 1 spray in each nostril twice daily. She reports taking Symbicort about 3x/week. She had a recent viral URI memorial day weekend, but recovered without antibiotics. No fevers or chills. Cough is improved. No dyspnea, wheezing, or chest tightness.  PFTs (May 2024) with mild restriction, low lung volumes, and moderately reduced diffusing capacity. No bronchodilator response.  Labs in May 2024 with normal CBC (absolute eos 140), CMP, and LDH. Quantiferon Gold TB previously negative.  CT Chest (Jan 2024) with patent central airways, calcified right lung granulomas, no pleural effusion, no thoracic lymphadenopathy. CT A/P with stable to decreased size of mesenteric nodes. No new lymphadenopathy in the chest, abdomen, pelvis.  Up to date with influenza and pneumococcal vaccinations.

## 2024-06-04 NOTE — ASSESSMENT
[FreeTextEntry1] : -  Ms. Singh is a 43-year old female with a history of allergic rhinitis and mild intermittent asthma, follicular lymphoma s/p maintenance therapy with rituximab (last dose Dec 2020), prediabetes, and HLD who presents for follow-up. She was previously found to have mild restriction and moderately reduced diffusing capacity on PFTs in Oct 2023, but CT chest in Jan 2024 with no parenchymal lung disease. Stress echo in Feb 2023 with no evidence of pulmonary hypertension. She is maintained on Symbicort 80-4.5 mcg 2 puffs bid as necessary and levocetirizine 5 mg daily for allergies as well as azelastine-fluticasone nasal spray 1 spray in each nostril twice daily. She reports taking Symbicort about 3x/week. She had a recent viral URI memorial day weekend, but recovered without antibiotics. No fevers or chills. Cough is improved. No dyspnea, wheezing, or chest tightness.  PFTs (May 2024) with mild restriction, low lung volumes, and moderately reduced diffusing capacity. No bronchodilator response.  Labs in May 2024 with normal CBC (absolute eos 140), CMP, and LDH. Quantiferon Gold TB previously negative.  CT Chest (Jan 2024) with patent central airways, calcified right lung granulomas, no pleural effusion, no thoracic lymphadenopathy. CT A/P with stable to decreased size of mesenteric nodes. No new lymphadenopathy in the chest, abdomen, pelvis.  ASSESSMENT: Mild restrictive lung disease Mild intermittent asthma Allergic rhinitis Upper airway cough syndrome  PLAN: - PFTs today reviewed, stable mild restriction, low lung volumes, and moderately reduced diffusing capacity. No bronchodilator response - No evidence of parenchymal lung disease on CT chest in Jan 2024. Restrictive lung disease is likely extrathoracic - Stress echo in Feb 2023 with normal LV systolic function, no significant valvular disease, no pericardial effusion, and normal RV size and systolic function with no evidence of pulmonary hypertension. Stress echo with no evidence of inducible ischemia - Continue Symbicort 80-4.5 mcg 2 puffs twice daily as necessary, rinse after use - Levocetirizine 5 mg daily at bedtime for allergies - Azelastine-fluticasone nasal spray 1 spray per nostril twice daily - Up to date with influenza and pneumococcal vaccinations - Follow-up in 6-12 months or sooner PRN

## 2024-06-07 ENCOUNTER — APPOINTMENT (OUTPATIENT)
Dept: INTERNAL MEDICINE | Facility: CLINIC | Age: 44
End: 2024-06-07
Payer: MEDICAID

## 2024-06-07 ENCOUNTER — OUTPATIENT (OUTPATIENT)
Dept: OUTPATIENT SERVICES | Facility: HOSPITAL | Age: 44
LOS: 1 days | End: 2024-06-07

## 2024-06-07 VITALS
WEIGHT: 162 LBS | BODY MASS INDEX: 27.66 KG/M2 | HEART RATE: 78 BPM | OXYGEN SATURATION: 100 % | HEIGHT: 64 IN | DIASTOLIC BLOOD PRESSURE: 84 MMHG | SYSTOLIC BLOOD PRESSURE: 118 MMHG

## 2024-06-07 DIAGNOSIS — L03.119 CELLULITIS OF UNSPECIFIED PART OF LIMB: ICD-10-CM

## 2024-06-07 DIAGNOSIS — Z87.898 PERSONAL HISTORY OF OTHER SPECIFIED CONDITIONS: ICD-10-CM

## 2024-06-07 DIAGNOSIS — N20.0 CALCULUS OF KIDNEY: Chronic | ICD-10-CM

## 2024-06-07 DIAGNOSIS — Z20.822 CONTACT WITH AND (SUSPECTED) EXPOSURE TO COVID-19: ICD-10-CM

## 2024-06-07 DIAGNOSIS — R73.03 PREDIABETES.: ICD-10-CM

## 2024-06-07 DIAGNOSIS — U07.1 COVID-19: ICD-10-CM

## 2024-06-07 DIAGNOSIS — E78.5 HYPERLIPIDEMIA, UNSPECIFIED: ICD-10-CM

## 2024-06-07 DIAGNOSIS — M25.569 PAIN IN UNSPECIFIED KNEE: ICD-10-CM

## 2024-06-07 DIAGNOSIS — R20.2 PARESTHESIA OF SKIN: ICD-10-CM

## 2024-06-07 DIAGNOSIS — R10.2 PELVIC AND PERINEAL PAIN: ICD-10-CM

## 2024-06-07 DIAGNOSIS — J30.89 OTHER ALLERGIC RHINITIS: ICD-10-CM

## 2024-06-07 DIAGNOSIS — Z86.79 PERSONAL HISTORY OF OTHER DISEASES OF THE CIRCULATORY SYSTEM: ICD-10-CM

## 2024-06-07 DIAGNOSIS — G54.5 NEURALGIC AMYOTROPHY: ICD-10-CM

## 2024-06-07 DIAGNOSIS — J45.20 MILD INTERMITTENT ASTHMA, UNCOMPLICATED: ICD-10-CM

## 2024-06-07 DIAGNOSIS — Z96.0 PRESENCE OF UROGENITAL IMPLANTS: ICD-10-CM

## 2024-06-07 DIAGNOSIS — Z95.828 PRESENCE OF OTHER VASCULAR IMPLANTS AND GRAFTS: Chronic | ICD-10-CM

## 2024-06-07 DIAGNOSIS — L82.1 OTHER SEBORRHEIC KERATOSIS: ICD-10-CM

## 2024-06-07 DIAGNOSIS — Z23 ENCOUNTER FOR IMMUNIZATION: ICD-10-CM

## 2024-06-07 DIAGNOSIS — J45.909 UNSPECIFIED ASTHMA, UNCOMPLICATED: ICD-10-CM

## 2024-06-07 DIAGNOSIS — Z87.09 PERSONAL HISTORY OF OTHER DISEASES OF THE RESPIRATORY SYSTEM: ICD-10-CM

## 2024-06-07 DIAGNOSIS — Z87.39 PERSONAL HISTORY OF OTHER DISEASES OF THE MUSCULOSKELETAL SYSTEM AND CONNECTIVE TISSUE: ICD-10-CM

## 2024-06-07 DIAGNOSIS — H52.11 MYOPIA, RIGHT EYE: ICD-10-CM

## 2024-06-07 DIAGNOSIS — E11.9 TYPE 2 DIABETES MELLITUS W/OUT COMPLICATIONS: ICD-10-CM

## 2024-06-07 PROCEDURE — 99213 OFFICE O/P EST LOW 20 MIN: CPT | Mod: GE,25

## 2024-06-07 RX ORDER — OMEPRAZOLE 20 MG/1
20 CAPSULE, DELAYED RELEASE ORAL
Qty: 90 | Refills: 3 | Status: DISCONTINUED | COMMUNITY
Start: 2022-01-10 | End: 2024-06-07

## 2024-06-09 PROBLEM — Z87.898 HISTORY OF FATIGUE: Status: RESOLVED | Noted: 2021-11-05 | Resolved: 2024-06-09

## 2024-06-09 PROBLEM — Z87.39 HISTORY OF BACK PAIN: Status: RESOLVED | Noted: 2023-10-19 | Resolved: 2024-06-09

## 2024-06-09 PROBLEM — Z87.09 HISTORY OF PARANASAL SINUS CONGESTION: Status: RESOLVED | Noted: 2021-11-05 | Resolved: 2024-06-09

## 2024-06-09 PROBLEM — G54.5 SHOULDER GIRDLE SYNDROME: Status: RESOLVED | Noted: 2022-08-09 | Resolved: 2024-06-09

## 2024-06-09 PROBLEM — L82.1 DERMATOSIS PAPULOSA NIGRA: Status: RESOLVED | Noted: 2024-04-12 | Resolved: 2024-06-09

## 2024-06-09 PROBLEM — E78.5 HYPERLIPIDEMIA: Status: ACTIVE | Noted: 2023-02-01

## 2024-06-09 PROBLEM — Z87.898 HISTORY OF PALPITATIONS: Status: RESOLVED | Noted: 2023-10-06 | Resolved: 2024-06-09

## 2024-06-09 PROBLEM — Z23 ENCOUNTER FOR IMMUNIZATION: Status: RESOLVED | Noted: 2021-01-19 | Resolved: 2024-06-09

## 2024-06-09 PROBLEM — Z87.898 HISTORY OF CHEST PAIN: Status: RESOLVED | Noted: 2023-02-01 | Resolved: 2024-06-09

## 2024-06-09 PROBLEM — Z87.898 HISTORY OF DYSPNEA: Status: RESOLVED | Noted: 2021-11-05 | Resolved: 2024-06-09

## 2024-06-09 PROBLEM — U07.1 COVID-19 VIRUS INFECTION: Status: RESOLVED | Noted: 2022-01-11 | Resolved: 2024-06-09

## 2024-06-09 PROBLEM — Z87.898 HISTORY OF ABDOMINAL PAIN: Status: RESOLVED | Noted: 2023-02-01 | Resolved: 2024-06-09

## 2024-06-09 PROBLEM — Z20.822 ENCOUNTER FOR LABORATORY TESTING FOR COVID-19 VIRUS: Status: RESOLVED | Noted: 2022-01-10 | Resolved: 2024-06-09

## 2024-06-09 PROBLEM — J45.909 ASTHMA: Noted: 2023-10-06

## 2024-06-09 PROBLEM — R10.2 PELVIC PAIN: Status: RESOLVED | Noted: 2019-04-04 | Resolved: 2024-06-09

## 2024-06-09 PROBLEM — Z87.898 HISTORY OF NAUSEA AND VOMITING: Status: RESOLVED | Noted: 2022-01-10 | Resolved: 2024-06-09

## 2024-06-09 PROBLEM — Z87.898 HISTORY OF EPIGASTRIC PAIN: Status: RESOLVED | Noted: 2022-01-10 | Resolved: 2024-06-09

## 2024-06-09 PROBLEM — R20.2 TINGLING IN EXTREMITIES: Status: RESOLVED | Noted: 2023-02-01 | Resolved: 2024-06-09

## 2024-06-09 PROBLEM — L03.119 CELLULITIS OF HAND: Status: RESOLVED | Noted: 2020-04-10 | Resolved: 2024-06-09

## 2024-06-09 PROBLEM — Z87.09 HISTORY OF STREP SORE THROAT: Status: RESOLVED | Noted: 2023-06-22 | Resolved: 2024-06-09

## 2024-06-09 PROBLEM — R73.03 PREDIABETES: Noted: 2023-10-06

## 2024-06-09 PROBLEM — M25.569 KNEE PAIN: Status: RESOLVED | Noted: 2018-06-27 | Resolved: 2024-06-09

## 2024-06-09 PROBLEM — Z87.898 HISTORY OF HOARSENESS: Status: RESOLVED | Noted: 2022-03-22 | Resolved: 2024-06-09

## 2024-06-09 PROBLEM — Z96.0 RETAINED URETERAL STENT: Status: RESOLVED | Noted: 2020-12-03 | Resolved: 2024-06-09

## 2024-06-09 PROBLEM — E11.9 DIABETES MELLITUS: Status: ACTIVE | Noted: 2023-10-09

## 2024-06-09 PROBLEM — Z87.898 HISTORY OF NAUSEA AND VOMITING: Status: RESOLVED | Noted: 2019-06-28 | Resolved: 2024-06-09

## 2024-06-09 PROBLEM — Z87.898 HISTORY OF DYSPHAGIA: Status: RESOLVED | Noted: 2022-01-10 | Resolved: 2024-06-09

## 2024-06-09 NOTE — PHYSICAL EXAM
[Normal] : normal rate, regular rhythm, normal S1 and S2 and no murmur heard [Pedal Pulses Present] : the pedal pulses are present [No Edema] : there was no peripheral edema [Soft] : abdomen soft [Non Tender] : non-tender [Non-distended] : non-distended [No Joint Swelling] : no joint swelling [Grossly Normal Strength/Tone] : grossly normal strength/tone [Coordination Grossly Intact] : coordination grossly intact [No Focal Deficits] : no focal deficits [Normal Gait] : normal gait [Speech Grossly Normal] : speech grossly normal [Memory Grossly Normal] : memory grossly normal [Normal Affect] : the affect was normal [Alert and Oriented x3] : oriented to person, place, and time [Normal Insight/Judgement] : insight and judgment were intact

## 2024-06-10 LAB
CHOLEST SERPL-MCNC: 246 MG/DL
ESTIMATED AVERAGE GLUCOSE: 154 MG/DL
HBA1C MFR BLD HPLC: 7 %
HDLC SERPL-MCNC: 41 MG/DL
LDLC SERPL CALC-MCNC: 188 MG/DL
NONHDLC SERPL-MCNC: 205 MG/DL
TRIGL SERPL-MCNC: 100 MG/DL

## 2024-06-10 RX ORDER — ATORVASTATIN CALCIUM 20 MG/1
20 TABLET, FILM COATED ORAL DAILY
Qty: 90 | Refills: 3 | Status: ACTIVE | COMMUNITY
Start: 2023-10-06 | End: 1900-01-01

## 2024-06-10 RX ORDER — SEMAGLUTIDE 0.68 MG/ML
2 INJECTION, SOLUTION SUBCUTANEOUS
Qty: 4 | Refills: 2 | Status: ACTIVE | COMMUNITY
Start: 2024-06-10 | End: 1900-01-01

## 2024-06-10 NOTE — REVIEW OF SYSTEMS
[Discharge] : discharge [Depression] : depression [Fever] : no fever [Chills] : no chills [Night Sweats] : no night sweats [Pain] : no pain [Vision Problems] : no vision problems [Earache] : no earache [Hearing Loss] : no hearing loss [Nasal Discharge] : no nasal discharge [Sore Throat] : no sore throat [Chest Pain] : no chest pain [Palpitations] : no palpitations [Lower Ext Edema] : no lower extremity edema [Orthopnea] : no orthopnea [Shortness Of Breath] : no shortness of breath [Wheezing] : no wheezing [Dyspnea on Exertion] : no dyspnea on exertion [Abdominal Pain] : no abdominal pain [Nausea] : no nausea [Constipation] : no constipation [Diarrhea] : diarrhea [Vomiting] : no vomiting [Melena] : no melena [Dysuria] : no dysuria [Incontinence] : no incontinence [Hematuria] : no hematuria [Frequency] : no frequency [Joint Pain] : no joint pain [Muscle Pain] : no muscle pain [Skin Rash] : no skin rash [Headache] : no headache [Dizziness] : no dizziness [Fainting] : no fainting [Suicidal] : not suicidal [Insomnia] : no insomnia [Anxiety] : no anxiety

## 2024-06-10 NOTE — HISTORY OF PRESENT ILLNESS
[de-identified] : 43 year old woman, history of allergic rhinitis, mild intermittent asthma, chronic post-nasal drip, follicular lymphoma s/p maintenance therapy with rituximab 2 years ago, laryngopharyngeal reflux disease, HLD, T2DM (A1c 7.1) here for follow up. Patient reports feeling well. She states she has had an increase in her seasonal allergies but is managing with her anti-histamine which she takes at night. Patient states she stopped taking her atorvastatin. States she made some changes to her diet and has started eating more vegetables and protein. States she has not increased her physical activity but plans to walk and exercise more.

## 2024-06-17 ENCOUNTER — APPOINTMENT (OUTPATIENT)
Dept: DERMATOLOGY | Facility: CLINIC | Age: 44
End: 2024-06-17
Payer: MEDICAID

## 2024-06-17 DIAGNOSIS — L70.0 ACNE VULGARIS: ICD-10-CM

## 2024-06-17 DIAGNOSIS — L91.0 HYPERTROPHIC SCAR: ICD-10-CM

## 2024-06-17 PROCEDURE — 99213 OFFICE O/P EST LOW 20 MIN: CPT

## 2024-06-19 ENCOUNTER — OUTPATIENT (OUTPATIENT)
Dept: OUTPATIENT SERVICES | Facility: HOSPITAL | Age: 44
LOS: 1 days | End: 2024-06-19

## 2024-06-19 ENCOUNTER — APPOINTMENT (OUTPATIENT)
Dept: OBGYN | Facility: HOSPITAL | Age: 44
End: 2024-06-19

## 2024-06-19 VITALS
BODY MASS INDEX: 28.34 KG/M2 | WEIGHT: 166 LBS | HEART RATE: 73 BPM | TEMPERATURE: 97.6 F | SYSTOLIC BLOOD PRESSURE: 124 MMHG | DIASTOLIC BLOOD PRESSURE: 88 MMHG | HEIGHT: 64 IN

## 2024-06-19 DIAGNOSIS — N92.6 IRREGULAR MENSTRUATION, UNSPECIFIED: ICD-10-CM

## 2024-06-19 DIAGNOSIS — Z30.42 ENCOUNTER FOR SURVEILLANCE OF INJECTABLE CONTRACEPTIVE: ICD-10-CM

## 2024-06-19 DIAGNOSIS — Z95.828 PRESENCE OF OTHER VASCULAR IMPLANTS AND GRAFTS: Chronic | ICD-10-CM

## 2024-06-19 DIAGNOSIS — N20.0 CALCULUS OF KIDNEY: Chronic | ICD-10-CM

## 2024-06-19 DIAGNOSIS — Z90.79 ACQUIRED ABSENCE OF OTHER GENITAL ORGAN(S): Chronic | ICD-10-CM

## 2024-06-19 PROCEDURE — 99213 OFFICE O/P EST LOW 20 MIN: CPT | Mod: 25

## 2024-06-19 RX ORDER — MEDROXYPROGESTERONE ACETATE 150 MG/ML
150 INJECTION, SUSPENSION INTRAMUSCULAR
Qty: 0 | Refills: 0 | Status: COMPLETED | OUTPATIENT
Start: 2024-06-19

## 2024-06-19 RX ADMIN — MEDROXYPROGESTERONE ACETATE 0 MG/ML: 150 INJECTION, SUSPENSION INTRAMUSCULAR at 00:00

## 2024-06-19 NOTE — PLAN
[FreeTextEntry1] : Depo Provera Encounter -Depo 150mg IM given today, on time for dose -Calcium supplements advised -STD counseling, safe sex practices and condoms advised, declines STI screening today Follow up Sept 4-Sept 18, 2024 for next Depo Provera -Aware she is Due for her annual exam 10/2024.   rto 3mo..

## 2024-06-19 NOTE — HISTORY OF PRESENT ILLNESS
[FreeTextEntry1] : 42 yo  here for repeat Depo Provera. Uses Depo for history of irregular bleeding and ovarian cysts and pain. Comes in for Depo q10 weeks because sometimes she will have light spotting the week leading up her depo injection. Overall doing well. No complaints.   Hx bilateral salpingectomy. Hx B-cell lymphoma and followed by Oncology.  Starting Ozempic injections soon for pre-DM; unable to tolerate GI side effects of Metformin.   Daughter just graduated 6th grade, involved in surfing, lives in [place].   Offered and declines STI screen.   [Patient reported mammogram was normal] : Patient reported mammogram was normal [Patient reported breast sonogram was normal] : Patient reported breast sonogram was normal [Patient reported PAP Smear was normal] : Patient reported PAP Smear was normal [Mammogramdate] : 1/29/24 [TextBox_19] : BIRADS 2 [BreastSonogramDate] : 1/29/24 [TextBox_25] :  BIRADS 2 [PapSmeardate] : 10/28/23 [TextBox_31] : NILM HPV- [Currently Active] : currently active [Men] : men [Vaginal] : vaginal [No] : No [Patient refuses STI testing] : Patient refuses STI testing [FreeTextEntry3] : Salpingectomy

## 2024-06-20 DIAGNOSIS — N92.6 IRREGULAR MENSTRUATION, UNSPECIFIED: ICD-10-CM

## 2024-06-20 DIAGNOSIS — Z30.42 ENCOUNTER FOR SURVEILLANCE OF INJECTABLE CONTRACEPTIVE: ICD-10-CM

## 2024-07-17 ENCOUNTER — NON-APPOINTMENT (OUTPATIENT)
Age: 44
End: 2024-07-17

## 2024-07-17 ENCOUNTER — APPOINTMENT (OUTPATIENT)
Dept: OPHTHALMOLOGY | Facility: CLINIC | Age: 44
End: 2024-07-17

## 2024-07-17 PROCEDURE — 92002 INTRM OPH EXAM NEW PATIENT: CPT

## 2024-08-06 ENCOUNTER — NON-APPOINTMENT (OUTPATIENT)
Age: 44
End: 2024-08-06

## 2024-08-06 ENCOUNTER — APPOINTMENT (OUTPATIENT)
Dept: OPHTHALMOLOGY | Facility: CLINIC | Age: 44
End: 2024-08-06

## 2024-08-06 PROCEDURE — 92012 INTRM OPH EXAM EST PATIENT: CPT

## 2024-09-05 ENCOUNTER — APPOINTMENT (OUTPATIENT)
Dept: OBGYN | Facility: HOSPITAL | Age: 44
End: 2024-09-05
Payer: COMMERCIAL

## 2024-09-05 ENCOUNTER — RX RENEWAL (OUTPATIENT)
Age: 44
End: 2024-09-05

## 2024-09-05 ENCOUNTER — OUTPATIENT (OUTPATIENT)
Dept: OUTPATIENT SERVICES | Facility: HOSPITAL | Age: 44
LOS: 1 days | End: 2024-09-05

## 2024-09-05 VITALS
DIASTOLIC BLOOD PRESSURE: 87 MMHG | TEMPERATURE: 97.9 F | BODY MASS INDEX: 27.29 KG/M2 | SYSTOLIC BLOOD PRESSURE: 132 MMHG | HEART RATE: 81 BPM | WEIGHT: 159 LBS

## 2024-09-05 DIAGNOSIS — Z90.79 ACQUIRED ABSENCE OF OTHER GENITAL ORGAN(S): Chronic | ICD-10-CM

## 2024-09-05 DIAGNOSIS — Z30.42 ENCOUNTER FOR SURVEILLANCE OF INJECTABLE CONTRACEPTIVE: ICD-10-CM

## 2024-09-05 DIAGNOSIS — N20.0 CALCULUS OF KIDNEY: Chronic | ICD-10-CM

## 2024-09-05 PROCEDURE — 99213 OFFICE O/P EST LOW 20 MIN: CPT | Mod: 25

## 2024-09-05 RX ORDER — MEDROXYPROGESTERONE ACETATE 150 MG/ML
150 INJECTION, SUSPENSION INTRAMUSCULAR
Qty: 0 | Refills: 0 | Status: COMPLETED | OUTPATIENT
Start: 2024-09-05

## 2024-09-05 RX ADMIN — MEDROXYPROGESTERONE ACETATE 0 MG/ML: 150 INJECTION, SUSPENSION INTRAMUSCULAR at 00:00

## 2024-09-05 NOTE — DISCUSSION/SUMMARY
[FreeTextEntry1] : Depo Provera/Family Planning Depo Provera given today Calcium supplements advised STD counseling, safe sex practices and condoms advised Follow up Nov 21-Dec 5, 2024  for next Depo Provera and annual exam

## 2024-09-05 NOTE — HISTORY OF PRESENT ILLNESS
[FreeTextEntry1] : 42 yo  here for repeat Depo Provera.  Feels well, no c/o.  Started Ozempic for her diabetes a few weeks ago and lost 7 lbs.   [Currently Active] : currently active [Men] : men [Vaginal] : vaginal [No] : No [Yes] : Yes [FreeTextEntry3] : Suhail

## 2024-09-10 ENCOUNTER — NON-APPOINTMENT (OUTPATIENT)
Age: 44
End: 2024-09-10

## 2024-09-10 ENCOUNTER — APPOINTMENT (OUTPATIENT)
Dept: OPHTHALMOLOGY | Facility: CLINIC | Age: 44
End: 2024-09-10
Payer: COMMERCIAL

## 2024-09-10 PROCEDURE — 92014 COMPRE OPH EXAM EST PT 1/>: CPT

## 2024-09-12 DIAGNOSIS — R11.0 NAUSEA: ICD-10-CM

## 2024-09-16 ENCOUNTER — OUTPATIENT (OUTPATIENT)
Dept: OUTPATIENT SERVICES | Facility: HOSPITAL | Age: 44
LOS: 1 days | Discharge: HOME | End: 2024-09-16

## 2024-09-16 DIAGNOSIS — N20.0 CALCULUS OF KIDNEY: Chronic | ICD-10-CM

## 2024-09-16 DIAGNOSIS — Z90.79 ACQUIRED ABSENCE OF OTHER GENITAL ORGAN(S): Chronic | ICD-10-CM

## 2024-09-16 DIAGNOSIS — Z95.828 PRESENCE OF OTHER VASCULAR IMPLANTS AND GRAFTS: Chronic | ICD-10-CM

## 2024-09-16 DIAGNOSIS — C85.10 UNSPECIFIED B-CELL LYMPHOMA, UNSPECIFIED SITE: ICD-10-CM

## 2024-09-18 ENCOUNTER — APPOINTMENT (OUTPATIENT)
Dept: INTERNAL MEDICINE | Facility: CLINIC | Age: 44
End: 2024-09-18
Payer: COMMERCIAL

## 2024-09-18 ENCOUNTER — OUTPATIENT (OUTPATIENT)
Dept: OUTPATIENT SERVICES | Facility: HOSPITAL | Age: 44
LOS: 1 days | End: 2024-09-18

## 2024-09-18 ENCOUNTER — MED ADMIN CHARGE (OUTPATIENT)
Age: 44
End: 2024-09-18

## 2024-09-18 VITALS
DIASTOLIC BLOOD PRESSURE: 85 MMHG | BODY MASS INDEX: 27.49 KG/M2 | HEART RATE: 71 BPM | HEIGHT: 64 IN | SYSTOLIC BLOOD PRESSURE: 124 MMHG | WEIGHT: 161 LBS | OXYGEN SATURATION: 100 %

## 2024-09-18 DIAGNOSIS — Z87.898 PERSONAL HISTORY OF OTHER SPECIFIED CONDITIONS: ICD-10-CM

## 2024-09-18 DIAGNOSIS — Z01.419 ENCOUNTER FOR GYNECOLOGICAL EXAMINATION (GENERAL) (ROUTINE) W/OUT ABNORMAL FINDINGS: ICD-10-CM

## 2024-09-18 DIAGNOSIS — Z87.09 PERSONAL HISTORY OF OTHER DISEASES OF THE RESPIRATORY SYSTEM: ICD-10-CM

## 2024-09-18 DIAGNOSIS — Z20.822 CONTACT WITH AND (SUSPECTED) EXPOSURE TO COVID-19: ICD-10-CM

## 2024-09-18 DIAGNOSIS — R05.8 OTHER SPECIFIED COUGH: ICD-10-CM

## 2024-09-18 DIAGNOSIS — Z95.828 PRESENCE OF OTHER VASCULAR IMPLANTS AND GRAFTS: Chronic | ICD-10-CM

## 2024-09-18 DIAGNOSIS — E11.9 TYPE 2 DIABETES MELLITUS W/OUT COMPLICATIONS: ICD-10-CM

## 2024-09-18 DIAGNOSIS — E78.5 HYPERLIPIDEMIA, UNSPECIFIED: ICD-10-CM

## 2024-09-18 DIAGNOSIS — N20.0 CALCULUS OF KIDNEY: Chronic | ICD-10-CM

## 2024-09-18 DIAGNOSIS — Z00.00 ENCOUNTER FOR GENERAL ADULT MEDICAL EXAMINATION W/OUT ABNORMAL FINDINGS: ICD-10-CM

## 2024-09-18 DIAGNOSIS — Z01.89 ENCOUNTER FOR OTHER SPECIFIED SPECIAL EXAMINATIONS: ICD-10-CM

## 2024-09-18 DIAGNOSIS — Z23 ENCOUNTER FOR IMMUNIZATION: ICD-10-CM

## 2024-09-18 DIAGNOSIS — R10.2 PELVIC AND PERINEAL PAIN: ICD-10-CM

## 2024-09-18 DIAGNOSIS — Z90.79 ACQUIRED ABSENCE OF OTHER GENITAL ORGAN(S): Chronic | ICD-10-CM

## 2024-09-18 DIAGNOSIS — Z30.42 ENCOUNTER FOR SURVEILLANCE OF INJECTABLE CONTRACEPTIVE: ICD-10-CM

## 2024-09-18 PROCEDURE — 99213 OFFICE O/P EST LOW 20 MIN: CPT | Mod: 25

## 2024-09-18 PROCEDURE — G2211 COMPLEX E/M VISIT ADD ON: CPT | Mod: NC

## 2024-09-18 RX ORDER — ONDANSETRON 8 MG/1
8 TABLET, ORALLY DISINTEGRATING ORAL EVERY 8 HOURS
Qty: 21 | Refills: 0 | Status: DISCONTINUED | COMMUNITY
Start: 2024-09-12 | End: 2024-09-18

## 2024-09-19 LAB
CHOLEST SERPL-MCNC: 161 MG/DL
ESTIMATED AVERAGE GLUCOSE: 117 MG/DL
HBA1C MFR BLD HPLC: 5.7 %
HDLC SERPL-MCNC: 32 MG/DL
LDLC SERPL CALC-MCNC: 112 MG/DL
NONHDLC SERPL-MCNC: 129 MG/DL
TRIGL SERPL-MCNC: 92 MG/DL

## 2024-09-19 RX ORDER — ROSUVASTATIN CALCIUM 10 MG/1
10 TABLET, FILM COATED ORAL
Qty: 30 | Refills: 3 | Status: ACTIVE | COMMUNITY
Start: 2024-09-19 | End: 1900-01-01

## 2024-09-23 DIAGNOSIS — E11.9 TYPE 2 DIABETES MELLITUS WITHOUT COMPLICATIONS: ICD-10-CM

## 2024-09-23 DIAGNOSIS — E78.5 HYPERLIPIDEMIA, UNSPECIFIED: ICD-10-CM

## 2024-09-23 DIAGNOSIS — Z23 ENCOUNTER FOR IMMUNIZATION: ICD-10-CM

## 2024-09-23 PROBLEM — R05.8 UPPER AIRWAY COUGH SYNDROME: Status: RESOLVED | Noted: 2023-11-13 | Resolved: 2024-09-23

## 2024-09-23 PROBLEM — Z01.419 WELL WOMAN EXAM WITH ROUTINE GYNECOLOGICAL EXAM: Status: RESOLVED | Noted: 2020-11-15 | Resolved: 2024-09-23

## 2024-09-23 NOTE — HISTORY OF PRESENT ILLNESS
[de-identified] : 43 year old woman, history of allergic rhinitis, mild intermittent asthma, chronic post-nasal drip, follicular lymphoma s/p maintenance therapy with rituximab 2 years ago, laryngopharyngeal reflux disease, HLD, T2DM (A1c 7.1) here for follow up. Patient reports feeling well. States she has been using Ozempic 0.5mg and initially had some nausea but was recommended to take it before a meal and nausea resolved. Patient states she has trouble with atorvastatin and has been causing her to have loose BM on days she takes the medication. She has started to take it every other day and only has GI side effects on days she takes the medication.

## 2024-09-23 NOTE — HISTORY OF PRESENT ILLNESS
[de-identified] : 43 year old woman, history of allergic rhinitis, mild intermittent asthma, chronic post-nasal drip, follicular lymphoma s/p maintenance therapy with rituximab 2 years ago, laryngopharyngeal reflux disease, HLD, T2DM (A1c 7.1) here for follow up. Patient reports feeling well. States she has been using Ozempic 0.5mg and initially had some nausea but was recommended to take it before a meal and nausea resolved. Patient states she has trouble with atorvastatin and has been causing her to have loose BM on days she takes the medication. She has started to take it every other day and only has GI side effects on days she takes the medication.

## 2024-09-23 NOTE — ASSESSMENT
[FreeTextEntry1] : RTC in 6 months for annual CPE visit.   Case discussed w/ Dr. Gabby Foley MD   Internal Medicine, PGY-3   MS[facility], FIrm 1

## 2024-09-25 ENCOUNTER — RESULT REVIEW (OUTPATIENT)
Age: 44
End: 2024-09-25

## 2024-09-25 ENCOUNTER — APPOINTMENT (OUTPATIENT)
Dept: HEMATOLOGY ONCOLOGY | Facility: CLINIC | Age: 44
End: 2024-09-25
Payer: COMMERCIAL

## 2024-09-25 VITALS
TEMPERATURE: 97.1 F | DIASTOLIC BLOOD PRESSURE: 82 MMHG | OXYGEN SATURATION: 99 % | WEIGHT: 158.29 LBS | BODY MASS INDEX: 27.17 KG/M2 | SYSTOLIC BLOOD PRESSURE: 120 MMHG | HEART RATE: 73 BPM | RESPIRATION RATE: 16 BRPM

## 2024-09-25 DIAGNOSIS — C85.10 UNSPECIFIED B-CELL LYMPHOMA, UNSPECIFIED SITE: ICD-10-CM

## 2024-09-25 LAB
BASOPHILS # BLD AUTO: 0.05 K/UL — SIGNIFICANT CHANGE UP (ref 0–0.2)
BASOPHILS NFR BLD AUTO: 0.9 % — SIGNIFICANT CHANGE UP (ref 0–2)
EOSINOPHIL # BLD AUTO: 0.18 K/UL — SIGNIFICANT CHANGE UP (ref 0–0.5)
EOSINOPHIL NFR BLD AUTO: 3.3 % — SIGNIFICANT CHANGE UP (ref 0–6)
HCT VFR BLD CALC: 40.3 % — SIGNIFICANT CHANGE UP (ref 34.5–45)
HGB BLD-MCNC: 13 G/DL — SIGNIFICANT CHANGE UP (ref 11.5–15.5)
IMM GRANULOCYTES NFR BLD AUTO: 0.4 % — SIGNIFICANT CHANGE UP (ref 0–0.9)
LYMPHOCYTES # BLD AUTO: 1.6 K/UL — SIGNIFICANT CHANGE UP (ref 1–3.3)
LYMPHOCYTES # BLD AUTO: 28.9 % — SIGNIFICANT CHANGE UP (ref 13–44)
MCHC RBC-ENTMCNC: 26.1 PG — LOW (ref 27–34)
MCHC RBC-ENTMCNC: 32.3 G/DL — SIGNIFICANT CHANGE UP (ref 32–36)
MCV RBC AUTO: 80.8 FL — SIGNIFICANT CHANGE UP (ref 80–100)
MONOCYTES # BLD AUTO: 0.4 K/UL — SIGNIFICANT CHANGE UP (ref 0–0.9)
MONOCYTES NFR BLD AUTO: 7.2 % — SIGNIFICANT CHANGE UP (ref 2–14)
NEUTROPHILS # BLD AUTO: 3.28 K/UL — SIGNIFICANT CHANGE UP (ref 1.8–7.4)
NEUTROPHILS NFR BLD AUTO: 59.3 % — SIGNIFICANT CHANGE UP (ref 43–77)
NRBC # BLD: 0 /100 WBCS — SIGNIFICANT CHANGE UP (ref 0–0)
PLATELET # BLD AUTO: 211 K/UL — SIGNIFICANT CHANGE UP (ref 150–400)
RBC # BLD: 4.99 M/UL — SIGNIFICANT CHANGE UP (ref 3.8–5.2)
RBC # FLD: 14.4 % — SIGNIFICANT CHANGE UP (ref 10.3–14.5)
WBC # BLD: 5.53 K/UL — SIGNIFICANT CHANGE UP (ref 3.8–10.5)
WBC # FLD AUTO: 5.53 K/UL — SIGNIFICANT CHANGE UP (ref 3.8–10.5)

## 2024-09-25 PROCEDURE — 99214 OFFICE O/P EST MOD 30 MIN: CPT

## 2024-09-25 PROCEDURE — G2211 COMPLEX E/M VISIT ADD ON: CPT

## 2024-09-26 NOTE — HISTORY OF PRESENT ILLNESS
[de-identified] : 37yo F w/ asthma here for evaluation of newly diagnosed follicular lymphoma.   Patient mil she initially noted left inguinal pain in February 2019, had a palpable mass for the last 6 years which she noticed after delivery of her second baby in 2013. She saw GYN, had TVUS: Uterus: 5.6 x 3.6 x 4.3 cm. EMS 3 mm. Right ovary normal. Left ovary normal. Complex elongated left adnexal lesion suggestive of hydrosalpinx, 9.2 x 4.9 x 9 cm. She is s/p b/l salpingectomy on 5/2/19. She remains on depo injection. She states that she has had more swelling and more pain since her procedure.   She had CT A/P done on 5/9/19 which demonstrated extensive retroperitoneal, pelvic, and inguinal lymphadenopathy, concerning for malignancy with small amount of abdominal and pelvic ascites. s/p IR biopsy of left inguinal lymph node - follicular lymphoma grade 1-2.   Also having cervical LAD, noticed for the last few months, L>R. Noted R inguinal swelling for the last week with pain. No fevers of chills. Notes having itching and that her breathing is a bit more labored than usual.  [de-identified] : BMbx (6/10/19): - Focal lymphoid infiltrate consistent with involvement by follicular lymphoma (history of follicular lymphoma) - Small paracortical sample with trilineage hematopoiesis with maturation and iron stores present  PET/CT (6/16/19): 1. Hypermetabolic lymphadenopathy in neck, thorax, abdomen, pelvis, and bilateral inguinal/femoral regions corresponds to biopsy-proven follicular lymphoma. Hypermetabolic subcutaneous nodule, right posterolateral chest wall, is compatible with an additional site of lymphoma. 2. Nonspecific left greater than right tonsillar hypermetabolism may represent additional sites of lymphoma. 3. Findings compatible with bone marrow involvement by lymphoma in bilateral humeri and axial skeleton, as described above.  She reports having increasing fatigue. Still having slight shortness of breath.  We started BR on 6/24. She had a reaction to Rituxan.   Eating fine. Neck LAD, shortness of breath and abdominal bloating improved/resolved.  She had a yeast infection, saw GYN. Also reports having pelvic pain and was found to have a small polyp.   C2 on 7/22. She notes having burning as she was getting the chemo (please see chart note for details). Notes that her arms were still hurting for a week after.  She had a portacath placed 8/12 Felt more fatigued and weak.  Had transvaginal US, saw GYN this morning (9/5/19). Pelvic pain has resolved.   C3 on 8/19. Tolerated it well.   CT N/C/A/P (9/10/19): Marked decrease in size of cervical lymph nodes when compared with the prior exam compatible with a favorable response to therapy. Significant interval decrease in size of left supraclavicular lymphadenopathy. Focal enhancement involving the right anterior tonsillar without associated tonsillar expansion. Correlation with direct visualization and continued follow-up is advised. Significant interval decrease in size of previously noted hypermetabolic nodule in the soft tissues of the right posterior lateral chest. Significant interval decrease in size of retroperitoneal, pelvic, inguinal, and mesenteric lymphadenopathy as described above.  C4 on 9/16. Tolerated well, had some tremors afterwards.   C5 was delayed secondary to neutropenia. Given on 10/30/19. Had an episode of chest tightness w/ Pillo on day 1. Premedicated on day 2 with no events. Tremors better this cycle but notes 3 episodes of restless legs.   C6 on 11/26/19. She reports having muscle aches.   PET/CT 1/18/20: 1. Interval resolution of FDG activity associated with lymphadenopathy in the neck, chest, upper abdomen and pelvis with either resolution or significant decrease in size of the corresponding lymph nodes and not well delineated on CT as compared to PET/CT from 6/16/2019. Near total resolution of FDG avid left inguinal lymphadenopathy which is significantly decreased in size now demonstrating background activity. 2. Interval significant decrease in size and metabolism of hypermetabolic retroperitoneal and mesenteric lymphadenopathy. 3. Interval resolution of asymmetric tonsillar hypermetabolism. 4. Interval resolution of FDG avidity in bone marrow of bilateral humeri, T10, and sacrum.  Having pain on L side of lower abdomen and groin around 3-4 weeks ago. Having night sweats again too. Had GYN f/u 2 weeks ago. We did a PET/CT on 6/13 which showed: 1. Small focus of mild FDG activity, decreased in size and metabolism, is associated with mesenteric haziness which is unchanged on CT as compared to prior study dated 1/18/2020 ([facility] 4). Resolution of minimally FDG-avid amorphous density, left periaortic region. 2. Minimally FDG-avid density, left inguinal region, unchanged, may represent postsurgical change versus lymph node. Please correlate clinically.  Started Rituxan maintenance on 6/25/20. She had a reaction to Rituxan -please see chart note for details.  Second dose on 8/31/20, tolerated better. Reports having back pain after premeds wore off.  3rd dose on 11/4/20, she felt tired and slept through the treatment, denied nausea, but c/o tingling in the finger tips and feet comes and goes. Patient admitted tremor improved. birth control pills d/c 3 months ago, she attributed the nausea after Rituxan to the withdrawal of birth control pills. Kidney stone stent removed today.  Fourth dose on 12/30/20. Pt reports nausea and fatigue. States that does not feel well. Does not want to continue at this time.   PET/CT 2/6/21: 1. Resolution of small focus of mild FDG activity associated with mesenteric haziness which is unchanged on CT ([facility]auville 1). 2. Minimally FDG-avid density, left inguinal region, unchanged, may represent postsurgical change versus lymph node. Please correlate clinically. 3. Remainder study is unremarkable and not significantly changed, demonstrating no evidence of FDG-avid disease.  She is doing well, no new complaints.  Continues to be fatigued. Reports having a difficult time coping with her diagnosis and her transition off treatment.  She received both her COVID vaccine doses - 5/27 and 6/17  Had GYN and PCP annual check up 2 weeks ago, she admitted cholesterol level was slightly high other than that no remarkable findings, will f/u in 3 months. PCP ordered Echo, will do after Thanksgiving. will have Mammogram done next month.  Never went to psychotherapy, she felt everything calmed down now, the working gets better, she rested better, she felt much less stressful now.  Denied any new complaints.   She had COVID in Dec - had URI symptoms and had CP/SOB.  She saw GI on 1/10/22 and had endoscopy done. Taking omeprazole with relief.   Saw [facility] Dr. Jolly on 3/22/22 for voice hoarseness and neck mass.  CT C/A/P done 3/5/22: No evidence of recurrent or metastatic disease in the chest, abdomen, or pelvis. s/p excisional lymph node, left neck, biopsy- Follicular and paracortical hyperplasia   She will start PT on Tues for L shoulder syndrome.  Denied any new complaints.   Patient was seen today, feeling fine overall. had left shoulder pain since biopsy of left neck.  Started PT early July 2 day/wk now 1 day a week, left should pain improved.  She f/u w/ GYN, Ophthalmologist, [facility] doc recently no remark findings.  Denied any constitutional symptoms.  Intermittent voice hoarseness, following with ENT  CT N/C/A/P 1/12/23: Slight interval increased size left level 2 lymph nodes as detailed above. Stable additional retropharyngeal and right cervical chain lymph nodes. No new adenopathy. Stable nonspecific prominence of Waldeyer's ring. Nonspecific increase in prominence of subcentimeter mesenteric lymph nodes largest measuring 1.0 x 0.9 cm in the right lower quadrant. Consider further evaluation with PET/CT scan.  She had COVID in Dec 2022, s/p Paxlovid. She continues having hoarseness. She continues having cough, worse at night. She has intermittent chest pain.  Mammo/US done 1/19/23: BI-RADS 0 -incomplete.  Repeated US breast on 1/31/23: BI-RADS 4A- suspicious findings; low suspicion for Malignancy; Ultrasound guided core needle biopsy of the left breast done on 3/9: revealed begin ductal ectasia;  c/o neuropathy in feet, f/u neurologist Rx of gabapentin but she didn't' take it. Only took Tylenol for pain relief.  She admitted chest pain and cough subsided now.   Having a cough -went to urgent care, said it was 2/2 postnasal drip.  Continues with physical therapy  CT N/C/A/P 1/21/24: Scattered bilateral cervical lymph nodes are stable in number and distribution, all measuring mildly larger (few millimeter growth) compared to 1/12/2023. None grown by more than 25% and these are favored to be physiologic or reactive. Stable to decreased size of mesenteric nodes since 01/12/2023. Otherwise, no new lymphadenopathy in the chest, abdomen, or pelvis.  She had a sore throat and body aches x2 wks -improving. COVID negative   5/22/24:  Patient is seen today for a f/u apt. She feels well overall. Denied any new complaints.  She saw dermatologist for the keloid scar after port removal, had steroid injection, now the scar becomes flat and improved.   She is off Metformin now due to s/e of diarhea.  Otherwise she is doing well.   9/25/24: Here for follow up. Overall feels well with no new complaints. Ongoing night sweats for the last year, unchanged, currently in perimenopause.  Is on Ozempic now due to inability to tolerate metformin, tolerating well. Lost ~7 lbs since starting.  Denies fevers, chills, lymphadenopathy. Improving energy but still has some fatigue and neuropathy but reports improving. Continues to work from home. Had flu vaccine last week.

## 2024-09-26 NOTE — PHYSICAL EXAM
[Fully active, able to carry on all pre-disease performance without restriction] : Status 0 - Fully active, able to carry on all pre-disease performance without restriction [Normal] : no peripheral adenopathy appreciated [de-identified] : L neck excisional site healed [de-identified] : flat red skin scar on the left chest after port removal.

## 2024-09-26 NOTE — PHYSICAL EXAM
[Fully active, able to carry on all pre-disease performance without restriction] : Status 0 - Fully active, able to carry on all pre-disease performance without restriction [Normal] : no peripheral adenopathy appreciated [de-identified] : L neck excisional site healed [de-identified] : flat red skin scar on the left chest after port removal.

## 2024-09-26 NOTE — ASSESSMENT
[FreeTextEntry1] : 41yo F w/ asthma here for f/u of stage IV follicular lymphoma grade 1-2. We started treatment with Bendamustine/Rituxan on 6/24/19. LAD has improved. C5 delayed due to neutropenia, received on 10/30/19. C6 on 11/26/19, tolerated treatment well. PET/CT done at end of treatment showed interval significant decrease in size and metabolism of LAD.  PET/CT unchanged. Started on Rituximab maintenance on 6/25/20. Cont every 2 mo. s/p 4th dose of Rituxan on 12/30/20. She had worsened side effects after 4th dose and does not want to continue. Will hold further Rituxan maintenance given intolerance. PET/CT post Rituxan 2/2021 stable Last imaging from 3/2022 [facility]. s/p excisional biopsy of L neck mass that was unremarkable CT N/C/A/P done 1/2023 showed slight increase in LAD but still ~1-1.5cm.  CT N/C/A/P in 1/2024 - bilateral cervical lymph nodes slightly enlarged but she currently has URI. Otherwise LAD stable. Results reviewed with pt f/u w/ PM and gyn Repeated US breast BI-ARDS 4A, US guided biopsy of left breast done on 2/8/23: begin ductal ectasia; Port removed, saw derm for keloid scar after port removal, had steroid injection, keloid improved initially but now feels it's becoming tough again, has follow up in December. All questions answered. CBC grossly within normal limits; CMP and LDH unremarkable. RTC 4 mo after scans done. If stable, can space visits to Q6 months.  Patient seen and discussed with Dr. Vega. Chelsea Davis DO, MPH Medical Oncology Fellow
[FreeTextEntry1] : 43yo F w/ asthma here for f/u of stage IV follicular lymphoma grade 1-2. We started treatment with Bendamustine/Rituxan on 6/24/19. LAD has improved. C5 delayed due to neutropenia, received on 10/30/19. C6 on 11/26/19, tolerated treatment well. PET/CT done at end of treatment showed interval significant decrease in size and metabolism of LAD.  PET/CT unchanged. Started on Rituximab maintenance on 6/25/20. Cont every 2 mo. s/p 4th dose of Rituxan on 12/30/20. She had worsened side effects after 4th dose and does not want to continue. Will hold further Rituxan maintenance given intolerance. PET/CT post Rituxan 2/2021 stable Last imaging from 3/2022 [facility]. s/p excisional biopsy of L neck mass that was unremarkable CT N/C/A/P done 1/2023 showed slight increase in LAD but still ~1-1.5cm.  CT N/C/A/P in 1/2024 - bilateral cervical lymph nodes slightly enlarged but she currently has URI. Otherwise LAD stable. Results reviewed with pt f/u w/ PM and gyn Repeated US breast BI-ARDS 4A, US guided biopsy of left breast done on 2/8/23: begin ductal ectasia; Port removed, saw derm for keloid scar after port removal, had steroid injection, keloid improved initially but now feels it's becoming tough again, has follow up in December. All questions answered. CBC grossly within normal limits; CMP and LDH unremarkable. RTC 4 mo after scans done. If stable, can space visits to Q6 months.  Patient seen and discussed with Dr. Vega. Chelsea Davis DO, MPH Medical Oncology Fellow
No...

## 2024-09-26 NOTE — HISTORY OF PRESENT ILLNESS
[de-identified] : 39yo F w/ asthma here for evaluation of newly diagnosed follicular lymphoma.   Patient mil she initially noted left inguinal pain in February 2019, had a palpable mass for the last 6 years which she noticed after delivery of her second baby in 2013. She saw GYN, had TVUS: Uterus: 5.6 x 3.6 x 4.3 cm. EMS 3 mm. Right ovary normal. Left ovary normal. Complex elongated left adnexal lesion suggestive of hydrosalpinx, 9.2 x 4.9 x 9 cm. She is s/p b/l salpingectomy on 5/2/19. She remains on depo injection. She states that she has had more swelling and more pain since her procedure.   She had CT A/P done on 5/9/19 which demonstrated extensive retroperitoneal, pelvic, and inguinal lymphadenopathy, concerning for malignancy with small amount of abdominal and pelvic ascites. s/p IR biopsy of left inguinal lymph node - follicular lymphoma grade 1-2.   Also having cervical LAD, noticed for the last few months, L>R. Noted R inguinal swelling for the last week with pain. No fevers of chills. Notes having itching and that her breathing is a bit more labored than usual.  [de-identified] : BMbx (6/10/19): - Focal lymphoid infiltrate consistent with involvement by follicular lymphoma (history of follicular lymphoma) - Small paracortical sample with trilineage hematopoiesis with maturation and iron stores present  PET/CT (6/16/19): 1. Hypermetabolic lymphadenopathy in neck, thorax, abdomen, pelvis, and bilateral inguinal/femoral regions corresponds to biopsy-proven follicular lymphoma. Hypermetabolic subcutaneous nodule, right posterolateral chest wall, is compatible with an additional site of lymphoma. 2. Nonspecific left greater than right tonsillar hypermetabolism may represent additional sites of lymphoma. 3. Findings compatible with bone marrow involvement by lymphoma in bilateral humeri and axial skeleton, as described above.  She reports having increasing fatigue. Still having slight shortness of breath.  We started BR on 6/24. She had a reaction to Rituxan.   Eating fine. Neck LAD, shortness of breath and abdominal bloating improved/resolved.  She had a yeast infection, saw GYN. Also reports having pelvic pain and was found to have a small polyp.   C2 on 7/22. She notes having burning as she was getting the chemo (please see chart note for details). Notes that her arms were still hurting for a week after.  She had a portacath placed 8/12 Felt more fatigued and weak.  Had transvaginal US, saw GYN this morning (9/5/19). Pelvic pain has resolved.   C3 on 8/19. Tolerated it well.   CT N/C/A/P (9/10/19): Marked decrease in size of cervical lymph nodes when compared with the prior exam compatible with a favorable response to therapy. Significant interval decrease in size of left supraclavicular lymphadenopathy. Focal enhancement involving the right anterior tonsillar without associated tonsillar expansion. Correlation with direct visualization and continued follow-up is advised. Significant interval decrease in size of previously noted hypermetabolic nodule in the soft tissues of the right posterior lateral chest. Significant interval decrease in size of retroperitoneal, pelvic, inguinal, and mesenteric lymphadenopathy as described above.  C4 on 9/16. Tolerated well, had some tremors afterwards.   C5 was delayed secondary to neutropenia. Given on 10/30/19. Had an episode of chest tightness w/ Pillo on day 1. Premedicated on day 2 with no events. Tremors better this cycle but notes 3 episodes of restless legs.   C6 on 11/26/19. She reports having muscle aches.   PET/CT 1/18/20: 1. Interval resolution of FDG activity associated with lymphadenopathy in the neck, chest, upper abdomen and pelvis with either resolution or significant decrease in size of the corresponding lymph nodes and not well delineated on CT as compared to PET/CT from 6/16/2019. Near total resolution of FDG avid left inguinal lymphadenopathy which is significantly decreased in size now demonstrating background activity. 2. Interval significant decrease in size and metabolism of hypermetabolic retroperitoneal and mesenteric lymphadenopathy. 3. Interval resolution of asymmetric tonsillar hypermetabolism. 4. Interval resolution of FDG avidity in bone marrow of bilateral humeri, T10, and sacrum.  Having pain on L side of lower abdomen and groin around 3-4 weeks ago. Having night sweats again too. Had GYN f/u 2 weeks ago. We did a PET/CT on 6/13 which showed: 1. Small focus of mild FDG activity, decreased in size and metabolism, is associated with mesenteric haziness which is unchanged on CT as compared to prior study dated 1/18/2020 ([facility] 4). Resolution of minimally FDG-avid amorphous density, left periaortic region. 2. Minimally FDG-avid density, left inguinal region, unchanged, may represent postsurgical change versus lymph node. Please correlate clinically.  Started Rituxan maintenance on 6/25/20. She had a reaction to Rituxan -please see chart note for details.  Second dose on 8/31/20, tolerated better. Reports having back pain after premeds wore off.  3rd dose on 11/4/20, she felt tired and slept through the treatment, denied nausea, but c/o tingling in the finger tips and feet comes and goes. Patient admitted tremor improved. birth control pills d/c 3 months ago, she attributed the nausea after Rituxan to the withdrawal of birth control pills. Kidney stone stent removed today.  Fourth dose on 12/30/20. Pt reports nausea and fatigue. States that does not feel well. Does not want to continue at this time.   PET/CT 2/6/21: 1. Resolution of small focus of mild FDG activity associated with mesenteric haziness which is unchanged on CT ([facility] 1). 2. Minimally FDG-avid density, left inguinal region, unchanged, may represent postsurgical change versus lymph node. Please correlate clinically. 3. Remainder study is unremarkable and not significantly changed, demonstrating no evidence of FDG-avid disease.  She is doing well, no new complaints.  Continues to be fatigued. Reports having a difficult time coping with her diagnosis and her transition off treatment.  She received both her COVID vaccine doses - 5/27 and 6/17  Had GYN and PCP annual check up 2 weeks ago, she admitted cholesterol level was slightly high other than that no remarkable findings, will f/u in 3 months. PCP ordered Echo, will do after Thanksgiving. will have Mammogram done next month.  Never went to psychotherapy, she felt everything calmed down now, the working gets better, she rested better, she felt much less stressful now.  Denied any new complaints.   She had COVID in Dec - had URI symptoms and had CP/SOB.  She saw GI on 1/10/22 and had endoscopy done. Taking omeprazole with relief.   Saw [facility] Dr. Jolly on 3/22/22 for voice hoarseness and neck mass.  CT C/A/P done 3/5/22: No evidence of recurrent or metastatic disease in the chest, abdomen, or pelvis. s/p excisional lymph node, left neck, biopsy- Follicular and paracortical hyperplasia   She will start PT on Tues for L shoulder syndrome.  Denied any new complaints.   Patient was seen today, feeling fine overall. had left shoulder pain since biopsy of left neck.  Started PT early July 2 day/wk now 1 day a week, left should pain improved.  She f/u w/ GYN, Ophthalmologist, [facility] doc recently no remark findings.  Denied any constitutional symptoms.  Intermittent voice hoarseness, following with ENT  CT N/C/A/P 1/12/23: Slight interval increased size left level 2 lymph nodes as detailed above. Stable additional retropharyngeal and right cervical chain lymph nodes. No new adenopathy. Stable nonspecific prominence of Waldeyer's ring. Nonspecific increase in prominence of subcentimeter mesenteric lymph nodes largest measuring 1.0 x 0.9 cm in the right lower quadrant. Consider further evaluation with PET/CT scan.  She had COVID in Dec 2022, s/p Paxlovid. She continues having hoarseness. She continues having cough, worse at night. She has intermittent chest pain.  Mammo/US done 1/19/23: BI-RADS 0 -incomplete.  Repeated US breast on 1/31/23: BI-RADS 4A- suspicious findings; low suspicion for Malignancy; Ultrasound guided core needle biopsy of the left breast done on 3/9: revealed begin ductal ectasia;  c/o neuropathy in feet, f/u neurologist Rx of gabapentin but she didn't' take it. Only took Tylenol for pain relief.  She admitted chest pain and cough subsided now.   Having a cough -went to urgent care, said it was 2/2 postnasal drip.  Continues with physical therapy  CT N/C/A/P 1/21/24: Scattered bilateral cervical lymph nodes are stable in number and distribution, all measuring mildly larger (few millimeter growth) compared to 1/12/2023. None grown by more than 25% and these are favored to be physiologic or reactive. Stable to decreased size of mesenteric nodes since 01/12/2023. Otherwise, no new lymphadenopathy in the chest, abdomen, or pelvis.  She had a sore throat and body aches x2 wks -improving. COVID negative   5/22/24:  Patient is seen today for a f/u apt. She feels well overall. Denied any new complaints.  She saw dermatologist for the keloid scar after port removal, had steroid injection, now the scar becomes flat and improved.   She is off Metformin now due to s/e of diarhea.  Otherwise she is doing well.   9/25/24: Here for follow up. Overall feels well with no new complaints. Ongoing night sweats for the last year, unchanged, currently in perimenopause.  Is on Ozempic now due to inability to tolerate metformin, tolerating well. Lost ~7 lbs since starting.  Denies fevers, chills, lymphadenopathy. Improving energy but still has some fatigue and neuropathy but reports improving. Continues to work from home. Had flu vaccine last week.

## 2024-10-03 LAB
ALBUMIN SERPL ELPH-MCNC: 4.6 G/DL
ALP BLD-CCNC: 98 U/L
ALT SERPL-CCNC: 20 U/L
ANION GAP SERPL CALC-SCNC: 13 MMOL/L
AST SERPL-CCNC: 18 U/L
BILIRUB SERPL-MCNC: 0.4 MG/DL
BUN SERPL-MCNC: 10 MG/DL
CALCIUM SERPL-MCNC: 9.9 MG/DL
CHLORIDE SERPL-SCNC: 108 MMOL/L
CO2 SERPL-SCNC: 19 MMOL/L
CREAT SERPL-MCNC: 0.73 MG/DL
EGFR: 105 ML/MIN/1.73M2
GLUCOSE SERPL-MCNC: 104 MG/DL
LDH SERPL-CCNC: 176 U/L
POTASSIUM SERPL-SCNC: 4.6 MMOL/L
PROT SERPL-MCNC: 6.7 G/DL
SODIUM SERPL-SCNC: 140 MMOL/L

## 2024-10-08 ENCOUNTER — NON-APPOINTMENT (OUTPATIENT)
Age: 44
End: 2024-10-08

## 2024-10-08 ENCOUNTER — APPOINTMENT (OUTPATIENT)
Dept: OPHTHALMOLOGY | Facility: CLINIC | Age: 44
End: 2024-10-08
Payer: SELF-PAY

## 2024-10-08 PROCEDURE — 92310B: CUSTOM

## 2024-11-01 ENCOUNTER — APPOINTMENT (OUTPATIENT)
Dept: CT IMAGING | Facility: IMAGING CENTER | Age: 44
End: 2024-11-01
Payer: COMMERCIAL

## 2024-11-01 ENCOUNTER — OUTPATIENT (OUTPATIENT)
Dept: OUTPATIENT SERVICES | Facility: HOSPITAL | Age: 44
LOS: 1 days | End: 2024-11-01
Payer: COMMERCIAL

## 2024-11-01 DIAGNOSIS — Z00.8 ENCOUNTER FOR OTHER GENERAL EXAMINATION: ICD-10-CM

## 2024-11-01 DIAGNOSIS — C85.10 UNSPECIFIED B-CELL LYMPHOMA, UNSPECIFIED SITE: ICD-10-CM

## 2024-11-01 PROCEDURE — 74177 CT ABD & PELVIS W/CONTRAST: CPT

## 2024-11-01 PROCEDURE — 71260 CT THORAX DX C+: CPT | Mod: 26

## 2024-11-01 PROCEDURE — 70491 CT SOFT TISSUE NECK W/DYE: CPT | Mod: 26

## 2024-11-01 PROCEDURE — 70491 CT SOFT TISSUE NECK W/DYE: CPT

## 2024-11-01 PROCEDURE — 71260 CT THORAX DX C+: CPT

## 2024-11-01 PROCEDURE — 74177 CT ABD & PELVIS W/CONTRAST: CPT | Mod: 26

## 2024-11-06 ENCOUNTER — EMERGENCY (EMERGENCY)
Facility: HOSPITAL | Age: 44
LOS: 1 days | Discharge: HOME | End: 2024-11-06
Attending: STUDENT IN AN ORGANIZED HEALTH CARE EDUCATION/TRAINING PROGRAM | Admitting: STUDENT IN AN ORGANIZED HEALTH CARE EDUCATION/TRAINING PROGRAM
Payer: COMMERCIAL

## 2024-11-06 ENCOUNTER — NON-APPOINTMENT (OUTPATIENT)
Age: 44
End: 2024-11-06

## 2024-11-06 VITALS
WEIGHT: 160.06 LBS | TEMPERATURE: 98 F | RESPIRATION RATE: 15 BRPM | OXYGEN SATURATION: 100 % | DIASTOLIC BLOOD PRESSURE: 75 MMHG | SYSTOLIC BLOOD PRESSURE: 135 MMHG | HEART RATE: 88 BPM

## 2024-11-06 DIAGNOSIS — N20.0 CALCULUS OF KIDNEY: Chronic | ICD-10-CM

## 2024-11-06 DIAGNOSIS — Z90.79 ACQUIRED ABSENCE OF OTHER GENITAL ORGAN(S): Chronic | ICD-10-CM

## 2024-11-06 DIAGNOSIS — Z95.828 PRESENCE OF OTHER VASCULAR IMPLANTS AND GRAFTS: Chronic | ICD-10-CM

## 2024-11-06 PROCEDURE — 99284 EMERGENCY DEPT VISIT MOD MDM: CPT | Mod: 25

## 2024-11-07 VITALS
TEMPERATURE: 98 F | OXYGEN SATURATION: 100 % | RESPIRATION RATE: 16 BRPM | HEART RATE: 72 BPM | DIASTOLIC BLOOD PRESSURE: 72 MMHG | SYSTOLIC BLOOD PRESSURE: 128 MMHG

## 2024-11-07 LAB
ADD ON TEST-SPECIMEN IN LAB: SIGNIFICANT CHANGE UP
ALBUMIN SERPL ELPH-MCNC: 4.4 G/DL — SIGNIFICANT CHANGE UP (ref 3.3–5)
ALP SERPL-CCNC: 90 U/L — SIGNIFICANT CHANGE UP (ref 40–120)
ALT FLD-CCNC: 20 U/L — SIGNIFICANT CHANGE UP (ref 4–33)
ANION GAP SERPL CALC-SCNC: 13 MMOL/L — SIGNIFICANT CHANGE UP (ref 7–14)
APPEARANCE UR: ABNORMAL
AST SERPL-CCNC: 18 U/L — SIGNIFICANT CHANGE UP (ref 4–32)
BACTERIA # UR AUTO: ABNORMAL /HPF
BASOPHILS # BLD AUTO: 0.07 K/UL — SIGNIFICANT CHANGE UP (ref 0–0.2)
BASOPHILS NFR BLD AUTO: 0.8 % — SIGNIFICANT CHANGE UP (ref 0–2)
BILIRUB DIRECT SERPL-MCNC: <0.2 MG/DL — SIGNIFICANT CHANGE UP (ref 0–0.3)
BILIRUB SERPL-MCNC: 0.5 MG/DL — SIGNIFICANT CHANGE UP (ref 0.2–1.2)
BILIRUB UR-MCNC: NEGATIVE — SIGNIFICANT CHANGE UP
BUN SERPL-MCNC: 13 MG/DL — SIGNIFICANT CHANGE UP (ref 7–23)
CALCIUM SERPL-MCNC: 9.4 MG/DL — SIGNIFICANT CHANGE UP (ref 8.4–10.5)
CAST: 0 /LPF — SIGNIFICANT CHANGE UP (ref 0–4)
CHLORIDE SERPL-SCNC: 108 MMOL/L — HIGH (ref 98–107)
CK SERPL-CCNC: 52 U/L — SIGNIFICANT CHANGE UP (ref 25–170)
CO2 SERPL-SCNC: 19 MMOL/L — LOW (ref 22–31)
COD CRY URNS QL: PRESENT
COLOR SPEC: YELLOW — SIGNIFICANT CHANGE UP
CREAT SERPL-MCNC: 0.77 MG/DL — SIGNIFICANT CHANGE UP (ref 0.5–1.3)
CRP SERPL-MCNC: 3.5 MG/L — SIGNIFICANT CHANGE UP
DIFF PNL FLD: ABNORMAL
EGFR: 98 ML/MIN/1.73M2 — SIGNIFICANT CHANGE UP
EGFR: 98 ML/MIN/1.73M2 — SIGNIFICANT CHANGE UP
EOSINOPHIL # BLD AUTO: 0.13 K/UL — SIGNIFICANT CHANGE UP (ref 0–0.5)
EOSINOPHIL NFR BLD AUTO: 1.5 % — SIGNIFICANT CHANGE UP (ref 0–6)
EPI CELLS # UR: PRESENT
ERYTHROCYTE [SEDIMENTATION RATE] IN BLOOD: 14 MM/HR — SIGNIFICANT CHANGE UP (ref 4–25)
GLUCOSE SERPL-MCNC: 95 MG/DL — SIGNIFICANT CHANGE UP (ref 70–99)
GLUCOSE UR QL: NEGATIVE MG/DL — SIGNIFICANT CHANGE UP
HCG SERPL-ACNC: <1 MIU/ML — SIGNIFICANT CHANGE UP
HCT VFR BLD CALC: 36.6 % — SIGNIFICANT CHANGE UP (ref 34.5–45)
HGB BLD-MCNC: 11.9 G/DL — SIGNIFICANT CHANGE UP (ref 11.5–15.5)
IANC: 5.94 K/UL — SIGNIFICANT CHANGE UP (ref 1.8–7.4)
IMM GRANULOCYTES NFR BLD AUTO: 0.4 % — SIGNIFICANT CHANGE UP (ref 0–0.9)
KETONES UR-MCNC: NEGATIVE MG/DL — SIGNIFICANT CHANGE UP
LEUKOCYTE ESTERASE UR-ACNC: ABNORMAL
LYMPHOCYTES # BLD AUTO: 1.64 K/UL — SIGNIFICANT CHANGE UP (ref 1–3.3)
LYMPHOCYTES # BLD AUTO: 19.5 % — SIGNIFICANT CHANGE UP (ref 13–44)
MCHC RBC-ENTMCNC: 26.3 PG — LOW (ref 27–34)
MCHC RBC-ENTMCNC: 32.5 G/DL — SIGNIFICANT CHANGE UP (ref 32–36)
MCV RBC AUTO: 81 FL — SIGNIFICANT CHANGE UP (ref 80–100)
MONOCYTES # BLD AUTO: 0.62 K/UL — SIGNIFICANT CHANGE UP (ref 0–0.9)
MONOCYTES NFR BLD AUTO: 7.4 % — SIGNIFICANT CHANGE UP (ref 2–14)
MUCOUS THREADS # UR AUTO: PRESENT
NEUTROPHILS # BLD AUTO: 5.94 K/UL — SIGNIFICANT CHANGE UP (ref 1.8–7.4)
NEUTROPHILS NFR BLD AUTO: 70.4 % — SIGNIFICANT CHANGE UP (ref 43–77)
NITRITE UR-MCNC: NEGATIVE — SIGNIFICANT CHANGE UP
NRBC # BLD AUTO: 0 K/UL — SIGNIFICANT CHANGE UP (ref 0–0)
NRBC # BLD: 0 /100 WBCS — SIGNIFICANT CHANGE UP (ref 0–0)
NRBC # FLD: 0 K/UL — SIGNIFICANT CHANGE UP (ref 0–0)
NRBC BLD-RTO: 0 /100 WBCS — SIGNIFICANT CHANGE UP (ref 0–0)
PH UR: 5.5 — SIGNIFICANT CHANGE UP (ref 5–8)
PLATELET # BLD AUTO: 182 K/UL — SIGNIFICANT CHANGE UP (ref 150–400)
POTASSIUM SERPL-MCNC: 3.4 MMOL/L — LOW (ref 3.5–5.3)
POTASSIUM SERPL-SCNC: 3.4 MMOL/L — LOW (ref 3.5–5.3)
PROT SERPL-MCNC: 6.5 G/DL — SIGNIFICANT CHANGE UP (ref 6–8.3)
PROT UR-MCNC: 30 MG/DL
RBC # BLD: 4.52 M/UL — SIGNIFICANT CHANGE UP (ref 3.8–5.2)
RBC # FLD: 14 % — SIGNIFICANT CHANGE UP (ref 10.3–14.5)
RBC CASTS # UR COMP ASSIST: 11 /HPF — HIGH (ref 0–4)
REVIEW: SIGNIFICANT CHANGE UP
SODIUM SERPL-SCNC: 140 MMOL/L — SIGNIFICANT CHANGE UP (ref 135–145)
SP GR SPEC: 1.02 — SIGNIFICANT CHANGE UP (ref 1–1.03)
SQUAMOUS # UR AUTO: 5 /HPF — SIGNIFICANT CHANGE UP (ref 0–5)
UROBILINOGEN FLD QL: 0.2 MG/DL — SIGNIFICANT CHANGE UP (ref 0.2–1)
WBC # BLD: 8.43 K/UL — SIGNIFICANT CHANGE UP (ref 3.8–10.5)
WBC # FLD AUTO: 8.43 K/UL — SIGNIFICANT CHANGE UP (ref 3.8–10.5)
WBC UR QL: 9 /HPF — HIGH (ref 0–5)

## 2024-11-08 LAB
CULTURE RESULTS: SIGNIFICANT CHANGE UP
SPECIMEN SOURCE: SIGNIFICANT CHANGE UP

## 2024-11-09 ENCOUNTER — NON-APPOINTMENT (OUTPATIENT)
Age: 44
End: 2024-11-09

## 2024-11-11 ENCOUNTER — NON-APPOINTMENT (OUTPATIENT)
Age: 44
End: 2024-11-11

## 2024-11-15 ENCOUNTER — LABORATORY RESULT (OUTPATIENT)
Age: 44
End: 2024-11-15

## 2024-11-15 ENCOUNTER — APPOINTMENT (OUTPATIENT)
Dept: INTERNAL MEDICINE | Facility: CLINIC | Age: 44
End: 2024-11-15
Payer: COMMERCIAL

## 2024-11-15 ENCOUNTER — OUTPATIENT (OUTPATIENT)
Dept: OUTPATIENT SERVICES | Facility: HOSPITAL | Age: 44
LOS: 1 days | End: 2024-11-15

## 2024-11-15 VITALS
RESPIRATION RATE: 16 BRPM | BODY MASS INDEX: 26.29 KG/M2 | HEART RATE: 83 BPM | OXYGEN SATURATION: 98 % | SYSTOLIC BLOOD PRESSURE: 110 MMHG | WEIGHT: 154 LBS | DIASTOLIC BLOOD PRESSURE: 76 MMHG | HEIGHT: 64 IN

## 2024-11-15 DIAGNOSIS — E78.5 HYPERLIPIDEMIA, UNSPECIFIED: ICD-10-CM

## 2024-11-15 DIAGNOSIS — Z95.828 PRESENCE OF OTHER VASCULAR IMPLANTS AND GRAFTS: Chronic | ICD-10-CM

## 2024-11-15 DIAGNOSIS — Z90.79 ACQUIRED ABSENCE OF OTHER GENITAL ORGAN(S): Chronic | ICD-10-CM

## 2024-11-15 DIAGNOSIS — E11.9 TYPE 2 DIABETES MELLITUS W/OUT COMPLICATIONS: ICD-10-CM

## 2024-11-15 DIAGNOSIS — N20.0 CALCULUS OF KIDNEY: Chronic | ICD-10-CM

## 2024-11-15 DIAGNOSIS — R10.9 UNSPECIFIED ABDOMINAL PAIN: ICD-10-CM

## 2024-11-15 PROCEDURE — 99213 OFFICE O/P EST LOW 20 MIN: CPT

## 2024-11-18 ENCOUNTER — TRANSCRIPTION ENCOUNTER (OUTPATIENT)
Age: 44
End: 2024-11-18

## 2024-11-18 LAB
APPEARANCE: CLEAR
BILIRUBIN URINE: ABNORMAL
BLOOD URINE: ABNORMAL
COLOR: NORMAL
GLUCOSE QUALITATIVE U: NEGATIVE MG/DL
KETONES URINE: NEGATIVE MG/DL
LEUKOCYTE ESTERASE URINE: ABNORMAL
NITRITE URINE: NEGATIVE
PH URINE: 5.5
PROTEIN URINE: NORMAL MG/DL
SPECIFIC GRAVITY URINE: 1.02
UROBILINOGEN URINE: 0.2 MG/DL

## 2024-11-19 DIAGNOSIS — E11.9 TYPE 2 DIABETES MELLITUS WITHOUT COMPLICATIONS: ICD-10-CM

## 2024-11-19 DIAGNOSIS — E78.5 HYPERLIPIDEMIA, UNSPECIFIED: ICD-10-CM

## 2024-11-21 ENCOUNTER — RESULT REVIEW (OUTPATIENT)
Age: 44
End: 2024-11-21

## 2024-11-21 ENCOUNTER — APPOINTMENT (OUTPATIENT)
Dept: OBGYN | Facility: HOSPITAL | Age: 44
End: 2024-11-21
Payer: COMMERCIAL

## 2024-11-21 ENCOUNTER — OUTPATIENT (OUTPATIENT)
Dept: OUTPATIENT SERVICES | Facility: HOSPITAL | Age: 44
LOS: 1 days | End: 2024-11-21

## 2024-11-21 VITALS
WEIGHT: 152 LBS | SYSTOLIC BLOOD PRESSURE: 122 MMHG | BODY MASS INDEX: 25.95 KG/M2 | HEIGHT: 64 IN | TEMPERATURE: 98.4 F | HEART RATE: 87 BPM | DIASTOLIC BLOOD PRESSURE: 83 MMHG

## 2024-11-21 DIAGNOSIS — Z01.419 ENCOUNTER FOR GYNECOLOGICAL EXAMINATION (GENERAL) (ROUTINE) WITHOUT ABNORMAL FINDINGS: ICD-10-CM

## 2024-11-21 DIAGNOSIS — Z30.42 ENCOUNTER FOR SURVEILLANCE OF INJECTABLE CONTRACEPTIVE: ICD-10-CM

## 2024-11-21 DIAGNOSIS — Z90.79 ACQUIRED ABSENCE OF OTHER GENITAL ORGAN(S): Chronic | ICD-10-CM

## 2024-11-21 DIAGNOSIS — Z01.419 ENCOUNTER FOR GYNECOLOGICAL EXAMINATION (GENERAL) (ROUTINE) W/OUT ABNORMAL FINDINGS: ICD-10-CM

## 2024-11-21 DIAGNOSIS — Z95.828 PRESENCE OF OTHER VASCULAR IMPLANTS AND GRAFTS: Chronic | ICD-10-CM

## 2024-11-21 DIAGNOSIS — N20.0 CALCULUS OF KIDNEY: Chronic | ICD-10-CM

## 2024-11-21 LAB — HIV 1+2 AB+HIV1 P24 AG SERPL QL IA: SIGNIFICANT CHANGE UP

## 2024-11-21 PROCEDURE — 99396 PREV VISIT EST AGE 40-64: CPT | Mod: 25

## 2024-11-21 RX ORDER — MEDROXYPROGESTERONE ACETATE 150 MG/ML
150 INJECTION, SUSPENSION INTRAMUSCULAR
Qty: 0 | Refills: 0 | Status: COMPLETED | OUTPATIENT
Start: 2024-11-21

## 2024-11-21 RX ADMIN — MEDROXYPROGESTERONE ACETATE 0 MG/ML: 150 INJECTION, SUSPENSION INTRAMUSCULAR at 00:00

## 2024-11-22 LAB
C TRACH RRNA SPEC QL NAA+PROBE: SIGNIFICANT CHANGE UP
HBV SURFACE AG SER-ACNC: SIGNIFICANT CHANGE UP
HCV AB S/CO SERPL IA: 0.04 S/CO — SIGNIFICANT CHANGE UP (ref 0–0.99)
HCV AB SERPL-IMP: SIGNIFICANT CHANGE UP
N GONORRHOEA RRNA SPEC QL NAA+PROBE: SIGNIFICANT CHANGE UP
SPECIMEN SOURCE: SIGNIFICANT CHANGE UP
T PALLIDUM AB TITR SER: NEGATIVE — SIGNIFICANT CHANGE UP

## 2024-12-20 ENCOUNTER — APPOINTMENT (OUTPATIENT)
Dept: INTERNAL MEDICINE | Facility: CLINIC | Age: 44
End: 2024-12-20

## 2024-12-30 ENCOUNTER — APPOINTMENT (OUTPATIENT)
Dept: DERMATOLOGY | Facility: CLINIC | Age: 44
End: 2024-12-30

## 2024-12-30 ENCOUNTER — APPOINTMENT (OUTPATIENT)
Dept: DERMATOLOGY | Facility: CLINIC | Age: 44
End: 2024-12-30
Payer: COMMERCIAL

## 2024-12-30 DIAGNOSIS — L70.0 ACNE VULGARIS: ICD-10-CM

## 2024-12-30 DIAGNOSIS — L91.0 HYPERTROPHIC SCAR: ICD-10-CM

## 2024-12-30 PROCEDURE — 99214 OFFICE O/P EST MOD 30 MIN: CPT | Mod: 95

## 2025-01-03 ENCOUNTER — APPOINTMENT (OUTPATIENT)
Dept: DERMATOLOGY | Facility: CLINIC | Age: 45
End: 2025-01-03

## 2025-01-28 ENCOUNTER — OUTPATIENT (OUTPATIENT)
Dept: OUTPATIENT SERVICES | Facility: HOSPITAL | Age: 45
LOS: 1 days | Discharge: HOME | End: 2025-01-28

## 2025-01-28 DIAGNOSIS — C85.10 UNSPECIFIED B-CELL LYMPHOMA, UNSPECIFIED SITE: ICD-10-CM

## 2025-01-28 DIAGNOSIS — N20.0 CALCULUS OF KIDNEY: Chronic | ICD-10-CM

## 2025-01-28 DIAGNOSIS — Z90.79 ACQUIRED ABSENCE OF OTHER GENITAL ORGAN(S): Chronic | ICD-10-CM

## 2025-01-28 DIAGNOSIS — Z95.828 PRESENCE OF OTHER VASCULAR IMPLANTS AND GRAFTS: Chronic | ICD-10-CM

## 2025-01-29 ENCOUNTER — APPOINTMENT (OUTPATIENT)
Dept: HEMATOLOGY ONCOLOGY | Facility: CLINIC | Age: 45
End: 2025-01-29
Payer: COMMERCIAL

## 2025-01-29 ENCOUNTER — RESULT REVIEW (OUTPATIENT)
Age: 45
End: 2025-01-29

## 2025-01-29 ENCOUNTER — NON-APPOINTMENT (OUTPATIENT)
Age: 45
End: 2025-01-29

## 2025-01-29 VITALS
OXYGEN SATURATION: 99 % | HEART RATE: 89 BPM | RESPIRATION RATE: 16 BRPM | SYSTOLIC BLOOD PRESSURE: 118 MMHG | BODY MASS INDEX: 25.81 KG/M2 | WEIGHT: 150.36 LBS | TEMPERATURE: 97.9 F | DIASTOLIC BLOOD PRESSURE: 82 MMHG

## 2025-01-29 LAB
BASOPHILS # BLD AUTO: 0.05 K/UL — SIGNIFICANT CHANGE UP (ref 0–0.2)
BASOPHILS NFR BLD AUTO: 0.8 % — SIGNIFICANT CHANGE UP (ref 0–2)
EOSINOPHIL # BLD AUTO: 0.14 K/UL — SIGNIFICANT CHANGE UP (ref 0–0.5)
EOSINOPHIL NFR BLD AUTO: 2.4 % — SIGNIFICANT CHANGE UP (ref 0–6)
HCT VFR BLD CALC: 35.9 % — SIGNIFICANT CHANGE UP (ref 34.5–45)
HGB BLD-MCNC: 11.5 G/DL — SIGNIFICANT CHANGE UP (ref 11.5–15.5)
IMM GRANULOCYTES NFR BLD AUTO: 0.3 % — SIGNIFICANT CHANGE UP (ref 0–0.9)
LYMPHOCYTES # BLD AUTO: 1.17 K/UL — SIGNIFICANT CHANGE UP (ref 1–3.3)
LYMPHOCYTES # BLD AUTO: 19.8 % — SIGNIFICANT CHANGE UP (ref 13–44)
MCHC RBC-ENTMCNC: 25.7 PG — LOW (ref 27–34)
MCHC RBC-ENTMCNC: 32 G/DL — SIGNIFICANT CHANGE UP (ref 32–36)
MCV RBC AUTO: 80.1 FL — SIGNIFICANT CHANGE UP (ref 80–100)
MONOCYTES # BLD AUTO: 0.42 K/UL — SIGNIFICANT CHANGE UP (ref 0–0.9)
MONOCYTES NFR BLD AUTO: 7.1 % — SIGNIFICANT CHANGE UP (ref 2–14)
NEUTROPHILS # BLD AUTO: 4.1 K/UL — SIGNIFICANT CHANGE UP (ref 1.8–7.4)
NEUTROPHILS NFR BLD AUTO: 69.6 % — SIGNIFICANT CHANGE UP (ref 43–77)
NRBC # BLD: 0 /100 WBCS — SIGNIFICANT CHANGE UP (ref 0–0)
NRBC BLD-RTO: 0 /100 WBCS — SIGNIFICANT CHANGE UP (ref 0–0)
PLATELET # BLD AUTO: 227 K/UL — SIGNIFICANT CHANGE UP (ref 150–400)
RBC # BLD: 4.48 M/UL — SIGNIFICANT CHANGE UP (ref 3.8–5.2)
RBC # FLD: 15 % — HIGH (ref 10.3–14.5)
WBC # BLD: 5.9 K/UL — SIGNIFICANT CHANGE UP (ref 3.8–10.5)
WBC # FLD AUTO: 5.9 K/UL — SIGNIFICANT CHANGE UP (ref 3.8–10.5)

## 2025-01-29 PROCEDURE — 99214 OFFICE O/P EST MOD 30 MIN: CPT

## 2025-01-29 PROCEDURE — G2211 COMPLEX E/M VISIT ADD ON: CPT

## 2025-01-30 ENCOUNTER — APPOINTMENT (OUTPATIENT)
Dept: ULTRASOUND IMAGING | Facility: IMAGING CENTER | Age: 45
End: 2025-01-30
Payer: COMMERCIAL

## 2025-01-30 ENCOUNTER — RESULT REVIEW (OUTPATIENT)
Age: 45
End: 2025-01-30

## 2025-01-30 ENCOUNTER — OUTPATIENT (OUTPATIENT)
Dept: OUTPATIENT SERVICES | Facility: HOSPITAL | Age: 45
LOS: 1 days | End: 2025-01-30
Payer: COMMERCIAL

## 2025-01-30 ENCOUNTER — APPOINTMENT (OUTPATIENT)
Dept: MAMMOGRAPHY | Facility: IMAGING CENTER | Age: 45
End: 2025-01-30
Payer: COMMERCIAL

## 2025-01-30 DIAGNOSIS — Z95.828 PRESENCE OF OTHER VASCULAR IMPLANTS AND GRAFTS: Chronic | ICD-10-CM

## 2025-01-30 DIAGNOSIS — N20.0 CALCULUS OF KIDNEY: Chronic | ICD-10-CM

## 2025-01-30 DIAGNOSIS — R92.30 DENSE BREASTS, UNSPECIFIED: ICD-10-CM

## 2025-01-30 DIAGNOSIS — Z90.79 ACQUIRED ABSENCE OF OTHER GENITAL ORGAN(S): Chronic | ICD-10-CM

## 2025-01-30 LAB
ALBUMIN SERPL ELPH-MCNC: 4.4 G/DL
ALP BLD-CCNC: 115 U/L
ALT SERPL-CCNC: 62 U/L
ANION GAP SERPL CALC-SCNC: 13 MMOL/L
AST SERPL-CCNC: 40 U/L
BILIRUB SERPL-MCNC: 0.5 MG/DL
BUN SERPL-MCNC: 7 MG/DL
CALCIUM SERPL-MCNC: 9.5 MG/DL
CHLORIDE SERPL-SCNC: 109 MMOL/L
CO2 SERPL-SCNC: 20 MMOL/L
CREAT SERPL-MCNC: 0.74 MG/DL
EGFR: 103 ML/MIN/1.73M2
GLUCOSE SERPL-MCNC: 112 MG/DL
LDH SERPL-CCNC: 209 U/L
POTASSIUM SERPL-SCNC: 4.6 MMOL/L
PROT SERPL-MCNC: 6 G/DL
SODIUM SERPL-SCNC: 143 MMOL/L

## 2025-01-30 PROCEDURE — 76641 ULTRASOUND BREAST COMPLETE: CPT | Mod: 26,50

## 2025-01-30 PROCEDURE — 77063 BREAST TOMOSYNTHESIS BI: CPT | Mod: 26

## 2025-01-30 PROCEDURE — 77063 BREAST TOMOSYNTHESIS BI: CPT

## 2025-01-30 PROCEDURE — 77067 SCR MAMMO BI INCL CAD: CPT | Mod: 26

## 2025-01-30 PROCEDURE — 77067 SCR MAMMO BI INCL CAD: CPT

## 2025-01-30 PROCEDURE — 76641 ULTRASOUND BREAST COMPLETE: CPT

## 2025-02-03 ENCOUNTER — NON-APPOINTMENT (OUTPATIENT)
Age: 45
End: 2025-02-03

## 2025-02-05 ENCOUNTER — OUTPATIENT (OUTPATIENT)
Dept: OUTPATIENT SERVICES | Facility: HOSPITAL | Age: 45
LOS: 1 days | End: 2025-02-05

## 2025-02-05 ENCOUNTER — MED ADMIN CHARGE (OUTPATIENT)
Age: 45
End: 2025-02-05

## 2025-02-05 ENCOUNTER — APPOINTMENT (OUTPATIENT)
Dept: INTERNAL MEDICINE | Facility: CLINIC | Age: 45
End: 2025-02-05

## 2025-02-05 VITALS
SYSTOLIC BLOOD PRESSURE: 122 MMHG | OXYGEN SATURATION: 98 % | BODY MASS INDEX: 25.95 KG/M2 | DIASTOLIC BLOOD PRESSURE: 72 MMHG | HEIGHT: 64 IN | HEART RATE: 86 BPM | RESPIRATION RATE: 16 BRPM | WEIGHT: 152 LBS

## 2025-02-05 DIAGNOSIS — J45.20 MILD INTERMITTENT ASTHMA, UNCOMPLICATED: ICD-10-CM

## 2025-02-05 DIAGNOSIS — Z95.828 PRESENCE OF OTHER VASCULAR IMPLANTS AND GRAFTS: Chronic | ICD-10-CM

## 2025-02-05 DIAGNOSIS — Z00.00 ENCOUNTER FOR GENERAL ADULT MEDICAL EXAMINATION W/OUT ABNORMAL FINDINGS: ICD-10-CM

## 2025-02-05 DIAGNOSIS — E11.9 TYPE 2 DIABETES MELLITUS W/OUT COMPLICATIONS: ICD-10-CM

## 2025-02-05 DIAGNOSIS — Z90.79 ACQUIRED ABSENCE OF OTHER GENITAL ORGAN(S): Chronic | ICD-10-CM

## 2025-02-05 DIAGNOSIS — Z23 ENCOUNTER FOR IMMUNIZATION: ICD-10-CM

## 2025-02-05 DIAGNOSIS — N20.0 CALCULUS OF KIDNEY: Chronic | ICD-10-CM

## 2025-02-05 DIAGNOSIS — E78.5 HYPERLIPIDEMIA, UNSPECIFIED: ICD-10-CM

## 2025-02-05 DIAGNOSIS — C85.10 UNSPECIFIED B-CELL LYMPHOMA, UNSPECIFIED SITE: ICD-10-CM

## 2025-02-05 PROCEDURE — 99396 PREV VISIT EST AGE 40-64: CPT | Mod: GE,25

## 2025-02-05 RX ORDER — SEMAGLUTIDE 1.34 MG/ML
4 INJECTION, SOLUTION SUBCUTANEOUS
Qty: 4 | Refills: 3 | Status: ACTIVE | COMMUNITY
Start: 2025-02-05 | End: 1900-01-01

## 2025-02-06 ENCOUNTER — OUTPATIENT (OUTPATIENT)
Dept: OUTPATIENT SERVICES | Facility: HOSPITAL | Age: 45
LOS: 1 days | End: 2025-02-06

## 2025-02-06 ENCOUNTER — APPOINTMENT (OUTPATIENT)
Dept: OBGYN | Facility: HOSPITAL | Age: 45
End: 2025-02-06
Payer: COMMERCIAL

## 2025-02-06 VITALS
SYSTOLIC BLOOD PRESSURE: 126 MMHG | HEART RATE: 78 BPM | WEIGHT: 151 LBS | TEMPERATURE: 98.6 F | DIASTOLIC BLOOD PRESSURE: 80 MMHG | HEIGHT: 64 IN | BODY MASS INDEX: 25.78 KG/M2

## 2025-02-06 DIAGNOSIS — N20.0 CALCULUS OF KIDNEY: Chronic | ICD-10-CM

## 2025-02-06 DIAGNOSIS — Z95.828 PRESENCE OF OTHER VASCULAR IMPLANTS AND GRAFTS: Chronic | ICD-10-CM

## 2025-02-06 DIAGNOSIS — B37.31 ACUTE CANDIDIASIS OF VULVA AND VAGINA: ICD-10-CM

## 2025-02-06 DIAGNOSIS — Z30.42 ENCOUNTER FOR SURVEILLANCE OF INJECTABLE CONTRACEPTIVE: ICD-10-CM

## 2025-02-06 DIAGNOSIS — Z90.79 ACQUIRED ABSENCE OF OTHER GENITAL ORGAN(S): Chronic | ICD-10-CM

## 2025-02-06 LAB
CHOLEST SERPL-MCNC: 191 MG/DL
CREAT SPEC-SCNC: 135 MG/DL
ESTIMATED AVERAGE GLUCOSE: 123 MG/DL
HBA1C MFR BLD HPLC: 5.9 %
HDLC SERPL-MCNC: 37 MG/DL
LDLC SERPL CALC-MCNC: 135 MG/DL
MICROALBUMIN 24H UR DL<=1MG/L-MCNC: 2.3 MG/DL
MICROALBUMIN/CREAT 24H UR-RTO: 17 MG/G
NONHDLC SERPL-MCNC: 154 MG/DL
TRIGL SERPL-MCNC: 104 MG/DL

## 2025-02-06 PROCEDURE — 99213 OFFICE O/P EST LOW 20 MIN: CPT | Mod: 25

## 2025-02-06 RX ORDER — TERCONAZOLE 8 MG/G
0.8 CREAM VAGINAL
Qty: 1 | Refills: 1 | Status: ACTIVE | COMMUNITY
Start: 2025-02-06 | End: 1900-01-01

## 2025-02-06 RX ORDER — FLUCONAZOLE 150 MG/1
150 TABLET ORAL
Qty: 2 | Refills: 1 | Status: ACTIVE | COMMUNITY
Start: 2025-02-06 | End: 1900-01-01

## 2025-02-06 RX ORDER — MEDROXYPROGESTERONE ACETATE 150 MG/ML
150 INJECTION, SUSPENSION INTRAMUSCULAR
Qty: 0 | Refills: 0 | Status: COMPLETED | OUTPATIENT
Start: 2025-02-06

## 2025-02-06 RX ADMIN — MEDROXYPROGESTERONE ACETATE 0 MG/ML: 150 INJECTION, SUSPENSION INTRAMUSCULAR at 00:00

## 2025-02-12 ENCOUNTER — NON-APPOINTMENT (OUTPATIENT)
Age: 45
End: 2025-02-12

## 2025-02-12 ENCOUNTER — APPOINTMENT (OUTPATIENT)
Dept: INTERNAL MEDICINE | Facility: CLINIC | Age: 45
End: 2025-02-12

## 2025-02-12 DIAGNOSIS — E78.5 HYPERLIPIDEMIA, UNSPECIFIED: ICD-10-CM

## 2025-02-12 DIAGNOSIS — E11.9 TYPE 2 DIABETES MELLITUS WITHOUT COMPLICATIONS: ICD-10-CM

## 2025-02-12 DIAGNOSIS — C85.10 UNSPECIFIED B-CELL LYMPHOMA, UNSPECIFIED SITE: ICD-10-CM

## 2025-02-12 DIAGNOSIS — Z00.00 ENCOUNTER FOR GENERAL ADULT MEDICAL EXAMINATION WITHOUT ABNORMAL FINDINGS: ICD-10-CM

## 2025-02-12 DIAGNOSIS — J45.20 MILD INTERMITTENT ASTHMA, UNCOMPLICATED: ICD-10-CM

## 2025-02-26 ENCOUNTER — APPOINTMENT (OUTPATIENT)
Dept: INTERNAL MEDICINE | Facility: CLINIC | Age: 45
End: 2025-02-26

## 2025-03-04 ENCOUNTER — NON-APPOINTMENT (OUTPATIENT)
Age: 45
End: 2025-03-04

## 2025-03-06 ENCOUNTER — APPOINTMENT (OUTPATIENT)
Dept: INTERNAL MEDICINE | Facility: CLINIC | Age: 45
End: 2025-03-06

## 2025-03-06 ENCOUNTER — NON-APPOINTMENT (OUTPATIENT)
Age: 45
End: 2025-03-06

## 2025-03-10 ENCOUNTER — APPOINTMENT (OUTPATIENT)
Dept: INTERNAL MEDICINE | Facility: CLINIC | Age: 45
End: 2025-03-10

## 2025-03-10 ENCOUNTER — OUTPATIENT (OUTPATIENT)
Dept: OUTPATIENT SERVICES | Facility: HOSPITAL | Age: 45
LOS: 1 days | End: 2025-03-10

## 2025-03-10 VITALS — WEIGHT: 152 LBS | HEIGHT: 64 IN | BODY MASS INDEX: 25.95 KG/M2

## 2025-03-10 DIAGNOSIS — Z95.828 PRESENCE OF OTHER VASCULAR IMPLANTS AND GRAFTS: Chronic | ICD-10-CM

## 2025-03-10 DIAGNOSIS — E11.9 TYPE 2 DIABETES MELLITUS W/OUT COMPLICATIONS: ICD-10-CM

## 2025-03-10 DIAGNOSIS — Z90.79 ACQUIRED ABSENCE OF OTHER GENITAL ORGAN(S): Chronic | ICD-10-CM

## 2025-03-10 DIAGNOSIS — N20.0 CALCULUS OF KIDNEY: Chronic | ICD-10-CM

## 2025-03-10 DIAGNOSIS — N95.1 MENOPAUSAL AND FEMALE CLIMACTERIC STATES: ICD-10-CM

## 2025-03-10 DIAGNOSIS — E78.5 HYPERLIPIDEMIA, UNSPECIFIED: ICD-10-CM

## 2025-03-11 ENCOUNTER — APPOINTMENT (OUTPATIENT)
Dept: OPHTHALMOLOGY | Facility: CLINIC | Age: 45
End: 2025-03-11
Payer: COMMERCIAL

## 2025-03-11 ENCOUNTER — NON-APPOINTMENT (OUTPATIENT)
Age: 45
End: 2025-03-11

## 2025-03-11 PROBLEM — N95.1 MENOPAUSAL SYMPTOMS: Status: ACTIVE | Noted: 2025-03-11

## 2025-03-11 PROCEDURE — 92133 CPTRZD OPH DX IMG PST SGM ON: CPT

## 2025-03-11 PROCEDURE — 68761 CLOSE TEAR DUCT OPENING: CPT | Mod: E2,E4

## 2025-03-11 PROCEDURE — 92012 INTRM OPH EXAM EST PATIENT: CPT | Mod: 25

## 2025-03-11 PROCEDURE — 92083 EXTENDED VISUAL FIELD XM: CPT

## 2025-03-18 RX ORDER — PRAVASTATIN SODIUM 20 MG/1
20 TABLET ORAL
Qty: 30 | Refills: 3 | Status: ACTIVE | COMMUNITY
Start: 2025-03-18 | End: 1900-01-01

## 2025-04-24 ENCOUNTER — APPOINTMENT (OUTPATIENT)
Dept: OBGYN | Facility: HOSPITAL | Age: 45
End: 2025-04-24
Payer: COMMERCIAL

## 2025-04-24 ENCOUNTER — OUTPATIENT (OUTPATIENT)
Dept: OUTPATIENT SERVICES | Facility: HOSPITAL | Age: 45
LOS: 1 days | End: 2025-04-24

## 2025-04-24 VITALS
HEIGHT: 64 IN | HEART RATE: 81 BPM | TEMPERATURE: 97.9 F | WEIGHT: 152 LBS | SYSTOLIC BLOOD PRESSURE: 110 MMHG | DIASTOLIC BLOOD PRESSURE: 60 MMHG | BODY MASS INDEX: 25.95 KG/M2

## 2025-04-24 DIAGNOSIS — Z95.828 PRESENCE OF OTHER VASCULAR IMPLANTS AND GRAFTS: Chronic | ICD-10-CM

## 2025-04-24 DIAGNOSIS — Z30.42 ENCOUNTER FOR SURVEILLANCE OF INJECTABLE CONTRACEPTIVE: ICD-10-CM

## 2025-04-24 DIAGNOSIS — Z90.79 ACQUIRED ABSENCE OF OTHER GENITAL ORGAN(S): Chronic | ICD-10-CM

## 2025-04-24 PROCEDURE — 99213 OFFICE O/P EST LOW 20 MIN: CPT | Mod: 25

## 2025-04-24 RX ORDER — MEDROXYPROGESTERONE ACETATE 150 MG/ML
150 INJECTION, SUSPENSION INTRAMUSCULAR
Qty: 0 | Refills: 0 | Status: COMPLETED | OUTPATIENT
Start: 2025-04-24

## 2025-04-24 RX ADMIN — MEDROXYPROGESTERONE ACETATE 0 MG/ML: 150 INJECTION, SUSPENSION, EXTENDED RELEASE INTRAMUSCULAR at 00:00

## 2025-05-22 ENCOUNTER — APPOINTMENT (OUTPATIENT)
Dept: INTERNAL MEDICINE | Facility: CLINIC | Age: 45
End: 2025-05-22
Payer: COMMERCIAL

## 2025-05-22 ENCOUNTER — OUTPATIENT (OUTPATIENT)
Dept: OUTPATIENT SERVICES | Facility: HOSPITAL | Age: 45
LOS: 1 days | End: 2025-05-22

## 2025-05-22 VITALS
RESPIRATION RATE: 16 BRPM | SYSTOLIC BLOOD PRESSURE: 110 MMHG | HEIGHT: 64 IN | BODY MASS INDEX: 25.27 KG/M2 | HEART RATE: 90 BPM | DIASTOLIC BLOOD PRESSURE: 76 MMHG | WEIGHT: 148 LBS | OXYGEN SATURATION: 98 %

## 2025-05-22 DIAGNOSIS — M25.569 PAIN IN UNSPECIFIED KNEE: ICD-10-CM

## 2025-05-22 DIAGNOSIS — Z90.79 ACQUIRED ABSENCE OF OTHER GENITAL ORGAN(S): Chronic | ICD-10-CM

## 2025-05-22 DIAGNOSIS — N20.0 CALCULUS OF KIDNEY: Chronic | ICD-10-CM

## 2025-05-22 DIAGNOSIS — E78.5 HYPERLIPIDEMIA, UNSPECIFIED: ICD-10-CM

## 2025-05-22 DIAGNOSIS — Z95.828 PRESENCE OF OTHER VASCULAR IMPLANTS AND GRAFTS: Chronic | ICD-10-CM

## 2025-05-22 DIAGNOSIS — E11.9 TYPE 2 DIABETES MELLITUS W/OUT COMPLICATIONS: ICD-10-CM

## 2025-05-22 PROCEDURE — 99213 OFFICE O/P EST LOW 20 MIN: CPT

## 2025-05-22 PROCEDURE — G2211 COMPLEX E/M VISIT ADD ON: CPT | Mod: NC

## 2025-05-23 PROBLEM — M25.569 KNEE PAIN: Status: ACTIVE | Noted: 2018-06-27

## 2025-05-27 DIAGNOSIS — E11.9 TYPE 2 DIABETES MELLITUS WITHOUT COMPLICATIONS: ICD-10-CM

## 2025-05-27 DIAGNOSIS — E78.5 HYPERLIPIDEMIA, UNSPECIFIED: ICD-10-CM

## 2025-05-27 DIAGNOSIS — M25.569 PAIN IN UNSPECIFIED KNEE: ICD-10-CM

## 2025-06-09 ENCOUNTER — APPOINTMENT (OUTPATIENT)
Dept: DERMATOLOGY | Facility: CLINIC | Age: 45
End: 2025-06-09
Payer: COMMERCIAL

## 2025-06-09 PROCEDURE — 99213 OFFICE O/P EST LOW 20 MIN: CPT | Mod: 25

## 2025-06-09 PROCEDURE — 11900 INJECT SKIN LESIONS </W 7: CPT

## 2025-06-24 NOTE — ASSESSMENT
Thank you for choosing Veronica Duarte PA-C at [facility]. It is a pleasure to be a part of your healthcare team.     Please let us know if you need anything. You may contact us at [phone] for any questions or concerns.    Please call Centralized Patient Scheduling should you need to schedule any imaging at [phone].     You may receive a short survey after this visit.  We appreciate your feedback.     Have a great day!     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 \par We started treatment with Bendamustine/Rituxan on 6/24/19. LAD has improved. C5 delayed due to neutropenia, received on 10/30/19. C6 on 11/26/19, tolerated treatment well. PET/CT done at end of treatment showed interval significant decrease in size and metabolism of LAD. \par \par PET/CT unchanged. Started on Rituximab maintenance on 6/25/20. Cont every 2 mo. s/p 4th dose of Rituxan on 12/30/20. She had worsened side effects after 4th dose and does not want to continue. Will hold further Rituxan maintenance given intolerance. PET/CT post Rituxan stable\par f/u w/ PMD and gyn\par All questions answered\par port flush every 6 wks\par pt is eligible for COVID vaccine, will provide letter \par RTC 3-4 mo\par

## 2025-06-28 ENCOUNTER — OUTPATIENT (OUTPATIENT)
Dept: OUTPATIENT SERVICES | Facility: HOSPITAL | Age: 45
LOS: 1 days | Discharge: HOME | End: 2025-06-28

## 2025-06-28 DIAGNOSIS — N20.0 CALCULUS OF KIDNEY: Chronic | ICD-10-CM

## 2025-06-28 DIAGNOSIS — Z95.828 PRESENCE OF OTHER VASCULAR IMPLANTS AND GRAFTS: Chronic | ICD-10-CM

## 2025-06-28 DIAGNOSIS — C85.10 UNSPECIFIED B-CELL LYMPHOMA, UNSPECIFIED SITE: ICD-10-CM

## 2025-06-28 DIAGNOSIS — Z90.79 ACQUIRED ABSENCE OF OTHER GENITAL ORGAN(S): Chronic | ICD-10-CM

## 2025-07-01 ENCOUNTER — OUTPATIENT (OUTPATIENT)
Dept: OUTPATIENT SERVICES | Facility: HOSPITAL | Age: 45
LOS: 1 days | Discharge: HOME | End: 2025-07-01

## 2025-07-01 DIAGNOSIS — Z95.828 PRESENCE OF OTHER VASCULAR IMPLANTS AND GRAFTS: Chronic | ICD-10-CM

## 2025-07-01 DIAGNOSIS — Z90.79 ACQUIRED ABSENCE OF OTHER GENITAL ORGAN(S): Chronic | ICD-10-CM

## 2025-07-01 DIAGNOSIS — N20.0 CALCULUS OF KIDNEY: Chronic | ICD-10-CM

## 2025-07-01 DIAGNOSIS — C85.10 UNSPECIFIED B-CELL LYMPHOMA, UNSPECIFIED SITE: ICD-10-CM

## 2025-07-02 ENCOUNTER — RESULT REVIEW (OUTPATIENT)
Age: 45
End: 2025-07-02

## 2025-07-02 ENCOUNTER — APPOINTMENT (OUTPATIENT)
Dept: HEMATOLOGY ONCOLOGY | Facility: CLINIC | Age: 45
End: 2025-07-02
Payer: MEDICAID

## 2025-07-02 VITALS
BODY MASS INDEX: 24.98 KG/M2 | TEMPERATURE: 97.7 F | SYSTOLIC BLOOD PRESSURE: 126 MMHG | HEART RATE: 91 BPM | DIASTOLIC BLOOD PRESSURE: 87 MMHG | OXYGEN SATURATION: 99 % | WEIGHT: 145.51 LBS | RESPIRATION RATE: 16 BRPM

## 2025-07-02 LAB
BASOPHILS # BLD AUTO: 0.04 K/UL — SIGNIFICANT CHANGE UP (ref 0–0.2)
BASOPHILS NFR BLD AUTO: 0.8 % — SIGNIFICANT CHANGE UP (ref 0–2)
EOSINOPHIL # BLD AUTO: 0.12 K/UL — SIGNIFICANT CHANGE UP (ref 0–0.5)
EOSINOPHIL NFR BLD AUTO: 2.3 % — SIGNIFICANT CHANGE UP (ref 0–6)
HCT VFR BLD CALC: 40.6 % — SIGNIFICANT CHANGE UP (ref 34.5–45)
HGB BLD-MCNC: 12.9 G/DL — SIGNIFICANT CHANGE UP (ref 11.5–15.5)
IMM GRANULOCYTES NFR BLD AUTO: 0.6 % — SIGNIFICANT CHANGE UP (ref 0–0.9)
LYMPHOCYTES # BLD AUTO: 1.51 K/UL — SIGNIFICANT CHANGE UP (ref 1–3.3)
LYMPHOCYTES # BLD AUTO: 28.6 % — SIGNIFICANT CHANGE UP (ref 13–44)
MCHC RBC-ENTMCNC: 25.2 PG — LOW (ref 27–34)
MCHC RBC-ENTMCNC: 31.8 G/DL — LOW (ref 32–36)
MCV RBC AUTO: 79.3 FL — LOW (ref 80–100)
MONOCYTES # BLD AUTO: 0.35 K/UL — SIGNIFICANT CHANGE UP (ref 0–0.9)
MONOCYTES NFR BLD AUTO: 6.6 % — SIGNIFICANT CHANGE UP (ref 2–14)
NEUTROPHILS # BLD AUTO: 3.23 K/UL — SIGNIFICANT CHANGE UP (ref 1.8–7.4)
NEUTROPHILS NFR BLD AUTO: 61.1 % — SIGNIFICANT CHANGE UP (ref 43–77)
NRBC BLD AUTO-RTO: 0 /100 WBCS — SIGNIFICANT CHANGE UP (ref 0–0)
PLATELET # BLD AUTO: 222 K/UL — SIGNIFICANT CHANGE UP (ref 150–400)
RBC # BLD: 5.12 M/UL — SIGNIFICANT CHANGE UP (ref 3.8–5.2)
RBC # FLD: 14.3 % — SIGNIFICANT CHANGE UP (ref 10.3–14.5)
WBC # BLD: 5.28 K/UL — SIGNIFICANT CHANGE UP (ref 3.8–10.5)
WBC # FLD AUTO: 5.28 K/UL — SIGNIFICANT CHANGE UP (ref 3.8–10.5)

## 2025-07-02 PROCEDURE — 99214 OFFICE O/P EST MOD 30 MIN: CPT

## 2025-07-02 PROCEDURE — G2211 COMPLEX E/M VISIT ADD ON: CPT | Mod: NC

## 2025-07-10 ENCOUNTER — OUTPATIENT (OUTPATIENT)
Dept: OUTPATIENT SERVICES | Facility: HOSPITAL | Age: 45
LOS: 1 days | End: 2025-07-10

## 2025-07-10 ENCOUNTER — APPOINTMENT (OUTPATIENT)
Dept: OBGYN | Facility: HOSPITAL | Age: 45
End: 2025-07-10
Payer: MEDICAID

## 2025-07-10 VITALS
DIASTOLIC BLOOD PRESSURE: 87 MMHG | WEIGHT: 144 LBS | SYSTOLIC BLOOD PRESSURE: 128 MMHG | HEIGHT: 64 IN | HEART RATE: 89 BPM | BODY MASS INDEX: 24.59 KG/M2 | TEMPERATURE: 97.8 F

## 2025-07-10 DIAGNOSIS — Z95.828 PRESENCE OF OTHER VASCULAR IMPLANTS AND GRAFTS: Chronic | ICD-10-CM

## 2025-07-10 DIAGNOSIS — Z90.79 ACQUIRED ABSENCE OF OTHER GENITAL ORGAN(S): Chronic | ICD-10-CM

## 2025-07-10 DIAGNOSIS — N20.0 CALCULUS OF KIDNEY: Chronic | ICD-10-CM

## 2025-07-10 PROBLEM — N95.1 PERIMENOPAUSE: Status: ACTIVE | Noted: 2025-07-10

## 2025-07-10 PROBLEM — R23.2 HOT FLASHES: Status: ACTIVE | Noted: 2025-07-10

## 2025-07-10 PROCEDURE — 99213 OFFICE O/P EST LOW 20 MIN: CPT | Mod: 25

## 2025-07-10 RX ORDER — MEDROXYPROGESTERONE ACETATE 150 MG/ML
150 INJECTION, SUSPENSION INTRAMUSCULAR
Qty: 0 | Refills: 0 | Status: COMPLETED | OUTPATIENT
Start: 2025-07-10

## 2025-07-10 RX ADMIN — MEDROXYPROGESTERONE ACETATE 0 MG/ML: 150 INJECTION, SUSPENSION, EXTENDED RELEASE INTRAMUSCULAR at 00:00

## 2025-07-11 DIAGNOSIS — Z30.42 ENCOUNTER FOR SURVEILLANCE OF INJECTABLE CONTRACEPTIVE: ICD-10-CM

## 2025-07-11 DIAGNOSIS — R23.2 FLUSHING: ICD-10-CM

## 2025-07-11 DIAGNOSIS — N95.1 MENOPAUSAL AND FEMALE CLIMACTERIC STATES: ICD-10-CM

## 2025-08-01 NOTE — CONSULT NOTE ADULT - ASSESSMENT
8/1/2025    Writer met with pt for brief clinical check-in to follow up on Short Term Disability paperwork that was sent to IOP treatment team.  Pt reported the claim was initiate by PHP provider during his first time in partial, Pt later sharing that his second time in PHP, his provider then said that they would continue with the extension. This writer explained how [facility] is unable to complete this paperwork; however, [facility] treatment team is able to collaborate if needed. This writer to follow up with PHP providers to see if they would be able to continue Pt's extension. If not, Pt understandable that his outpatient provider (Clare Ahn) would have to take over.    Patient identified the following need/s to support their health and wellbeing:  denies any current needs. Writer will continue to assess for needs accordingly.    Pt was alert and oriented to person, place, time, and situation.  Pt's mood was anxious, affect restricted.  Pt's speech was clear, thought content tracking.  Pt denied SI/HI/USH, AVH, or Substance Use concerns.      Plan:  IOP provider will check in with patient during next scheduled session to follow-up (8/4/2025).      Jessica Velez, LPC    
Assessmet;    41y/o Female w/ PMHx of Cancer and chronic salpingitis. Pt is afebrile, has no difficulty urinating, CVA tenderness present. US abdomen showed 4mm stone in the proximal right ureter.    Assessment  -Pain control  -Continue abx, IV fluids, tamsulosin  -Order Urine culture  -If Urine culture is negative, schedule surgery on Sunday  -Discussed w/ Dr. Tilley    
acute pyelo  urine culture is nogrowth   pcn allergy was hives a few years ago   no angioedema or anaphyllaxis   discussed with patient   will start rocephin am   if no reactions and stable discharge plan on po vantin x 10 more days   will follow with you thanks

## 2025-09-08 ENCOUNTER — NON-APPOINTMENT (OUTPATIENT)
Age: 45
End: 2025-09-08

## 2025-09-09 ENCOUNTER — NON-APPOINTMENT (OUTPATIENT)
Age: 45
End: 2025-09-09

## 2025-09-09 ENCOUNTER — APPOINTMENT (OUTPATIENT)
Dept: OPHTHALMOLOGY | Facility: CLINIC | Age: 45
End: 2025-09-09
Payer: MEDICAID

## 2025-09-09 PROCEDURE — 92250 FUNDUS PHOTOGRAPHY W/I&R: CPT

## 2025-09-09 PROCEDURE — 68761 CLOSE TEAR DUCT OPENING: CPT | Mod: E2,E4

## 2025-09-09 PROCEDURE — 92014 COMPRE OPH EXAM EST PT 1/>: CPT | Mod: 25

## (undated) DEVICE — BIOPSY FORCEP RADIAL JAW 4 STANDARD WITH NEEDLE

## (undated) DEVICE — CLAMP BX HOT RAD JAW 3

## (undated) DEVICE — DENTURE CUP PINK

## (undated) DEVICE — GLV 8.5 PROTEXIS

## (undated) DEVICE — WARMING BLANKET FULL ADULT

## (undated) DEVICE — PACK HEAD & NECK

## (undated) DEVICE — BASIN EMESIS 10IN GRADUATED MAUVE

## (undated) DEVICE — PROTECTOR HEEL / ELBOW FLUFFY

## (undated) DEVICE — SUT SILK 2-0 30" SH

## (undated) DEVICE — CATH IV SAFE BC 22G X 1" (BLUE)

## (undated) DEVICE — DRAIN JACKSON PRATT 7MM FLAT FULL NO TROCAR

## (undated) DEVICE — DRAPE TOWEL BLUE 17" X 24"

## (undated) DEVICE — SALIVA EJECTOR (BLUE)

## (undated) DEVICE — UNDERPAD LINEN SAVER 17 X 24"

## (undated) DEVICE — DRAPE SPLIT SHEET 77" X 120"

## (undated) DEVICE — GLV 8 PROTEXIS (WHITE)

## (undated) DEVICE — DRSG 2X2

## (undated) DEVICE — TUBING IV SET GRAVITY 3Y 100" MACRO

## (undated) DEVICE — LUBRICATING JELLY HR ONE SHOT 3G

## (undated) DEVICE — ELCTR BOVIE PENCIL SMOKE EVACUATION

## (undated) DEVICE — BIOPSY FORCEP COLD DISP

## (undated) DEVICE — STAPLER SKIN VISI-STAT 35 WIDE

## (undated) DEVICE — FACESHIELD FULL VISOR

## (undated) DEVICE — VENODYNE/SCD SLEEVE CALF MEDIUM

## (undated) DEVICE — DRSG BENZOIN 0.6CC

## (undated) DEVICE — DRAIN RESERVOIR FOR JACKSON PRATT 100CC CARDINAL

## (undated) DEVICE — CONTAINER FORMALIN 80ML YELLOW

## (undated) DEVICE — LABELS BLANK W PEN

## (undated) DEVICE — SUT VICRYL 3-0 27" SH UNDYED

## (undated) DEVICE — RUBBER BANDS STERILE

## (undated) DEVICE — PACK IV START WITH CHG

## (undated) DEVICE — ELCTR GROUNDING PAD ADULT COVIDIEN

## (undated) DEVICE — PREP BETADINE SPONGE STICKS

## (undated) DEVICE — ELCTR ECG CONDUCTIVE ADHESIVE

## (undated) DEVICE — DRAPE MAGNETIC INSTRUMENT MEDIUM

## (undated) DEVICE — GOWN LG

## (undated) DEVICE — SOL IRR POUR H2O 500ML

## (undated) DEVICE — TONSIL ROLLS

## (undated) DEVICE — CANISTER DISPOSABLE THIN WALL 3000CC

## (undated) DEVICE — DRSG BANDAID 0.75X3"

## (undated) DEVICE — SOL IRR POUR NS 0.9% 500ML

## (undated) DEVICE — TUBING MEDI-VAC W MAXIGRIP CONNECTORS 1/4"X6'

## (undated) DEVICE — LINE BREATHE SAMPLNG

## (undated) DEVICE — LAP PAD W RING 18 X 18"

## (undated) DEVICE — DRAPE 3/4 SHEET 52X76"

## (undated) DEVICE — SPONGE PEANUT AUTO COUNT

## (undated) DEVICE — BITE BLOCK ADULT 20 X 27MM (GREEN)

## (undated) DEVICE — SUT BIOSYN 4-0 18" P-12

## (undated) DEVICE — DRAPE 1/2 SHEET 40X57"

## (undated) DEVICE — DRSG CURITY GAUZE SPONGE 4 X 4" 12-PLY NON-STERILE